# Patient Record
Sex: FEMALE | Race: WHITE | Employment: UNEMPLOYED | URBAN - NONMETROPOLITAN AREA
[De-identification: names, ages, dates, MRNs, and addresses within clinical notes are randomized per-mention and may not be internally consistent; named-entity substitution may affect disease eponyms.]

---

## 2017-07-21 ENCOUNTER — OFFICE VISIT (OUTPATIENT)
Dept: ORTHOPEDIC SURGERY | Age: 57
End: 2017-07-21

## 2017-07-21 DIAGNOSIS — M17.11 PRIMARY OSTEOARTHRITIS OF RIGHT KNEE: ICD-10-CM

## 2017-07-21 DIAGNOSIS — M23.300 DEGENERATIVE TEAR OF LATERAL MENISCUS OF RIGHT KNEE: ICD-10-CM

## 2017-07-21 DIAGNOSIS — M25.561 LATERAL KNEE PAIN, RIGHT: Primary | ICD-10-CM

## 2017-07-21 PROBLEM — M25.569 LATERAL KNEE PAIN: Status: ACTIVE | Noted: 2017-07-21

## 2017-07-21 PROCEDURE — 99213 OFFICE O/P EST LOW 20 MIN: CPT | Performed by: ORTHOPAEDIC SURGERY

## 2017-07-21 RX ORDER — NITROFURANTOIN MACROCRYSTALS 50 MG/1
50 CAPSULE ORAL 4 TIMES DAILY
Status: ON HOLD | COMMUNITY
End: 2020-11-09 | Stop reason: HOSPADM

## 2017-07-21 RX ORDER — METOPROLOL SUCCINATE 25 MG/1
25 TABLET, EXTENDED RELEASE ORAL DAILY
Status: ON HOLD | COMMUNITY
End: 2021-03-12 | Stop reason: HOSPADM

## 2017-09-15 ENCOUNTER — OFFICE VISIT (OUTPATIENT)
Dept: ORTHOPEDIC SURGERY | Age: 57
End: 2017-09-15

## 2017-09-15 DIAGNOSIS — M23.300 DEGENERATIVE TEAR OF LATERAL MENISCUS OF RIGHT KNEE: Primary | ICD-10-CM

## 2017-09-15 DIAGNOSIS — M25.561 LATERAL KNEE PAIN, RIGHT: ICD-10-CM

## 2017-09-15 DIAGNOSIS — M17.11 PRIMARY OSTEOARTHRITIS OF RIGHT KNEE: ICD-10-CM

## 2017-09-15 PROCEDURE — 99213 OFFICE O/P EST LOW 20 MIN: CPT | Performed by: ORTHOPAEDIC SURGERY

## 2017-09-15 RX ORDER — MELOXICAM 15 MG/1
15 TABLET ORAL DAILY
Status: ON HOLD | COMMUNITY
End: 2020-12-29 | Stop reason: HOSPADM

## 2017-09-15 RX ORDER — LATANOPROST 50 UG/ML
1 SOLUTION/ DROPS OPHTHALMIC NIGHTLY
COMMUNITY

## 2017-12-01 ENCOUNTER — OFFICE VISIT (OUTPATIENT)
Dept: ORTHOPEDIC SURGERY | Age: 57
End: 2017-12-01

## 2017-12-01 VITALS
SYSTOLIC BLOOD PRESSURE: 191 MMHG | WEIGHT: 230 LBS | HEART RATE: 69 BPM | DIASTOLIC BLOOD PRESSURE: 100 MMHG | BODY MASS INDEX: 34.86 KG/M2 | HEIGHT: 68 IN

## 2017-12-01 DIAGNOSIS — M23.300 DEGENERATIVE TEAR OF LATERAL MENISCUS OF RIGHT KNEE: Primary | ICD-10-CM

## 2017-12-01 DIAGNOSIS — M17.11 PRIMARY OSTEOARTHRITIS OF RIGHT KNEE: ICD-10-CM

## 2017-12-01 PROCEDURE — 99213 OFFICE O/P EST LOW 20 MIN: CPT | Performed by: ORTHOPAEDIC SURGERY

## 2017-12-01 PROCEDURE — 3014F SCREEN MAMMO DOC REV: CPT | Performed by: ORTHOPAEDIC SURGERY

## 2017-12-01 PROCEDURE — 1036F TOBACCO NON-USER: CPT | Performed by: ORTHOPAEDIC SURGERY

## 2017-12-01 PROCEDURE — G8484 FLU IMMUNIZE NO ADMIN: HCPCS | Performed by: ORTHOPAEDIC SURGERY

## 2017-12-01 PROCEDURE — G8427 DOCREV CUR MEDS BY ELIG CLIN: HCPCS | Performed by: ORTHOPAEDIC SURGERY

## 2017-12-01 PROCEDURE — 3017F COLORECTAL CA SCREEN DOC REV: CPT | Performed by: ORTHOPAEDIC SURGERY

## 2017-12-01 PROCEDURE — G8417 CALC BMI ABV UP PARAM F/U: HCPCS | Performed by: ORTHOPAEDIC SURGERY

## 2017-12-01 RX ORDER — ACETAMINOPHEN 500 MG
500 TABLET ORAL EVERY 6 HOURS PRN
Status: ON HOLD | COMMUNITY
End: 2020-11-09 | Stop reason: HOSPADM

## 2017-12-01 RX ORDER — OMEGA-3S/DHA/EPA/FISH OIL/D3 300MG-1000
400 CAPSULE ORAL DAILY
COMMUNITY

## 2017-12-01 RX ORDER — PHENOL 1.4 %
1 AEROSOL, SPRAY (ML) MUCOUS MEMBRANE DAILY
COMMUNITY

## 2017-12-01 NOTE — LETTER
 meloxicam (MOBIC) 15 MG tablet Take 15 mg by mouth daily      ciprofloxacin (CIPRO) 500 MG tablet Take 500 mg by mouth 2 times daily      oxyCODONE-acetaminophen (PERCOCET) 5-325 MG per tablet Take 1 tablet by mouth every 4 hours as needed for Pain       No current facility-administered medications for this visit. Social    Social History     Social History    Marital status:      Spouse name: N/A    Number of children: N/A    Years of education: N/A     Occupational History    Not on file. Social History Main Topics    Smoking status: Never Smoker    Smokeless tobacco: Never Used    Alcohol use Not on file    Drug use: Unknown    Sexual activity: Not on file     Other Topics Concern    Not on file     Social History Narrative    No narrative on file       Family HISTORY    History reviewed. No pertinent family history. PHYSICAL EXAM    Vital Signs:  BP (!) 191/100   Pulse 69   Ht 5' 8\" (1.727 m)   Wt 230 lb (104.3 kg)   BMI 34.97 kg/m²    General Appearance: obese body habitus with a BMI of 35. Alert and oriented to person, place, and time. Affect:  Normal.   Gait:  Antalgic, using a cane with some genu valgum, which is mild to moderate on the right versus the left.l. Good balance and coordination. Skin:  Intact. Sensation:  Intact. Strength:  Intact. Reflexes:  Intact. Pulses:  Intact. Knee Exam:    Effusion:  negative    Range of Motion Right Left   Extension 0 0   Flexion 110 110     Provocative Test Right Left    Positive Negative Positive Negative   Anterior drawer       Lachman       Posterior drawer       Varus testing       Valgus testing       Joint line tenderness         Additional Exam Comments:  Her neurocirculatory and lymphatic exam is normal symmetric both lower extremities. She has lateral compartment pain on the right knee with direct palpation and Gladis's maneuver.       IMAGING STUDIES MRI of her right knee reveals osteoarthritis with lateral meniscus tear    IMPRESSION    Right knee pain secondary to degenerative lateral meniscus tear and osteoarthritis    PLAN      1. Conservative care options including physical therapy, NSAIDs, bracing, and activity modification were discussed. 2.  The indications for therapeutic injections were discussed. 3.  The indications for additional imaging studies were discussed. 4.  After considering the various options discussed, the patient elected to pursue a course that includes proceeding with arthroscopic intervention for partial lateral meniscectomy, right knee      INFORMED CONSENT NOTE        We discussed the risks, benefits, and alternatives to the proposed procedure, as well as the necessity of other members of the healthcare team participating in the procedure. All questions were answered and the patient elected to proceed with the proposed procedure and signed the informed consent form. Laquita Dakin.  Stanford Boros, MD Adella Runner, MD

## 2017-12-01 NOTE — PROGRESS NOTES
KNEE VISIT      HISTORY OF PRESENT ILLNESS    Paola Oppenheim is a 61 y.o. female who presents for Reevaluation of her right knee. She's here for review of her MRI. Still complains of knee pain. Most and some lateral compartment. ROS    Well documented in her patient history form dated 17  All other ROS negative except for above.     Past Surgical history    Past Surgical History:   Procedure Laterality Date    BLADDER REPAIR       SECTION      CHOLECYSTECTOMY      CYSTOSCOPY         PAST MEDICAL    Past Medical History:   Diagnosis Date    CHF [facility]congestive heart failure) ([facility] [address]     HTN (hypertension)     UTI (urinary tract infection)        Allergies    No Known Allergies    Meds    Current Outpatient Prescriptions   Medication Sig Dispense Refill    vitamin D3 (CHOLECALCIFEROL) 400 units TABS tablet Take 400 Units by mouth daily      Flaxseed Oil OIL 1,400 mg by Does not apply route daily      calcium carbonate 600 MG TABS tablet Take 1 tablet by mouth daily      acetaminophen (TYLENOL) 500 MG tablet Take 500 mg by mouth every 6 hours as needed for Pain      cimetidine (TAGAMET HB) 200 MG tablet Take 200 mg by mouth as needed      metoprolol succinate (TOPROL XL) 25 MG extended release tablet Take 25 mg by mouth daily      nitrofurantoin (MACRODANTIN) 50 MG capsule Take 50 mg by mouth 4 times daily      rivaroxaban (XARELTO) 20 MG TABS tablet Take 20 mg by mouth      lisinopril (PRINIVIL;ZESTRIL) 20 MG tablet Take 40 mg by mouth daily       potassium chloride (KLOR-CON) 20 MEQ packet Take 20 mEq by mouth 2 times daily      latanoprost (XALATAN) 0.005 % ophthalmic solution 1 drop nightly      meloxicam (MOBIC) 15 MG tablet Take 15 mg by mouth daily      ciprofloxacin (CIPRO) 500 MG tablet Take 500 mg by mouth 2 times daily      oxyCODONE-acetaminophen (PERCOCET) 5-325 MG per tablet Take 1 tablet by mouth every 4 hours as needed for Pain       No current facility-administered medications for this visit. Social    Social History     Social History    Marital status:      Spouse name: N/A    Number of children: N/A    Years of education: N/A     Occupational History    Not on file. Social History Main Topics    Smoking status: Never Smoker    Smokeless tobacco: Never Used    Alcohol use Not on file    Drug use: Unknown    Sexual activity: Not on file     Other Topics Concern    Not on file     Social History Narrative    No narrative on file       Family HISTORY    History reviewed. No pertinent family history. PHYSICAL EXAM    Vital Signs:  BP (!) 191/100   Pulse 69   Ht 5' 8\" (1.727 m)   Wt 230 lb (104.3 kg)   BMI 34.97 kg/m²   General Appearance: obese body habitus with a BMI of 35. Alert and oriented to person, place, and time. Affect:  Normal.   Gait:  Antalgic, using a cane with some genu valgum, which is mild to moderate on the right versus the left.l. Good balance and coordination. Skin:  Intact. Sensation:  Intact. Strength:  Intact. Reflexes:  Intact. Pulses:  Intact. Knee Exam:    Effusion:  negative    Range of Motion Right Left   Extension 0 0   Flexion 110 110     Provocative Test Right Left    Positive Negative Positive Negative   Anterior drawer [] [x] [] [x]   Lachman [] [x] [] [x]   Posterior drawer [] [x] [] [x]   Varus testing [] [x] [] [x]   Valgus testing [x] [] [] [x]   Joint line tenderness [x] [] [] [x]     Additional Exam Comments:  Her neurocirculatory and lymphatic exam is normal symmetric both lower extremities. She has lateral compartment pain on the right knee with direct palpation and Gladis's maneuver. IMAGING STUDIES    MRI of her right knee reveals osteoarthritis with lateral meniscus tear    IMPRESSION    Right knee pain secondary to degenerative lateral meniscus tear and osteoarthritis    PLAN      1.   Conservative care options including physical therapy, NSAIDs, bracing, and activity modification were discussed. 2.  The indications for therapeutic injections were discussed. 3.  The indications for additional imaging studies were discussed. 4.  After considering the various options discussed, the patient elected to pursue a course that includes proceeding with arthroscopic intervention for partial lateral meniscectomy, right knee      INFORMED CONSENT NOTE        We discussed the risks, benefits, and alternatives to the proposed procedure, as well as the necessity of other members of the healthcare team participating in the procedure. All questions were answered and the patient elected to proceed with the proposed procedure and signed the informed consent form.

## 2017-12-14 ENCOUNTER — TELEPHONE (OUTPATIENT)
Dept: ORTHOPEDIC SURGERY | Age: 57
End: 2017-12-14

## 2017-12-14 NOTE — TELEPHONE ENCOUNTER
Pending (no info in sys regarding Surgery)   Marylin ZAIDI (R Knee)  12/20/17 @ 3:12 pm, still nothing in sys regarding sx  12/27/17 @ 10:48a.m. Nothing in sys regarding sx  12/28/17 sent Juanito Ran a telephone msg.

## 2018-01-03 ENCOUNTER — OFFICE VISIT (OUTPATIENT)
Dept: ORTHOPEDIC SURGERY | Age: 58
End: 2018-01-03

## 2018-01-03 DIAGNOSIS — M17.11 PRIMARY OSTEOARTHRITIS OF RIGHT KNEE: ICD-10-CM

## 2018-01-03 DIAGNOSIS — M23.300 DEGENERATIVE TEAR OF LATERAL MENISCUS OF RIGHT KNEE: Primary | ICD-10-CM

## 2018-01-03 PROCEDURE — 99024 POSTOP FOLLOW-UP VISIT: CPT | Performed by: ORTHOPAEDIC SURGERY

## 2018-01-03 NOTE — PROGRESS NOTES
FOLLOW-UP VISIT      The patient returns for follow-up s/p right knee video arthroscopy, partial lateral meniscectomy, chondroplasty    Date of Surgery    Flow 20/9/17    Wound Status    Sutures Removed, Incisions are healing well with no surrounding erythema, and minimal ecchymosis.      Exam    She has no signs of infection DVT but moderate pain and still using a walker    Plan    Formal physical therapy    Follow-up Appointment    4 weeks

## 2018-01-03 NOTE — PATIENT INSTRUCTIONS
floor. Hold for about 6 seconds, then slowly lower your leg. 5. Do 8 to 12 repetitions. Straight-leg raises to the back    1. Lie on your stomach, and lift your leg straight up behind you (toward the ceiling). 2. Lift your toes about 6 inches off the floor, hold for about 6 seconds, then lower slowly. 3. Do 8 to 12 repetitions. Straight-leg raises to the inside    1. Lie on the side of your body with the injured leg. 2. You can either prop your other (good) leg up on a chair, or you can bend your good knee and put that foot in front of your injured knee. Do not drop your hip back. 3. Tighten the muscles on the front of your thigh to straighten your injured knee. 4. Keep your kneecap pointing forward, and lift your whole leg up toward the ceiling about 6 inches. Hold for about 6 seconds, then lower slowly. 5. Do 8 to 12 repetitions. Heel dig bridging    1. Lie on your back with both knees bent and your ankles bent so that only your heels are digging into the floor. Your knees should be bent about 90 degrees. 2. Then push your heels into the floor, squeeze your buttocks, and lift your hips off the floor until your shoulders, hips, and knees are all in a straight line. 3. Hold for about 6 seconds as you continue to breathe normally, and then slowly lower your hips back down to the floor and rest for up to 10 seconds. 4. Do 8 to 12 repetitions. Hamstring curls    1. Lie on your stomach with your knees straight. If your kneecap is uncomfortable, roll up a washcloth and put it under your leg just above your kneecap. 2. Lift the foot of your injured leg by bending the knee so that you bring the foot up toward your buttock. If this motion hurts, try it without bending your knee quite as far. This may help you avoid any painful motion. 3. Slowly lower your leg back to the floor. 4. Do 8 to 12 repetitions.   5. With permission from your doctor or physical therapist, you may also want to add a cuff weight to your ankle (not more than 5 pounds). With weight, you do not have to lift your leg more than 12 inches to get a hamstring workout. Shallow standing knee bends    Do this exercise only if you have very little pain; if you have no clicking, locking, or giving way if you have an injured knee; and if it does not hurt while you are doing 8 to 12 repetitions. 1. Stand with your hands lightly resting on a counter or chair in front of you. Put your feet shoulder-width apart. 2. Slowly bend your knees so that you squat down like you are going to sit in a chair. Make sure your knees do not go in front of your toes. 3. Lower yourself about 6 inches. Your heels should remain on the floor at all times. 4. Rise slowly to a standing position. Heel raises    1. Stand with your feet a few inches apart, with your hands lightly resting on a counter or chair in front of you. 2. Slowly raise your heels off the floor while keeping your knees straight. 3. Hold for about 6 seconds, then slowly lower your heels to the floor. 4. Do 8 to 12 repetitions several times during the day. Follow-up care is a key part of your treatment and safety. Be sure to make and go to all appointments, and call your doctor if you are having problems. It's also a good idea to know your test results and keep a list of the medicines you take. Where can you learn more? Go to https://[employer]. org and sign in to your [employer] account. Enter V833 in the [facility] box to learn more about \"Knee: Exercises. \"     If you do not have an account, please click on the \"Sign Up Now\" link. Current as of: March 21, 2017  Content Version: 11.4  © 5163-2187 [employer], [employer]. Care instructions adapted under license by [facility] ([facility]).  If you have questions about a medical condition or this instruction, always ask your healthcare professional. [place] [address] any warranty or liability for your use of this

## 2018-01-31 ENCOUNTER — OFFICE VISIT (OUTPATIENT)
Dept: ORTHOPEDIC SURGERY | Age: 58
End: 2018-01-31

## 2018-01-31 DIAGNOSIS — M17.11 PRIMARY OSTEOARTHRITIS OF RIGHT KNEE: Primary | ICD-10-CM

## 2018-01-31 PROCEDURE — 99024 POSTOP FOLLOW-UP VISIT: CPT | Performed by: ORTHOPAEDIC SURGERY

## 2018-02-01 ENCOUNTER — TELEPHONE (OUTPATIENT)
Dept: ORTHOPEDIC SURGERY | Age: 58
End: 2018-02-01

## 2018-02-01 NOTE — TELEPHONE ENCOUNTER
Faxed completed RTW Certification and Revised FMLA (R Knee) to [facility] @ [phone].

## 2020-10-30 ENCOUNTER — OFFICE VISIT (OUTPATIENT)
Dept: ORTHOPEDIC SURGERY | Age: 60
End: 2020-10-30
Payer: MEDICAID

## 2020-10-30 VITALS — WEIGHT: 290 LBS | HEIGHT: 68 IN | BODY MASS INDEX: 43.95 KG/M2

## 2020-10-30 PROBLEM — M17.12 PRIMARY OSTEOARTHRITIS OF LEFT KNEE: Status: ACTIVE | Noted: 2020-10-30

## 2020-10-30 PROCEDURE — G8427 DOCREV CUR MEDS BY ELIG CLIN: HCPCS | Performed by: ORTHOPAEDIC SURGERY

## 2020-10-30 PROCEDURE — 4004F PT TOBACCO SCREEN RCVD TLK: CPT | Performed by: ORTHOPAEDIC SURGERY

## 2020-10-30 PROCEDURE — G8484 FLU IMMUNIZE NO ADMIN: HCPCS | Performed by: ORTHOPAEDIC SURGERY

## 2020-10-30 PROCEDURE — G8417 CALC BMI ABV UP PARAM F/U: HCPCS | Performed by: ORTHOPAEDIC SURGERY

## 2020-10-30 PROCEDURE — 99203 OFFICE O/P NEW LOW 30 MIN: CPT | Performed by: ORTHOPAEDIC SURGERY

## 2020-10-30 PROCEDURE — 3017F COLORECTAL CA SCREEN DOC REV: CPT | Performed by: ORTHOPAEDIC SURGERY

## 2020-10-30 RX ORDER — AMLODIPINE BESYLATE 10 MG/1
TABLET ORAL
Status: ON HOLD | COMMUNITY
Start: 2020-10-08 | End: 2021-03-12 | Stop reason: HOSPADM

## 2020-10-30 RX ORDER — TRAMADOL HYDROCHLORIDE 50 MG/1
TABLET ORAL
Status: ON HOLD | COMMUNITY
Start: 2020-10-08 | End: 2020-11-09 | Stop reason: HOSPADM

## 2020-10-30 RX ORDER — ATORVASTATIN CALCIUM 40 MG/1
TABLET, FILM COATED ORAL
COMMUNITY
Start: 2020-10-29

## 2020-10-30 RX ORDER — OXYBUTYNIN CHLORIDE 5 MG/1
TABLET ORAL
COMMUNITY
Start: 2020-07-31

## 2020-10-30 NOTE — LETTER
[id number] [address]  Phone: [phone]  Fax: [phone]    Ke Mosley MD        2020    Alysia Tillman  [facility]. [address] [facility]   1960  DOS 10/30/2020      KNEE VISIT      HISTORY OF PRESENT ILLNESS    Alysia Tillman is a 60 y.o. female who presents for evaluation of bilateral knee pain. Right knee hurts worse than left and she was told by another practitioner that although she had grade 10/10 pain that she should eat 1 meal a day. Although she says she weighs 290 pounds, my guess is that she weighs closer to 350 or more pounds. She has difficult time walking and just wants some relief and recommendations because she was told to eating 1 meal a day is probably not the best source    ROS    Patient history form dated 10/30/2020  All other ROS negative except for above.     Past Surgical history    Past Surgical History:   Procedure Laterality Date    BLADDER REPAIR       SECTION      CHOLECYSTECTOMY      CYSTOSCOPY         PAST MEDICAL    Past Medical History:   Diagnosis Date    [facility]) ([facility] [address]     HTN (hypertension)     UTI (urinary tract infection)        Allergies    No Known Allergies    Meds    Current Outpatient Medications   Medication Sig Dispense Refill    amLODIPine (NORVASC) 10 MG tablet TAKE 1 TABLET BY MOUTH ONCE DAILY FOR 90 DAYS      atorvastatin (LIPITOR) 40 MG tablet       oxybutynin (DITROPAN) 5 MG tablet TAKE 1 TABLET BY MOUTH EVERY 8 HOURS      traMADol (ULTRAM) 50 MG tablet TAKE 1 TABLET BY MOUTH THREE TIMES DAILY AS NEEDED FOR PAIN      acetaminophen (TYLENOL) 500 MG tablet Take 500 mg by mouth every 6 hours as needed for Pain      cimetidine (TAGAMET HB) 200 MG tablet Take 200 mg by mouth as needed      metoprolol succinate (TOPROL XL) 25 MG extended release tablet Take 25 mg by mouth daily  nitrofurantoin (MACRODANTIN) 50 MG capsule Take 50 mg by mouth 4 times daily      rivaroxaban (XARELTO) 20 MG TABS tablet Take 20 mg by mouth      lisinopril (PRINIVIL;ZESTRIL) 20 MG tablet Take 40 mg by mouth daily       vitamin D3 (CHOLECALCIFEROL) 400 units TABS tablet Take 400 Units by mouth daily      Flaxseed Oil OIL 1,400 mg by Does not apply route daily      calcium carbonate 600 MG TABS tablet Take 1 tablet by mouth daily      latanoprost (XALATAN) 0.005 % ophthalmic solution 1 drop nightly      meloxicam (MOBIC) 15 MG tablet Take 15 mg by mouth daily      ciprofloxacin (CIPRO) 500 MG tablet Take 500 mg by mouth 2 times daily      oxyCODONE-acetaminophen (PERCOCET) 5-325 MG per tablet Take 1 tablet by mouth every 4 hours as needed for Pain      potassium chloride (KLOR-CON) 20 MEQ packet Take 20 mEq by mouth 2 times daily       No current facility-administered medications for this visit. Social    Social History     Socioeconomic History    Marital status:       Spouse name: Not on file    Number of children: Not on file    Years of education: Not on file    Highest education level: Not on file   Occupational History    Not on file   Social Needs    Financial resource strain: Not on file    Food insecurity     Worry: Not on file     Inability: Not on file    Transportation needs     Medical: Not on file     Non-medical: Not on file   Tobacco Use    Smoking status: Never Smoker    Smokeless tobacco: Never Used   Substance and Sexual Activity    Alcohol use: Not on file    Drug use: Not on file    Sexual activity: Not on file   Lifestyle    Physical activity     Days per week: Not on file     Minutes per session: Not on file    Stress: Not on file   Relationships    Social connections     Talks on phone: Not on file     Gets together: Not on file     Attends Adventism service: Not on file     Active member of club or organization: Not on file 2.  The indications for therapeutic injections were discussed. 3.  The indications for additional imaging studies were discussed. 4.  After considering the various options discussed, the patient elected to pursue a course that includes requesting viscosupplementation, a consultation with dietary and seek care with [facility] weight management or weight reduction department        MD Jose Eduardo Reddy MD

## 2020-10-30 NOTE — PROGRESS NOTES
KNEE VISIT      HISTORY OF PRESENT ILLNESS    Mary Grace Cordova is a 60 y.o. female who presents for evaluation of bilateral knee pain. Right knee hurts worse than left and she was told by another practitioner that although she had grade 10/10 pain that she should eat 1 meal a day. Although she says she weighs 290 pounds, my guess is that she weighs closer to 350 or more pounds. She has difficult time walking and just wants some relief and recommendations because she was told to eating 1 meal a day is probably not the best source    ROS    Patient history form dated 10/30/2020  All other ROS negative except for above.     Past Surgical history    Past Surgical History:   Procedure Laterality Date    BLADDER REPAIR       SECTION      CHOLECYSTECTOMY      CYSTOSCOPY         PAST MEDICAL    Past Medical History:   Diagnosis Date    [facility]est[facility]) ([facility] [address]     HTN (hypertension)     UTI (urinary tract infection)        Allergies    No Known Allergies    Meds    Current Outpatient Medications   Medication Sig Dispense Refill    amLODIPine (NORVASC) 10 MG tablet TAKE 1 TABLET BY MOUTH ONCE DAILY FOR 90 DAYS      atorvastatin (LIPITOR) 40 MG tablet       oxybutynin (DITROPAN) 5 MG tablet TAKE 1 TABLET BY MOUTH EVERY 8 HOURS      traMADol (ULTRAM) 50 MG tablet TAKE 1 TABLET BY MOUTH THREE TIMES DAILY AS NEEDED FOR PAIN      acetaminophen (TYLENOL) 500 MG tablet Take 500 mg by mouth every 6 hours as needed for Pain      cimetidine (TAGAMET HB) 200 MG tablet Take 200 mg by mouth as needed      metoprolol succinate (TOPROL XL) 25 MG extended release tablet Take 25 mg by mouth daily      nitrofurantoin (MACRODANTIN) 50 MG capsule Take 50 mg by mouth 4 times daily      rivaroxaban (XARELTO) 20 MG TABS tablet Take 20 mg by mouth      lisinopril (PRINIVIL;ZESTRIL) 20 MG tablet Take 40 mg by mouth daily       vitamin D3 (CHOLECALCIFEROL) 400 units TABS tablet Take 400 Units by mouth daily  Flaxseed Oil OIL 1,400 mg by Does not apply route daily      calcium carbonate 600 MG TABS tablet Take 1 tablet by mouth daily      latanoprost (XALATAN) 0.005 % ophthalmic solution 1 drop nightly      meloxicam (MOBIC) 15 MG tablet Take 15 mg by mouth daily      ciprofloxacin (CIPRO) 500 MG tablet Take 500 mg by mouth 2 times daily      oxyCODONE-acetaminophen (PERCOCET) 5-325 MG per tablet Take 1 tablet by mouth every 4 hours as needed for Pain      potassium chloride (KLOR-CON) 20 MEQ packet Take 20 mEq by mouth 2 times daily       No current facility-administered medications for this visit. Social    Social History     Socioeconomic History    Marital status:       Spouse name: Not on file    Number of children: Not on file    Years of education: Not on file    Highest education level: Not on file   Occupational History    Not on file   Social Needs    Financial resource strain: Not on file    Food insecurity     Worry: Not on file     Inability: Not on file    Transportation needs     Medical: Not on file     Non-medical: Not on file   Tobacco Use    Smoking status: Never Smoker    Smokeless tobacco: Never Used   Substance and Sexual Activity    Alcohol use: Not on file    Drug use: Not on file    Sexual activity: Not on file   Lifestyle    Physical activity     Days per week: Not on file     Minutes per session: Not on file    Stress: Not on file   Relationships    Social connections     Talks on phone: Not on file     Gets together: Not on file     Attends Latter-day service: Not on file     Active member of club or organization: Not on file     Attends meetings of clubs or organizations: Not on file     Relationship status: Not on file    Intimate partner violence     Fear of current or ex partner: Not on file     Emotionally abused: Not on file     Physically abused: Not on file     Forced sexual activity: Not on file   Other Topics Concern    Not on file   Social History Narrative    Not on file       Family HISTORY    No family history on file. PHYSICAL EXAM    Vital Signs:  Ht 5' 8\" (1.727 m)   Wt 290 lb (131.5 kg)   BMI 44.09 kg/m²   General Appearance: Obese body habitus with a BMI this problem much greater than 44. Alert and oriented to person, place, and time. Affect:  Normal.   Gait: Very unsteady with severe genu valgum right knee good balance and coordination. Skin:  Intact. Sensation:  Intact. Strength:  Intact. Reflexes:  Intact. Pulses:  Intact. Knee Exam:    Effusion: Difficult to obtain or determine because of the size of her legs    Range of Motion Right Left   Extension -10 -10   Flexion 85 110     Provocative Test Right Left    Positive Negative Positive Negative   Anterior drawer [] [x] [] [x]   Lachman [] [x] [] [x]   Posterior drawer [] [x] [] [x]   Varus testing [x] [] [x] []   Valgus testing [x] [] [x] []   Joint line tenderness [x] [] [x] []     Additional Exam Comments: She has cellulitis in both legs from chronic pedal edema and venous stasis. I told her she needs to get her legs up higher as opposed to trying to get them down to do exercise which she cannot do anyway. She is not a good surgical candidate for variety reasons including skin condition weight and activity. IMAGING STUDIES    X-rays 2 views of both knees reveal osteoarthritis of both knees    IMPRESSION    Severe arthritis both knees right worse than left    PLAN      1. Conservative care options including physical therapy, NSAIDs, bracing, and activity modification were discussed. 2.  The indications for therapeutic injections were discussed. 3.  The indications for additional imaging studies were discussed.    4.  After considering the various options discussed, the patient elected to pursue a course that includes requesting viscosupplementation, a consultation with dietary and seek care with [facility] weight management or weight reduction department

## 2020-11-05 ENCOUNTER — HOSPITAL ENCOUNTER (INPATIENT)
Age: 60
LOS: 7 days | Discharge: NURSING, REHAB OR LONG-TERM CARE | DRG: 313 | End: 2020-11-12
Attending: INTERNAL MEDICINE | Admitting: INTERNAL MEDICINE
Payer: MEDICAID

## 2020-11-05 PROBLEM — S82.131A RIGHT MEDIAL TIBIAL PLATEAU FRACTURE, CLOSED, INITIAL ENCOUNTER: Status: ACTIVE | Noted: 2020-11-05

## 2020-11-05 PROCEDURE — 6360000002 HC RX W HCPCS: Performed by: NURSE PRACTITIONER

## 2020-11-05 PROCEDURE — 1200000000 HC SEMI PRIVATE

## 2020-11-05 RX ORDER — ONDANSETRON 2 MG/ML
4 INJECTION INTRAMUSCULAR; INTRAVENOUS EVERY 6 HOURS PRN
Status: DISCONTINUED | OUTPATIENT
Start: 2020-11-05 | End: 2020-11-08

## 2020-11-05 RX ORDER — PROMETHAZINE HYDROCHLORIDE 25 MG/1
12.5 TABLET ORAL EVERY 6 HOURS PRN
Status: DISCONTINUED | OUTPATIENT
Start: 2020-11-05 | End: 2020-11-12 | Stop reason: HOSPADM

## 2020-11-05 RX ORDER — OXYCODONE HYDROCHLORIDE AND ACETAMINOPHEN 5; 325 MG/1; MG/1
2 TABLET ORAL EVERY 4 HOURS PRN
Status: DISCONTINUED | OUTPATIENT
Start: 2020-11-05 | End: 2020-11-12 | Stop reason: HOSPADM

## 2020-11-05 RX ORDER — MORPHINE SULFATE 2 MG/ML
2 INJECTION, SOLUTION INTRAMUSCULAR; INTRAVENOUS
Status: DISCONTINUED | OUTPATIENT
Start: 2020-11-05 | End: 2020-11-12 | Stop reason: HOSPADM

## 2020-11-05 RX ORDER — ACETAMINOPHEN 325 MG/1
650 TABLET ORAL EVERY 6 HOURS PRN
Status: DISCONTINUED | OUTPATIENT
Start: 2020-11-05 | End: 2020-11-12 | Stop reason: HOSPADM

## 2020-11-05 RX ORDER — SODIUM CHLORIDE 9 MG/ML
INJECTION, SOLUTION INTRAVENOUS CONTINUOUS
Status: DISCONTINUED | OUTPATIENT
Start: 2020-11-06 | End: 2020-11-07

## 2020-11-05 RX ORDER — ACETAMINOPHEN 650 MG/1
650 SUPPOSITORY RECTAL EVERY 6 HOURS PRN
Status: DISCONTINUED | OUTPATIENT
Start: 2020-11-05 | End: 2020-11-12 | Stop reason: HOSPADM

## 2020-11-05 RX ORDER — CYCLOBENZAPRINE HCL 10 MG
10 TABLET ORAL 3 TIMES DAILY PRN
Status: DISCONTINUED | OUTPATIENT
Start: 2020-11-05 | End: 2020-11-12 | Stop reason: HOSPADM

## 2020-11-05 RX ORDER — POLYETHYLENE GLYCOL 3350 17 G/17G
17 POWDER, FOR SOLUTION ORAL DAILY PRN
Status: DISCONTINUED | OUTPATIENT
Start: 2020-11-05 | End: 2020-11-12 | Stop reason: HOSPADM

## 2020-11-05 RX ORDER — SODIUM CHLORIDE 0.9 % (FLUSH) 0.9 %
10 SYRINGE (ML) INJECTION EVERY 12 HOURS SCHEDULED
Status: DISCONTINUED | OUTPATIENT
Start: 2020-11-06 | End: 2020-11-12 | Stop reason: HOSPADM

## 2020-11-05 RX ORDER — SODIUM CHLORIDE 0.9 % (FLUSH) 0.9 %
10 SYRINGE (ML) INJECTION PRN
Status: DISCONTINUED | OUTPATIENT
Start: 2020-11-05 | End: 2020-11-12 | Stop reason: HOSPADM

## 2020-11-05 RX ORDER — OXYCODONE HYDROCHLORIDE AND ACETAMINOPHEN 5; 325 MG/1; MG/1
1 TABLET ORAL EVERY 4 HOURS PRN
Status: DISCONTINUED | OUTPATIENT
Start: 2020-11-05 | End: 2020-11-12 | Stop reason: HOSPADM

## 2020-11-05 RX ORDER — MORPHINE SULFATE 4 MG/ML
4 INJECTION, SOLUTION INTRAMUSCULAR; INTRAVENOUS
Status: DISCONTINUED | OUTPATIENT
Start: 2020-11-05 | End: 2020-11-12 | Stop reason: HOSPADM

## 2020-11-05 ASSESSMENT — PAIN DESCRIPTION - LOCATION: LOCATION: LEG

## 2020-11-05 ASSESSMENT — PAIN DESCRIPTION - ORIENTATION: ORIENTATION: RIGHT

## 2020-11-05 ASSESSMENT — PAIN SCALES - GENERAL: PAINLEVEL_OUTOF10: 5

## 2020-11-05 ASSESSMENT — PAIN DESCRIPTION - PROGRESSION: CLINICAL_PROGRESSION: NOT CHANGED

## 2020-11-05 ASSESSMENT — PAIN DESCRIPTION - FREQUENCY: FREQUENCY: CONTINUOUS

## 2020-11-05 ASSESSMENT — PAIN DESCRIPTION - DESCRIPTORS: DESCRIPTORS: SHOOTING;SHARP;RADIATING

## 2020-11-05 ASSESSMENT — PAIN DESCRIPTION - ONSET: ONSET: SUDDEN

## 2020-11-05 ASSESSMENT — PAIN DESCRIPTION - PAIN TYPE: TYPE: ACUTE PAIN

## 2020-11-06 ENCOUNTER — APPOINTMENT (OUTPATIENT)
Dept: GENERAL RADIOLOGY | Age: 60
DRG: 313 | End: 2020-11-06
Attending: INTERNAL MEDICINE
Payer: MEDICAID

## 2020-11-06 ENCOUNTER — ANESTHESIA (OUTPATIENT)
Dept: OPERATING ROOM | Age: 60
DRG: 313 | End: 2020-11-06
Payer: MEDICAID

## 2020-11-06 ENCOUNTER — ANESTHESIA EVENT (OUTPATIENT)
Dept: OPERATING ROOM | Age: 60
DRG: 313 | End: 2020-11-06
Payer: MEDICAID

## 2020-11-06 VITALS
RESPIRATION RATE: 13 BRPM | SYSTOLIC BLOOD PRESSURE: 153 MMHG | OXYGEN SATURATION: 100 % | DIASTOLIC BLOOD PRESSURE: 79 MMHG

## 2020-11-06 LAB
ANION GAP SERPL CALCULATED.3IONS-SCNC: 12 MMOL/L (ref 3–16)
BASOPHILS ABSOLUTE: 0.1 K/UL (ref 0–0.2)
BASOPHILS RELATIVE PERCENT: 0.7 %
BUN BLDV-MCNC: 12 MG/DL (ref 7–20)
CALCIUM SERPL-MCNC: 9.3 MG/DL (ref 8.3–10.6)
CHLORIDE BLD-SCNC: 108 MMOL/L (ref 99–110)
CO2: 22 MMOL/L (ref 21–32)
CREAT SERPL-MCNC: <0.5 MG/DL (ref 0.6–1.2)
EKG ATRIAL RATE: 98 BPM
EKG DIAGNOSIS: NORMAL
EKG P AXIS: 38 DEGREES
EKG P-R INTERVAL: 114 MS
EKG Q-T INTERVAL: 348 MS
EKG QRS DURATION: 76 MS
EKG QTC CALCULATION (BAZETT): 444 MS
EKG R AXIS: -9 DEGREES
EKG T AXIS: 31 DEGREES
EKG VENTRICULAR RATE: 98 BPM
EOSINOPHILS ABSOLUTE: 0.1 K/UL (ref 0–0.6)
EOSINOPHILS RELATIVE PERCENT: 1.3 %
GFR AFRICAN AMERICAN: >60
GFR NON-AFRICAN AMERICAN: >60
GLUCOSE BLD-MCNC: 90 MG/DL (ref 70–99)
HCT VFR BLD CALC: 39.9 % (ref 36–48)
HEMOGLOBIN: 12.5 G/DL (ref 12–16)
LYMPHOCYTES ABSOLUTE: 0.8 K/UL (ref 1–5.1)
LYMPHOCYTES RELATIVE PERCENT: 8.2 %
MCH RBC QN AUTO: 32.6 PG (ref 26–34)
MCHC RBC AUTO-ENTMCNC: 31.3 G/DL (ref 31–36)
MCV RBC AUTO: 104.1 FL (ref 80–100)
MONOCYTES ABSOLUTE: 0.8 K/UL (ref 0–1.3)
MONOCYTES RELATIVE PERCENT: 7.8 %
NEUTROPHILS ABSOLUTE: 8.3 K/UL (ref 1.7–7.7)
NEUTROPHILS RELATIVE PERCENT: 82 %
PDW BLD-RTO: 17.1 % (ref 12.4–15.4)
PLATELET # BLD: 156 K/UL (ref 135–450)
PLATELET SLIDE REVIEW: ADEQUATE
PMV BLD AUTO: 11.1 FL (ref 5–10.5)
POTASSIUM REFLEX MAGNESIUM: 3.8 MMOL/L (ref 3.5–5.1)
RBC # BLD: 3.84 M/UL (ref 4–5.2)
SLIDE REVIEW: ABNORMAL
SODIUM BLD-SCNC: 142 MMOL/L (ref 136–145)
WBC # BLD: 10.1 K/UL (ref 4–11)

## 2020-11-06 PROCEDURE — 3600000015 HC SURGERY LEVEL 5 ADDTL 15MIN: Performed by: ORTHOPAEDIC SURGERY

## 2020-11-06 PROCEDURE — 6360000002 HC RX W HCPCS: Performed by: ANESTHESIOLOGY

## 2020-11-06 PROCEDURE — 3600000005 HC SURGERY LEVEL 5 BASE: Performed by: ORTHOPAEDIC SURGERY

## 2020-11-06 PROCEDURE — 36415 COLL VENOUS BLD VENIPUNCTURE: CPT

## 2020-11-06 PROCEDURE — 7100000000 HC PACU RECOVERY - FIRST 15 MIN: Performed by: ORTHOPAEDIC SURGERY

## 2020-11-06 PROCEDURE — 93010 ELECTROCARDIOGRAM REPORT: CPT | Performed by: INTERNAL MEDICINE

## 2020-11-06 PROCEDURE — 2500000003 HC RX 250 WO HCPCS: Performed by: ANESTHESIOLOGY

## 2020-11-06 PROCEDURE — 3700000000 HC ANESTHESIA ATTENDED CARE: Performed by: ORTHOPAEDIC SURGERY

## 2020-11-06 PROCEDURE — 6360000002 HC RX W HCPCS: Performed by: ORTHOPAEDIC SURGERY

## 2020-11-06 PROCEDURE — 2580000003 HC RX 258: Performed by: ORTHOPAEDIC SURGERY

## 2020-11-06 PROCEDURE — 2500000003 HC RX 250 WO HCPCS: Performed by: NURSE ANESTHETIST, CERTIFIED REGISTERED

## 2020-11-06 PROCEDURE — 6360000002 HC RX W HCPCS: Performed by: NURSE PRACTITIONER

## 2020-11-06 PROCEDURE — 3E0T3BZ INTRODUCTION OF ANESTHETIC AGENT INTO PERIPHERAL NERVES AND PLEXI, PERCUTANEOUS APPROACH: ICD-10-PCS | Performed by: ANESTHESIOLOGY

## 2020-11-06 PROCEDURE — 2580000003 HC RX 258: Performed by: NURSE PRACTITIONER

## 2020-11-06 PROCEDURE — 3700000001 HC ADD 15 MINUTES (ANESTHESIA): Performed by: ORTHOPAEDIC SURGERY

## 2020-11-06 PROCEDURE — 7100000001 HC PACU RECOVERY - ADDTL 15 MIN: Performed by: ORTHOPAEDIC SURGERY

## 2020-11-06 PROCEDURE — 0QSG36Z REPOSITION RIGHT TIBIA WITH INTRAMEDULLARY INTERNAL FIXATION DEVICE, PERCUTANEOUS APPROACH: ICD-10-PCS | Performed by: ORTHOPAEDIC SURGERY

## 2020-11-06 PROCEDURE — 2580000003 HC RX 258: Performed by: PHYSICIAN ASSISTANT

## 2020-11-06 PROCEDURE — 94761 N-INVAS EAR/PLS OXIMETRY MLT: CPT

## 2020-11-06 PROCEDURE — 64445 NJX AA&/STRD SCIATIC NRV IMG: CPT | Performed by: ANESTHESIOLOGY

## 2020-11-06 PROCEDURE — 3209999900 FLUORO FOR SURGICAL PROCEDURES

## 2020-11-06 PROCEDURE — 73590 X-RAY EXAM OF LOWER LEG: CPT

## 2020-11-06 PROCEDURE — 2709999900 HC NON-CHARGEABLE SUPPLY: Performed by: ORTHOPAEDIC SURGERY

## 2020-11-06 PROCEDURE — 2720000010 HC SURG SUPPLY STERILE: Performed by: ORTHOPAEDIC SURGERY

## 2020-11-06 PROCEDURE — C1713 ANCHOR/SCREW BN/BN,TIS/BN: HCPCS | Performed by: ORTHOPAEDIC SURGERY

## 2020-11-06 PROCEDURE — 6370000000 HC RX 637 (ALT 250 FOR IP): Performed by: NURSE PRACTITIONER

## 2020-11-06 PROCEDURE — 93005 ELECTROCARDIOGRAM TRACING: CPT | Performed by: ANESTHESIOLOGY

## 2020-11-06 PROCEDURE — 85025 COMPLETE CBC W/AUTO DIFF WBC: CPT

## 2020-11-06 PROCEDURE — 1200000000 HC SEMI PRIVATE

## 2020-11-06 PROCEDURE — 80048 BASIC METABOLIC PNL TOTAL CA: CPT

## 2020-11-06 PROCEDURE — 2700000000 HC OXYGEN THERAPY PER DAY

## 2020-11-06 PROCEDURE — 6360000002 HC RX W HCPCS: Performed by: NURSE ANESTHETIST, CERTIFIED REGISTERED

## 2020-11-06 PROCEDURE — 2580000003 HC RX 258: Performed by: NURSE ANESTHETIST, CERTIFIED REGISTERED

## 2020-11-06 PROCEDURE — 64447 NJX AA&/STRD FEMORAL NRV IMG: CPT | Performed by: ANESTHESIOLOGY

## 2020-11-06 PROCEDURE — 6360000002 HC RX W HCPCS: Performed by: PHYSICIAN ASSISTANT

## 2020-11-06 DEVICE — TRIGEN META TIBIAL NAIL 10MM X 34CM
Type: IMPLANTABLE DEVICE | Site: TIBIA | Status: FUNCTIONAL
Brand: TRIGEN

## 2020-11-06 DEVICE — TRIGEN LOW PROFILE SCREW 5.0MM X 50MM
Type: IMPLANTABLE DEVICE | Site: TIBIA | Status: FUNCTIONAL
Brand: TRIGEN

## 2020-11-06 DEVICE — TRIGEN LOW PROFILE SCREW 5.0MM X 72.5MM
Type: IMPLANTABLE DEVICE | Site: TIBIA | Status: FUNCTIONAL
Brand: TRIGEN

## 2020-11-06 DEVICE — TRIGEN LOW PROFILE SCREW 5.0MM X 30MM
Type: IMPLANTABLE DEVICE | Site: TIBIA | Status: FUNCTIONAL
Brand: TRIGEN

## 2020-11-06 DEVICE — TRIGEN LOW PROFILE SCREW 5.0MM X 60MM
Type: IMPLANTABLE DEVICE | Site: TIBIA | Status: FUNCTIONAL
Brand: TRIGEN

## 2020-11-06 DEVICE — TRIGEN LOW PROFILE SCREW 5.0MM X 25MM
Type: IMPLANTABLE DEVICE | Site: TIBIA | Status: FUNCTIONAL
Brand: TRIGEN

## 2020-11-06 RX ORDER — SODIUM CHLORIDE, SODIUM LACTATE, POTASSIUM CHLORIDE, CALCIUM CHLORIDE 600; 310; 30; 20 MG/100ML; MG/100ML; MG/100ML; MG/100ML
INJECTION, SOLUTION INTRAVENOUS CONTINUOUS PRN
Status: DISCONTINUED | OUTPATIENT
Start: 2020-11-06 | End: 2020-11-06 | Stop reason: SDUPTHER

## 2020-11-06 RX ORDER — DEXAMETHASONE SODIUM PHOSPHATE 4 MG/ML
INJECTION, SOLUTION INTRA-ARTICULAR; INTRALESIONAL; INTRAMUSCULAR; INTRAVENOUS; SOFT TISSUE PRN
Status: DISCONTINUED | OUTPATIENT
Start: 2020-11-06 | End: 2020-11-06 | Stop reason: SDUPTHER

## 2020-11-06 RX ORDER — PROPOFOL 10 MG/ML
INJECTION, EMULSION INTRAVENOUS PRN
Status: DISCONTINUED | OUTPATIENT
Start: 2020-11-06 | End: 2020-11-06 | Stop reason: SDUPTHER

## 2020-11-06 RX ORDER — OXYCODONE HYDROCHLORIDE AND ACETAMINOPHEN 5; 325 MG/1; MG/1
1 TABLET ORAL PRN
Status: DISCONTINUED | OUTPATIENT
Start: 2020-11-06 | End: 2020-11-06 | Stop reason: HOSPADM

## 2020-11-06 RX ORDER — SUCCINYLCHOLINE CHLORIDE 20 MG/ML
INJECTION INTRAMUSCULAR; INTRAVENOUS PRN
Status: DISCONTINUED | OUTPATIENT
Start: 2020-11-06 | End: 2020-11-06 | Stop reason: SDUPTHER

## 2020-11-06 RX ORDER — LIDOCAINE HYDROCHLORIDE 20 MG/ML
INJECTION, SOLUTION EPIDURAL; INFILTRATION; INTRACAUDAL; PERINEURAL PRN
Status: DISCONTINUED | OUTPATIENT
Start: 2020-11-06 | End: 2020-11-06 | Stop reason: SDUPTHER

## 2020-11-06 RX ORDER — PROMETHAZINE HYDROCHLORIDE 25 MG/ML
6.25 INJECTION, SOLUTION INTRAMUSCULAR; INTRAVENOUS
Status: DISCONTINUED | OUTPATIENT
Start: 2020-11-06 | End: 2020-11-06 | Stop reason: HOSPADM

## 2020-11-06 RX ORDER — LABETALOL HYDROCHLORIDE 5 MG/ML
INJECTION, SOLUTION INTRAVENOUS PRN
Status: DISCONTINUED | OUTPATIENT
Start: 2020-11-06 | End: 2020-11-06 | Stop reason: SDUPTHER

## 2020-11-06 RX ORDER — ONDANSETRON 2 MG/ML
INJECTION INTRAMUSCULAR; INTRAVENOUS PRN
Status: DISCONTINUED | OUTPATIENT
Start: 2020-11-06 | End: 2020-11-06 | Stop reason: SDUPTHER

## 2020-11-06 RX ORDER — ROCURONIUM BROMIDE 10 MG/ML
INJECTION, SOLUTION INTRAVENOUS PRN
Status: DISCONTINUED | OUTPATIENT
Start: 2020-11-06 | End: 2020-11-06 | Stop reason: SDUPTHER

## 2020-11-06 RX ORDER — FENTANYL CITRATE 50 UG/ML
25 INJECTION, SOLUTION INTRAMUSCULAR; INTRAVENOUS EVERY 5 MIN PRN
Status: DISCONTINUED | OUTPATIENT
Start: 2020-11-06 | End: 2020-11-06 | Stop reason: HOSPADM

## 2020-11-06 RX ORDER — ONDANSETRON 2 MG/ML
4 INJECTION INTRAMUSCULAR; INTRAVENOUS
Status: DISCONTINUED | OUTPATIENT
Start: 2020-11-06 | End: 2020-11-06 | Stop reason: HOSPADM

## 2020-11-06 RX ORDER — MEPERIDINE HYDROCHLORIDE 50 MG/ML
12.5 INJECTION INTRAMUSCULAR; INTRAVENOUS; SUBCUTANEOUS EVERY 5 MIN PRN
Status: DISCONTINUED | OUTPATIENT
Start: 2020-11-06 | End: 2020-11-06 | Stop reason: HOSPADM

## 2020-11-06 RX ORDER — FENTANYL CITRATE 50 UG/ML
INJECTION, SOLUTION INTRAMUSCULAR; INTRAVENOUS PRN
Status: DISCONTINUED | OUTPATIENT
Start: 2020-11-06 | End: 2020-11-06 | Stop reason: SDUPTHER

## 2020-11-06 RX ORDER — OXYCODONE HYDROCHLORIDE AND ACETAMINOPHEN 5; 325 MG/1; MG/1
2 TABLET ORAL PRN
Status: DISCONTINUED | OUTPATIENT
Start: 2020-11-06 | End: 2020-11-06 | Stop reason: HOSPADM

## 2020-11-06 RX ORDER — BUPIVACAINE HYDROCHLORIDE AND EPINEPHRINE 5; 5 MG/ML; UG/ML
INJECTION, SOLUTION EPIDURAL; INTRACAUDAL; PERINEURAL PRN
Status: DISCONTINUED | OUTPATIENT
Start: 2020-11-06 | End: 2020-11-06 | Stop reason: SDUPTHER

## 2020-11-06 RX ORDER — MIDAZOLAM HYDROCHLORIDE 1 MG/ML
INJECTION INTRAMUSCULAR; INTRAVENOUS PRN
Status: DISCONTINUED | OUTPATIENT
Start: 2020-11-06 | End: 2020-11-06 | Stop reason: SDUPTHER

## 2020-11-06 RX ORDER — HYDRALAZINE HYDROCHLORIDE 20 MG/ML
5 INJECTION INTRAMUSCULAR; INTRAVENOUS EVERY 10 MIN PRN
Status: DISCONTINUED | OUTPATIENT
Start: 2020-11-06 | End: 2020-11-06 | Stop reason: HOSPADM

## 2020-11-06 RX ADMIN — PROMETHAZINE HYDROCHLORIDE 12.5 MG: 25 TABLET ORAL at 00:53

## 2020-11-06 RX ADMIN — HYDROMORPHONE HYDROCHLORIDE 0.5 MG: 1 INJECTION, SOLUTION INTRAMUSCULAR; INTRAVENOUS; SUBCUTANEOUS at 17:34

## 2020-11-06 RX ADMIN — MORPHINE SULFATE 4 MG: 4 INJECTION, SOLUTION INTRAMUSCULAR; INTRAVENOUS at 10:11

## 2020-11-06 RX ADMIN — SUCCINYLCHOLINE CHLORIDE 180 MG: 20 INJECTION, SOLUTION INTRAMUSCULAR; INTRAVENOUS at 15:07

## 2020-11-06 RX ADMIN — BUPIVACAINE HYDROCHLORIDE AND EPINEPHRINE 10 ML: 5; 5 INJECTION, SOLUTION EPIDURAL; INTRACAUDAL; PERINEURAL at 14:20

## 2020-11-06 RX ADMIN — SODIUM CHLORIDE, PRESERVATIVE FREE 10 ML: 5 INJECTION INTRAVENOUS at 08:53

## 2020-11-06 RX ADMIN — ROCURONIUM BROMIDE 20 MG: 10 SOLUTION INTRAVENOUS at 15:33

## 2020-11-06 RX ADMIN — SODIUM CHLORIDE: 9 INJECTION, SOLUTION INTRAVENOUS at 00:29

## 2020-11-06 RX ADMIN — PROPOFOL 25 MG: 10 INJECTION, EMULSION INTRAVENOUS at 16:56

## 2020-11-06 RX ADMIN — HYDROMORPHONE HYDROCHLORIDE 0.5 MG: 1 INJECTION, SOLUTION INTRAMUSCULAR; INTRAVENOUS; SUBCUTANEOUS at 17:20

## 2020-11-06 RX ADMIN — ROCURONIUM BROMIDE 10 MG: 10 SOLUTION INTRAVENOUS at 16:31

## 2020-11-06 RX ADMIN — SUGAMMADEX 200 MG: 100 INJECTION, SOLUTION INTRAVENOUS at 17:00

## 2020-11-06 RX ADMIN — FENTANYL CITRATE 25 MCG: 50 INJECTION INTRAMUSCULAR; INTRAVENOUS at 16:55

## 2020-11-06 RX ADMIN — BUPIVACAINE HYDROCHLORIDE AND EPINEPHRINE 10 ML: 5; 5 INJECTION, SOLUTION EPIDURAL; INTRACAUDAL; PERINEURAL at 14:19

## 2020-11-06 RX ADMIN — FENTANYL CITRATE 50 MCG: 50 INJECTION INTRAMUSCULAR; INTRAVENOUS at 15:47

## 2020-11-06 RX ADMIN — ONDANSETRON 4 MG: 2 INJECTION INTRAMUSCULAR; INTRAVENOUS at 06:40

## 2020-11-06 RX ADMIN — BUPIVACAINE HYDROCHLORIDE AND EPINEPHRINE 10 ML: 5; 5 INJECTION, SOLUTION EPIDURAL; INTRACAUDAL; PERINEURAL at 14:15

## 2020-11-06 RX ADMIN — FENTANYL CITRATE 50 MCG: 50 INJECTION INTRAMUSCULAR; INTRAVENOUS at 15:07

## 2020-11-06 RX ADMIN — DEXAMETHASONE SODIUM PHOSPHATE 8 MG: 4 INJECTION, SOLUTION INTRAMUSCULAR; INTRAVENOUS at 15:07

## 2020-11-06 RX ADMIN — FENTANYL CITRATE 25 MCG: 50 INJECTION INTRAMUSCULAR; INTRAVENOUS at 16:53

## 2020-11-06 RX ADMIN — ROCURONIUM BROMIDE 50 MG: 10 SOLUTION INTRAVENOUS at 15:20

## 2020-11-06 RX ADMIN — PROPOFOL 100 MG: 10 INJECTION, EMULSION INTRAVENOUS at 15:45

## 2020-11-06 RX ADMIN — MIDAZOLAM HYDROCHLORIDE 2 MG: 2 INJECTION, SOLUTION INTRAMUSCULAR; INTRAVENOUS at 14:13

## 2020-11-06 RX ADMIN — FENTANYL CITRATE 50 MCG: 50 INJECTION INTRAMUSCULAR; INTRAVENOUS at 16:30

## 2020-11-06 RX ADMIN — LABETALOL HYDROCHLORIDE 5 MG: 5 INJECTION, SOLUTION INTRAVENOUS at 16:32

## 2020-11-06 RX ADMIN — ONDANSETRON 4 MG: 2 INJECTION INTRAMUSCULAR; INTRAVENOUS at 15:07

## 2020-11-06 RX ADMIN — ONDANSETRON 4 MG: 2 INJECTION INTRAMUSCULAR; INTRAVENOUS at 00:22

## 2020-11-06 RX ADMIN — SODIUM CHLORIDE, PRESERVATIVE FREE 10 ML: 5 INJECTION INTRAVENOUS at 22:25

## 2020-11-06 RX ADMIN — BUPIVACAINE HYDROCHLORIDE AND EPINEPHRINE 10 ML: 5; 5 INJECTION, SOLUTION EPIDURAL; INTRACAUDAL; PERINEURAL at 14:18

## 2020-11-06 RX ADMIN — LIDOCAINE HYDROCHLORIDE 60 MG: 20 INJECTION, SOLUTION EPIDURAL; INFILTRATION; INTRACAUDAL; PERINEURAL at 15:07

## 2020-11-06 RX ADMIN — MEPERIDINE HYDROCHLORIDE 12.5 MG: 50 INJECTION, SOLUTION INTRAMUSCULAR; INTRAVENOUS; SUBCUTANEOUS at 17:12

## 2020-11-06 RX ADMIN — HYDROMORPHONE HYDROCHLORIDE 0.5 MG: 1 INJECTION, SOLUTION INTRAMUSCULAR; INTRAVENOUS; SUBCUTANEOUS at 17:29

## 2020-11-06 RX ADMIN — BUPIVACAINE HYDROCHLORIDE AND EPINEPHRINE 10 ML: 5; 5 INJECTION, SOLUTION EPIDURAL; INTRACAUDAL; PERINEURAL at 14:16

## 2020-11-06 RX ADMIN — PROPOFOL 25 MG: 10 INJECTION, EMULSION INTRAVENOUS at 16:55

## 2020-11-06 RX ADMIN — CEFAZOLIN SODIUM 2 G: 10 INJECTION, POWDER, FOR SOLUTION INTRAVENOUS at 15:02

## 2020-11-06 RX ADMIN — SODIUM CHLORIDE, SODIUM LACTATE, POTASSIUM CHLORIDE, AND CALCIUM CHLORIDE: .6; .31; .03; .02 INJECTION, SOLUTION INTRAVENOUS at 15:02

## 2020-11-06 RX ADMIN — MORPHINE SULFATE 4 MG: 4 INJECTION, SOLUTION INTRAMUSCULAR; INTRAVENOUS at 06:40

## 2020-11-06 RX ADMIN — FENTANYL CITRATE 50 MCG: 50 INJECTION INTRAMUSCULAR; INTRAVENOUS at 15:42

## 2020-11-06 RX ADMIN — LABETALOL HYDROCHLORIDE 5 MG: 5 INJECTION, SOLUTION INTRAVENOUS at 16:46

## 2020-11-06 RX ADMIN — PROPOFOL 25 MG: 10 INJECTION, EMULSION INTRAVENOUS at 16:54

## 2020-11-06 RX ADMIN — FENTANYL CITRATE 100 MCG: 50 INJECTION INTRAMUSCULAR; INTRAVENOUS at 14:13

## 2020-11-06 RX ADMIN — HYDROMORPHONE HYDROCHLORIDE 0.5 MG: 1 INJECTION, SOLUTION INTRAMUSCULAR; INTRAVENOUS; SUBCUTANEOUS at 17:15

## 2020-11-06 RX ADMIN — SUGAMMADEX 200 MG: 100 INJECTION, SOLUTION INTRAVENOUS at 16:58

## 2020-11-06 RX ADMIN — PROPOFOL 200 MG: 10 INJECTION, EMULSION INTRAVENOUS at 15:07

## 2020-11-06 ASSESSMENT — PULMONARY FUNCTION TESTS
PIF_VALUE: 19
PIF_VALUE: 20
PIF_VALUE: 17
PIF_VALUE: 23
PIF_VALUE: 16
PIF_VALUE: 20
PIF_VALUE: 19
PIF_VALUE: 2
PIF_VALUE: 19
PIF_VALUE: 20
PIF_VALUE: 19
PIF_VALUE: 17
PIF_VALUE: 19
PIF_VALUE: 0
PIF_VALUE: 19
PIF_VALUE: 19
PIF_VALUE: 20
PIF_VALUE: 22
PIF_VALUE: 17
PIF_VALUE: 0
PIF_VALUE: 20
PIF_VALUE: 19
PIF_VALUE: 18
PIF_VALUE: 19
PIF_VALUE: 19
PIF_VALUE: 16
PIF_VALUE: 21
PIF_VALUE: 0
PIF_VALUE: 20
PIF_VALUE: 17
PIF_VALUE: 23
PIF_VALUE: 22
PIF_VALUE: 17
PIF_VALUE: 20
PIF_VALUE: 19
PIF_VALUE: 23
PIF_VALUE: 22
PIF_VALUE: 19
PIF_VALUE: 19
PIF_VALUE: 23
PIF_VALUE: 15
PIF_VALUE: 19
PIF_VALUE: 20
PIF_VALUE: 17
PIF_VALUE: 19
PIF_VALUE: 15
PIF_VALUE: 19
PIF_VALUE: 21
PIF_VALUE: 17
PIF_VALUE: 23
PIF_VALUE: 19
PIF_VALUE: 19
PIF_VALUE: 22
PIF_VALUE: 22
PIF_VALUE: 0
PIF_VALUE: 21
PIF_VALUE: 19
PIF_VALUE: 22
PIF_VALUE: 20
PIF_VALUE: 20
PIF_VALUE: 21
PIF_VALUE: 19
PIF_VALUE: 23
PIF_VALUE: 22
PIF_VALUE: 19
PIF_VALUE: 17
PIF_VALUE: 19
PIF_VALUE: 1
PIF_VALUE: 18
PIF_VALUE: 17
PIF_VALUE: 19
PIF_VALUE: 0
PIF_VALUE: 19
PIF_VALUE: 20
PIF_VALUE: 21
PIF_VALUE: 19
PIF_VALUE: 19
PIF_VALUE: 23
PIF_VALUE: 15
PIF_VALUE: 14
PIF_VALUE: 22
PIF_VALUE: 5
PIF_VALUE: 23
PIF_VALUE: 19
PIF_VALUE: 19
PIF_VALUE: 14
PIF_VALUE: 19
PIF_VALUE: 20
PIF_VALUE: 19
PIF_VALUE: 20
PIF_VALUE: 21
PIF_VALUE: 19
PIF_VALUE: 20
PIF_VALUE: 19
PIF_VALUE: 5
PIF_VALUE: 19
PIF_VALUE: 26
PIF_VALUE: 1
PIF_VALUE: 19
PIF_VALUE: 19
PIF_VALUE: 20
PIF_VALUE: 20
PIF_VALUE: 19

## 2020-11-06 ASSESSMENT — PAIN DESCRIPTION - FREQUENCY
FREQUENCY: CONTINUOUS

## 2020-11-06 ASSESSMENT — PAIN DESCRIPTION - DESCRIPTORS
DESCRIPTORS: ACHING
DESCRIPTORS: ACHING
DESCRIPTORS: SHOOTING;SHARP
DESCRIPTORS: ACHING

## 2020-11-06 ASSESSMENT — PAIN DESCRIPTION - PAIN TYPE
TYPE: SURGICAL PAIN
TYPE: ACUTE PAIN
TYPE: SURGICAL PAIN

## 2020-11-06 ASSESSMENT — PAIN DESCRIPTION - ONSET
ONSET: ON-GOING

## 2020-11-06 ASSESSMENT — PAIN DESCRIPTION - PROGRESSION: CLINICAL_PROGRESSION: NOT CHANGED

## 2020-11-06 ASSESSMENT — PAIN DESCRIPTION - ORIENTATION
ORIENTATION: RIGHT

## 2020-11-06 ASSESSMENT — PAIN SCALES - GENERAL
PAINLEVEL_OUTOF10: 7
PAINLEVEL_OUTOF10: 4
PAINLEVEL_OUTOF10: 7
PAINLEVEL_OUTOF10: 4
PAINLEVEL_OUTOF10: 5
PAINLEVEL_OUTOF10: 4
PAINLEVEL_OUTOF10: 10
PAINLEVEL_OUTOF10: 9
PAINLEVEL_OUTOF10: 7
PAINLEVEL_OUTOF10: 10
PAINLEVEL_OUTOF10: 8

## 2020-11-06 ASSESSMENT — LIFESTYLE VARIABLES: SMOKING_STATUS: 0

## 2020-11-06 ASSESSMENT — PAIN DESCRIPTION - LOCATION
LOCATION: LEG

## 2020-11-06 NOTE — CONSULTS
Department of Orthopedic Surgery  Physician Assistant   Consult Note        Reason for Consult:  Right leg pain  Requesting Physician: Caro Mcmillan MD  Date of Service: 2020 10:56 AM    CHIEF COMPLAINT:  As Above    History Obtained From:  patient    HISTORY OF PRESENT ILLNESS:                The patient is a 63 y.o. female who presents with above chief complaint. Pt states that yesterday she tripped on a step and fell. Her right leg bent under her and she felt it pop. Was unable to get up or walk after. No prior lower leg injuries. No pain in the knee or the hip. No n/t in the leg. 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 Last dose yesterday. Past Medical History:        Diagnosis Date    CHF (congestive heart failure) (HCC)     HTN (hypertension)     UTI (urinary tract infection)      Past Surgical History:        Procedure Laterality Date    BLADDER REPAIR       SECTION      CHOLECYSTECTOMY      CYSTOSCOPY           Medications Prior to Admission:   Prior to Admission medications    Medication Sig Start Date End Date Taking?  Authorizing Provider   atorvastatin (LIPITOR) 40 MG tablet  10/29/20  Yes Historical Provider, MD   oxybutynin (DITROPAN) 5 MG tablet TAKE 1 TABLET BY MOUTH EVERY 8 HOURS 20  Yes Historical Provider, MD   traMADol (ULTRAM) 50 MG tablet TAKE 1 TABLET BY MOUTH THREE TIMES DAILY AS NEEDED FOR PAIN 10/8/20  Yes Historical Provider, MD   acetaminophen (TYLENOL) 500 MG tablet Take 500 mg by mouth every 6 hours as needed for Pain   Yes Historical Provider, MD   meloxicam (MOBIC) 15 MG tablet Take 15 mg by mouth daily   Yes Historical Provider, MD   metoprolol succinate (TOPROL XL) 25 MG extended release tablet Take 25 mg by mouth daily   Yes Historical Provider, MD   nitrofurantoin (MACRODANTIN) 50 MG capsule Take 50 mg by mouth 4 times daily   Yes Historical Provider, MD   rivaroxaban (XARELTO) 20 MG TABS tablet Take 20 mg by mouth   Yes Historical Recent Labs     11/06/20  0644   WBC 10.1   HGB 12.5        BMP:    Recent Labs     11/06/20  0644      K 3.8      CO2 22   BUN 12   CREATININE <0.5*   GLUCOSE 90     INR: No results for input(s): INR in the last 72 hours. Radiology:   XR TIBIA FIBULA RIGHT (2 VIEWS)    (Results Pending)          IMPRESSION/RECOMMENDATIONS:    Assessment: Right tibia fracture    Plan:  1) Patient will get repeat films today to verify fracture and displacement. Will keep her NPO today as we may plan to take her to surgery later this afternoon depending on amount of displacement and fracture pattern. Holding Xarelto currently and may need to delay surgery a day due to her last dose being yesterday. Consent obtained. Continue pain control. Thank you for the opportunity to consult on this patient.     David Mauricio

## 2020-11-06 NOTE — BRIEF OP NOTE
Brief Postoperative Note      Patient: Balbir Rubio  YOB: 1960  MRN: [id number]    Date of Procedure: 11/6/2020    Pre-Op Diagnosis: RIGHT PROXIMAL TIBIA FRACTURE    Post-Op Diagnosis: Same       Procedure(s):  RIGHT TIBIA IM NAIL INSERTION    Surgeon(s):  Bonnie Owens MD    Assistant:  Surgical Assistant: Karis Monsivais; Berenice Mckoy; Luis Jasso  Physician Assistant: RAMÍREZ Eugene    Anesthesia: General    Estimated Blood Loss (mL): 281     Complications: None    Specimens:   * No specimens in log *    Implants:  Implant Name Type Inv.  Item Serial No.  Lot No. LRB No. Used Action   NAIL IM [id number] [id number] TIB KNEE GLD TI ROSANA LCK RG BENT  NAIL IM L340MM [id number] TIB KNEE GLD TI ROSANA LCK RG BENT  [employer]- [id number] Right 1 Implanted   SCREW BNE L25MM DIA5MM JORGE TIB G TI ST SELF DRL  SCREW BNE L25MM DIA5MM JORGE TIB G TI ST SELF DRL  [employer] [id number] Right 1 Implanted   SCREW BNE L30MM DIA5MM JORGE TI INT CAPT HEX LO PROF FOR IM  SCREW BNE L30MM DIA5MM JORGE TI INT CAPT HEX LO PROF FOR IM  [employer] [id number] Right 1 Implanted   SCREW BNE L72.5MM DIA5MM JORGE TIB TI ST SELF DRL HEX DRV  SCREW BNE L72.5MM DIA5MM JORGE TIB TI ST SELF DRL HEX DRV  [employer] [id number] Right 1 Implanted   SCREW BNE L60MM DIA5MM JORGE TIB TI INT HEX LO PROF FOR IM  SCREW BNE L60MM DIA5MM JORGE TIB TI INT HEX LO PROF FOR IM  [employer] [id number] Right 1 Implanted   SCREW BNE L50MM DIA5MM JORGE TIB TI INT HEX LO PROF FOR IM  SCREW BNE L50MM DIA5MM JORGE TIB TI INT HEX LO PROF FOR IM  [employer] [id number] Right 1 Implanted         Drains:   Urethral Catheter Non-latex 16 fr (Active)   Catheter Indications Perioperative use in selected surgeries including but not limited to urologic, pelvic or need for intraoperative monitoring of urinary output due to prolonged surgery,

## 2020-11-06 NOTE — ANESTHESIA PROCEDURE NOTES
Peripheral Block    Patient location during procedure: pre-op  Staffing  Anesthesiologist: Damián Howard MD  Performed: anesthesiologist   Preanesthetic Checklist  Completed: patient identified, site marked, surgical consent, pre-op evaluation, timeout performed, IV checked, risks and benefits discussed, monitors and equipment checked, anesthesia consent given, oxygen available and patient being monitored  Peripheral Block  Patient position: supine  Patient monitoring: continuous pulse ox and IV access  Block type: Femoral  Laterality: right  Injection technique: single-shot  Procedures: ultrasound guided  Adductor canal  Provider prep: sterile gloves and mask  Needle  Needle gauge: 21 G  Needle length: 10 cm  Needle localization: ultrasound guidance  Test dose: negative  Assessment  Injection assessment: negative aspiration for heme, no paresthesia on injection and local visualized surrounding nerve on ultrasound  Paresthesia pain: none  Slow fractionated injection: yes  Hemodynamics: stable  Additional Notes  Pt. agrees to risks, benefits and alternatives to block  Block performed at the request of the surgeon for post-operative pain management   Immediately prior to procedure a \"time out\" was called to verify the correct patient, procedure, equipment, support staff. Site/side marked as required  Side: right  Site/Approach: anteromedial thigh approximately 10-15 cm from the inguinal crease. Position: supine  Sedation: Midazolam 2 mg  +  Fentanyl  100  mcg  IV  Local Anesthetic Dose:  2% Lido  3 ml  Aseptic technique: prepped with chlorhexidine  Ultrasound:   yes, see attached image  Iliopsoas Muscle and Fascia Iliaca, Femoral artery (Deep artery to the thigh take off), Femoral Vein and Femoral Nerve are identified; the tip of the need and the spread of the local anesthetic around the Femoral nerve are visualized.  The Femoral nerve appeared to be anatomically normal and there were no abnormal pathologically findings seen. Local Anesthetic: 0.5% Bupivacaine with Epi 1:200K  Amount: 20 ml  in 5 ml increments after negative aspiration each time. Easy injection w/o resistance and w/o pain/paresthesias. Pt tolerated procedure well. No complications.   Reason for block: post-op pain management and at surgeon's request

## 2020-11-06 NOTE — ANESTHESIA PROCEDURE NOTES
Peripheral Block    Patient location during procedure: pre-op  Start time: 11/6/2020 2:13 PM  End time: 11/6/2020 2:20 PM  Staffing  Anesthesiologist: Tima Clarke MD  Performed: anesthesiologist   Peripheral Block  Patient position: supine  Prep: ChloraPrep  Patient monitoring: continuous pulse ox and IV access  Block type: Sciatic  Laterality: right  Injection technique: single-shot  Procedures: ultrasound guided  Popliteal  Provider prep: mask and sterile gloves  Needle  Needle gauge: 20 G  Needle length: 15 cm  Needle localization: ultrasound guidance and nerve stimulator  Test dose: negative  Assessment  Injection assessment: negative aspiration for heme, no paresthesia on injection and local visualized surrounding nerve on ultrasound  Paresthesia pain: none  Slow fractionated injection: yes  Hemodynamics: stable  Additional Notes  Pt. agrees to risks, benefits and alternatives to block  Block performed at the request of the surgeon for post-operative pain management   Immediately prior to procedure a \"time out\" was called to verify the correct patient, procedure, equipment, support staff. Site/side marked as required  Side: right  Site/Approach: lateral thigh 5-10 cm superior to the PF crease  Position: supine with leg elevated on blankets  Sedation: Midazolam 2 mg  +  Fentanyl  100  mcg  IV  Local Anesthetic Dose:  Lido 2%    3 ml sc prior to each block  Aseptic technique: prepped with chlorhexidine  Ultrasound:   yes- see attached image; Verified with nerve stimulator- + toe twitch  Local Anesthetic: 0.5% Bupivacaine with Epi 1:200K Amount:  30 ml in 5 ml increments after negative aspiration each time. Easy injection w/o resistance and w/o pain/paresthesias. Pt tolerated procedure well. No complications.   Reason for block: post-op pain management and at surgeon's request

## 2020-11-06 NOTE — PROGRESS NOTES
D: R leg pain is at a tolerable level, denies nausea, resting in bed with eyes closed. A: floor RN called and requested to come down to bedside for handoff report.

## 2020-11-06 NOTE — H&P
Hospital Medicine History & Physical      PCP: Magdalena Ladd MD    Date of Admission: 11/5/2020    Date of Service: Pt seen/examined on 11/5/2020 and Admitted to Inpatient with expected LOS greater than two midnights due to medical therapy. Chief Complaint:  Right leg pain    History Of Present Illness:      63 y.o. female, with PMH of morbid obesity, HTN and HLD, who was a direct admit from [facility] to [facility] with right leg pain. History obtained from the patient and review of EMR. The patient stated she had an appointment with her urologist today so she called the transportation van. She stated she told them she needed the large van, but the small [employer] [facility] showed up. Patient stated the  tried to get her into the small [employer], but was unsuccessful so the patient went back to her home. She stated when she stepped up onto the step to go into her house her right leg gave out from underneath her and she fell to the ground and her right leg went underneath of her left leg. She stated she did try to stand up, but was unable to. [facility] to help the patient get up, but was unable to. Therefore, EMS was called. In the emergency room at [facility] an x-ray of the tibia fibula was obtained that revealed acute proximal tibial and fibular fractures. Was given multiple doses of Dilaudid in the emergency room. Orthopedic surgery was consulted. The patient was ultimately transferred to [facility] for further evaluation. She denied any other associated symptoms as well as any aggravating and/or alleviating factors. At the time of this assessment, the patient was resting comfortably in bed. She currently denies any chest pain, back pain, abdominal pain, shortness of breath, numbness, tingling, N/V/C/D, fever and/or chills.      Past Medical History:          Diagnosis Date    CHF (congestive heart failure) (HCC)     HTN (hypertension)     UTI (urinary tract infection)      Past Surgical History:          Procedure Laterality Date    BLADDER REPAIR       SECTION      CHOLECYSTECTOMY      CYSTOSCOPY       Medications Prior to Admission:      Prior to Admission medications    Medication Sig Start Date End Date Taking?  Authorizing Provider   amLODIPine (NORVASC) 10 MG tablet TAKE 1 TABLET BY MOUTH ONCE DAILY FOR 90 DAYS 10/8/20   Historical Provider, MD   atorvastatin (LIPITOR) 40 MG tablet  10/29/20   Historical Provider, MD   oxybutynin (DITROPAN) 5 MG tablet TAKE 1 TABLET BY MOUTH EVERY 8 HOURS 20   Historical Provider, MD   traMADol (ULTRAM) 50 MG tablet TAKE 1 TABLET BY MOUTH THREE TIMES DAILY AS NEEDED FOR PAIN 10/8/20   Historical Provider, MD   vitamin D3 (CHOLECALCIFEROL) 400 units TABS tablet Take 400 Units by mouth daily    Historical Provider, MD   Flaxseed Oil OIL 1,400 mg by Does not apply route daily    Historical Provider, MD   calcium carbonate 600 MG TABS tablet Take 1 tablet by mouth daily    Historical Provider, MD   acetaminophen (TYLENOL) 500 MG tablet Take 500 mg by mouth every 6 hours as needed for Pain    Historical Provider, MD   cimetidine (TAGAMET HB) 200 MG tablet Take 200 mg by mouth as needed    Historical Provider, MD   latanoprost (XALATAN) 0.005 % ophthalmic solution 1 drop nightly    Historical Provider, MD   meloxicam (MOBIC) 15 MG tablet Take 15 mg by mouth daily    Historical Provider, MD   metoprolol succinate (TOPROL XL) 25 MG extended release tablet Take 25 mg by mouth daily    Historical Provider, MD   nitrofurantoin (MACRODANTIN) 50 MG capsule Take 50 mg by mouth 4 times daily    Historical Provider, MD   rivaroxaban (XARELTO) 20 MG TABS tablet Take 20 mg by mouth    Historical Provider, MD   ciprofloxacin (CIPRO) 500 MG tablet Take 500 mg by mouth 2 times daily    Historical Provider, MD   lisinopril (PRINIVIL;ZESTRIL) 20 MG tablet Take 40 mg by mouth daily     Historical Provider, MD   oxyCODONE-acetaminophen found for: [facility] [address] [facility] [address], [address] [facility] [address]    Radiology:     CXR: I have reviewed the CXR with the following interpretation: n/a    EKG:  I have reviewed the EKG with the following interpretation: N/a    No orders to display     ASSESSMENT:    Active Hospital Problems    Diagnosis Date Noted    Right medial tibial plateau fracture, closed, initial encounter [S82.131A] 11/05/2020    HTN (hypertension) [I10]     Morbid obesity ([facility] [address] [E66.01]     HLD (hyperlipidemia) [E78.5]      PLAN:    Right knee and shin pain In setting of right tib fib fracture d/t mechanical fall  -XR right tibia fibula revealed: acute proximal tibial and fibular fractures  -up with assistance  -orthopedic surgery consulted in ED at [facility] - appreciate recommendations in advance  -PRN pain medication   -prn muscle relaxer  -NPO after MN    Morbid obesity  With Body mass index is 44.0 kg/m². Complicating assessment and treatment. Placing patient at risk for multiple co-morbidities as well as early death and contributing to the patient's presentation. Counseled on weight loss    Essential HTN  -lisinopril, amlodipine and metoprolol    HLD  -atorvastatin    H/o atrial fibrillation/DVT and PE  -anticoagulated on Xarelto, hold until further notice    DVT Prophylaxis: pt takes xarelto    Diet: No diet orders on file     Code Status: No Order    PT/OT Eval Status: no indication for need at this time    Dispo - 2-3 days pending clinical improvement     Candance Silvius, LEIGHA - CNP    Thank you Anabella Osullivan MD for the opportunity to be involved in this patient's care.  If you have any questions or concerns please feel free to contact me at [phone].  -------------------------------Anticipated Dr. Angelina chandler---------------------------------------

## 2020-11-06 NOTE — PROGRESS NOTES
D: Pt brought to PACU. Report obtained from [address] and CRNA. Pt placed on monitor, VSS, c/o right leg pain, see MAR for details.

## 2020-11-06 NOTE — PROGRESS NOTES
Pt arrived to [facility]. Rings, watch, earrings removed and placed into a bag with patient label. EKG completed.

## 2020-11-06 NOTE — ANESTHESIA PRE PROCEDURE
Department of Anesthesiology  Preprocedure Note       Name:  Jazzmine Hernandes   Age:  63 y.o.  :  1960                                          MRN:  [id number]         Date:  2020      Surgeon:  Shiloh Jolley MD    Procedure:  RIGHT TIBIA IM NAIL INSERTION    HPI:  63 y.o. female who states that yesterday she tripped on a step and fell. Her right leg bent under her and she felt it pop. Was unable to get up or walk after. No prior lower leg injuries. No pain in the knee or the hip. No n/t in the leg. 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 Last dose yesterday. EK2020 Normal sinus rhythm 114; Nonspecific ST and T wave abnormality; No acute ischemic changes; No previous ECGs available    ECHO:    Left ventricle: Wall thickness was markedly increased. Systolic function was normal. The calculated ejection fraction was in the range of 50% to 55%. Ventricular septum: The contour showed diastolic flattening and systolic flattening. Right ventricle: The cavity size was dilated. Systolic function was reduced. Systolic pressure was increased. Right atrium: The atrium was dilated. Tricuspid valve: There was moderate regurgitation. Pulmonary arteries: PA peak pressure: 56mm Hg (S).     Medications prior to admission:    atorvastatin (LIPITOR) 40 MG tablet    oxybutynin (DITROPAN) 5 MG tablet TAKE 1 TABLET BY MOUTH EVERY 8 HOURS   traMADol (ULTRAM) 50 MG tablet TAKE 1 TABLET BY MOUTH THREE TIMES DAILY AS NEEDED FOR PAIN   acetaminophen (TYLENOL) 500 MG   Take 500 mg by mouth every 6 hours as needed for Pain   meloxicam (MOBIC) 15 MG tablet Take 15 mg by mouth daily   metoprolol succinate (TOPROL XL) 25 MG  Take 25 mg by mouth daily   nitrofurantoin (MACRODANTIN) 50 MG   Take 50 mg by mouth 4 times daily   rivaroxaban (XARELTO) 20 MG   Take 20 mg by mouth   ciprofloxacin (CIPRO) 500 MG tablet Take 500 mg by mouth 2 times daily   amLODIPine (NORVASC) 10 MG tablet TAKE 1 TABLET BY MOUTH ONCE DAILY FOR 90 DAYS   vitamin D3 (CHOLECALCIFEROL) 400 units   Take 400 Units by mouth daily   Flaxseed Oil OIL 1,400 mg by Does not apply route daily   calcium carbonate 600 MG TABS tablet Take 1 tablet by mouth daily   cimetidine (TAGAMET HB) 200 MG tablet Take 200 mg by mouth as needed   latanoprost (XALATAN) 0.005 % ophthalmic soln 1 drop nightly   lisinopril (PRINIVIL;ZESTRIL) 20 MG tablet Take 40 mg by mouth daily    oxyCODONE-acetaminophen (PERCOCET) 5-325 MG  Take 1 tablet by mouth every 4 hours as needed for Pain   potassium chloride (KLOR-CON) 20 MEQ packet Take 20 mEq by mouth 2 times daily     Current medications:     ceFAZolin (ANCEF) 2 g in dextrose 5 %   2 g Intravenous On Call to OR    acetaminophen (TYLENOL) tablet 650 mg  650 mg Oral Q6H PRN       acetaminophen (TYLENOL) suppository 650 mg  650 mg Rectal Q6H PRN    polyethylene glycol (GLYCOLAX) packet 17 g  17 g Oral Daily PRN    promethazine (PHENERGAN) tablet 12.5 mg  12.5 mg Oral Q6H PRN       ondansetron (ZOFRAN) injection 4 mg  4 mg Intravenous Q6H PRN    oxyCODONE-acetaminophen (PERCOCET) 5-325 MG   1 tablet Oral Q4H PRN       oxyCODONE-acetaminophen (PERCOCET) 5-325 MG   2 tablet Oral Q4H PRN    morphine (PF) injection 2 mg  2 mg Intravenous Q2H PRN       morphine (PF) injection 4 mg  4 mg Intravenous Q2H PRN    cyclobenzaprine (FLEXERIL) tablet 10 mg  10 mg Oral TID PRN     Allergies:  No Known Allergies    Problem List:     Pubic ramus fracture (HCC)    Right sided sciatica    Degenerative tear of lateral meniscus of right knee    Primary osteoarthritis of right knee    Lateral knee pain    Primary osteoarthritis of left knee    Right medial tibial plateau fracture, closed, initial encounter    HTN (hypertension)    Morbid obesity ([address]    HLD (hyperlipidemia)     Past Medical History:     CHF (congestive heart failure) (HCC)     HTN (hypertension)     UTI (urinary tract infection)      Past Surgical History:     BLADDER REPAIR       SECTION      CHOLECYSTECTOMY      CYSTOSCOPY       Social History:    Tobacco Use    Smoking status: Never Smoker    Smokeless tobacco: Never Used   Substance Use Topics    Alcohol use: Not on file     Vital Signs (Current):    20 2300 20 0744   BP: (!) 170/79 (!) 160/77   Pulse: 88 85   Resp: 18 18   Temp: 97.6 °F (36.4 °C) 99.3 °F (37.4 °C)   TempSrc: Axillary Oral   SpO2: 100% 90%                   BP Readings from Last 3 Encounters:   20 (!) 160/77   17 (!) 191/100     NPO Status: >8 hrs                         BMI:   Wt Readings from Last 3 Encounters:   10/30/20 290 lb (131.5 kg)   17 230 lb (104.3 kg)   16 230 lb (104.3 kg)       CBC:    WBC 10.1 2020    HGB 12.5 2020    HCT 39.9 2020    .1 2020     2020     CMP:     2020    K 3.8 2020     2020    CO2 22 2020    BUN 12 2020    CREATININE <0.5 2020    GLUCOSE 90 2020    CALCIUM 9.3 2020     COVID-19 Screening (If Applicable): No results found for: COVID19    Anesthesia Evaluation  Patient summary reviewed and Nursing notes reviewed no history of anesthetic complications:   Airway: Mallampati: II  TM distance: >3 FB   Neck ROM: full  Comment:  Thick neck girth  Mouth opening: < 3 FB Dental:          Pulmonary: breath sounds clear to auscultation      (-) COPD, asthma, recent URI, sleep apnea, wheezes and not a current smoker                           Cardiovascular:  Exercise tolerance: good (>4 METS),   (+) hypertension:,     (-) past MI,  angina,  DANIEL and murmur    ECG reviewed  Rhythm: regular  Rate: normal  Echocardiogram reviewed                  Neuro/Psych:   (+) neuromuscular disease:,    (-) seizures, TIA and CVA            ROS comment: Right side sciatica GI/Hepatic/Renal:   (+) morbid obesity     (-) GERD, hepatitis, liver disease and no renal disease       Endo/Other:    (+) : arthritis: OA., .    (-) diabetes mellitus, hypothyroidism               Abdominal:   (+) obese,         Vascular:   + DVT, PE.        ROS comment: H/O PE and DVT. Anesthesia Plan      general     ASA 2     (PF and AC block for post-op pain management per surgeon request. )  Induction: intravenous. MIPS: Prophylactic antiemetics administered. Anesthetic plan and risks discussed with patient. Plan discussed with CRNA.             Carolina Ghotra MD

## 2020-11-06 NOTE — PLAN OF CARE
Problem: Pain:  Goal: Pain level will decrease  Description: Pain level will decrease  Outcome: Ongoing     Problem: Falls - Risk of:  Goal: Will remain free from falls  Description: Will remain free from falls  Outcome: Ongoing     Problem: Skin Integrity:  Goal: Will show no infection signs and symptoms  Description: Will show no infection signs and symptoms  Outcome: Ongoing

## 2020-11-07 PROCEDURE — 1200000000 HC SEMI PRIVATE

## 2020-11-07 PROCEDURE — 97166 OT EVAL MOD COMPLEX 45 MIN: CPT

## 2020-11-07 PROCEDURE — 6360000002 HC RX W HCPCS: Performed by: PHYSICIAN ASSISTANT

## 2020-11-07 PROCEDURE — 6370000000 HC RX 637 (ALT 250 FOR IP): Performed by: HOSPITALIST

## 2020-11-07 PROCEDURE — 97162 PT EVAL MOD COMPLEX 30 MIN: CPT

## 2020-11-07 PROCEDURE — 94761 N-INVAS EAR/PLS OXIMETRY MLT: CPT

## 2020-11-07 PROCEDURE — 97530 THERAPEUTIC ACTIVITIES: CPT

## 2020-11-07 PROCEDURE — 2580000003 HC RX 258: Performed by: PHYSICIAN ASSISTANT

## 2020-11-07 PROCEDURE — 97535 SELF CARE MNGMENT TRAINING: CPT

## 2020-11-07 PROCEDURE — 2700000000 HC OXYGEN THERAPY PER DAY

## 2020-11-07 PROCEDURE — 97110 THERAPEUTIC EXERCISES: CPT

## 2020-11-07 PROCEDURE — 6370000000 HC RX 637 (ALT 250 FOR IP): Performed by: PHYSICIAN ASSISTANT

## 2020-11-07 RX ORDER — METOPROLOL SUCCINATE 25 MG/1
25 TABLET, EXTENDED RELEASE ORAL DAILY
Status: DISCONTINUED | OUTPATIENT
Start: 2020-11-08 | End: 2020-11-12 | Stop reason: HOSPADM

## 2020-11-07 RX ORDER — ATORVASTATIN CALCIUM 40 MG/1
40 TABLET, FILM COATED ORAL NIGHTLY
Status: DISCONTINUED | OUTPATIENT
Start: 2020-11-07 | End: 2020-11-12 | Stop reason: HOSPADM

## 2020-11-07 RX ORDER — AMLODIPINE BESYLATE 5 MG/1
10 TABLET ORAL DAILY
Status: DISCONTINUED | OUTPATIENT
Start: 2020-11-07 | End: 2020-11-12 | Stop reason: HOSPADM

## 2020-11-07 RX ORDER — CALCIUM CARBONATE 200(500)MG
500 TABLET,CHEWABLE ORAL 3 TIMES DAILY PRN
Status: DISCONTINUED | OUTPATIENT
Start: 2020-11-07 | End: 2020-11-12 | Stop reason: HOSPADM

## 2020-11-07 RX ORDER — LISINOPRIL 20 MG/1
40 TABLET ORAL DAILY
Status: DISCONTINUED | OUTPATIENT
Start: 2020-11-07 | End: 2020-11-12 | Stop reason: HOSPADM

## 2020-11-07 RX ORDER — LATANOPROST 50 UG/ML
1 SOLUTION/ DROPS OPHTHALMIC NIGHTLY
Status: DISCONTINUED | OUTPATIENT
Start: 2020-11-07 | End: 2020-11-12 | Stop reason: HOSPADM

## 2020-11-07 RX ADMIN — OXYCODONE AND ACETAMINOPHEN 2 TABLET: 5; 325 TABLET ORAL at 23:32

## 2020-11-07 RX ADMIN — ANTACID TABLETS 500 MG: 500 TABLET, CHEWABLE ORAL at 12:59

## 2020-11-07 RX ADMIN — CEFAZOLIN SODIUM 2 G: 10 INJECTION, POWDER, FOR SOLUTION INTRAVENOUS at 01:25

## 2020-11-07 RX ADMIN — CEFAZOLIN SODIUM 2 G: 10 INJECTION, POWDER, FOR SOLUTION INTRAVENOUS at 09:12

## 2020-11-07 RX ADMIN — LISINOPRIL 40 MG: 20 TABLET ORAL at 11:38

## 2020-11-07 RX ADMIN — RIVAROXABAN 20 MG: 20 TABLET, FILM COATED ORAL at 09:12

## 2020-11-07 RX ADMIN — OXYCODONE AND ACETAMINOPHEN 2 TABLET: 5; 325 TABLET ORAL at 17:33

## 2020-11-07 RX ADMIN — SODIUM CHLORIDE: 9 INJECTION, SOLUTION INTRAVENOUS at 11:38

## 2020-11-07 RX ADMIN — ANTACID TABLETS 500 MG: 500 TABLET, CHEWABLE ORAL at 18:40

## 2020-11-07 RX ADMIN — MORPHINE SULFATE 4 MG: 4 INJECTION, SOLUTION INTRAMUSCULAR; INTRAVENOUS at 03:11

## 2020-11-07 RX ADMIN — OXYCODONE AND ACETAMINOPHEN 2 TABLET: 5; 325 TABLET ORAL at 09:16

## 2020-11-07 RX ADMIN — LATANOPROST 1 DROP: 50 SOLUTION OPHTHALMIC at 20:08

## 2020-11-07 RX ADMIN — ATORVASTATIN CALCIUM 40 MG: 40 TABLET, FILM COATED ORAL at 20:10

## 2020-11-07 RX ADMIN — AMLODIPINE BESYLATE 10 MG: 5 TABLET ORAL at 11:38

## 2020-11-07 ASSESSMENT — PAIN DESCRIPTION - LOCATION
LOCATION: LEG

## 2020-11-07 ASSESSMENT — PAIN DESCRIPTION - ORIENTATION
ORIENTATION: RIGHT

## 2020-11-07 ASSESSMENT — PAIN SCALES - GENERAL
PAINLEVEL_OUTOF10: 9
PAINLEVEL_OUTOF10: 4
PAINLEVEL_OUTOF10: 9
PAINLEVEL_OUTOF10: 7
PAINLEVEL_OUTOF10: 8
PAINLEVEL_OUTOF10: 4
PAINLEVEL_OUTOF10: 8
PAINLEVEL_OUTOF10: 5
PAINLEVEL_OUTOF10: 7
PAINLEVEL_OUTOF10: 8

## 2020-11-07 ASSESSMENT — PAIN DESCRIPTION - FREQUENCY: FREQUENCY: CONTINUOUS

## 2020-11-07 ASSESSMENT — PAIN DESCRIPTION - PAIN TYPE
TYPE: SURGICAL PAIN

## 2020-11-07 ASSESSMENT — PAIN - FUNCTIONAL ASSESSMENT
PAIN_FUNCTIONAL_ASSESSMENT: PREVENTS OR INTERFERES SOME ACTIVE ACTIVITIES AND ADLS

## 2020-11-07 ASSESSMENT — PAIN DESCRIPTION - PROGRESSION
CLINICAL_PROGRESSION: NOT CHANGED
CLINICAL_PROGRESSION: NOT CHANGED

## 2020-11-07 ASSESSMENT — PAIN DESCRIPTION - ONSET: ONSET: ON-GOING

## 2020-11-07 ASSESSMENT — PAIN DESCRIPTION - DESCRIPTORS: DESCRIPTORS: ACHING

## 2020-11-07 NOTE — PROGRESS NOTES
Physical Therapy    Facility/Department: [place] [address] - MED SURG/ORTHO  Initial Assessment and Treatment    NAME: Danis Cash  : 1960  MRN: [id number]    Date of Service: 2020    Discharge Recommendations:  Subacute/Skilled Nursing Facility   PT Equipment Recommendations  Equipment Needed: No  Other: defer to patient's facility. If pt is unable to be seen after this session, please let this note serve as discharge summary. Please see case management note for discharge disposition. Thank you. Assessment   Body structures, Functions, Activity limitations: Decreased functional mobility ; Increased pain;Decreased ADL status; Decreased balance;Decreased posture;Decreased ROM; Decreased strength;Decreased high-level IADLs;Decreased safe awareness;Decreased cognition;Decreased endurance;Decreased coordination  Assessment: Patient is a 63year old female who was admitted to [facility] on 20 with right medial tibial plateau fracture. Patient received right tibia IM nail on 20 and per orthopedics she is TTWB RLE. Patient is significantly below her prior level of function. Today she needed maximum assistance x 2 for bed mobility and she was unable to stand with maximum assistance x 2. Patient presented with the therapy deficits listed above. PT recommends that this patient receive skilled PT in the SNF setting, when medically stable, in order to address her therapy defiicts and help her maximize her safety and independence with all functional mobility. PT to continue to follow. Treatment Diagnosis: decreased independence with functional mobility. Specific instructions for Next Treatment: progress mobility as tolerated  Prognosis: Fair  Decision Making: Medium Complexity  PT Education: Goals; General Safety;Gait Training;PT Role;Orientation;Disease Specific Education;Plan of Care;Home Exercise Program;Functional Mobility Training;Precautions;Transfer Training;Weight-bearing Education; Injury Prevention  Patient Education: Patient educated on her weight bearing precautions and on safe SW placement/usage. Patient will need additional education reinforcement. Barriers to Learning: impaired cognition  REQUIRES PT FOLLOW UP: Yes  Activity Tolerance  Activity Tolerance: Patient limited by fatigue;Patient limited by pain; Patient limited by endurance  Activity Tolerance: Vitals: 133/77 88 BPM       Patient Diagnosis(es): There were no encounter diagnoses. has a past medical history of CHF (congestive heart failure) ([address] HTN (hypertension), and UTI (urinary tract infection). has a past surgical history that includes  section; bladder repair; Cholecystectomy; and Cystoscopy. Restrictions  Restrictions/Precautions  Restrictions/Precautions: Weight Bearing, General Precautions, Fall Risk  Lower Extremity Weight Bearing Restrictions  Right Lower Extremity Weight Bearing: Partial Weight Bearing  Partial Weight Bearing Percentage Or Pounds: Partial Weight Bearing of TTWB to Right leg  Position Activity Restriction  Other position/activity restrictions: up with assistance  Vision/Hearing  Vision: Impaired  Vision Exceptions: Wears glasses at all times  Hearing: Within functional limits     Subjective  General  Chart Reviewed: Yes  Patient assessed for rehabilitation services?: Yes  Additional Pertinent Hx:  IM nail right tibia. Response To Previous Treatment: Not applicable  Referring Practitioner: RAMÍREZ Eugene  Referral Date : 20  General Comment  Comments: Patient in supine position in the bed upon entry of therapy staff. Subjective  Subjective: Patient agreed to participate.   Pain Screening  Patient Currently in Pain: Yes  Pain Assessment  Pain Assessment: 0-10  Pain Level: 8  Pain Type: Surgical pain  Pain Location: Leg  Pain Orientation: Right  Functional Pain Assessment: Prevents or interferes some active activities and ADLs  Response to Pain Intervention: Patient Satisfied  Vital Signs  Patient Currently in Pain: Yes  Pre Treatment Pain Screening  Intervention List: Patient able to continue with treatment    Orientation  Orientation  Overall Orientation Status: Within Functional Limits  Social/Functional History  Social/Functional History  Lives With: Alone  Type of Home: House  Home Layout: One level  Home Access: Stairs to enter without rails  Entrance Stairs - Number of Steps: 1  Bathroom Shower/Tub: Tub/Shower unit  Bathroom Toilet: Handicap height  Bathroom Accessibility: Not accessible  Home Equipment: [employer], Standard walker, Cane, Lift chair(patient sleeps in a recliner at home)  Receives Help From: Home health(aides come to house M-F for 1-2 hours a day)  ADL Assistance: Needs assistance  Bath: Moderate assistance  Dressing: Moderate assistance  Grooming: Independent  Feeding: Independent  Toileting: Independent  Homemaking Assistance: Needs assistance  Meal Prep: Maximal  Laundry: Maximal  Cleaning: Maximal  Ambulation Assistance: Independent(short household distances with her walker)  Transfer Assistance: Independent  Active : No  Patient's  Info: senior [employer]  Occupation: Retired  Type of occupation: [employer]  Additional Comments: Last fall prior to this most recent fall was about one year ago. Cognition   Cognition  Overall Cognitive Status: Exceptions  Following Commands: Follows multistep commands with repitition; Follows multistep commands with increased time  Safety Judgement: Decreased awareness of need for assistance  Problem Solving: Assistance required to correct errors made;Assistance required to generate solutions;Assistance required to identify errors made;Assistance required to implement solutions;Decreased awareness of errors  Insights: Decreased awareness of deficits  Initiation: Requires cues for some  Sequencing: Requires cues for some  Cognition Comment: frequent cueing for safety.     Objective     Observation/Palpation  Posture: Poor  Observation: significant forward flexed posture. AROM RLE (degrees)  RLE General AROM: severely limited right hip ROM due to pain/weakness. right knee ROM not tested due to knee in knee immobilizer. ankle ROM WFL  PROM LLE (degrees)  LLE PROM: WFL  AROM LLE (degrees)  LLE AROM : WFL  Strength RLE  Comment: 3-/5 right hip strength. right knee not tested due to immobilizer. right ankle grossly 3+/5 strength. Strength LLE  Comment: grossly 3+/5 strength  Motor Control  Gross Motor?: Exceptions  Comments: 3-/5 right hip strength. right knee not tested due to immobilizer. right ankle grossly 3+/5 strength. Sensation  Overall Sensation Status: Impaired(numbness in RLE)  Bed mobility  Rolling to Left: Maximum assistance  Rolling to Right: Maximum assistance  Supine to Sit: Maximum assistance;2 Person assistance(head of bed elevated)  Sit to Supine: Maximum assistance;2 Person assistance  Scooting: Dependent/Total(to scoot up in the bed)  Transfers  Sit to Stand: Unable to assess  Stand to sit: Unable to assess  Bed to Chair: Unable to assess  Comment: Standing attempts: 1st attempt: stand from edge of bed with standard walker and max assist x 2. This resulted in patient unable to clear buttocks from bed. 2nd and 3rd attempts the edge of the bed was raised. max assist x 2 was applied and patient was barely able to clear her buttocks from the bed. cueing provided for patient to utilize momentum and shift her weight more anteriorally but patient still unable to stand due to weight bearing status, weakness, pain, fatigue.   Ambulation  Ambulation?: No(unable to safely complete today due to weight bearing status, weakness, pain, fatigue.)  WB Status: TTWB RLE     Balance  Posture: Poor  Sitting - Static: Poor  Sitting - Dynamic: Poor  Exercises  Straight Leg Raise: 1 x 10 LLE 1 x 5 AAROM RLE  Quad Sets: 1 x 10  Heelslides: 1 x 10 LLE  Hip Abduction: 1 x 10 LLE, 1 x 10 RLE AAROM  Knee Long Arc Quad: 1 x 10 LLE AROM  Ankle Pumps: 2 x 10  Comments: cueing for sequencing and technique. frequent rest breaks due to pain/fatigue. Other exercises  Other exercises?: No     Plan   Plan  Times per week: 7/week  Plan weeks: 1 week 11/14/20  Specific instructions for Next Treatment: progress mobility as tolerated  Current Treatment Recommendations: Strengthening, Home Exercise Program, ROM, Safety Education & Training, Balance Training, Endurance Training, Patient/Caregiver Education & Training, Functional Mobility Training, Equipment Evaluation, Education, & procurement, Transfer Training, Gait Training, ADL/Self-care Training, Positioning, Stair training  Safety Devices  Type of devices: All fall risk precautions in place, Bed alarm in place, Nurse notified, Call light within reach, Gait belt, Patient at risk for falls, Left in bed    AM-PAC Score     AM-PAC Inpatient Mobility without Stair Climbing Raw Score : 7 (11/07/20 1009)  AM-PAC Inpatient without Stair Climbing T-Scale Score : 28.66 (11/07/20 1009)  Mobility Inpatient CMS 0-100% Score: 86.29 (11/07/20 1009)  Mobility Inpatient without Stair CMS G-Code Modifier : CM (11/07/20 1009)       Goals  Short term goals  Time Frame for Short term goals: 1 week 11/14/20  Short term goal 1: Rolling left/right with mod assist.  Short term goal 2: Supine <> sit with mod assist.  Short term goal 3: Sit <> stand with maximum assistance. Short term goal 4: Bed <> chair with maximum assistance and appropriate lifting equipment if needed. Short term goal 5: Assess ambulation when safe to attempt. Patient Goals   Patient goals : To decrease her pain and eventually start to stand/walk.        Therapy Time   Individual Concurrent Group Co-treatment   Time In 6359         Time Out 0848         Minutes 37         Timed Code Treatment Minutes: 27 Minutes(10 minute evaluation)       Louisa Gregory PT

## 2020-11-07 NOTE — PROGRESS NOTES
Department of Orthopedic Surgery  Physician Assistant Progress Note      SUBJECTIVE  POD 1 right tibial nail by Dr. Macho Araujo  She is resting in bed at my visit. States pain in manageable. Physical    VITALS:  /68   Pulse 75   Temp 99.1 °F (37.3 °C) (Oral)   Resp 16   SpO2 95%   CONSTITUTIONAL:  awake, alert, cooperative, no apparent distress, and appears stated age  MUSCULOSKELETAL:  with exception of  RIGHT LE:  Dressings in place. Calf soft and nontender.  Pulses equal    Data    CBC with Differential:    Lab Results   Component Value Date    WBC 10.1 11/06/2020    RBC 3.84 11/06/2020    HGB 12.5 11/06/2020    HCT 39.9 11/06/2020     11/06/2020    .1 11/06/2020    MCH 32.6 11/06/2020    MCHC 31.3 11/06/2020    RDW 17.1 11/06/2020    LYMPHOPCT 8.2 11/06/2020    MONOPCT 7.8 11/06/2020    BASOPCT 0.7 11/06/2020    MONOSABS 0.8 11/06/2020    LYMPHSABS 0.8 11/06/2020    EOSABS 0.1 11/06/2020    BASOSABS 0.1 11/06/2020     Hemoglobin/Hematocrit:    Lab Results   Component Value Date    HGB 12.5 11/06/2020    HCT 39.9 11/06/2020     CMP:    Lab Results   Component Value Date     11/06/2020    K 3.8 11/06/2020     11/06/2020    CO2 22 11/06/2020    BUN 12 11/06/2020    CREATININE <0.5 11/06/2020    GFRAA >60 11/06/2020    LABGLOM >60 11/06/2020    GLUCOSE 90 11/06/2020    CALCIUM 9.3 11/06/2020     BMP:    Lab Results   Component Value Date     11/06/2020    K 3.8 11/06/2020     11/06/2020    CO2 22 11/06/2020    BUN 12 11/06/2020    CREATININE <0.5 11/06/2020    CALCIUM 9.3 11/06/2020    GFRAA >60 11/06/2020    LABGLOM >60 11/06/2020    GLUCOSE 90 11/06/2020       Current Inpatient Medications    Current Facility-Administered Medications: rivaroxaban (XARELTO) tablet 20 mg, 20 mg, Oral, Daily  ceFAZolin (ANCEF) 2 g in dextrose 5 % 100 mL IVPB, 2 g, Intravenous, Q8H  sodium chloride flush 0.9 % injection 10 mL, 10 mL, Intravenous, 2 times per day  sodium chloride flush 0.9 % injection 10 mL, 10 mL, Intravenous, PRN  acetaminophen (TYLENOL) tablet 650 mg, 650 mg, Oral, Q6H PRN **OR** acetaminophen (TYLENOL) suppository 650 mg, 650 mg, Rectal, Q6H PRN  polyethylene glycol (GLYCOLAX) packet 17 g, 17 g, Oral, Daily PRN  promethazine (PHENERGAN) tablet 12.5 mg, 12.5 mg, Oral, Q6H PRN **OR** ondansetron (ZOFRAN) injection 4 mg, 4 mg, Intravenous, Q6H PRN  0.9 % sodium chloride infusion, , Intravenous, Continuous  oxyCODONE-acetaminophen (PERCOCET) 5-325 MG per tablet 1 tablet, 1 tablet, Oral, Q4H PRN **OR** oxyCODONE-acetaminophen (PERCOCET) 5-325 MG per tablet 2 tablet, 2 tablet, Oral, Q4H PRN  morphine (PF) injection 2 mg, 2 mg, Intravenous, Q2H PRN **OR** morphine (PF) injection 4 mg, 4 mg, Intravenous, Q2H PRN  cyclobenzaprine (FLEXERIL) tablet 10 mg, 10 mg, Oral, TID PRN    ASSESSMENT AND PLAN      Continue PT with TTWB RLE  Continue pain control  She lives alone at home and will need SNF placement.  It will be a challenge to ambulate at home with weightbearing restrictions given her size       Barb Bender PA-C

## 2020-11-07 NOTE — OP NOTE
SCIP protocol. INDICATIONS FOR SURGERY:  The patient is a 48-year-old morbidly obese  woman with a BMI of over 44. She claims she is 290 pounds. Clinically,  she appeared to be at least 350 to 400 pounds. She had unfortunately  sustained a complex fracture of the segmental fibular fracture and a  short oblique proximal fibular fracture of the metaphyseal/diaphyseal  junction 6.8 cm below the joint space. I recommended surgical  intervention for this woman, I explained her the complexity of this  operation as well as lengthy rehabilitation. She voiced understanding  to this with specific concerns regarding infection, deep vein  thrombosis, pulmonary embolism, arthrofibrosis, delayed rehabilitation,  anesthetic complications including death, cardiopulmonary issues,  hardware failure, need for revision surgery etc.  All questions were  answered. DETAILS OF SURGERY:  The patient was taken to the operating room and  placed on general anesthesia. The patient's massive obesity made  everything in this operation extremely difficult. She was ultimately  positioned adequately for the surgery. The leg was then prepped and  draped. The pneumatic tourniquet was placed high in the right thigh. After the patient was positioned and checked with x-ray, the leg was  prepped and draped. A longitudinal incision was made on the lateral  parapatellar region due to the nature of the fracture. This was carried  down through the skin and soft tissues to expose the bone. An awl was  placed and the guidewire was placed. Then, with the fracture reduction  maneuver performed by the assistant, the guidewire was placed down into  the tibial plafond region and checked. It was measured to _____. At this point, I was able to palpate the fracture and I was able to put  a Ver[place]rugge clamp on it. This allowing the fracture quite nicely.   The  Verbrugge clamp was left on really through the entirety of the case.    _____ reamed

## 2020-11-07 NOTE — PROGRESS NOTES
Hospitalist Progress Note      PCP: Zaid Coronado MD    Date of Admission: 11/5/2020        Subjective:     Complains of pain at the surgical site. No nausea vomiting. No fevers or chills. Medications:  Reviewed    Infusion Medications    sodium chloride 75 mL/hr at 11/07/20 1138     Scheduled Medications    latanoprost  1 drop Both Eyes Nightly    lisinopril  40 mg Oral Daily    atorvastatin  40 mg Oral Nightly    amLODIPine  10 mg Oral Daily    rivaroxaban  20 mg Oral Daily    sodium chloride flush  10 mL Intravenous 2 times per day     PRN Meds: sodium chloride flush, acetaminophen **OR** acetaminophen, polyethylene glycol, promethazine **OR** ondansetron, oxyCODONE-acetaminophen **OR** oxyCODONE-acetaminophen, morphine **OR** morphine, cyclobenzaprine      Intake/Output Summary (Last 24 hours) at 11/7/2020 1216  Last data filed at 11/7/2020 1138  Gross per 24 hour   Intake 1040 ml   Output 1350 ml   Net -310 ml       Exam:    /72   Pulse 86   Temp 98.7 °F (37.1 °C) (Oral)   Resp 16   SpO2 92%     General appearance: No apparent distress, appears stated age and cooperative. HEENT: Pupils equal, round, and reactive to light. Conjunctivae/corneas clear. Neck: Supple, with full range of motion. No jugular venous distention. Trachea midline. Respiratory:  Normal respiratory effort. Clear to auscultation, bilaterally without Rales/Wheezes/Rhonchi. Cardiovascular: Regular rate and rhythm with normal S1/S2 without murmurs, rubs or gallops. Abdomen: Soft, non-tender, non-distended with normal bowel sounds. Musculoskeletal: No clubbing, cyanosis or edema bilaterally. Full range of motion without deformity. Skin: Skin color, texture, turgor normal.  No rashes or lesions. Neurologic:  Neurovascularly intact without any focal sensory/motor deficits.  Cranial nerves: II-XII intact, grossly non-focal.  Psychiatric: Alert and oriented, thought content appropriate, normal

## 2020-11-07 NOTE — PROGRESS NOTES
Restrictions  Restrictions/Precautions  Restrictions/Precautions: Weight Bearing, General Precautions, Fall Risk  Lower Extremity Weight Bearing Restrictions  Right Lower Extremity Weight Bearing: Partial Weight Bearing  Partial Weight Bearing Percentage Or Pounds: Partial Weight Bearing of TTWB to Right leg  Position Activity Restriction  Other position/activity restrictions: up with assistance    Subjective   General  Chart Reviewed: Yes  Patient assessed for rehabilitation services?: Yes  Family / Caregiver Present: No  Referring Practitioner: RAMÍREZ Aguirre  Diagnosis: RIGHT TIBIA IM NAIL INSERTION  Subjective  Subjective: Pt agreeable to therapy  General Comment  Comments: RN approved therapy  Pain Assessment  Pain Level: 8  Vital Signs  Temp: 98.7 °F (37.1 °C)  Temp Source: Oral  Pulse: 105  Heart Rate Source: Monitor  Resp: 15  BP: (!) 146/83  BP Location: Left upper arm  BP Upper/Lower: Upper  MAP (mmHg): 104  Patient Position: High fowlers  Level of Consciousness: Alert  MEWS Score: 2  Oxygen Therapy  SpO2: 96 %  O2 Device: Nasal cannula  O2 Flow Rate (L/min): 1.5 L/min     Social/Functional History  Social/Functional History  Lives With: Alone  Type of Home: House  Home Layout: One level  Home Access: Stairs to enter without rails  Entrance Stairs - Number of Steps: 1  Bathroom Shower/Tub: Tub/Shower unit  Bathroom Toilet: Handicap height  Bathroom Accessibility: Not accessible  Home Equipment: [address], Standard walker, Cane, Lift chair(patient sleeps in a recliner at home)  Receives Help From: Home health(aides come to house M-F for 1-2 hours a day)  ADL Assistance: Needs assistance  Bath: Moderate assistance  Dressing:  Moderate assistance  Grooming: Independent  Feeding: Independent  Toileting: Independent  Homemaking Assistance: Needs assistance  Meal Prep: Maximal  Laundry: Maximal  Cleaning: Maximal  Ambulation Assistance: Independent(short household distances with her walker)  Transfer Assistance: Independent  Active : No  Patient's  Info: [employer]  Occupation: Retired  Type of occupation: [employer]  Additional Comments: Last fall prior to this most recent fall was about one year ago. Objective        Orientation  Overall Orientation Status: Within Functional Limits  Observation/Palpation  Posture: Poor  Observation: significant forward flexed posture. Balance  Sitting Balance: Stand by assistance(seated EOB)  Standing Balance: Dependent/Total(max A x2 for attempt sit<>stand)  Standing Balance  Time: ~10 minutes  Activity: seated EOB  ADL  Grooming: Setup(in semifowlers in bed)  LE Dressing: Dependent/Total(socks)  Toileting: Dependent/Total(duran)  Tone RUE  RUE Tone: Normotonic  Tone LUE  LUE Tone: Normotonic  Coordination  Movements Are Fluid And Coordinated: Yes     Bed mobility  Rolling to Left: Maximum assistance  Rolling to Right: Maximum assistance  Supine to Sit: Maximum assistance;2 Person assistance  Sit to Supine: Maximum assistance;2 Person assistance  Scooting: Dependent/Total(to scoot to [facility])  Transfers  Sit to stand: 2 Person assistance;Maximum assistance  Stand to sit: Maximum assistance;2 Person assistance  Transfer Comments: for attempts at sit<>stand at EOB with SW, pt unable to get to full standing position with weigth bearing status     Cognition  Overall Cognitive Status: Exceptions  Following Commands: Follows multistep commands with repitition; Follows multistep commands with increased time  Memory: Decreased short term memory  Safety Judgement: Decreased awareness of need for assistance  Problem Solving: Assistance required to correct errors made;Assistance required to generate solutions;Assistance required to identify errors made;Assistance required to implement solutions;Decreased awareness of errors  Insights: Decreased awareness of deficits  Initiation: Requires cues for some  Sequencing: Requires cues for some  Cognition Comment: frequent cueing for safety. Sensation  Overall Sensation Status: Impaired(numbness in RLE)  Type of ROM/Therapeutic Exercise  Type of ROM/Therapeutic Exercise: AROM  Exercises  Elbow Flexion: x10  Elbow Extension: x10  Grasp/Release: x10     LUE AROM (degrees)  LUE AROM : WFL  RUE AROM (degrees)  RUE AROM : WFL  LUE Strength  Gross LUE Strength: WFL  RUE Strength  Gross RUE Strength: WFL                   Plan   Plan  Times per week: 4-6x/wk  Current Treatment Recommendations: Balance Training, Safety Education & Training, Self-Care / ADL      AM-PAC Score        AM-PAC Inpatient Daily Activity Raw Score: 11 (11/07/20 1037)  AM-PAC Inpatient ADL T-Scale Score : 29.04 (11/07/20 1037)  ADL Inpatient CMS 0-100% Score: 70.42 (11/07/20 1037)  ADL Inpatient CMS G-Code Modifier : CL (11/07/20 1037)    Goals  Short term goals  Time Frame for Short term goals: 1 week (11/14/20)  Short term goal 1: Pt will complete functional transfer with min A x2. Short term goal 2: Pt will complete 5 minutes of dynamic sitting EOB for adls with S.  Short term goal 3: Pt will complete 15 reps of BUE exercises for increased endurance. (11/11/20)  Short term goal 4: Pt will complete LB dressing with AE prn and set up. Patient Goals   Patient goals : \"to get better\"       Therapy Time   Individual Concurrent Group Co-treatment   Time In 0810         Time Out 0850         Minutes 40         Timed Code Treatment Minutes: 30 Minutes(10 minutes for evaluation)       Coreen Casillas OTR/L    If pt is unable to be seen after this session, please let this note serve as discharge summary. Please see case management note for discharge disposition. Thank you.

## 2020-11-07 NOTE — PROGRESS NOTES
Pt assessment completed and charted. VSS. Pt a/o. PRN medication administered per MAR. Mitesh patent. Bed in lowest position and wheels locked. Call light within reach. Bedside table within reach. Non-skid footwear in place. Pt denies any other needs at this time. Pt calls out appropriately. Will continue to monitor.

## 2020-11-08 ENCOUNTER — APPOINTMENT (OUTPATIENT)
Dept: GENERAL RADIOLOGY | Age: 60
DRG: 313 | End: 2020-11-08
Attending: INTERNAL MEDICINE
Payer: MEDICAID

## 2020-11-08 PROCEDURE — 1200000000 HC SEMI PRIVATE

## 2020-11-08 PROCEDURE — 2580000003 HC RX 258: Performed by: PHYSICIAN ASSISTANT

## 2020-11-08 PROCEDURE — 97110 THERAPEUTIC EXERCISES: CPT

## 2020-11-08 PROCEDURE — 97530 THERAPEUTIC ACTIVITIES: CPT

## 2020-11-08 PROCEDURE — 6370000000 HC RX 637 (ALT 250 FOR IP): Performed by: HOSPITALIST

## 2020-11-08 PROCEDURE — 73590 X-RAY EXAM OF LOWER LEG: CPT

## 2020-11-08 PROCEDURE — 6370000000 HC RX 637 (ALT 250 FOR IP): Performed by: PHYSICIAN ASSISTANT

## 2020-11-08 RX ORDER — ONDANSETRON 4 MG/1
4 TABLET, ORALLY DISINTEGRATING ORAL EVERY 6 HOURS PRN
Status: DISCONTINUED | OUTPATIENT
Start: 2020-11-08 | End: 2020-11-12 | Stop reason: HOSPADM

## 2020-11-08 RX ADMIN — OXYCODONE AND ACETAMINOPHEN 2 TABLET: 5; 325 TABLET ORAL at 04:20

## 2020-11-08 RX ADMIN — AMLODIPINE BESYLATE 10 MG: 5 TABLET ORAL at 09:16

## 2020-11-08 RX ADMIN — ONDANSETRON 4 MG: 4 TABLET, ORALLY DISINTEGRATING ORAL at 12:06

## 2020-11-08 RX ADMIN — ANTACID TABLETS 500 MG: 500 TABLET, CHEWABLE ORAL at 11:32

## 2020-11-08 RX ADMIN — ATORVASTATIN CALCIUM 40 MG: 40 TABLET, FILM COATED ORAL at 19:43

## 2020-11-08 RX ADMIN — SODIUM CHLORIDE, PRESERVATIVE FREE 10 ML: 5 INJECTION INTRAVENOUS at 10:00

## 2020-11-08 RX ADMIN — METOPROLOL SUCCINATE 25 MG: 25 TABLET, EXTENDED RELEASE ORAL at 09:17

## 2020-11-08 RX ADMIN — RIVAROXABAN 20 MG: 20 TABLET, FILM COATED ORAL at 09:17

## 2020-11-08 RX ADMIN — LISINOPRIL 40 MG: 20 TABLET ORAL at 09:17

## 2020-11-08 RX ADMIN — LATANOPROST 1 DROP: 50 SOLUTION OPHTHALMIC at 19:43

## 2020-11-08 RX ADMIN — OXYCODONE AND ACETAMINOPHEN 2 TABLET: 5; 325 TABLET ORAL at 19:43

## 2020-11-08 RX ADMIN — OXYCODONE AND ACETAMINOPHEN 2 TABLET: 5; 325 TABLET ORAL at 09:16

## 2020-11-08 RX ADMIN — OXYCODONE AND ACETAMINOPHEN 2 TABLET: 5; 325 TABLET ORAL at 15:00

## 2020-11-08 ASSESSMENT — PAIN DESCRIPTION - PAIN TYPE
TYPE: SURGICAL PAIN

## 2020-11-08 ASSESSMENT — PAIN DESCRIPTION - ORIENTATION
ORIENTATION: RIGHT

## 2020-11-08 ASSESSMENT — PAIN SCALES - GENERAL
PAINLEVEL_OUTOF10: 0
PAINLEVEL_OUTOF10: 8
PAINLEVEL_OUTOF10: 7
PAINLEVEL_OUTOF10: 0
PAINLEVEL_OUTOF10: 9
PAINLEVEL_OUTOF10: 0
PAINLEVEL_OUTOF10: 8
PAINLEVEL_OUTOF10: 8

## 2020-11-08 ASSESSMENT — PAIN DESCRIPTION - LOCATION
LOCATION: LEG

## 2020-11-08 NOTE — PROGRESS NOTES
Department of Orthopedic Surgery  Physician Assistant Progress Note      SUBJECTIVE  POD 1 right tibial nail by Dr. Deny Hernandez  Resting comfortably in bed at my visit. Pain is controlled. Physical    VITALS:  /67   Pulse 90   Temp 98.3 °F (36.8 °C) (Oral)   Resp 16   SpO2 94%   CONSTITUTIONAL:  awake, alert, cooperative, no apparent distress, and appears stated age  Right leg dressings intact. Clean and dry. Knee immobilizer in place. Calf is soft.  Pulses equal.     Data    CBC with Differential:    Lab Results   Component Value Date    WBC 10.1 11/06/2020    RBC 3.84 11/06/2020    HGB 12.5 11/06/2020    HCT 39.9 11/06/2020     11/06/2020    .1 11/06/2020    MCH 32.6 11/06/2020    MCHC 31.3 11/06/2020    RDW 17.1 11/06/2020    LYMPHOPCT 8.2 11/06/2020    MONOPCT 7.8 11/06/2020    BASOPCT 0.7 11/06/2020    MONOSABS 0.8 11/06/2020    LYMPHSABS 0.8 11/06/2020    EOSABS 0.1 11/06/2020    BASOSABS 0.1 11/06/2020     Hemoglobin/Hematocrit:    Lab Results   Component Value Date    HGB 12.5 11/06/2020    HCT 39.9 11/06/2020     CMP:    Lab Results   Component Value Date     11/06/2020    K 3.8 11/06/2020     11/06/2020    CO2 22 11/06/2020    BUN 12 11/06/2020    CREATININE <0.5 11/06/2020    GFRAA >60 11/06/2020    LABGLOM >60 11/06/2020    GLUCOSE 90 11/06/2020    CALCIUM 9.3 11/06/2020     BMP:    Lab Results   Component Value Date     11/06/2020    K 3.8 11/06/2020     11/06/2020    CO2 22 11/06/2020    BUN 12 11/06/2020    CREATININE <0.5 11/06/2020    CALCIUM 9.3 11/06/2020    GFRAA >60 11/06/2020    LABGLOM >60 11/06/2020    GLUCOSE 90 11/06/2020     Current Inpatient Medications    Current Facility-Administered Medications: latanoprost (XALATAN) 0.005 % ophthalmic solution 1 drop, 1 drop, Both Eyes, Nightly  lisinopril (PRINIVIL;ZESTRIL) tablet 40 mg, 40 mg, Oral, Daily  atorvastatin (LIPITOR) tablet 40 mg, 40 mg, Oral, Nightly  amLODIPine (NORVASC) tablet 10 mg, 10 mg, Oral, Daily  metoprolol succinate (TOPROL XL) extended release tablet 25 mg, 25 mg, Oral, Daily  calcium carbonate (TUMS) chewable tablet 500 mg, 500 mg, Oral, TID PRN  rivaroxaban (XARELTO) tablet 20 mg, 20 mg, Oral, Daily  sodium chloride flush 0.9 % injection 10 mL, 10 mL, Intravenous, 2 times per day  sodium chloride flush 0.9 % injection 10 mL, 10 mL, Intravenous, PRN  acetaminophen (TYLENOL) tablet 650 mg, 650 mg, Oral, Q6H PRN **OR** acetaminophen (TYLENOL) suppository 650 mg, 650 mg, Rectal, Q6H PRN  polyethylene glycol (GLYCOLAX) packet 17 g, 17 g, Oral, Daily PRN  promethazine (PHENERGAN) tablet 12.5 mg, 12.5 mg, Oral, Q6H PRN **OR** ondansetron (ZOFRAN) injection 4 mg, 4 mg, Intravenous, Q6H PRN  oxyCODONE-acetaminophen (PERCOCET) 5-325 MG per tablet 1 tablet, 1 tablet, Oral, Q4H PRN **OR** oxyCODONE-acetaminophen (PERCOCET) 5-325 MG per tablet 2 tablet, 2 tablet, Oral, Q4H PRN  morphine (PF) injection 2 mg, 2 mg, Intravenous, Q2H PRN **OR** morphine (PF) injection 4 mg, 4 mg, Intravenous, Q2H PRN  cyclobenzaprine (FLEXERIL) tablet 10 mg, 10 mg, Oral, TID PRN    ASSESSMENT AND PLAN      She is doing well. Will need SNF placement  Continue PT with TTWB and brace. Patient on Xarelto with history of prior DVTs. Continue pain management.      Lee Martinez PA-C

## 2020-11-08 NOTE — PROGRESS NOTES
Physical Therapy  Facility/Department: [place] [address] - MED SURG/ORTHO  Daily Treatment Note  NAME: Yevgeniy Chicas  : 1960  MRN: [id number]    Date of Service: 2020    Discharge Recommendations:  Subacute/Skilled Nursing Facility   PT Equipment Recommendations  Equipment Needed: No  Other: defer to patient's facility. If pt is unable to be seen after this session, please let this note serve as discharge summary. Please see case management note for discharge disposition. Thank you. Assessment   Body structures, Functions, Activity limitations: Decreased functional mobility ; Increased pain;Decreased ADL status; Decreased balance;Decreased posture;Decreased ROM; Decreased strength;Decreased high-level IADLs;Decreased safe awareness;Decreased cognition;Decreased endurance;Decreased coordination  Assessment: Patient is a 63year old female who was admitted to [facility] on 20 with right medial tibial plateau fracture. Patient received right tibia IM nail on 20 and per orthopedics she is TTWB RLE. Patient continues to be significantly below her prior level of function. Today she again needed maximum assistance for bed mobility and she was unable to stand. Patient presented with the therapy deficits listed above. PT recommends that this patient receive skilled PT in the SNF setting, when medically stable, in order to address her therapy defiicts and help her maximize her safety and independence with all functional mobility. PT to continue to follow. Treatment Diagnosis: decreased independence with functional mobility. Specific instructions for Next Treatment: progress mobility as tolerated  Prognosis: Fair  Decision Making: Medium Complexity  PT Education: Goals; General Safety;Gait Training;PT Role;Orientation;Disease Specific Education;Plan of Care;Home Exercise Program;Functional Mobility Training;Precautions;Transfer Training;Weight-bearing Education; Injury Prevention  Patient Education: Patient educated on her weight bearing precautions and on safe SW placement/usage. Patient will need additional education reinforcement. Barriers to Learning: impaired cognition  REQUIRES PT FOLLOW UP: Yes  Activity Tolerance  Activity Tolerance: Patient limited by fatigue;Patient limited by pain; Patient limited by endurance  Activity Tolerance: Vitals: 138/77 89 BPM     Patient Diagnosis(es): There were no encounter diagnoses. has a past medical history of CHF (congestive heart failure) ([address] HTN (hypertension), and UTI (urinary tract infection). has a past surgical history that includes  section; bladder repair; Cholecystectomy; and Cystoscopy. Restrictions  Restrictions/Precautions  Restrictions/Precautions: Weight Bearing, General Precautions, Fall Risk  Lower Extremity Weight Bearing Restrictions  Right Lower Extremity Weight Bearing: Partial Weight Bearing  Partial Weight Bearing Percentage Or Pounds: Partial Weight Bearing of TTWB to Right leg  Position Activity Restriction  Other position/activity restrictions: up with assistance  Subjective   General  Chart Reviewed: Yes  Additional Pertinent Hx:  IM nail right tibia. Response To Previous Treatment: Not applicable  Subjective  Subjective: Patient agreed to participate. General Comment  Comments: Patient in supine position in the bed upon entry of therapy staff. Pain Screening  Patient Currently in Pain: Yes  Pain Assessment  Pain Assessment: 0-10  Pain Level: 8(when moving.)  Pain Type: Surgical pain  Pain Location: Leg  Pain Orientation: Right  Non-Pharmaceutical Pain Intervention(s): Ambulation/Increased Activity  Response to Pain Intervention: Patient Satisfied  Vital Signs  Patient Currently in Pain: Yes       Orientation  Orientation  Overall Orientation Status: Within Functional Limits  Cognition   Cognition  Overall Cognitive Status: Exceptions  Following Commands: Follows multistep commands with repitition; Follows multistep commands with increased time  Memory: Decreased short term memory  Safety Judgement: Decreased awareness of need for assistance  Problem Solving: Assistance required to correct errors made;Assistance required to generate solutions;Assistance required to identify errors made;Assistance required to implement solutions;Decreased awareness of errors  Insights: Decreased awareness of deficits  Initiation: Requires cues for some  Sequencing: Requires cues for some  Cognition Comment: frequent cueing for safety. Objective   Bed mobility  Rolling to Left: Moderate assistance  Rolling to Right: Moderate assistance  Supine to Sit: Maximum assistance  Sit to Supine: Maximum assistance;2 Person assistance  Scooting: Maximal assistance;2 Person assistance(to scoot up in bed)  Comment: head of bed elevated  Transfers  Sit to Stand: Unable to assess  Stand to sit: Unable to assess  Bed to Chair: Unable to assess  Comment: unable to attempt due to patient's weakness/pain/poor balance and only having one therapist present. Ambulation  Ambulation?: No(unable to safely complete today due to weight bearing status, weakness, pain, fatigue and only one therapist present during treatment session)  WB Status: TTWB RLE        Exercises  Straight Leg Raise: 1 x 10 AROM LLE. AAROM RLE  Quad Sets: 1 x 10  Heelslides: 1 x 10 LLE  Gluteal Sets: 1 x 10  Hip Flexion: 1 x 10 LLE at edge of bed AAROM  Hip Abduction: 1 x 10 LLE, 1 x 10 RLE AAROM  Knee Long Arc Quad: 2 x 10 LLE AROM at edge of bed  Knee Short Arc Quad: 2 x 10 LLE AROM with pillow under left knee  Ankle Pumps: 2 x 10  Core Strengthening: supine <> long sit with minimum assistance. repeated 1 x 5. Comments: cueing for sequencing and technique. frequent rest breaks due to pain/fatigue.   Other exercises  Other exercises?: No                       AM-PAC Score     AM-PAC Inpatient Mobility without Stair Climbing Raw Score : 7 (11/08/20 1024)  AM-PAC Inpatient without Stair Climbing T-Scale Score : 28.66 (11/08/20 1024)  Mobility Inpatient CMS 0-100% Score: 86.29 (11/08/20 1024)  Mobility Inpatient without Stair CMS G-Code Modifier : CM (11/08/20 1024)       Goals  Short term goals  Time Frame for Short term goals: 1 week 11/14/20  Short term goal 1: Rolling left/right with mod assist. Goal met 11/8  Short term goal 2: Supine <> sit with mod assist. 11/8 max assist for supine to sit and max assist x 2 for sit to supine  Short term goal 3: Sit <> stand with maximum assistance. 11/8 unable to attempt  Short term goal 4: Bed <> chair with maximum assistance and appropriate lifting equipment if needed. 11/8 unable to attempt  Short term goal 5: Assess ambulation when safe to attempt. 11/8 unable to attempt  Patient Goals   Patient goals : To decrease her pain and eventually start to stand/walk. Plan    Plan  Times per week: 7/week  Plan weeks: 1 week 11/14/20  Specific instructions for Next Treatment: progress mobility as tolerated  Current Treatment Recommendations: Strengthening, Home Exercise Program, ROM, Safety Education & Training, Balance Training, Endurance Training, Patient/Caregiver Education & Training, Functional Mobility Training, Equipment Evaluation, Education, & procurement, Transfer Training, Gait Training, ADL/Self-care Training, Positioning, Stair training  Safety Devices  Type of devices:  All fall risk precautions in place, Bed alarm in place, Nurse notified, Call light within reach, Gait belt, Patient at risk for falls, Left in bed     Therapy Time   Individual Concurrent Group Co-treatment   Time In 0948         Time Out 1018         Minutes 30         Timed Code Treatment Minutes: 1200 [facility], PT

## 2020-11-08 NOTE — PROGRESS NOTES
Hospitalist Progress Note      PCP: KEITH MARQUEZ [facility], MD    Date of Admission: 11/5/2020        Subjective:     Still complains of right leg pain, limited ambulation. No fevers      Medications:  Reviewed    Infusion Medications     Scheduled Medications    latanoprost  1 drop Both Eyes Nightly    lisinopril  40 mg Oral Daily    atorvastatin  40 mg Oral Nightly    amLODIPine  10 mg Oral Daily    metoprolol succinate  25 mg Oral Daily    rivaroxaban  20 mg Oral Daily    sodium chloride flush  10 mL Intravenous 2 times per day     PRN Meds: calcium carbonate, sodium chloride flush, acetaminophen **OR** acetaminophen, polyethylene glycol, promethazine **OR** ondansetron, oxyCODONE-acetaminophen **OR** oxyCODONE-acetaminophen, morphine **OR** morphine, cyclobenzaprine      Intake/Output Summary (Last 24 hours) at 11/8/2020 0902  Last data filed at 11/8/2020 6993  Gross per 24 hour   Intake 600 ml   Output 1200 ml   Net -600 ml       Exam:    /78   Pulse 78   Temp 98.3 °F (36.8 °C) (Oral)   Resp 16   SpO2 95%     General appearance: No apparent distress, appears stated age and cooperative. HEENT: Pupils equal, round, and reactive to light. Conjunctivae/corneas clear. Neck: Supple, with full range of motion. No jugular venous distention. Trachea midline. Respiratory:  Normal respiratory effort. Clear to auscultation, bilaterally without Rales/Wheezes/Rhonchi. Cardiovascular: Regular rate and rhythm with normal S1/S2 without murmurs, rubs or gallops. Abdomen: Soft, non-tender, non-distended with normal bowel sounds. Musculoskeletal: No clubbing, cyanosis or edema bilaterally. Full range of motion without deformity. Skin: Skin color, texture, turgor normal.  No rashes or lesions. Neurologic:  Neurovascularly intact without any focal sensory/motor deficits.  Cranial nerves: II-XII intact, grossly non-focal.  Psychiatric: Alert and oriented, thought content appropriate, normal insight  Capillary Refill: Brisk,< 3 seconds   Peripheral Pulses: +2 palpable, equal bilaterally       Labs:   Recent Labs     11/06/20  0644   WBC 10.1   HGB 12.5   HCT 39.9        Recent Labs     11/06/20  0644      K 3.8      CO2 22   BUN 12   CREATININE <0.5*   CALCIUM 9.3     No results for input(s): AST, ALT, BILIDIR, BILITOT, ALKPHOS in the last 72 hours. No results for input(s): INR in the last 72 hours. No results for input(s): Eliezer Troy in the last 72 hours. Assessment/Plan:    Right knee and shin pain In setting of right tib fib fracture d/t mechanical fall status post ORIF 11/6/20-continue PT and OT, pain control, DVT prophylaxis--- discharge planning to SNF     Morbid obesity--BMI 44--continue modified diet /weight loss management    Essential HTN-control-continue lisinopril, amlodipine and metoprolol     HLD-continue atorvastatin     Paroxysmal atrial fibrillation-in NSR-continue Xarelto and metoprolol    Hx DVT and PE-continue Xarelto    Disposition. ..  Discharge to skilled nursing facility once approved      DVT Prophylaxis: Xarelto  Diet: DIET LOW FAT;  Code Status: Full Code        Rocael Baugh MD

## 2020-11-09 LAB
GLUCOSE BLD-MCNC: 88 MG/DL (ref 70–99)
HCT VFR BLD CALC: 31.5 % (ref 36–48)
HEMOGLOBIN: 10.3 G/DL (ref 12–16)
MCH RBC QN AUTO: 32.6 PG (ref 26–34)
MCHC RBC AUTO-ENTMCNC: 32.6 G/DL (ref 31–36)
MCV RBC AUTO: 99.8 FL (ref 80–100)
PDW BLD-RTO: 16.5 % (ref 12.4–15.4)
PERFORMED ON: NORMAL
PLATELET # BLD: 204 K/UL (ref 135–450)
PMV BLD AUTO: 10.4 FL (ref 5–10.5)
RBC # BLD: 3.15 M/UL (ref 4–5.2)
SARS-COV-2, NAAT: NOT DETECTED
WBC # BLD: 8.6 K/UL (ref 4–11)

## 2020-11-09 PROCEDURE — 2580000003 HC RX 258: Performed by: PHYSICIAN ASSISTANT

## 2020-11-09 PROCEDURE — 97110 THERAPEUTIC EXERCISES: CPT

## 2020-11-09 PROCEDURE — U0002 COVID-19 LAB TEST NON-CDC: HCPCS

## 2020-11-09 PROCEDURE — 36415 COLL VENOUS BLD VENIPUNCTURE: CPT

## 2020-11-09 PROCEDURE — 85027 COMPLETE CBC AUTOMATED: CPT

## 2020-11-09 PROCEDURE — 1200000000 HC SEMI PRIVATE

## 2020-11-09 PROCEDURE — 6370000000 HC RX 637 (ALT 250 FOR IP): Performed by: PHYSICIAN ASSISTANT

## 2020-11-09 PROCEDURE — 6370000000 HC RX 637 (ALT 250 FOR IP): Performed by: HOSPITALIST

## 2020-11-09 PROCEDURE — 97530 THERAPEUTIC ACTIVITIES: CPT

## 2020-11-09 RX ORDER — OXYCODONE HYDROCHLORIDE AND ACETAMINOPHEN 5; 325 MG/1; MG/1
1-2 TABLET ORAL EVERY 4 HOURS PRN
Qty: 40 TABLET | Refills: 0 | Status: SHIPPED | OUTPATIENT
Start: 2020-11-09 | End: 2020-11-12

## 2020-11-09 RX ADMIN — LATANOPROST 1 DROP: 50 SOLUTION OPHTHALMIC at 21:42

## 2020-11-09 RX ADMIN — OXYCODONE AND ACETAMINOPHEN 2 TABLET: 5; 325 TABLET ORAL at 14:02

## 2020-11-09 RX ADMIN — RIVAROXABAN 20 MG: 20 TABLET, FILM COATED ORAL at 08:49

## 2020-11-09 RX ADMIN — ATORVASTATIN CALCIUM 40 MG: 40 TABLET, FILM COATED ORAL at 21:42

## 2020-11-09 RX ADMIN — SODIUM CHLORIDE, PRESERVATIVE FREE 10 ML: 5 INJECTION INTRAVENOUS at 08:55

## 2020-11-09 RX ADMIN — METOPROLOL SUCCINATE 25 MG: 25 TABLET, EXTENDED RELEASE ORAL at 08:49

## 2020-11-09 RX ADMIN — OXYCODONE AND ACETAMINOPHEN 2 TABLET: 5; 325 TABLET ORAL at 08:48

## 2020-11-09 RX ADMIN — ANTACID TABLETS 500 MG: 500 TABLET, CHEWABLE ORAL at 18:59

## 2020-11-09 RX ADMIN — LISINOPRIL 40 MG: 20 TABLET ORAL at 08:49

## 2020-11-09 RX ADMIN — OXYCODONE AND ACETAMINOPHEN 2 TABLET: 5; 325 TABLET ORAL at 21:42

## 2020-11-09 RX ADMIN — AMLODIPINE BESYLATE 10 MG: 5 TABLET ORAL at 08:49

## 2020-11-09 RX ADMIN — OXYCODONE AND ACETAMINOPHEN 2 TABLET: 5; 325 TABLET ORAL at 04:14

## 2020-11-09 ASSESSMENT — PAIN SCALES - GENERAL
PAINLEVEL_OUTOF10: 8
PAINLEVEL_OUTOF10: 0
PAINLEVEL_OUTOF10: 8

## 2020-11-09 ASSESSMENT — PAIN DESCRIPTION - ORIENTATION
ORIENTATION: RIGHT

## 2020-11-09 ASSESSMENT — PAIN DESCRIPTION - PAIN TYPE
TYPE: SURGICAL PAIN

## 2020-11-09 ASSESSMENT — PAIN DESCRIPTION - LOCATION
LOCATION: LEG

## 2020-11-09 NOTE — PROGRESS NOTES
Occupational Therapy  Facility/Department: [facility] - MED SURG/ORTHO  Daily Treatment Note  NAME: Elisa Damon  : 1960  MRN: [id number]    Date of Service: 2020    Discharge Recommendations:  Subacute/Skilled Nursing Facility     Assessment   Performance deficits / Impairments: Decreased functional mobility ; Decreased strength;Decreased endurance;Decreased ADL status; Decreased safe awareness;Decreased balance;Decreased posture;Decreased high-level IADLs  Assessment: Pt demos need for max A of 2 to complete functional transfers, unable to achieve full standing position at this time, unable to maintain TTWB. Pt total A for LE dressing. Pt functioning below her baseline with the above noted occupational performance deficits and would benefit from continued skilled OT in SNF setting at d/c. Prognosis: Good  OT Education: OT Role;Plan of Care;Transfer Training;Precautions; Home Exercise Program  Patient Education: disease specific: weight bearing status, safety  REQUIRES OT FOLLOW UP: Yes  Activity Tolerance  Activity Tolerance: Patient Tolerated treatment well  Safety Devices  Safety Devices in place: Yes  Type of devices: Left in chair;Chair alarm in place;Call light within reach;Nurse notified;Gait belt         Patient Diagnosis(es): The encounter diagnosis was Right medial tibial plateau fracture, closed, initial encounter. has a past medical history of CHF (congestive heart failure) ([facility] [address] HTN (hypertension), and UTI (urinary tract infection). has a past surgical history that includes  section; bladder repair; Cholecystectomy; Cystoscopy; and Tibia fracture surgery (Right, 2020).     Restrictions  Restrictions/Precautions  Restrictions/Precautions: Weight Bearing, General Precautions, Fall Risk  Required Braces or Orthoses?: Yes  Lower Extremity Weight Bearing Restrictions  Right Lower Extremity Weight Bearing: Partial Weight Bearing  Partial Weight Bearing Percentage Or Pounds: Partial Weight Bearing of TTWB to Right leg  Required Braces or Orthoses  Right Lower Extremity Brace: Knee Immobilizer  Position Activity Restriction  Other position/activity restrictions: up with assistance     Subjective   General  Chart Reviewed: Yes  Patient assessed for rehabilitation services?: Yes  Response to previous treatment: Patient with no complaints from previous session  Family / Caregiver Present: No  Referring Practitioner: RAMÍREZ Pandya  Diagnosis: RIGHT TIBIA IM NAIL INSERTION    Subjective  Subjective: Pt resting in bed, agreeable to OT treatment. Pain Assessment  Pain Assessment: 0-10  Pain Level: 8  Pain Location: Leg  Pain Orientation: Right  Non-Pharmaceutical Pain Intervention(s): Repositioned  Pre Treatment Pain Screening  Intervention List: Patient able to continue with treatment  Vital Signs  Patient Currently in Pain: Yes     Orientation  Orientation  Overall Orientation Status: Within Functional Limits     Objective    ADL  LE Dressing: Dependent/Total(socks)  Additional Comments: Total A to manage KI     Balance  Sitting Balance: Stand by assistance  Standing Balance: Dependent/Total(max A of 2 with alicia RW, pt forward flexed with posterior lean)  Standing Balance  Activity: 2x stand with RW, pt fatigues quickly and heavy max A of 2 for partial stands    Bed mobility  Rolling to Left: Maximum assistance  Supine to Sit: Dependent/Total;2 Person assistance(mod A of 2 to pt L)     Transfers  Sit to stand: Dependent/Total;2 Person assistance(max A of 2)  Stand to sit: Dependent/Total;2 Person assistance(mod-max A of 2)  Transfer Comments: Pt with difficulty standing fully erect, forward flexed with posterior lean, unable to maintain TTWB.  Used Maxi Move lift to transfer pt from EOB to bedside chair     Cognition  Overall Cognitive Status: WFL     Type of ROM/Therapeutic Exercise  Type of ROM/Therapeutic Exercise: AROM  Comment: seated EOB  Exercises  Shoulder Flexion: 10x  Horizontal ABduction: 10x  Horizontal ADduction: 10x  Elbow Flexion: 15x  Elbow Extension: 15x  Supination: 15x  Pronation: 15x  Wrist Flexion: 15x  Wrist Extension: 15x  Finger Flexion: 15x  Finger Extension: 15x     Plan   Plan  Times per week: 4-6x/wk  Current Treatment Recommendations: Balance Training, Safety Education & Training, Self-Care / ADL    AM-PAC Score  AM-PAC Inpatient Daily Activity Raw Score: 11 (11/09/20 1315)  AM-PAC Inpatient ADL T-Scale Score : 29.04 (11/09/20 1315)  ADL Inpatient CMS 0-100% Score: 70.42 (11/09/20 1315)  ADL Inpatient CMS G-Code Modifier : CL (11/09/20 1315)    Goals  Short term goals  Time Frame for Short term goals: 1 week (11/14/20)  Short term goal 1: Pt will complete functional transfer with min A x2. -- ongoing 11/9/20  Short term goal 2: Pt will complete 5 minutes of dynamic sitting EOB for adls with S.-- ongoing 11/9/20  Short term goal 3: Pt will complete 15 reps of BUE exercises for increased endurance.  (11/11/20)-- ongoing 11/9/20  Short term goal 4: Pt will complete LB dressing with AE prn and set up.-- ongoing 11/9/20  Patient Goals   Patient goals : \"to get better\"       Therapy Time   Individual Concurrent Group Co-treatment   Time In 1130         Time Out 1210         Minutes [address], BLANCO/L

## 2020-11-09 NOTE — PROGRESS NOTES
Physician Progress Note      Bay Sams  [facility] #:                  [id number]  :                       1960  ADMIT DATE:       2020 11:03 PM  DISCH DATE:  RESPONDING  PROVIDER #:        Aubrey Aaron MD          QUERY TEXT:    Pt admitted with Tib/Fib fracture and repair and has CHF documented. If   possible, please document in progress notes and discharge summary further   specificity regarding the type and acuity of CHF:    The medical record reflects the following:  Risk Factors: Morbid obesity, HTN, AF  Clinical Indicators: per anesthesia report of 2016 echo: \"ECHO:  2016  Left   ventricle: Wall thickness was markedly increased. Systolic?function was   normal. The calculated ejection fraction was in the?range of 50% to 55%. Ventricular septum: The contour showed diastolic flattening and?systolic   flattening. Right ventricle: The cavity size was dilated. Systolic   function?was reduced. Systolic pressure was increased. Right atrium: The   atrium was dilated. Tricuspid valve: There was moderate regurgitation. Pulmonary arteries: PA peak pressure: 56mm Hg (S). \"  Listed PMH of CHF per ortho note without specificity. ? Treatment: home meds of hydralazine, lisinopril, Toprol XL continued, labs   and monitoring, supportive care    Thank Lei Wolf RN, CDS  [phone]  Options provided:  -- Chronic Diastolic CHF/HFpEF  -- Chronic Systolic and Diastolic CHF  -- Cardiomyopathy without CHF  -- Other - I will add my own diagnosis  -- Disagree - Not applicable / Not valid  -- Disagree - Clinically unable to determine / Unknown  -- Refer to Clinical Documentation Reviewer    PROVIDER RESPONSE TEXT:    This patient has Cardiomyopathy without CHF.     Query created by: Varun Roblero on 2020 10:34 AM      Electronically signed by:  Aubrey Aaron MD 2020 3:53 PM

## 2020-11-09 NOTE — DISCHARGE INSTR - COC
Continuity of Care Form    Patient Name: Margarita Mendez   :  1960  MRN:  [id number]    Admit date:  2020  Discharge date:      Code Status Order: Full Code   Advance Directives:   [facility] Documentation       Date/Time Healthcare Directive Type of Healthcare Directive Copy in [address] Agent's Name Healthcare Agent's Phone Number    20 1423  No, patient does not have an advance directive for healthcare treatment -- -- -- -- --    20 0515  No, patient does not have an advance directive for healthcare treatment -- -- -- -- --            Admitting Physician:  Jerone Cooks, MD  PCP: Vijay Yoon MD    Discharging Nurse: [facility] Unit/Room#: 9111/3356-77  Discharging Unit Phone Number: ***    Emergency Contact:   Extended Emergency Contact Information  Primary Emergency Contact: Grover Faria   [address] Phone: [phone]  Relation: Parent    Past Surgical History:  Past Surgical History:   Procedure Laterality Date    BLADDER REPAIR       SECTION      [facility] Right 2020    RIGHT TIBIA IM NAIL INSERTION performed by Suyapa Sarkar MD at [facility]       Immunization History: There is no immunization history on file for this patient.     Active Problems:  Patient Active Problem List   Diagnosis Code    Pubic ramus fracture ([facility] [address] S32.599A    Right sided sciatica M54.31    Degenerative tear of lateral meniscus of right knee M23.300    Primary osteoarthritis of right knee M17.11    Lateral knee pain M25.569    Primary osteoarthritis of left knee M17.12    Right medial tibial plateau fracture, closed, initial encounter S82.131A    HTN (hypertension) I10    Morbid obesity ([facility] [address] E66.01    HLD (hyperlipidemia) E78.5       Isolation/Infection:   Isolation            No Isolation          Patient Infection Status

## 2020-11-09 NOTE — CARE COORDINATION
CM spoke to [facility] who stated she would look at pt this a.m.  CM refaxed face sheet.   Radha Becerril RN

## 2020-11-09 NOTE — PROGRESS NOTES
Physical Therapy  Facility/Department: [facility] C5 - MED SURG/ORTHO  Daily Treatment Note  NAME: Tasha Cornejo  : 1960  MRN: [id number]    Date of Service: 2020    Discharge Recommendations:  Subacute/Skilled Nursing Facility   PT Equipment Recommendations  Equipment Needed: No  Other: defer to patient's facility. Assessment   Body structures, Functions, Activity limitations: Decreased functional mobility ; Increased pain;Decreased ADL status; Decreased balance;Decreased posture;Decreased ROM; Decreased strength;Decreased high-level IADLs;Decreased safe awareness;Decreased cognition;Decreased endurance;Decreased coordination  Assessment: Pt able to rtolerate 1 stand at EOB with walker today but could not take any steps. Utilized maxi move to transfer pt bed > chair. Pt participated in [employer] and seems well motivated. Recommend pt for SNF at D/C. Treatment Diagnosis: decreased independence with functional mobility. Specific instructions for Next Treatment: progress mobility as tolerated  Prognosis: Fair  Decision Making: Medium Complexity  PT Education: Goals; General Safety;Gait Training;PT Role;Orientation;Disease Specific Education;Plan of Care;Home Exercise Program;Functional Mobility Training;Precautions;Transfer Training;Weight-bearing Education; Injury Prevention  Patient Education: Patient educated on her weight bearing precautions and on safe SW placement/usage. Patient will need additional education reinforcement. Barriers to Learning: impaired cognition  REQUIRES PT FOLLOW UP: Yes  Activity Tolerance  Activity Tolerance: Patient limited by fatigue;Patient limited by pain; Patient limited by endurance     Patient Diagnosis(es): The encounter diagnosis was Right medial tibial plateau fracture, closed, initial encounter. has a past medical history of CHF (congestive heart failure) ([facility] [address] HTN (hypertension), and UTI (urinary tract infection).    has a past surgical history that includes  section; bladder repair; Cholecystectomy; Cystoscopy; and Tibia fracture surgery (Right, 11/6/2020). Restrictions  Restrictions/Precautions  Restrictions/Precautions: Weight Bearing, General Precautions, Fall Risk  Required Braces or Orthoses?: Yes  Lower Extremity Weight Bearing Restrictions  Right Lower Extremity Weight Bearing: Partial Weight Bearing  Partial Weight Bearing Percentage Or Pounds: Partial Weight Bearing of TTWB to Right leg  Required Braces or Orthoses  Right Lower Extremity Brace: Knee Immobilizer  Position Activity Restriction  Other position/activity restrictions: up with assistance     Subjective   General  Chart Reviewed: Yes  Additional Pertinent Hx: 11/06 IM nail right tibia. Response To Previous Treatment: Patient with no complaints from previous session. Subjective  Subjective: Patient agreed to participate. General Comment  Comments: Patient in supine position in the bed upon entry of therapy staff.   Pain Screening  Patient Currently in Pain: Yes  Pain Assessment  Pain Assessment: 0-10  Pain Level: 8  Pain Type: Surgical pain  Pain Location: Leg  Pain Orientation: Right  Non-Pharmaceutical Pain Intervention(s): Emotional support;Repositioned;Elevation  Vital Signs  Patient Currently in Pain: Yes       Orientation  Orientation  Overall Orientation Status: Within Functional Limits     Objective   Bed mobility  Rolling to Left: Maximum assistance  Supine to Sit: Dependent/Total(mod A of 2 to pts L with HOB elevated and use of rails.)  Transfers  Sit to Stand: Dependent/Total(mod/ max A of 2 for 1 stand at EOB with walker, consecutive stands were unsucessful due to fatigue/ endurance)  Stand to sit: Dependent/Total(min of 2)  Bed to Chair: Dependent/Total(unable to take steps with walker, used maxi move after failed attempt with walker)  Ambulation  Ambulation?: No  WB Status: TTWB RLE        Exercises  Quad Sets: 1 x 10  Gluteal Sets: 1 x 10  Ankle Pumps: X 15 B        AM-PAC Score AM-PAC Inpatient Mobility without Stair Climbing Raw Score : 8 (11/09/20 1449)  AM-PAC Inpatient without Stair Climbing T-Scale Score : 30.65 (11/09/20 1449)  Mobility Inpatient CMS 0-100% Score: 80.91 (11/09/20 1449)  Mobility Inpatient without Stair CMS G-Code Modifier : CM (11/09/20 1449)       Goals  Short term goals  Time Frame for Short term goals: 1 week 11/14/20  Short term goal 1: Rolling left/right with mod assist. Goal met 11/8  Short term goal 2: Supine <> sit with mod assist.; 11/9 mod A of 2  Short term goal 3: Sit <> stand with maximum assistance. 11/9 max/ mod A of 2 for 1 stand at EOB with walker  Short term goal 4: Bed <> chair with maximum assistance and appropriate lifting equipment if needed. 11/9 maxi move afterfailed attempt with walker  Short term goal 5: Assess ambulation when safe to attempt. 11/9 unable to take steps  Patient Goals   Patient goals : To decrease her pain and eventually start to stand/walk. Plan    Plan  Times per week: 7/week  Plan weeks: 1 week 11/14/20  Specific instructions for Next Treatment: progress mobility as tolerated  Current Treatment Recommendations: Strengthening, Home Exercise Program, ROM, Safety Education & Training, Balance Training, Endurance Training, Patient/Caregiver Education & Training, Functional Mobility Training, Equipment Evaluation, Education, & procurement, Transfer Training, Gait Training, ADL/Self-care Training, Positioning, Stair training  Safety Devices  Type of devices:  All fall risk precautions in place, Call light within reach, Chair alarm in place, Gait belt, Left in chair, Nurse notified     Therapy Time   Individual Concurrent Group Co-treatment   Time In 1130         Time Out 1210         Minutes [address] [facility], [address] [place] [address]

## 2020-11-09 NOTE — PROGRESS NOTES
Department of Orthopedic Surgery  Physician Assistant Progress Note      SUBJECTIVE  POD 2 right tibial nail by Dr. Henri Castelan  Resting comfortably in bed at my visit. Pain is controlled. Physical    VITALS:  /75   Pulse 82   Temp 98.8 °F (37.1 °C) (Oral)   Resp 16   Wt (!) 308 lb 13.8 oz (140.1 kg)   SpO2 90%   BMI 46.96 kg/m²   CONSTITUTIONAL:  awake, alert, cooperative, no apparent distress, and appears stated age  Right leg dressings intact. Clean and dry. Knee immobilizer in place. Calf is soft. Pulses equal. Able to DF and PF without problems.      Data    CBC with Differential:    Lab Results   Component Value Date    WBC 8.6 11/09/2020    RBC 3.15 11/09/2020    HGB 10.3 11/09/2020    HCT 31.5 11/09/2020     11/09/2020    MCV 99.8 11/09/2020    MCH 32.6 11/09/2020    MCHC 32.6 11/09/2020    RDW 16.5 11/09/2020    LYMPHOPCT 8.2 11/06/2020    MONOPCT 7.8 11/06/2020    BASOPCT 0.7 11/06/2020    MONOSABS 0.8 11/06/2020    LYMPHSABS 0.8 11/06/2020    EOSABS 0.1 11/06/2020    BASOSABS 0.1 11/06/2020     Hemoglobin/Hematocrit:    Lab Results   Component Value Date    HGB 10.3 11/09/2020    HCT 31.5 11/09/2020     CMP:    Lab Results   Component Value Date     11/06/2020    K 3.8 11/06/2020     11/06/2020    CO2 22 11/06/2020    BUN 12 11/06/2020    CREATININE <0.5 11/06/2020    GFRAA >60 11/06/2020    LABGLOM >60 11/06/2020    GLUCOSE 90 11/06/2020    CALCIUM 9.3 11/06/2020     BMP:    Lab Results   Component Value Date     11/06/2020    K 3.8 11/06/2020     11/06/2020    CO2 22 11/06/2020    BUN 12 11/06/2020    CREATININE <0.5 11/06/2020    CALCIUM 9.3 11/06/2020    GFRAA >60 11/06/2020    LABGLOM >60 11/06/2020    GLUCOSE 90 11/06/2020     Current Inpatient Medications    Current Facility-Administered Medications: ondansetron (ZOFRAN-ODT) disintegrating tablet 4 mg, 4 mg, Oral, Q6H PRN  latanoprost (XALATAN) 0.005 % ophthalmic solution 1 drop, 1 drop, Both Eyes, Nightly  lisinopril (PRINIVIL;ZESTRIL) tablet 40 mg, 40 mg, Oral, Daily  atorvastatin (LIPITOR) tablet 40 mg, 40 mg, Oral, Nightly  amLODIPine (NORVASC) tablet 10 mg, 10 mg, Oral, Daily  metoprolol succinate (TOPROL XL) extended release tablet 25 mg, 25 mg, Oral, Daily  calcium carbonate (TUMS) chewable tablet 500 mg, 500 mg, Oral, TID PRN  rivaroxaban (XARELTO) tablet 20 mg, 20 mg, Oral, Daily  sodium chloride flush 0.9 % injection 10 mL, 10 mL, Intravenous, 2 times per day  sodium chloride flush 0.9 % injection 10 mL, 10 mL, Intravenous, PRN  acetaminophen (TYLENOL) tablet 650 mg, 650 mg, Oral, Q6H PRN **OR** acetaminophen (TYLENOL) suppository 650 mg, 650 mg, Rectal, Q6H PRN  polyethylene glycol (GLYCOLAX) packet 17 g, 17 g, Oral, Daily PRN  promethazine (PHENERGAN) tablet 12.5 mg, 12.5 mg, Oral, Q6H PRN **OR** [DISCONTINUED] ondansetron (ZOFRAN) injection 4 mg, 4 mg, Intravenous, Q6H PRN  oxyCODONE-acetaminophen (PERCOCET) 5-325 MG per tablet 1 tablet, 1 tablet, Oral, Q4H PRN **OR** oxyCODONE-acetaminophen (PERCOCET) 5-325 MG per tablet 2 tablet, 2 tablet, Oral, Q4H PRN  morphine (PF) injection 2 mg, 2 mg, Intravenous, Q2H PRN **OR** morphine (PF) injection 4 mg, 4 mg, Intravenous, Q2H PRN  cyclobenzaprine (FLEXERIL) tablet 10 mg, 10 mg, Oral, TID PRN    ASSESSMENT AND PLAN      She is doing well. Will need SNF placement - [facility]. She is surgical ready for d/c  Continue PT with TTWB and brace. Patient on Xarelto with history of prior DVTs. Continue pain management.  Script in chart    Rufus Rae PA-C

## 2020-11-09 NOTE — DISCHARGE SUMMARY
Hospital Medicine PROGRESS NOTE  Awaiting precert. Patient ID: Edy Pena      Patient's PCP: Hafsa Siddiqi MD    Admit Date: 11/5/2020     Discharge Date:   11/09/20     Admitting Physician: Robinson Ortiz MD     Discharge Physician: Gavin Messer MD     Discharge Diagnoses: Active Hospital Problems    Diagnosis    Right medial tibial plateau fracture, closed, initial encounter [S82.131A]    HTN (hypertension) [I10]    Morbid obesity ([address] [E66.01]    HLD (hyperlipidemia) [E78.5]       The patient was seen and examined on day of discharge and this discharge summary is in conjunction with any daily progress note from day of discharge. Hospital Course:  \"55 y.o. female, with PMH of morbid obesity, HTN and HLD, who was a direct admit from [facility] with right leg pain. The patient stated she had an appointment with her urologist today so she called the transportation van. She stated she told them she needed the large van, but the small [employer] showed up. Patient stated the  tried to get her into the small [employer], but was unsuccessful so the patient went back to her home. She stated when she stepped up onto the step to go into her house her right leg gave out from underneath her and she fell to the ground and her right leg went underneath of her left leg. \"      R tibia and fibula fractures  - ORIF 11/6. SNF. TTWB RLE.     Morbid obesity--BMI 44--continue modified diet /weight loss management     Essential HTN-control-continue lisinopril, amlodipine and metoprolol     HLD-continue atorvastatin     Paroxysmal atrial fibrillation-in NSR-continue Xarelto and metoprolol     Hx DVT and PE-continue Xarelto        Physical Exam Performed:     /75   Pulse 82   Temp 98.8 °F (37.1 °C) (Oral)   Resp 16   Wt (!) 308 lb 13.8 oz (140.1 kg)   SpO2 90%   BMI 46.96 kg/m²       General appearance:  No apparent distress, appears stated age and cooperative. HEENT:  Normal cephalic, atraumatic without obvious deformity. Pupils equal, round, and reactive to light. Extra ocular muscles intact. Conjunctivae/corneas clear. Neck: Supple, with full range of motion. No jugular venous distention. Trachea midline. Respiratory:  Normal respiratory effort. Clear to auscultation, bilaterally without Rales/Wheezes/Rhonchi. Cardiovascular:  Regular rate and rhythm with normal S1/S2 without murmurs, rubs or gallops. Abdomen: Soft, non-tender, non-distended with normal bowel sounds. Musculoskeletal:  No clubbing, cyanosis. RLE nonpitting edema. Full range of motion without deformity. Skin: Skin color, texture, turgor normal.  No rashes or lesions. Neurologic:  Neurovascularly intact without any focal sensory/motor deficits. Cranial nerves: II-XII intact, grossly non-focal.  Psychiatric:  Alert and oriented, thought content appropriate, normal insight  Capillary Refill: Brisk,< 3 seconds   Peripheral Pulses: +2 palpable, equal bilaterally       Labs: For convenience and continuity at follow-up the following most recent labs are provided:      CBC:    Lab Results   Component Value Date    WBC 8.6 11/09/2020    HGB 10.3 11/09/2020    HCT 31.5 11/09/2020     11/09/2020       Renal:    Lab Results   Component Value Date     11/06/2020    K 3.8 11/06/2020     11/06/2020    CO2 22 11/06/2020    BUN 12 11/06/2020    CREATININE <0.5 11/06/2020    CALCIUM 9.3 11/06/2020         Significant Diagnostic Studies    Radiology:   XR TIBIA FIBULA RIGHT (2 VIEWS)   Final Result   Postoperative change of the right tibia as above. Proximal right fibular fracture now appears displaced. A remote appearing   callused fracture is again seen at the mid to distal fibula. XR TIBIA FIBULA RIGHT (2 VIEWS)   Final Result   Successful reduction of tibial fracture with hardware. Please refer to   procedure notes for additional details.          XR TIBIA FIBULA review of medications and discharge plan. Signed:    Carie Steward MD   11/9/2020      Thank you Jacquie Duckworth MD for the opportunity to be involved in this patient's care. If you have any questions or concerns please feel free to contact me at [phone].

## 2020-11-09 NOTE — CARE COORDINATION
CM spoke to [facility];  Precert initiated;  ELAINE ordered rapid Covid;   Mariaa Montenegro RN

## 2020-11-10 PROCEDURE — 1200000000 HC SEMI PRIVATE

## 2020-11-10 PROCEDURE — 97110 THERAPEUTIC EXERCISES: CPT

## 2020-11-10 PROCEDURE — 97530 THERAPEUTIC ACTIVITIES: CPT

## 2020-11-10 PROCEDURE — 97535 SELF CARE MNGMENT TRAINING: CPT

## 2020-11-10 PROCEDURE — 6370000000 HC RX 637 (ALT 250 FOR IP): Performed by: PHYSICIAN ASSISTANT

## 2020-11-10 PROCEDURE — 6370000000 HC RX 637 (ALT 250 FOR IP): Performed by: HOSPITALIST

## 2020-11-10 RX ADMIN — RIVAROXABAN 20 MG: 20 TABLET, FILM COATED ORAL at 08:56

## 2020-11-10 RX ADMIN — ANTACID TABLETS 500 MG: 500 TABLET, CHEWABLE ORAL at 15:56

## 2020-11-10 RX ADMIN — OXYCODONE AND ACETAMINOPHEN 2 TABLET: 5; 325 TABLET ORAL at 15:58

## 2020-11-10 RX ADMIN — ANTACID TABLETS 500 MG: 500 TABLET, CHEWABLE ORAL at 08:58

## 2020-11-10 RX ADMIN — ATORVASTATIN CALCIUM 40 MG: 40 TABLET, FILM COATED ORAL at 19:43

## 2020-11-10 RX ADMIN — METOPROLOL SUCCINATE 25 MG: 25 TABLET, EXTENDED RELEASE ORAL at 08:56

## 2020-11-10 RX ADMIN — LISINOPRIL 40 MG: 20 TABLET ORAL at 08:56

## 2020-11-10 RX ADMIN — AMLODIPINE BESYLATE 10 MG: 5 TABLET ORAL at 08:56

## 2020-11-10 RX ADMIN — LATANOPROST 1 DROP: 50 SOLUTION OPHTHALMIC at 19:43

## 2020-11-10 RX ADMIN — OXYCODONE AND ACETAMINOPHEN 2 TABLET: 5; 325 TABLET ORAL at 05:38

## 2020-11-10 ASSESSMENT — PAIN DESCRIPTION - ORIENTATION
ORIENTATION: RIGHT
ORIENTATION: RIGHT

## 2020-11-10 ASSESSMENT — PAIN SCALES - GENERAL
PAINLEVEL_OUTOF10: 8
PAINLEVEL_OUTOF10: 0

## 2020-11-10 ASSESSMENT — PAIN DESCRIPTION - PAIN TYPE
TYPE: ACUTE PAIN;SURGICAL PAIN
TYPE: SURGICAL PAIN

## 2020-11-10 ASSESSMENT — PAIN DESCRIPTION - LOCATION
LOCATION: LEG
LOCATION: LEG

## 2020-11-10 NOTE — PROGRESS NOTES
Occupational Therapy  Facility/Department: [facility] C5 - MED SURG/ORTHO  Daily Treatment Note  NAME: Anabel Woods  : 1960  MRN: [id number]    Date of Service: 11/10/2020    Discharge Recommendations:  Subacute/Skilled Nursing Facility       Assessment   Performance deficits / Impairments: Decreased functional mobility ; Decreased strength;Decreased endurance;Decreased ADL status; Decreased safe awareness;Decreased balance;Decreased posture;Decreased high-level IADLs  Assessment: Pt demos need for max A of 2 to complete functional transfers, unable to achieve full standing position at this time requiring use of maxi move lift. Pt total A for LE dressing, and setup/Supervision for EOB grooming tasks. Pt functioning below her baseline with the above noted occupational performance deficits and would benefit from continued skilled OT in SNF setting at d/c. Prognosis: Good  OT Education: OT Role;Plan of Care;Transfer Training;Precautions; Home Exercise Program  Patient Education: disease specific: weight bearing status, safety  REQUIRES OT FOLLOW UP: Yes  Activity Tolerance  Activity Tolerance: Patient Tolerated treatment well  Safety Devices  Safety Devices in place: Yes  Type of devices: Left in chair;Chair alarm in place;Call light within reach;Nurse notified;Gait belt         Patient Diagnosis(es): The encounter diagnosis was Right medial tibial plateau fracture, closed, initial encounter. has a past medical history of CHF (congestive heart failure) ([facility] [address] HTN (hypertension), and UTI (urinary tract infection). has a past surgical history that includes  section; bladder repair; Cholecystectomy; Cystoscopy; and Tibia fracture surgery (Right, 2020).     Restrictions  Restrictions/Precautions  Restrictions/Precautions: Weight Bearing, General Precautions, Fall Risk  Required Braces or Orthoses?: Yes  Lower Extremity Weight Bearing Restrictions  Right Lower Extremity Weight Bearing: Partial Weight Bearing  Partial Weight Bearing Percentage Or Pounds: Partial Weight Bearing of TTWB to Right leg  Required Braces or Orthoses  Right Lower Extremity Brace: Knee Immobilizer  Position Activity Restriction  Other position/activity restrictions: up with assistance     Subjective   General  Chart Reviewed: Yes  Patient assessed for rehabilitation services?: Yes  Response to previous treatment: Patient with no complaints from previous session  Family / Caregiver Present: No  Referring Practitioner: RAMÍREZ Eugene  Diagnosis: RIGHT TIBIA IM NAIL INSERTION    Subjective  Subjective: Pt resting in bed, agreeable to OT treatment and getting up to chair.     Pain Assessment  Pain Assessment: 0-10  Pain Level: 8  Pain Type: Surgical pain  Pain Location: Leg  Pain Orientation: Right  Non-Pharmaceutical Pain Intervention(s): Repositioned;Elevation  Pre Treatment Pain Screening  Intervention List: Patient able to continue with treatment  Vital Signs  Patient Currently in Pain: Yes     Orientation  Orientation  Overall Orientation Status: Within Functional Limits     Objective    ADL  Grooming: Setup(EOB to brush teeth and comb hair)  LE Dressing: Dependent/Total(socks)  Toileting: Dependent/Total(purewick initially, removed for OOB)     Balance  Sitting Balance: Supervision  Standing Balance: Dependent/Total(max A of 2 for partial stand, pt unable to stand fully upright with alicia RW)  Standing Balance  Activity: 2 attempts to achieve standing from EOB up to alicia RW, pt barely clears hips from mattress, unable to achieve full stand    Bed mobility  Supine to Sit: Dependent/Total;2 Person assistance(mod A of 1, min A of 2nd person to pt L with HOB elevated and use of bedrails)     Transfers  Sit to stand: Dependent/Total;2 Person assistance(max A of 2)  Stand to sit: Dependent/Total;2 Person assistance(max A of 2)  Transfer Comments: Max A of 2 for partial stand, required use of Maxi move lift to transfer from EOB to bedside

## 2020-11-10 NOTE — DISCHARGE SUMMARY
Hospital Medicine PROGRESS NOTE  Awaiting precert    Patient ID: Nikki Velazquez      Patient's PCP: Lucretia Oliveros MD    Admit Date: 11/5/2020     Discharge Date:   11/10/20     Admitting Physician: Joelle Ellis MD     Discharge Physician: Mario Ortiz MD     Discharge Diagnoses: Active Hospital Problems    Diagnosis    Right medial tibial plateau fracture, closed, initial encounter [S82.131A]    HTN (hypertension) [I10]    Morbid obesity ([address] [E66.01]    HLD (hyperlipidemia) [E78.5]       The patient was seen and examined on day of discharge and this discharge summary is in conjunction with any daily progress note from day of discharge. Hospital Course:  \"60 y.o. female, with PMH of morbid obesity, HTN and HLD, who was a direct admit from [address] [place] [address] leg [facility]. The patient stated she had an appointment with her urologist today so she called the transportation Leida Maucynthia stated she told them she needed the large van, but the small [employer] showed up. Ching Castrejon stated the  tried to get her into the small [employer], but was unsuccessful so the patient went back to her home. Justo Flores stated when she stepped up onto the step to go into her house her right leg gave out from underneath her and she fell to the ground and her right leg went underneath of her left leg. \"        R tibia and fibula fractures  - ORIF 11/6. SNF.   TTWB RLE.     Morbid obesity--BMI 44--continue modified diet /weight loss management     Essential HTN-control-continue lisinopril, amlodipine and metoprolol     HLD-continue atorvastatin     Paroxysmal atrial fibrillation-in NSR-continue Xarelto and metoprolol     Hx DVT and PE-continue Xarelto          Physical Exam Performed:     BP (!) 143/80   Pulse 94   Temp 98.4 °F (36.9 °C) (Oral)   Resp 16   Wt (!) 308 lb 13.8 oz (140.1 kg)   SpO2 91%   BMI 46.96 kg/m²       General appearance:  No apparent distress, appears stated age and RIGHT (2 VIEWS)   Final Result   Tibial and fibular fractures as above. FLUORO FOR SURGICAL PROCEDURES    (Results Pending)          Consults:     None    Disposition:  SNF     Condition at Discharge: Stable    Discharge Instructions/Follow-up:  Follow up with PCP within 1-2 weeks. Follow up with orthopedics per their instructions, typically within about two weeks. Toe touch weight bearing RLE. Code Status:  Full Code     Activity: activity as tolerated    Diet: cardiac diet      Discharge Medications:     Current Discharge Medication List           Details   oxyCODONE-acetaminophen (PERCOCET) 5-325 MG per tablet Take 1-2 tablets by mouth every 4 hours as needed for Pain (every 4-6 hours as needed) for up to 3 days.   Qty: 40 tablet, Refills: 0    Comments: Reduce doses taken as pain becomes manageable  Associated Diagnoses: Right medial tibial plateau fracture, closed, initial encounter              Details   atorvastatin (LIPITOR) 40 MG tablet       oxybutynin (DITROPAN) 5 MG tablet TAKE 1 TABLET BY MOUTH EVERY 8 HOURS      meloxicam (MOBIC) 15 MG tablet Take 15 mg by mouth daily      metoprolol succinate (TOPROL XL) 25 MG extended release tablet Take 25 mg by mouth daily      rivaroxaban (XARELTO) 20 MG TABS tablet Take 20 mg by mouth      amLODIPine (NORVASC) 10 MG tablet TAKE 1 TABLET BY MOUTH ONCE DAILY FOR 90 DAYS      vitamin D3 (CHOLECALCIFEROL) 400 units TABS tablet Take 400 Units by mouth daily      Flaxseed Oil OIL 1,400 mg by Does not apply route daily      calcium carbonate 600 MG TABS tablet Take 1 tablet by mouth daily      cimetidine (TAGAMET HB) 200 MG tablet Take 200 mg by mouth as needed      latanoprost (XALATAN) 0.005 % ophthalmic solution 1 drop nightly      lisinopril (PRINIVIL;ZESTRIL) 20 MG tablet Take 40 mg by mouth daily       potassium chloride (KLOR-CON) 20 MEQ packet Take 20 mEq by mouth 2 times daily               Time Spent on discharge is more than 30 minutes in the examination, evaluation, counseling and review of medications and discharge plan. Signed:    eMet Barillas MD   11/10/2020      Thank you Jordon Samaniego MD for the opportunity to be involved in this patient's care. If you have any questions or concerns please feel free to contact me at [phone].

## 2020-11-10 NOTE — CARE COORDINATION
CASE MANAGEMENT DISCHARGE SUMMARY      Discharge to: [facility] completed: yes  Hospital Exemption Notification (HENS) completed: yes        Transportation: Ambulance     Agency used:  [address] up time: 4:30 PM   Ambulance form completed: Yes    Confirmed discharge plan with:     Patient: yes     Family, name and contact number: Pt a/o, able to contact family   Facility/Agency, name:  [facility] faxed   Phone number for report to facility: [phone]     RN, name:   Marda Cowden, RN    Note: Discharging nurse to complete ERNA, reconcile [facility], and place final copy with patient's discharge packet. RN to ensure that written prescriptions for  Level II medications are sent with patient to the facility as per protocol.       Jennifer Watts RN

## 2020-11-10 NOTE — PROGRESS NOTES
Physical Therapy  Facility/Department: [place] [address] - MED SURG/ORTHO  Daily Treatment Note  NAME: Yevgeniy Chicas  : 1960  MRN: [id number]    Date of Service: 11/10/2020    Discharge Recommendations:  Subacute/Skilled Nursing Facility   PT Equipment Recommendations  Equipment Needed: No  Other: defer to patient's facility. Assessment   Body structures, Functions, Activity limitations: Decreased functional mobility ; Increased pain;Decreased ADL status; Decreased balance;Decreased posture;Decreased ROM; Decreased strength;Decreased high-level IADLs;Decreased safe awareness;Decreased cognition;Decreased endurance;Decreased coordination  Assessment: Pt cont to struggle with standing at walker. She required max A of 2 and was able to clear hips slightly from bed on 2 attemtps before using maxi move to transfer to chair from EOB. Pt continues to seem well motivated. Pt is recommended for SNf at D/C. Treatment Diagnosis: decreased independence with functional mobility. Specific instructions for Next Treatment: progress mobility as tolerated  Prognosis: Fair  Decision Making: Medium Complexity  PT Education: Goals; General Safety;Gait Training;PT Role;Orientation;Disease Specific Education;Plan of Care;Home Exercise Program;Functional Mobility Training;Precautions;Transfer Training;Weight-bearing Education; Injury Prevention  Patient Education: Patient educated on her weight bearing precautions and on safe SW placement/usage. Patient will need additional education reinforcement. Barriers to Learning: impaired cognition  REQUIRES PT FOLLOW UP: Yes     Patient Diagnosis(es): The encounter diagnosis was Right medial tibial plateau fracture, closed, initial encounter. has a past medical history of CHF (congestive heart failure) ([address] HTN (hypertension), and UTI (urinary tract infection). has a past surgical history that includes  section; bladder repair; Cholecystectomy;  Cystoscopy; and Tibia fracture surgery (Right, 11/6/2020). Restrictions  Restrictions/Precautions  Restrictions/Precautions: Weight Bearing, General Precautions, Fall Risk  Required Braces or Orthoses?: Yes  Lower Extremity Weight Bearing Restrictions  Right Lower Extremity Weight Bearing: Partial Weight Bearing  Partial Weight Bearing Percentage Or Pounds: Partial Weight Bearing of TTWB to Right leg  Required Braces or Orthoses  Right Lower Extremity Brace: Knee Immobilizer  Position Activity Restriction  Other position/activity restrictions: up with assistance     Subjective   General  Chart Reviewed: Yes  Additional Pertinent Hx: 11/06 IM nail right tibia. Response To Previous Treatment: Patient with no complaints from previous session. Family / Caregiver Present: No  Referring Practitioner: RAMÍREZ Rodriguez  Subjective  Subjective: Patient agreed to participate. General Comment  Comments: Patient in supine position in the bed upon entry of therapy staff. Pain Screening  Patient Currently in Pain: Yes  Pain Assessment  Pain Assessment: 0-10  Patient's Stated Pain Goal: 8  Pain Type: Acute pain;Surgical pain  Pain Location: Leg  Pain Orientation: Right  Non-Pharmaceutical Pain Intervention(s): Emotional support;Repositioned;Elevation  Vital Signs  Patient Currently in Pain: Yes       Orientation  Orientation  Overall Orientation Status: Within Functional Limits     Objective   Bed mobility  Rolling to Left: Moderate assistance;Maximum assistance  Supine to Sit: Dependent/Total;2 Person assistance(mod/max of 2)  Sit to Supine: Unable to assess  Transfers  Sit to Stand: Dependent/Total(unable to obtain fully erect posture at walker with max A of 1. Pt attemtped twice lifting buttocks slightly from bed but not obtaining as much clearance as yesterday.   She did seem to have some difficulty executing our directions  to push from the bed.)  Bed to Chair: Dependent/Total(maxi move from EOB after failed attempts to stand at walker)  Ambulation  Ambulation?: No  WB Status: TTWB RLE        Exercises  Quad Sets: X 10 B  Heelslides: X 20 LLE AA> AROM  Gluteal Sets: X 10 B  Ankle Pumps: X 15 B        AM-PAC Score     AM-PAC Inpatient Mobility without Stair Climbing Raw Score : 7 (11/10/20 0858)  AM-PAC Inpatient without Stair Climbing T-Scale Score : 28.66 (11/10/20 0858)  Mobility Inpatient CMS 0-100% Score: 86.29 (11/10/20 1633)  Mobility Inpatient without Stair CMS G-Code Modifier : CM (11/10/20 5929)       Goals  Short term goals  Time Frame for Short term goals: 1 week 11/14/20  Short term goal 1: Rolling left/right with mod assist. Goal met 11/8  Short term goal 2: Supine <> sit with mod assist.; 11/10 mod A of 2  Short term goal 3: Sit <> stand with maximum assistance. 11/10 max X 2 for partial stand at EOB in walker  Short term goal 4: Bed <> chair with maximum assistance and appropriate lifting equipment if needed. 11/10 maxi move after failed attempt with walker  Short term goal 5: Assess ambulation when safe to attempt. 11/10 unable to take steps  Patient Goals   Patient goals : To decrease her pain and eventually start to stand/walk. Plan    Plan  Times per week: 7/week  Plan weeks: 1 week 11/14/20  Specific instructions for Next Treatment: progress mobility as tolerated  Current Treatment Recommendations: Strengthening, Home Exercise Program, ROM, Safety Education & Training, Balance Training, Endurance Training, Patient/Caregiver Education & Training, Functional Mobility Training, Equipment Evaluation, Education, & procurement, Transfer Training, Gait Training, ADL/Self-care Training, Positioning, Stair training  Safety Devices  Type of devices:  All fall risk precautions in place, Call light within reach, Chair alarm in place, Gait belt, Left in chair, Nurse notified     Therapy Time   Individual Concurrent Group Co-treatment   Time In 0735         Time Out 0813         Minutes [address], [place] [address]

## 2020-11-11 PROCEDURE — 97535 SELF CARE MNGMENT TRAINING: CPT

## 2020-11-11 PROCEDURE — 97110 THERAPEUTIC EXERCISES: CPT

## 2020-11-11 PROCEDURE — 97530 THERAPEUTIC ACTIVITIES: CPT

## 2020-11-11 PROCEDURE — 1200000000 HC SEMI PRIVATE

## 2020-11-11 PROCEDURE — 6370000000 HC RX 637 (ALT 250 FOR IP): Performed by: HOSPITALIST

## 2020-11-11 PROCEDURE — 2580000003 HC RX 258: Performed by: PHYSICIAN ASSISTANT

## 2020-11-11 PROCEDURE — 6370000000 HC RX 637 (ALT 250 FOR IP): Performed by: PHYSICIAN ASSISTANT

## 2020-11-11 RX ADMIN — AMLODIPINE BESYLATE 10 MG: 5 TABLET ORAL at 09:16

## 2020-11-11 RX ADMIN — LATANOPROST 1 DROP: 50 SOLUTION OPHTHALMIC at 20:05

## 2020-11-11 RX ADMIN — OXYCODONE AND ACETAMINOPHEN 2 TABLET: 5; 325 TABLET ORAL at 18:02

## 2020-11-11 RX ADMIN — LISINOPRIL 40 MG: 20 TABLET ORAL at 09:17

## 2020-11-11 RX ADMIN — RIVAROXABAN 20 MG: 20 TABLET, FILM COATED ORAL at 07:52

## 2020-11-11 RX ADMIN — ATORVASTATIN CALCIUM 40 MG: 40 TABLET, FILM COATED ORAL at 20:05

## 2020-11-11 RX ADMIN — OXYCODONE AND ACETAMINOPHEN 2 TABLET: 5; 325 TABLET ORAL at 06:50

## 2020-11-11 RX ADMIN — OXYCODONE AND ACETAMINOPHEN 2 TABLET: 5; 325 TABLET ORAL at 00:55

## 2020-11-11 RX ADMIN — METOPROLOL SUCCINATE 25 MG: 25 TABLET, EXTENDED RELEASE ORAL at 09:17

## 2020-11-11 ASSESSMENT — PAIN DESCRIPTION - LOCATION
LOCATION: LEG
LOCATION: LEG

## 2020-11-11 ASSESSMENT — PAIN SCALES - GENERAL
PAINLEVEL_OUTOF10: 0
PAINLEVEL_OUTOF10: 0
PAINLEVEL_OUTOF10: 8
PAINLEVEL_OUTOF10: 9
PAINLEVEL_OUTOF10: 8

## 2020-11-11 ASSESSMENT — PAIN DESCRIPTION - ORIENTATION
ORIENTATION: RIGHT
ORIENTATION: RIGHT

## 2020-11-11 ASSESSMENT — PAIN DESCRIPTION - PAIN TYPE
TYPE: ACUTE PAIN;SURGICAL PAIN
TYPE: SURGICAL PAIN;ACUTE PAIN

## 2020-11-11 NOTE — CARE COORDINATION
CM spoke to [facility] who stated that she has called [facility] several times in regard to delay.   DCP will continue to work w/facility in order get approval. Abisai Baez RN

## 2020-11-11 NOTE — PROGRESS NOTES
Physical Therapy  Facility/Department: [facility] C5 - MED SURG/ORTHO  Daily Treatment Note  NAME: Elisa Damon  : 1960  MRN: [id number]    Date of Service: 2020    Discharge Recommendations:  Subacute/Skilled Nursing Facility   PT Equipment Recommendations  Equipment Needed: No  Other: defer to patient's facility. Assessment   Body structures, Functions, Activity limitations: Decreased functional mobility ; Increased pain;Decreased ADL status; Decreased balance;Decreased posture;Decreased ROM; Decreased strength;Decreased high-level IADLs;Decreased safe awareness;Decreased cognition;Decreased endurance;Decreased coordination  Assessment: Pt deonstarated ability to stand at walker while maintaining her WB fairly well with A  of 2 dfor ~ 1min15 seconds today. Pt was unable to take steps with walker so maxi move was untilized for bed <> chair transfer. Pt seems well motivated and participates well, she is recommeneded for D/C to SNF to con't with skilled PT/OT. Treatment Diagnosis: decreased independence with functional mobility. Specific instructions for Next Treatment: progress mobility as tolerated  Prognosis: Fair  Decision Making: Medium Complexity  Patient Education: Patient educated on her weight bearing precautions and on safe SW placement/usage. Patient will need additional education reinforcement. Barriers to Learning: impaired cognition  REQUIRES PT FOLLOW UP: Yes  Activity Tolerance  Activity Tolerance: Patient limited by fatigue;Patient limited by pain; Patient limited by endurance     Patient Diagnosis(es): The encounter diagnosis was Right medial tibial plateau fracture, closed, initial encounter. has a past medical history of CHF (congestive heart failure) ([facility] [address] HTN (hypertension), and UTI (urinary tract infection). has a past surgical history that includes  section; bladder repair; Cholecystectomy;  Cystoscopy; and Tibia fracture surgery (Right, 11/6/2020). Restrictions  Restrictions/Precautions  Restrictions/Precautions: Weight Bearing, General Precautions, Fall Risk  Required Braces or Orthoses?: Yes  Lower Extremity Weight Bearing Restrictions  Right Lower Extremity Weight Bearing: Partial Weight Bearing  Partial Weight Bearing Percentage Or Pounds: Partial Weight Bearing of TTWB to Right leg  Required Braces or Orthoses  Right Lower Extremity Brace: Knee Immobilizer  Position Activity Restriction  Other position/activity restrictions: up with assistance     Subjective   General  Chart Reviewed: Yes  Additional Pertinent Hx: 11/06 IM nail right tibia. Response To Previous Treatment: Patient with no complaints from previous session. Family / Caregiver Present: No  Referring Practitioner: RAMÍREZ Stewart  Subjective  Subjective: Patient agreed to participate. General Comment  Comments: Patient in supine position in the bed upon entry of therapy staff.   Pain Screening  Patient Currently in Pain: Yes  Pain Assessment  Pain Assessment: 0-10  Pain Level: 8  Pain Type: Acute pain;Surgical pain  Pain Location: Leg  Pain Orientation: Right  Non-Pharmaceutical Pain Intervention(s): Emotional support  Vital Signs  Patient Currently in Pain: Yes       Orientation  Orientation  Overall Orientation Status: Within Functional Limits     Objective   Bed mobility  Rolling to Left: Maximum assistance(for hygiene, pt wet on arrival including brace)  Rolling to Right: Maximum assistance  Supine to Sit: Dependent/Total;2 Person assistance(min A of 2)  Sit to Supine: Unable to assess  Transfers  Sit to Stand: Dependent/Total;2 Person Assistance(mod/ max A of 2)  Stand to sit: Dependent/Total;2 Person Assistance(Evelyn of 2)  Bed to Chair: Dependent/Total(maxi move after pt was unable to take step swith walker)  Comment: Pt stood at walker with A of 2 X 1 min 15 seconds while maintaining WB fairly well but was uanble to take any steps due to poor overall strength Exercises  Quad Sets: X 10 B  Gluteal Sets: X 10 B  Hip Abduction: 1 x 10 LLE, 1 x 10 RLE AAROM  Knee Long Arc Quad: X 10 LLE at EOB  Ankle Pumps: X 15 B        AM-PAC Score     AM-PAC Inpatient Mobility without Stair Climbing Raw Score : 7 (11/11/20 1206)  AM-PAC Inpatient without Stair Climbing T-Scale Score : 28.66 (11/11/20 1206)  Mobility Inpatient CMS 0-100% Score: 86.29 (11/11/20 1206)  Mobility Inpatient without Stair CMS G-Code Modifier : CM (11/11/20 1206)       Goals  Short term goals  Time Frame for Short term goals: 1 week 11/14/20  Short term goal 1: Rolling left/right with mod assist. Goal met 11/11 max A  Short term goal 2: Supine <> sit with mod assist.; 11/11 Evelyn of 2  Short term goal 3: Sit <> stand with maximum assistance. 11/11 max X 2  Short term goal 4: Bed <> chair with maximum assistance and appropriate lifting equipment if needed. 11/11maxi move after failed attempt with walker  Short term goal 5: Assess ambulation when safe to attempt. 11/11 unable to take steps  Patient Goals   Patient goals : To decrease her pain and eventually start to stand/walk. Plan    Plan  Times per week: 7/week  Plan weeks: 1 week 11/14/20  Specific instructions for Next Treatment: progress mobility as tolerated  Current Treatment Recommendations: Strengthening, Home Exercise Program, ROM, Safety Education & Training, Balance Training, Endurance Training, Patient/Caregiver Education & Training, Functional Mobility Training, Equipment Evaluation, Education, & procurement, Transfer Training, Gait Training, ADL/Self-care Training, Positioning, Stair training  Safety Devices  Type of devices:  All fall risk precautions in place, Call light within reach, Chair alarm in place, Gait belt, Left in chair, Nurse notified     Therapy Time   Individual Concurrent Group Co-treatment   Time In 1057         Time Out 1137         Minutes [address] [place] [address]

## 2020-11-11 NOTE — CARE COORDINATION
[facility]ert approval for [facility] approved;  CM attempted to get transport set up, but unable to get  time;  [facility], [facility], [address], [address] [facility], [employer], [place] and [address] with no success; Pt scheduled to go to [facility] on 11/12 @ 1200.   Hayley Hernández RN

## 2020-11-11 NOTE — PROGRESS NOTES
Occupational Therapy  Facility/Department: [facility] C5 - MED SURG/ORTHO  Daily Treatment Note  NAME: Elsa Jordan  : 1960  MRN: [id number]    Date of Service: 2020    Discharge Recommendations:  Subacute/Skilled Nursing Facility     Assessment   Performance deficits / Impairments: Decreased functional mobility ; Decreased strength;Decreased endurance;Decreased ADL status; Decreased safe awareness;Decreased balance;Decreased posture;Decreased high-level IADLs  Assessment: Pt demos progress with ability to complete transfer to full standing position this date with max A of 2 and alicia RW. Pt continues to require Maxi Move lift for transfer to chair, fatigues quickly with BUE ther ex. Pt would benefit from continued skilled OT in SNF setting. Prognosis: Good  OT Education: OT Role;Plan of Care;Transfer Training;Precautions; Home Exercise Program  Patient Education: disease specific: weight bearing status, safety  REQUIRES OT FOLLOW UP: Yes  Activity Tolerance  Activity Tolerance: Patient Tolerated treatment well  Safety Devices  Safety Devices in place: Yes  Type of devices: Left in chair;Chair alarm in place;Call light within reach;Nurse notified;Gait belt         Patient Diagnosis(es): The encounter diagnosis was Right medial tibial plateau fracture, closed, initial encounter. has a past medical history of CHF (congestive heart failure) ([facility] [address] HTN (hypertension), and UTI (urinary tract infection). has a past surgical history that includes  section; bladder repair; Cholecystectomy; Cystoscopy; and Tibia fracture surgery (Right, 2020).     Restrictions  Restrictions/Precautions  Restrictions/Precautions: Weight Bearing, General Precautions, Fall Risk  Required Braces or Orthoses?: Yes  Lower Extremity Weight Bearing Restrictions  Right Lower Extremity Weight Bearing: Partial Weight Bearing  Partial Weight Bearing Percentage Or Pounds: Partial Weight Bearing of TTWB to Right leg  Required Braces or Orthoses  Right Lower Extremity Brace: Knee Immobilizer  Position Activity Restriction  Other position/activity restrictions: up with assistance     Subjective   General  Chart Reviewed: Yes  Patient assessed for rehabilitation services?: Yes  Response to previous treatment: Patient with no complaints from previous session  Family / Caregiver Present: No  Referring Practitioner: RAMÍREZ Valderrama  Diagnosis: RIGHT TIBIA IM NAIL INSERTION    Subjective  Subjective: Pt resting in bed, agreeable to OT treatment. Pt repors chair is more comfortable than the bed. Pain Assessment  Pain Assessment: 0-10  Pain Level: 8  Pain Type: Surgical pain;Acute pain  Pain Location: Leg  Pain Orientation: Right  Non-Pharmaceutical Pain Intervention(s): Repositioned  Pre Treatment Pain Screening  Intervention List: Patient able to continue with treatment  Vital Signs  Patient Currently in Pain: Yes     Orientation  Orientation  Overall Orientation Status: Within Functional Limits     Objective    ADL  LE Dressing: Dependent/Total(socks)  Toileting: Dependent/Total(purewick was full, briefs saturated and also bedpan used, total A for hygiene)  Additional Comments:  Total A to manage KI     Balance  Sitting Balance: Supervision  Standing Balance: Dependent/Total(mod-max A of 2 with alicia CHRISTIN)  Standing Balance  Activity: 1x static stand ~ 1 min  Comment: pt demos ability to maintain TTWB, however inconsistent d/t weakness    Bed mobility  Supine to Sit: Dependent/Total;2 Person assistance(min A of 2 to pt L)     Transfers  Sit to stand: Dependent/Total;2 Person assistance(max A of 2)  Stand to sit: Dependent/Total;2 Person assistance(mod A of 2)  Transfer Comments: pt able to tolerate full stand this date, unable to take steps or pivot for transfer, Maxi Move lift used for transfer from EOB to chair     Type of ROM/Therapeutic Exercise  Type of ROM/Therapeutic Exercise: AROM  Comment: seated in recliner  Exercises  Shoulder Flexion: 15x  Shoulder ABduction: 10x  Shoulder ADduction: 10x  Elbow Flexion: 15x  Elbow Extension: 15x      Plan   Plan  Times per week: 4-6x/wk  Current Treatment Recommendations: Balance Training, Safety Education & Training, Self-Care / ADL    AM-PAC Score  AM-PAC Inpatient Daily Activity Raw Score: 12 (11/11/20 1331)  AM-PAC Inpatient ADL T-Scale Score : 30.6 (11/11/20 1331)  ADL Inpatient CMS 0-100% Score: 66.57 (11/11/20 1331)  ADL Inpatient CMS G-Code Modifier : CL (11/11/20 1331)    Goals  Short term goals  Time Frame for Short term goals: 1 week (11/14/20)  Short term goal 1: Pt will complete functional transfer with min A x2. -- ongoing 11/11/20  Short term goal 2: Pt will complete 5 minutes of dynamic sitting EOB for adls with S.-- GOAL MET, pt demos ~10 mins EOB S for grooming tasks 11/10/20  Short term goal 3: Pt will complete 15 reps of BUE exercises for increased endurance.  (11/11/20)-- GOAL NOT MET, pt fatigues with 10 reps of some ther ex, particularly shoulder, tolerating 10-15 reps at this time 11/11/20  Short term goal 4: Pt will complete LB dressing with AE prn and set up.-- ongoing 11/11/20  Patient Goals   Patient goals : \"to get better\"       Therapy Time   Individual Concurrent Group Co-treatment   Time In 1057         Time Out 1138         Minutes [address], [place]

## 2020-11-11 NOTE — PLAN OF CARE
Problem: Pain:  Goal: Pain level will decrease  Description: Pain level will decrease  Outcome: Ongoing  Note: Pt scoring pain on 0-10 scale. Pain medications given per MAR. Pt instructed to call out when pain level increasing. Call light within reach. Nurse will continue to reassess and monitor. Goal: Control of acute pain  Description: Control of acute pain  Outcome: Ongoing     Problem: Falls - Risk of:  Goal: Will remain free from falls  Description: Will remain free from falls  Outcome: Ongoing  Note: Bed in lowest position, wheels locked, 2/4 side rails up, nonskid footwear on. Bed/ chair check alarm in place, call light within reach. Pt instructed to call out when needing assistance. Pt stated understanding. Nurse will continue to monitor.    Goal: Absence of physical injury  Description: Absence of physical injury  Outcome: Ongoing     Problem: Skin Integrity:  Goal: Absence of new skin breakdown  Description: Absence of new skin breakdown  Outcome: Ongoing

## 2020-11-11 NOTE — DISCHARGE SUMMARY
Hospital Medicine PROGRESS NOTE  Awaiting precert. Patient ID: Elisa Harris      Patient's PCP: Jacquie Duckworth MD    Admit Date: 11/5/2020     Discharge Date:   11/11/20     Admitting Physician: Hever Vicente MD     Discharge Physician: Carie Steward MD     Discharge Diagnoses: Active Hospital Problems    Diagnosis    Right medial tibial plateau fracture, closed, initial encounter [S82.131A]    HTN (hypertension) [I10]    Morbid obesity ([address] [E66.01]    HLD (hyperlipidemia) [E78.5]       The patient was seen and examined on day of discharge and this discharge summary is in conjunction with any daily progress note from day of discharge. Hospital Course:  \"60 y.o. female, with PMH of morbid obesity, HTN and HLD, who was a direct admit from [address] [place] [address] leg [facility]. The patient stated she had an appointment with her urologist today so she called the transportation Susan Osman stated she told them she needed the large van, but the small [employer] showed up. Aram Goldstein stated the  tried to get her into the small [employer], but was unsuccessful so the patient went back to her home. Zhang Edwards stated when she stepped up onto the step to go into her house her right leg gave out from underneath her and she fell to the ground and her right leg went underneath of her left leg. \"        R tibia and fibula fractures  - ORIF 11/6. SNF.   TTWB RLE.     Morbid obesity--BMI 44--continue modified diet /weight loss management     Essential HTN-control-continue lisinopril, amlodipine and metoprolol     HLD-continue atorvastatin     Paroxysmal atrial fibrillation-in NSR-continue Xarelto and metoprolol     Hx DVT and PE-continue Xarelto          Physical Exam Performed:     /80   Pulse 90   Temp 98.3 °F (36.8 °C) (Oral)   Resp 16   Wt (!) 308 lb 13.8 oz (140.1 kg)   SpO2 94%   BMI 46.96 kg/m²       General appearance:  No apparent distress, appears stated age and cooperative. HEENT:  Normal cephalic, atraumatic without obvious deformity. Pupils equal, round, and reactive to light. Extra ocular muscles intact. Conjunctivae/corneas clear. Neck: Supple, with full range of motion. No jugular venous distention. Trachea midline. Respiratory:  Normal respiratory effort. Clear to auscultation, bilaterally without Rales/Wheezes/Rhonchi. Cardiovascular:  Regular rate and rhythm with normal S1/S2 without murmurs, rubs or gallops. Abdomen: Soft, non-tender, non-distended with normal bowel sounds. Musculoskeletal:  No clubbing, cyanosis. RLE nonpitting edema. Full range of motion without deformity. Skin: Skin color, texture, turgor normal.  No rashes or lesions. Neurologic:  Neurovascularly intact without any focal sensory/motor deficits. Cranial nerves: II-XII intact, grossly non-focal.  Psychiatric:  Alert and oriented, thought content appropriate, normal insight  Capillary Refill: Brisk,< 3 seconds   Peripheral Pulses: +2 palpable, equal bilaterally       Labs: For convenience and continuity at follow-up the following most recent labs are provided:      CBC:    Lab Results   Component Value Date    WBC 8.6 11/09/2020    HGB 10.3 11/09/2020    HCT 31.5 11/09/2020     11/09/2020       Renal:    Lab Results   Component Value Date     11/06/2020    K 3.8 11/06/2020     11/06/2020    CO2 22 11/06/2020    BUN 12 11/06/2020    CREATININE <0.5 11/06/2020    CALCIUM 9.3 11/06/2020         Significant Diagnostic Studies    Radiology:   XR TIBIA FIBULA RIGHT (2 VIEWS)   Final Result   Postoperative change of the right tibia as above. Proximal right fibular fracture now appears displaced. A remote appearing   callused fracture is again seen at the mid to distal fibula. XR TIBIA FIBULA RIGHT (2 VIEWS)   Final Result   Successful reduction of tibial fracture with hardware. Please refer to   procedure notes for additional details.          XR TIBIA FIBULA RIGHT (2 VIEWS)   Final Result   Tibial and fibular fractures as above. FLUORO FOR SURGICAL PROCEDURES    (Results Pending)          Consults:     None    Disposition:  SNF     Condition at Discharge: Stable    Discharge Instructions/Follow-up:  Follow up with PCP within 1-2 weeks. Follow up with orthopedics per their instructions, typically within about two weeks. Toe touch weight bearing RLE. Code Status:  Full Code     Activity: activity as tolerated    Diet: cardiac diet      Discharge Medications:     Current Discharge Medication List           Details   oxyCODONE-acetaminophen (PERCOCET) 5-325 MG per tablet Take 1-2 tablets by mouth every 4 hours as needed for Pain (every 4-6 hours as needed) for up to 3 days.   Qty: 40 tablet, Refills: 0    Comments: Reduce doses taken as pain becomes manageable  Associated Diagnoses: Right medial tibial plateau fracture, closed, initial encounter              Details   atorvastatin (LIPITOR) 40 MG tablet       oxybutynin (DITROPAN) 5 MG tablet TAKE 1 TABLET BY MOUTH EVERY 8 HOURS      meloxicam (MOBIC) 15 MG tablet Take 15 mg by mouth daily      metoprolol succinate (TOPROL XL) 25 MG extended release tablet Take 25 mg by mouth daily      rivaroxaban (XARELTO) 20 MG TABS tablet Take 20 mg by mouth      amLODIPine (NORVASC) 10 MG tablet TAKE 1 TABLET BY MOUTH ONCE DAILY FOR 90 DAYS      vitamin D3 (CHOLECALCIFEROL) 400 units TABS tablet Take 400 Units by mouth daily      Flaxseed Oil OIL 1,400 mg by Does not apply route daily      calcium carbonate 600 MG TABS tablet Take 1 tablet by mouth daily      cimetidine (TAGAMET HB) 200 MG tablet Take 200 mg by mouth as needed      latanoprost (XALATAN) 0.005 % ophthalmic solution 1 drop nightly      lisinopril (PRINIVIL;ZESTRIL) 20 MG tablet Take 40 mg by mouth daily       potassium chloride (KLOR-CON) 20 MEQ packet Take 20 mEq by mouth 2 times daily               Time Spent on discharge is more than 30 minutes in the examination, evaluation, counseling and review of medications and discharge plan. Signed:    Steven Alfred MD   11/11/2020      Thank you Diego Talley MD for the opportunity to be involved in this patient's care. If you have any questions or concerns please feel free to contact me at [phone].

## 2020-11-12 VITALS
WEIGHT: 293 LBS | SYSTOLIC BLOOD PRESSURE: 142 MMHG | DIASTOLIC BLOOD PRESSURE: 83 MMHG | BODY MASS INDEX: 46.96 KG/M2 | OXYGEN SATURATION: 95 % | HEART RATE: 91 BPM | TEMPERATURE: 98.5 F | RESPIRATION RATE: 16 BRPM

## 2020-11-12 PROCEDURE — 97110 THERAPEUTIC EXERCISES: CPT

## 2020-11-12 PROCEDURE — 97116 GAIT TRAINING THERAPY: CPT

## 2020-11-12 PROCEDURE — 97530 THERAPEUTIC ACTIVITIES: CPT

## 2020-11-12 PROCEDURE — 6370000000 HC RX 637 (ALT 250 FOR IP): Performed by: PHYSICIAN ASSISTANT

## 2020-11-12 PROCEDURE — 6370000000 HC RX 637 (ALT 250 FOR IP): Performed by: HOSPITALIST

## 2020-11-12 RX ADMIN — AMLODIPINE BESYLATE 10 MG: 5 TABLET ORAL at 10:15

## 2020-11-12 RX ADMIN — OXYCODONE AND ACETAMINOPHEN 2 TABLET: 5; 325 TABLET ORAL at 04:30

## 2020-11-12 RX ADMIN — METOPROLOL SUCCINATE 25 MG: 25 TABLET, EXTENDED RELEASE ORAL at 10:15

## 2020-11-12 RX ADMIN — RIVAROXABAN 20 MG: 20 TABLET, FILM COATED ORAL at 10:15

## 2020-11-12 RX ADMIN — OXYCODONE AND ACETAMINOPHEN 2 TABLET: 5; 325 TABLET ORAL at 10:15

## 2020-11-12 RX ADMIN — LISINOPRIL 40 MG: 20 TABLET ORAL at 10:14

## 2020-11-12 ASSESSMENT — PAIN SCALES - GENERAL
PAINLEVEL_OUTOF10: 8
PAINLEVEL_OUTOF10: 8
PAINLEVEL_OUTOF10: 0
PAINLEVEL_OUTOF10: 8
PAINLEVEL_OUTOF10: 7
PAINLEVEL_OUTOF10: 0
PAINLEVEL_OUTOF10: 8
PAINLEVEL_OUTOF10: 8
PAINLEVEL_OUTOF10: 0

## 2020-11-12 ASSESSMENT — PAIN DESCRIPTION - ORIENTATION
ORIENTATION: RIGHT
ORIENTATION: RIGHT

## 2020-11-12 ASSESSMENT — PAIN DESCRIPTION - LOCATION
LOCATION: LEG;KNEE
LOCATION: LEG

## 2020-11-12 ASSESSMENT — PAIN DESCRIPTION - PAIN TYPE: TYPE: SURGICAL PAIN

## 2020-11-12 NOTE — PROGRESS NOTES
Occupational Therapy  Facility/Department: [facility] C5 - MED SURG/ORTHO  Daily Treatment Note  NAME: Srinivas Mail  : 1960  MRN: [id number]    Date of Service: 2020    Discharge Recommendations:  Subacute/Skilled Nursing Facility     Assessment   Performance deficits / Impairments: Decreased functional mobility ; Decreased strength;Decreased endurance;Decreased ADL status; Decreased safe awareness;Decreased balance;Decreased posture;Decreased high-level IADLs  Assessment: Pt demos progress with ability to complete transfer to full standing position this date with max A of 2 and alicia RW. Pt continues to require Maxi Move lift for transfer to chair, fatigues quickly with BUE ther ex. Pt would benefit from continued skilled OT in SNF setting. Prognosis: Good  OT Education: OT Role;Plan of Care;Transfer Training;Precautions; Home Exercise Program  Patient Education: disease specific: weight bearing status, safety  REQUIRES OT FOLLOW UP: Yes  Activity Tolerance  Activity Tolerance: Patient Tolerated treatment well  Safety Devices  Safety Devices in place: Yes  Type of devices: Left in chair;Chair alarm in place;Call light within reach;Nurse notified;Gait belt         Patient Diagnosis(es): The encounter diagnosis was Right medial tibial plateau fracture, closed, initial encounter. has a past medical history of CHF (congestive heart failure) ([facility] [address] HTN (hypertension), and UTI (urinary tract infection). has a past surgical history that includes  section; bladder repair; Cholecystectomy; Cystoscopy; and Tibia fracture surgery (Right, 2020).     Restrictions  Restrictions/Precautions  Restrictions/Precautions: Weight Bearing, General Precautions, Fall Risk  Required Braces or Orthoses?: Yes  Lower Extremity Weight Bearing Restrictions  Right Lower Extremity Weight Bearing: Partial Weight Bearing  Partial Weight Bearing Percentage Or Pounds: Partial Weight Bearing of TTWB to Right leg  Required Braces or Orthoses  Right Lower Extremity Brace: Knee Immobilizer  Position Activity Restriction  Other position/activity restrictions: up with assistance     Subjective   General  Chart Reviewed: Yes  Patient assessed for rehabilitation services?: Yes  Response to previous treatment: Patient with no complaints from previous session  Family / Caregiver Present: No  Referring Practitioner: RAMÍREZ Conner  Diagnosis: RIGHT TIBIA IM NAIL INSERTION    Subjective  Subjective: Pt resting in bed, agreeable to OT treatment.     Pain Assessment  Pain Assessment: 0-10  Pain Level: 8  Pain Type: Surgical pain  Pain Location: Leg  Pain Orientation: Right  Non-Pharmaceutical Pain Intervention(s): Ambulation/Increased Activity;Repositioned  Pre Treatment Pain Screening  Intervention List: Patient able to continue with treatment  Vital Signs  Patient Currently in Pain: Yes     Orientation  Orientation  Overall Orientation Status: Within Functional Limits     Objective    ADL  LE Dressing: Dependent/Total(socks and KI)  Toileting: Dependent/Total     Balance  Sitting Balance: Supervision  Standing Balance: Dependent/Total(mod-max A of 2 with alicia RW)  Standing Balance  Activity: 1st trial static stand pt unable to achieve full stand; 2nd trial static stand ~ 1 minute  Comment: pt demos ability to maintain TTWB, however inconsistent    Bed mobility  Supine to Sit: Minimal assistance(to L with HOB elevated and use of bedrail)     Transfers  Sit to stand: Dependent/Total;2 Person assistance(max A of 2)  Stand to sit: Dependent/Total;2 Person assistance(max A of 2)  Transfer Comments: Maxi Move lift used from EOB to bedside chair     Type of ROM/Therapeutic Exercise  Type of ROM/Therapeutic Exercise: AROM  Comment: seated in recliner  Exercises  Shoulder Flexion: 15x  Horizontal ABduction: 15x  Horizontal ADduction: 15x  Elbow Flexion: 20x  Elbow Extension: 20x  Supination: 20x  Pronation: 20x  Wrist Flexion: 20x  Wrist Extension: 20x  Grasp/Release: 20x     Plan   Plan  Times per week: 4-6x/wk  Current Treatment Recommendations: Balance Training, Safety Education & Training, Self-Care / ADL    AM-PAC Score  AM-PAC Inpatient Daily Activity Raw Score: 12 (11/12/20 0926)  AM-PAC Inpatient ADL T-Scale Score : 30.6 (11/12/20 0926)  ADL Inpatient CMS 0-100% Score: 66.57 (11/12/20 0926)  ADL Inpatient CMS G-Code Modifier : CL (11/12/20 9971)    Goals  Short term goals  Time Frame for Short term goals: 1 week (11/14/20)  Short term goal 1: Pt will complete functional transfer with min A x2. -- ongoing 11/12/20  Short term goal 2: Pt will complete 5 minutes of dynamic sitting EOB for adls with S.-- GOAL MET, pt demos ~10 mins EOB S for grooming tasks 11/10/20  Short term goal 3: Pt will complete 15 reps of BUE exercises for increased endurance.  (11/11/20)-- GOAL NOT MET, pt fatigues with 10 reps of some ther ex, particularly shoulder, tolerating 10-15 reps at this time 11/11/20  Short term goal 4: Pt will complete LB dressing with AE prn and set up.-- ongoing 11/12/20  Patient Goals   Patient goals : \"to get better\"       Therapy Time   Individual Concurrent Group Co-treatment   Time In 0800         Time Out 0838         Minutes [address] [facility], BLANCO/L

## 2020-11-12 NOTE — CARE COORDINATION
CASE MANAGEMENT DISCHARGE SUMMARY        Discharge to: Joanie Kussmaul     Precertification completed: yes  Hospital Exemption Notification (HENS) completed: yes           Transportation: Ambulance                Agency used:  [address] up time: 1200 PM              Ambulance form completed: Yes     Confirmed discharge plan with:                 Patient: yes                 Family, name and contact number: Pt a/o, able to contact family              Facility/Agency, name:  [facility] faxed              Phone number for report to facility: [phone]                 RN, name:   Geeta Mccain RN     Note: Discharging nurse to complete ERNA, reconcile [facility], and place final copy with patient's discharge packet.  RN to ensure that written prescriptions for  Level II medications are sent with patient to the facility as per protocol.        Rita Lane RN

## 2020-11-12 NOTE — PROGRESS NOTES
Physical Therapy  Facility/Department: [place] [address] - MED SURG/ORTHO  Daily Treatment Note  NAME: David Ledezma  : 1960  MRN: [id number]    Date of Service: 2020    Discharge Recommendations:  Subacute/Skilled Nursing Facility   PT Equipment Recommendations  Equipment Needed: No  Other: defer to patient's facility. If pt is unable to be seen after this session, please let this note serve as discharge summary. Please see case management note for discharge disposition. Thank you. Assessment   Body structures, Functions, Activity limitations: Decreased functional mobility ; Increased pain;Decreased ADL status; Decreased balance;Decreased posture;Decreased ROM; Decreased strength;Decreased high-level IADLs;Decreased safe awareness;Decreased cognition;Decreased endurance;Decreased coordination  Assessment: Pt demonstrated ability to stand at walker while maintaining her WB fairly well with A  of 2 for 2 mins today. Pt was unable to take steps with walker so maxi move was untilized for bed <> chair transfer. Pt seems well motivated and participates well, she is recommeneded for D/C to SNF to con't with skilled PT/OT. Treatment Diagnosis: decreased independence with functional mobility. Prognosis: Fair  Decision Making: Medium Complexity  Patient Education: Patient educated on her weight bearing precautions and on safe SW placement/usage. Patient verbalized understanding  Barriers to Learning: impaired cognition  REQUIRES PT FOLLOW UP: Yes  Activity Tolerance  Activity Tolerance: Patient Tolerated treatment well;Patient limited by endurance     Patient Diagnosis(es): The encounter diagnosis was Right medial tibial plateau fracture, closed, initial encounter. has a past medical history of CHF (congestive heart failure) ([address] HTN (hypertension), and UTI (urinary tract infection). has a past surgical history that includes  section; bladder repair; Cholecystectomy;  Cystoscopy; and Tibia fracture surgery (Right, 11/6/2020). Restrictions  Restrictions/Precautions  Restrictions/Precautions: Weight Bearing, General Precautions, Fall Risk  Required Braces or Orthoses?: Yes  Lower Extremity Weight Bearing Restrictions  Right Lower Extremity Weight Bearing: Partial Weight Bearing  Partial Weight Bearing Percentage Or Pounds: Partial Weight Bearing of TTWB to Right leg  Required Braces or Orthoses  Right Lower Extremity Brace: Knee Immobilizer  Position Activity Restriction  Other position/activity restrictions: up with assistance  Subjective   General  Chart Reviewed: Yes  Additional Pertinent Hx: 11/06 IM nail right tibia. Response To Previous Treatment: Patient with no complaints from previous session. Family / Caregiver Present: No  Referring Practitioner: RAMÍREZ Lira  General Comment  Comments: Patient in supine position in the bed upon entry of therapy staff. Pain Screening  Patient Currently in Pain: Yes  Pain Assessment  Pain Assessment: 0-10  Pain Level: 8  Non-Pharmaceutical Pain Intervention(s): Ambulation/Increased Activity;Repositioned  Vital Signs  Patient Currently in Pain: Yes       Orientation  Orientation  Overall Orientation Status: Within Functional Limits  Cognition      Objective   Bed mobility  Supine to Sit: Minimal assistance(HOB elev)  Transfers  Sit to Stand: 2 Person Assistance;Dependent/Total;Maximum Assistance  Stand to sit: 2 Person Assistance;Dependent/Total;Maximum Assistance  Bed to Chair: Dependent/Total(maxi move)  Ambulation  Ambulation?: No  WB Status: TTWB RLE     Balance  Comments: Sitting Balance: Supervision  Standing Balance: Dependent/Total(mod-max A of 2 with alicia RW)  Exercises  Straight Leg Raise: 1 x 10 AROM LLE.  AAROM RLE  Quad Sets: X 10 B  Heelslides: X 20 LLE AA> AROM  Gluteal Sets: X 10 B  Hip Abduction: 1 x 10 LLE, 1 x 10 RLE AAROM  Knee Long Arc Quad: X 10 LLE at EOB  Ankle Pumps: X 15 B         Comment: 1st trial static stand pt unable to achieve full stand; 2nd trial static stand ~ 1 minute. Pt demos ability to maintain TTWB, however inconsistent            AM-PAC Score     AM-PAC Inpatient Mobility without Stair Climbing Raw Score : 6 (11/12/20 0950)  AM-PAC Inpatient without Stair Climbing T-Scale Score : 26.48 (11/12/20 0950)  Mobility Inpatient CMS 0-100% Score: 92.18 (11/12/20 0950)  Mobility Inpatient without Stair CMS G-Code Modifier : CM (11/12/20 0950)       Goals  Short term goals  Time Frame for Short term goals: 1 week 11/14/20  Short term goal 1: Rolling left/right with mod assist. Goal met 11/11 max A  10/12 N/T  Short term goal 2: Supine <> sit with mod assist.; 11/12 Evelyn of 2  Short term goal 3: Sit <> stand with maximum assistance. 11/12 max X 2  Short term goal 4: Bed <> chair with maximum assistance and appropriate lifting equipment if needed. 11/12 maxi move  Short term goal 5: Assess ambulation when safe to attempt. 11/12 unable to take steps  Patient Goals   Patient goals : To decrease her pain and eventually start to stand/walk. 10/12 Not met    Plan    Plan  Times per week: 7/week  Plan weeks: 1 week 11/14/20  Specific instructions for Next Treatment: progress mobility as tolerated  Current Treatment Recommendations: Strengthening, Home Exercise Program, ROM, Safety Education & Training, Balance Training, Endurance Training, Patient/Caregiver Education & Training, Functional Mobility Training, Equipment Evaluation, Education, & procurement, Transfer Training, Gait Training, ADL/Self-care Training, Positioning, Stair training  Safety Devices  Type of devices:  All fall risk precautions in place, Call light within reach, Chair alarm in place, Gait belt, Left in chair, Nurse notified     Therapy Time   Individual Concurrent Group Co-treatment   Time In 0800         Time Out 0838         Minutes 38         Timed Code Treatment Minutes: [address]

## 2020-11-12 NOTE — DISCHARGE SUMMARY
cooperative. HEENT:  Normal cephalic, atraumatic without obvious deformity. Pupils equal, round, and reactive to light. Extra ocular muscles intact. Conjunctivae/corneas clear. Neck: Supple, with full range of motion. No jugular venous distention. Trachea midline. Respiratory:  Normal respiratory effort. Clear to auscultation, bilaterally without Rales/Wheezes/Rhonchi. Cardiovascular:  Regular rate and rhythm with normal S1/S2 without murmurs, rubs or gallops. Abdomen: Soft, non-tender, non-distended with normal bowel sounds. Musculoskeletal:  No clubbing, cyanosis. RLE nonpitting edema. Full range of motion without deformity. Skin: Skin color, texture, turgor normal.  No rashes or lesions. Neurologic:  Neurovascularly intact without any focal sensory/motor deficits. Cranial nerves: II-XII intact, grossly non-focal.  Psychiatric:  Alert and oriented, thought content appropriate, normal insight  Capillary Refill: Brisk,< 3 seconds   Peripheral Pulses: +2 palpable, equal bilaterally       Labs: For convenience and continuity at follow-up the following most recent labs are provided:      CBC:    Lab Results   Component Value Date    WBC 8.6 11/09/2020    HGB 10.3 11/09/2020    HCT 31.5 11/09/2020     11/09/2020       Renal:    Lab Results   Component Value Date     11/06/2020    K 3.8 11/06/2020     11/06/2020    CO2 22 11/06/2020    BUN 12 11/06/2020    CREATININE <0.5 11/06/2020    CALCIUM 9.3 11/06/2020         Significant Diagnostic Studies    Radiology:   XR TIBIA FIBULA RIGHT (2 VIEWS)   Final Result   Postoperative change of the right tibia as above. Proximal right fibular fracture now appears displaced. A remote appearing   callused fracture is again seen at the mid to distal fibula. XR TIBIA FIBULA RIGHT (2 VIEWS)   Final Result   Successful reduction of tibial fracture with hardware. Please refer to   procedure notes for additional details.          XR TIBIA FIBULA RIGHT (2 VIEWS)   Final Result   Tibial and fibular fractures as above. FLUORO FOR SURGICAL PROCEDURES    (Results Pending)          Consults:     None    Disposition:  SNF     Condition at Discharge: Stable    Discharge Instructions/Follow-up:  Follow up with PCP within 1-2 weeks. Follow up with orthopedics per their instructions, typically within about two weeks. Toe touch weight bearing RLE. Code Status:  Full Code     Activity: activity as tolerated    Diet: cardiac diet      Discharge Medications:     Current Discharge Medication List           Details   oxyCODONE-acetaminophen (PERCOCET) 5-325 MG per tablet Take 1-2 tablets by mouth every 4 hours as needed for Pain (every 4-6 hours as needed) for up to 3 days.   Qty: 40 tablet, Refills: 0    Comments: Reduce doses taken as pain becomes manageable  Associated Diagnoses: Right medial tibial plateau fracture, closed, initial encounter              Details   atorvastatin (LIPITOR) 40 MG tablet       oxybutynin (DITROPAN) 5 MG tablet TAKE 1 TABLET BY MOUTH EVERY 8 HOURS      meloxicam (MOBIC) 15 MG tablet Take 15 mg by mouth daily      metoprolol succinate (TOPROL XL) 25 MG extended release tablet Take 25 mg by mouth daily      rivaroxaban (XARELTO) 20 MG TABS tablet Take 20 mg by mouth      amLODIPine (NORVASC) 10 MG tablet TAKE 1 TABLET BY MOUTH ONCE DAILY FOR 90 DAYS      vitamin D3 (CHOLECALCIFEROL) 400 units TABS tablet Take 400 Units by mouth daily      Flaxseed Oil OIL 1,400 mg by Does not apply route daily      calcium carbonate 600 MG TABS tablet Take 1 tablet by mouth daily      cimetidine (TAGAMET HB) 200 MG tablet Take 200 mg by mouth as needed      latanoprost (XALATAN) 0.005 % ophthalmic solution 1 drop nightly      lisinopril (PRINIVIL;ZESTRIL) 20 MG tablet Take 40 mg by mouth daily       potassium chloride (KLOR-CON) 20 MEQ packet Take 20 mEq by mouth 2 times daily               Time Spent on discharge is more than 30 minutes in the examination, evaluation, counseling and review of medications and discharge plan. Signed:    Nael Roblero MD   11/12/2020      Thank you Nataly Linares MD for the opportunity to be involved in this patient's care. If you have any questions or concerns please feel free to contact me at [phone].

## 2020-11-20 ENCOUNTER — OFFICE VISIT (OUTPATIENT)
Dept: ORTHOPEDIC SURGERY | Age: 60
End: 2020-11-20

## 2020-11-20 VITALS — BODY MASS INDEX: 44.41 KG/M2 | HEIGHT: 68 IN | WEIGHT: 293 LBS

## 2020-11-20 PROCEDURE — 99024 POSTOP FOLLOW-UP VISIT: CPT | Performed by: ORTHOPAEDIC SURGERY

## 2020-11-20 NOTE — PROGRESS NOTES
CHIEF COMPLAINT:    Chief Complaint   Patient presents with    Post-Op Check     PO RT HIP TIBIAL NAIL SX:2020       HISTORY OF PRESENT ILLNESS:    Status post her right proximal tibial shaft nailing of 2020. She has been in a knee immobilizer and essentially nonweightbearing since the surgery. Currently residing in a nursing facility in [facility]. The patient is a 63 y.o. female   Past Medical History:   Diagnosis Date    CHF (congestive heart failure) ([facility] [address]     HTN (hypertension)     UTI (urinary tract infection)         Work Status:      The pain assessment was noted & is as follows:  Pain Assessment  Location of Pain: Leg  Location Modifiers: Right  Severity of Pain: 4  Quality of Pain: Dull, Aching  Duration of Pain: A few hours  Frequency of Pain: Several times daily  Aggravating Factors: Stretching, Bending  Limiting Behavior: Some  Relieving Factors: Rest]      Work Status/Functionality:     Past Medical History: Medical history form was reviewed today & can be found in the media tab  Past Medical History:   Diagnosis Date    CHF (congestive heart failure) (HCC)     HTN (hypertension)     UTI (urinary tract infection)       Past Surgical History:     Past Surgical History:   Procedure Laterality Date    BLADDER REPAIR       SECTION      CHOLECYSTECTOMY      CYSTOSCOPY      TIBIA FRACTURE SURGERY Right 2020    RIGHT TIBIA IM NAIL INSERTION performed by Lizeth Astorga MD at [facility]     Current Medications:     Current Outpatient Medications:     amLODIPine (NORVASC) 10 MG tablet, TAKE 1 TABLET BY MOUTH ONCE DAILY FOR 90 DAYS, Disp: , Rfl:     atorvastatin (LIPITOR) 40 MG tablet, , Disp: , Rfl:     oxybutynin (DITROPAN) 5 MG tablet, TAKE 1 TABLET BY MOUTH EVERY 8 HOURS, Disp: , Rfl:     vitamin D3 (CHOLECALCIFEROL) 400 units TABS tablet, Take 400 Units by mouth daily, Disp: , Rfl:     Flaxseed Oil OIL, 1,400 mg by Does not apply route daily, Disp: , Calf is soft and nontender. Capillary refill is normal.          · Skin:  There are no rashes, ulcerations or lesions. · There are no distal dysvascular changes     Gait & station: Stretcher      Additional Examinations:              Diagnostic Testing: The following x rays were read and interpreted by myself      1.  2 x-ray views of the right tibia and fibula demonstrates a nailing with proximal distal locking of the proximal tibial shaft fracture. Overall the position is acceptable with no signs of hardware failure or shifting of the fracture. Orders     Orders Placed This Encounter   Procedures    XR TIBIA FIBULA RIGHT (2 VIEWS)     Standing Status:   Future     Number of Occurrences:   1     Standing Expiration Date:   11/20/2021         Assessment / Treatment Plan:     1. That is post IM nailing of proximal tibial shaft fracture. Overall everything looks good. Working letter work on some gentle range of motion of her knee at this point. Follow-up in 2 weeks for repeat evaluation and x-ray. 2.  Explained to the patient the complex nature of this fracture and the proximity to the knee joint as well as the inherent instability of this fracture. Weightbearing is going to be a challenge for her particular considering her morbid obesity. Fortunately, there are no signs of infection or DVT. I explained to the patient that overall things really could not be better than they are now. She may start to work on range of motion of the knee as tolerated in physical therapy. She may also work on straight leg raising and flexion extension of the knee as tolerated. Her staples and sutures were removed. Steri-Strips applied for the next 2 days. After 2 days she may bathe directly over these areas. Patient does not need to wear her knee immobilizer except when she is doing transfers .     I do not believe she will be able to ambulate at this point in time until she is seen again in 3 weeks for repeat exam.    Also encouraged ankle range of motion and hip range of motion. 3. I have personally performed and/or participated in the history, exam and medical decision making and agree with all pertinent clinical information. I have also reviewed and agree with the past medical, family and social history unless otherwise noted. This dictation was performed with a verbal recognition program (DRAGON) and it was checked for errors. It is possible that there are still dictated errors within this office note. If so, please bring any errors to my attention for an addendum. All efforts were made to ensure that this office note is accurate.           Suresh Foster MD

## 2020-12-04 LAB
AMORPHOUS: NEGATIVE
BACTERIA: NORMAL
BILIRUBIN URINE: NEGATIVE
CASTS: NEGATIVE
CLARITY: NORMAL
COLOR: YELLOW
CRYSTALS, UA: NEGATIVE
EPITHELIAL CELLS: NORMAL
ERYTHROCYTES URINE: NORMAL
ERYTHROCYTES URINE: NORMAL
GLUCOSE URINE: NEGATIVE
KETONES, URINE: NORMAL
LEUKOCYTE ESTERASE, URINE: NORMAL
LEUKOCYTES, UA: NORMAL
MUCUS, URINE: NEGATIVE
NITRITE SER/PLAS SCNC PT QN: POSITIVE
PH UA: 5.5
PROTEIN FLUID: 100 MG/DL
SPECIFIC GRAVITY UA: >=1.03 (ref 1–1.03)
TRICHOMONAS: NEGATIVE
UROBILINOGEN, URINE: 0.2 MG/DL (ref 0.2–1)
YEAST, URINE: NEGATIVE

## 2020-12-14 ENCOUNTER — APPOINTMENT (OUTPATIENT)
Dept: CT IMAGING | Age: 60
DRG: 137 | End: 2020-12-14
Payer: MEDICAID

## 2020-12-14 ENCOUNTER — HOSPITAL ENCOUNTER (INPATIENT)
Age: 60
LOS: 1 days | Discharge: NURSING, REHAB OR LONG-TERM CARE | DRG: 137 | End: 2020-12-16
Attending: EMERGENCY MEDICINE | Admitting: HOSPITALIST
Payer: MEDICAID

## 2020-12-14 ENCOUNTER — APPOINTMENT (OUTPATIENT)
Dept: GENERAL RADIOLOGY | Age: 60
DRG: 137 | End: 2020-12-14
Payer: MEDICAID

## 2020-12-14 LAB
A/G RATIO: 1.1 (ref 1.1–2.2)
ALBUMIN SERPL-MCNC: 3 G/DL (ref 3.4–5)
ALP BLD-CCNC: 101 U/L (ref 40–129)
ALT SERPL-CCNC: 14 U/L (ref 10–40)
ANION GAP SERPL CALCULATED.3IONS-SCNC: 14 MMOL/L (ref 3–16)
AST SERPL-CCNC: 17 U/L (ref 15–37)
BACTERIA: ABNORMAL /HPF
BASOPHILS ABSOLUTE: 0.1 K/UL (ref 0–0.2)
BASOPHILS RELATIVE PERCENT: 1.2 %
BILIRUB SERPL-MCNC: 0.5 MG/DL (ref 0–1)
BILIRUBIN URINE: ABNORMAL
BLOOD, URINE: ABNORMAL
BUN BLDV-MCNC: 14 MG/DL (ref 7–20)
CALCIUM SERPL-MCNC: 9.3 MG/DL (ref 8.3–10.6)
CHLORIDE BLD-SCNC: 102 MMOL/L (ref 99–110)
CLARITY: ABNORMAL
CO2: 20 MMOL/L (ref 21–32)
COLOR: YELLOW
CREAT SERPL-MCNC: 0.9 MG/DL (ref 0.6–1.2)
EOSINOPHILS ABSOLUTE: 0.1 K/UL (ref 0–0.6)
EOSINOPHILS RELATIVE PERCENT: 2.2 %
EPITHELIAL CELLS, UA: ABNORMAL /HPF (ref 0–5)
GFR AFRICAN AMERICAN: >60
GFR NON-AFRICAN AMERICAN: >60
GLOBULIN: 2.7 G/DL
GLUCOSE BLD-MCNC: 61 MG/DL (ref 70–99)
GLUCOSE URINE: NEGATIVE MG/DL
HCT VFR BLD CALC: 37.3 % (ref 36–48)
HEMOGLOBIN: 12.2 G/DL (ref 12–16)
KETONES, URINE: 40 MG/DL
LACTIC ACID: 1 MMOL/L (ref 0.4–2)
LEUKOCYTE ESTERASE, URINE: ABNORMAL
LIPASE: 40 U/L (ref 13–60)
LYMPHOCYTES ABSOLUTE: 1.1 K/UL (ref 1–5.1)
LYMPHOCYTES RELATIVE PERCENT: 16.9 %
MCH RBC QN AUTO: 32 PG (ref 26–34)
MCHC RBC AUTO-ENTMCNC: 32.8 G/DL (ref 31–36)
MCV RBC AUTO: 97.3 FL (ref 80–100)
MICROSCOPIC EXAMINATION: YES
MONOCYTES ABSOLUTE: 0.6 K/UL (ref 0–1.3)
MONOCYTES RELATIVE PERCENT: 8.6 %
NEUTROPHILS ABSOLUTE: 4.6 K/UL (ref 1.7–7.7)
NEUTROPHILS RELATIVE PERCENT: 71.1 %
NITRITE, URINE: NEGATIVE
PDW BLD-RTO: 15.7 % (ref 12.4–15.4)
PH UA: 6 (ref 5–8)
PLATELET # BLD: 271 K/UL (ref 135–450)
PMV BLD AUTO: 10.3 FL (ref 5–10.5)
POTASSIUM SERPL-SCNC: 4.3 MMOL/L (ref 3.5–5.1)
PROTEIN UA: NEGATIVE MG/DL
RBC # BLD: 3.83 M/UL (ref 4–5.2)
RBC UA: ABNORMAL /HPF (ref 0–4)
SODIUM BLD-SCNC: 136 MMOL/L (ref 136–145)
SPECIFIC GRAVITY UA: 1.02 (ref 1–1.03)
TOTAL PROTEIN: 5.7 G/DL (ref 6.4–8.2)
TROPONIN: <0.01 NG/ML
URINE REFLEX TO CULTURE: YES
URINE TYPE: ABNORMAL
UROBILINOGEN, URINE: 1 E.U./DL
WBC # BLD: 6.4 K/UL (ref 4–11)
WBC UA: >100 /HPF (ref 0–5)

## 2020-12-14 PROCEDURE — 6360000002 HC RX W HCPCS: Performed by: PHYSICIAN ASSISTANT

## 2020-12-14 PROCEDURE — 87086 URINE CULTURE/COLONY COUNT: CPT

## 2020-12-14 PROCEDURE — 81001 URINALYSIS AUTO W/SCOPE: CPT

## 2020-12-14 PROCEDURE — 2580000003 HC RX 258: Performed by: PHYSICIAN ASSISTANT

## 2020-12-14 PROCEDURE — 85025 COMPLETE CBC W/AUTO DIFF WBC: CPT

## 2020-12-14 PROCEDURE — 93005 ELECTROCARDIOGRAM TRACING: CPT | Performed by: PHYSICIAN ASSISTANT

## 2020-12-14 PROCEDURE — 96365 THER/PROPH/DIAG IV INF INIT: CPT

## 2020-12-14 PROCEDURE — 6360000004 HC RX CONTRAST MEDICATION: Performed by: PHYSICIAN ASSISTANT

## 2020-12-14 PROCEDURE — 71045 X-RAY EXAM CHEST 1 VIEW: CPT

## 2020-12-14 PROCEDURE — 84484 ASSAY OF TROPONIN QUANT: CPT

## 2020-12-14 PROCEDURE — 87040 BLOOD CULTURE FOR BACTERIA: CPT

## 2020-12-14 PROCEDURE — 99285 EMERGENCY DEPT VISIT HI MDM: CPT

## 2020-12-14 PROCEDURE — 74177 CT ABD & PELVIS W/CONTRAST: CPT

## 2020-12-14 PROCEDURE — 96375 TX/PRO/DX INJ NEW DRUG ADDON: CPT

## 2020-12-14 PROCEDURE — 83690 ASSAY OF LIPASE: CPT

## 2020-12-14 PROCEDURE — 80053 COMPREHEN METABOLIC PANEL: CPT

## 2020-12-14 PROCEDURE — 83605 ASSAY OF LACTIC ACID: CPT

## 2020-12-14 RX ORDER — 0.9 % SODIUM CHLORIDE 0.9 %
500 INTRAVENOUS SOLUTION INTRAVENOUS ONCE
Status: COMPLETED | OUTPATIENT
Start: 2020-12-14 | End: 2020-12-14

## 2020-12-14 RX ORDER — 0.9 % SODIUM CHLORIDE 0.9 %
1000 INTRAVENOUS SOLUTION INTRAVENOUS ONCE
Status: DISCONTINUED | OUTPATIENT
Start: 2020-12-14 | End: 2020-12-14

## 2020-12-14 RX ORDER — ONDANSETRON 2 MG/ML
4 INJECTION INTRAMUSCULAR; INTRAVENOUS ONCE
Status: COMPLETED | OUTPATIENT
Start: 2020-12-14 | End: 2020-12-14

## 2020-12-14 RX ORDER — PROMETHAZINE HYDROCHLORIDE 25 MG/ML
6.25 INJECTION, SOLUTION INTRAMUSCULAR; INTRAVENOUS ONCE
Status: COMPLETED | OUTPATIENT
Start: 2020-12-14 | End: 2020-12-14

## 2020-12-14 RX ADMIN — SODIUM CHLORIDE 500 ML: 9 INJECTION, SOLUTION INTRAVENOUS at 19:00

## 2020-12-14 RX ADMIN — CEFTRIAXONE SODIUM 1 G: 1 INJECTION, POWDER, FOR SOLUTION INTRAMUSCULAR; INTRAVENOUS at 23:48

## 2020-12-14 RX ADMIN — PROMETHAZINE HYDROCHLORIDE 6.25 MG: 25 INJECTION INTRAMUSCULAR; INTRAVENOUS at 19:18

## 2020-12-14 RX ADMIN — IOPAMIDOL 75 ML: 755 INJECTION, SOLUTION INTRAVENOUS at 20:20

## 2020-12-14 RX ADMIN — ONDANSETRON HYDROCHLORIDE 4 MG: 2 INJECTION, SOLUTION INTRAMUSCULAR; INTRAVENOUS at 18:39

## 2020-12-14 NOTE — ED PROVIDER NOTES
[address]  EMERGENCY DEPARTMENT ENCOUNTER        Pt Name: Matt Hartley  MRN: [id number]  [place] 1960  Date of evaluation: 12/14/2020  Provider: Traci Wheatley PA-C  PCP: Kelly Nolasco MD    Shared Visit or Autonomous Visit:  I have seen and evaluated this patient with my supervising physician Erinn Cabrera MD.    [facility]       Chief Complaint   Patient presents with    Emesis     Pt from [facility] s/p n/v x 2 days per report. upon speaking to pt, pt reports taht she has been having difficulty with PO x approx 1 month but just worsening over last few days. pt tested positive 2 weeks ago. Pt reports LBM maybe 1 week ago.  Positive For Covid-19     x1 week; denies CP. HISTORY OF PRESENT ILLNESS   (Location/Symptom, Timing/Onset, Context/Setting, Quality, Duration, Modifying Factors, Severity)  Note limiting factors. Matt Hartley is a 63 y.o. 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 Presents to ER with complaint of nausea and intractable vomiting for the past 1 month. States cannot keep anything down. Last bowel movement was about 1 week ago has been taking stool softeners and has used 3 suppositories without relief. Denies abdominal pain. Denies chest pain or shortness of breath. No coughing. States 1 week ago they came into her room and started backing up all of her things and moved here in a different part of the rehab center told her she tested positive for COVID-19. Currently on bactrim for UTI. The history is provided by the patient.    Nausea & Vomiting  Duration:  1 month  Timing:  Constant  Progression:  Worsening  Associated symptoms: no abdominal pain, no cough, no diarrhea and no fever        Nursing Notes were reviewed    REVIEW OF SYSTEMS    (2-9 systems for level 4, 10 or more for level 5)     Review of Systems Constitutional: Negative for fever. Respiratory: Negative for cough and shortness of breath. Cardiovascular: Negative for chest pain. Gastrointestinal: Positive for nausea and vomiting. Negative for abdominal pain and diarrhea. Genitourinary: Negative for difficulty urinating. All other systems reviewed and are negative. Positives and Pertinent negatives as per HPI. PAST MEDICAL HISTORY     Past Medical History:   Diagnosis Date    CHF (congestive heart failure) ([facility] [address]     HTN (hypertension)     UTI (urinary tract infection)          SURGICAL HISTORY     Past Surgical History:   Procedure Laterality Date    BLADDER REPAIR       SECTION      CHOLECYSTECTOMY      CYSTOSCOPY      TIBIA FRACTURE SURGERY Right 2020    RIGHT TIBIA IM NAIL INSERTION performed by Chasity Najera MD at [facility]       Previous Medications    AMLODIPINE (NORVASC) 10 MG TABLET    TAKE 1 TABLET BY MOUTH ONCE DAILY FOR 90 DAYS    ATORVASTATIN (LIPITOR) 40 MG TABLET        CALCIUM CARBONATE 600 MG TABS TABLET    Take 1 tablet by mouth daily    CIMETIDINE (TAGAMET HB) 200 MG TABLET    Take 200 mg by mouth as needed    FLAXSEED OIL OIL    1,400 mg by Does not apply route daily    LATANOPROST (XALATAN) 0.005 % OPHTHALMIC SOLUTION    1 drop nightly    LISINOPRIL (PRINIVIL;ZESTRIL) 20 MG TABLET    Take 40 mg by mouth daily     MELOXICAM (MOBIC) 15 MG TABLET    Take 15 mg by mouth daily    METOPROLOL SUCCINATE (TOPROL XL) 25 MG EXTENDED RELEASE TABLET    Take 25 mg by mouth daily    OXYBUTYNIN (DITROPAN) 5 MG TABLET    TAKE 1 TABLET BY MOUTH EVERY 8 HOURS    POTASSIUM CHLORIDE (KLOR-CON) 20 MEQ PACKET    Take 20 mEq by mouth 2 times daily    RIVAROXABAN (XARELTO) 20 MG TABS TABLET    Take 20 mg by mouth    VITAMIN D3 (CHOLECALCIFEROL) 400 UNITS TABS TABLET    Take 400 Units by mouth daily         ALLERGIES     Patient has no known allergies.     FAMILYHISTORY     No family history on atraumatic. Mouth/Throat:      Mouth: Mucous membranes are dry. Pharynx: Oropharynx is clear. No oropharyngeal exudate or posterior oropharyngeal erythema. Eyes:      Conjunctiva/sclera: Conjunctivae normal.      Pupils: Pupils are equal, round, and reactive to light. Neck:      Musculoskeletal: Normal range of motion and neck supple. Vascular: No JVD. Cardiovascular:      Rate and Rhythm: Normal rate and regular rhythm. Pulses: Normal pulses. Heart sounds: Normal heart sounds. Pulmonary:      Effort: Pulmonary effort is normal. No respiratory distress. Breath sounds: Normal breath sounds. No stridor. No wheezing, rhonchi or rales. Abdominal:      General: Bowel sounds are normal. There is no distension. Palpations: Abdomen is soft. Abdomen is not rigid. There is no mass. Tenderness: There is no abdominal tenderness. There is no guarding or rebound. Musculoskeletal: Normal range of motion. Skin:     General: Skin is warm and dry. Findings: No rash. Neurological:      Mental Status: She is alert and oriented to person, place, and time. GCS: GCS eye subscore is 4. GCS verbal subscore is 5. GCS motor subscore is 6. Cranial Nerves: No cranial nerve deficit. Sensory: No sensory deficit. Motor: No abnormal muscle tone.       Coordination: Coordination normal.   Psychiatric:         Behavior: Behavior normal.         DIAGNOSTIC RESULTS   LABS:    Labs Reviewed   CBC WITH AUTO DIFFERENTIAL - Abnormal; Notable for the following components:       Result Value    RBC 3.83 (*)     RDW 15.7 (*)     All other components within normal limits    Narrative:     Performed at:  [place] [address],  [facility]   Phone [phone]   COMPREHENSIVE METABOLIC PANEL - Abnormal; Notable for the following components:    CO2 20 (*)     Glucose 61 (*)     Total Protein 5.7 (*)     Alb 3.0 (*)     All other components within normal limits    Narrative:     Performed at:  [facility],  [place], [facility]   Phone [phone]   URINE RT REFLEX TO CULTURE - Abnormal; Notable for the following components:    Clarity, UA SL CLOUDY (*)     Bilirubin Urine MODERATE (*)     Ketones, Urine 40 (*)     Blood, Urine SMALL (*)     Leukocyte Esterase, Urine LARGE (*)     All other components within normal limits    Narrative:     Performed at:  [facility],  [employer], [facility]   Phone [phone]   MICROSCOPIC URINALYSIS - Abnormal; Notable for the following components:    WBC, UA >100 (*)     RBC, UA 5-10 (*)     Epithelial Cells, UA 21-50 (*)     Bacteria, UA 2+ (*)     All other components within normal limits    Narrative:     Performed at:  [facility],  [employer], [facility]   Phone [phone]   CULTURE, BLOOD 2   CULTURE, BLOOD 1   CULTURE, URINE   LIPASE    Narrative:     Performed at:  [facility],  [employer], [facility]   Phone [phone]   LACTIC ACID, PLASMA    Narrative:     Performed at:  [facility],  [email or web address], [facility]   Phone [phone]   TROPONIN    Narrative:     Performed at:  [facility],  [employer]   Phone [phone]     Results for orders placed or performed during the hospital encounter of 12/14/20   CBC Auto Differential   Result Value Ref Range    WBC 6.4 4.0 - 11.0 K/uL    RBC 3.83 (L) 4.00 - 5.20 M/uL    Hemoglobin 12.2 12.0 - 16.0 g/dL    Hematocrit 37.3 36.0 - 48.0 %    MCV 97.3 80.0 - 100.0 fL    MCH 32.0 26.0 - 34.0 pg    MCHC 32.8 31.0 - 36.0 g/dL    RDW 15.7 (H) 12.4 - 15.4 %    Platelets 704 671 - 518 K/uL    MPV 10.3 5.0 - 10.5 fL    Neutrophils % 71.1 % Lymphocytes % 16.9 %    Monocytes % 8.6 %    Eosinophils % 2.2 %    Basophils % 1.2 %    Neutrophils Absolute 4.6 1.7 - 7.7 K/uL    Lymphocytes Absolute 1.1 1.0 - 5.1 K/uL    Monocytes Absolute 0.6 0.0 - 1.3 K/uL    Eosinophils Absolute 0.1 0.0 - 0.6 K/uL    Basophils Absolute 0.1 0.0 - 0.2 K/uL   Comprehensive Metabolic Panel   Result Value Ref Range    Sodium 136 136 - 145 mmol/L    Potassium 4.3 3.5 - 5.1 mmol/L    Chloride 102 99 - 110 mmol/L    CO2 20 (L) 21 - 32 mmol/L    Anion Gap 14 3 - 16    Glucose 61 (L) 70 - 99 mg/dL    BUN 14 7 - 20 mg/dL    CREATININE 0.9 0.6 - 1.2 mg/dL    GFR Non-African American >60 >60    GFR African American >60 >60    Calcium 9.3 8.3 - 10.6 mg/dL    Total Protein 5.7 (L) 6.4 - 8.2 g/dL    Alb 3.0 (L) 3.4 - 5.0 g/dL    Albumin/Globulin Ratio 1.1 1.1 - 2.2    Total Bilirubin 0.5 0.0 - 1.0 mg/dL    Alkaline Phosphatase 101 40 - 129 U/L    ALT 14 10 - 40 U/L    AST 17 15 - 37 U/L    Globulin 2.7 g/dL   Lipase   Result Value Ref Range    Lipase 40.0 13.0 - 60.0 U/L   Lactic Acid, Plasma   Result Value Ref Range    Lactic Acid 1.0 0.4 - 2.0 mmol/L   Urinalysis Reflex to Culture    Specimen: Urine, clean catch   Result Value Ref Range    Color, UA Yellow Straw/Yellow    Clarity, UA SL CLOUDY (A) Clear    Glucose, Ur Negative Negative mg/dL    Bilirubin Urine MODERATE (A) Negative    Ketones, Urine 40 (A) Negative mg/dL    Specific Gravity, UA 1.025 1.005 - 1.030    Blood, Urine SMALL (A) Negative    pH, UA 6.0 5.0 - 8.0    Protein, UA Negative Negative mg/dL    Urobilinogen, Urine 1.0 <2.0 E.U./dL    Nitrite, Urine Negative Negative    Leukocyte Esterase, Urine LARGE (A) Negative    Microscopic Examination YES     Urine Type NotGiven     Urine Reflex to Culture Yes    Troponin   Result Value Ref Range    Troponin <0.01 <0.01 ng/mL   Microscopic Urinalysis   Result Value Ref Range    WBC, UA >100 (A) 0 - 5 /HPF    RBC, UA 5-10 (A) 0 - 4 /HPF    Epithelial Cells, UA 21-50 (A) 0 - 5 /HPF Bacteria, UA 2+ (A) None Seen /HPF   EKG 12 Lead   Result Value Ref Range    Ventricular Rate 66 BPM    Atrial Rate 66 BPM    P-R Interval 122 ms    QRS Duration 72 ms    Q-T Interval 394 ms    QTc Calculation (Bazett) 413 ms    P Axis 17 degrees    R Axis -14 degrees    T Axis 92 degrees    Diagnosis       Normal sinus rhythmMinimal voltage criteria for LVH, may be normal variantST & T wave abnormality, consider anterolateral ischemiaAbnormal ECGNo previous ECGs available         All other labs were within normal range or not returned as of this dictation. EKG: All EKG's are interpreted by the Emergency Department Physician in the absence of a cardiologist.  Please see their note for interpretation of EKG. RADIOLOGY:   Non-plain film images such as CT, Ultrasound and MRI are read by the radiologist. Plain radiographic images are visualized andpreliminarily interpreted by the  ED Provider with the below findings:        Interpretation perthe Radiologist below, if available at the time of this note:    CT ABDOMEN PELVIS W IV CONTRAST Additional Contrast? None   Final Result   There is mild wall thickening of the midportion of the sigmoid. There is   subtle injection of the surrounding fat. This could be due to the partially   contracted state of the colon, or an early finding of colitis. Recommend   correlation with any recent colonoscopy. Bilateral inguinal hernias. No resultant bowel obstruction. Remote   appearing pelvic fractures      Indeterminate left adrenal nodule. This was described on outside chest CT   report 09/09/2016, measuring 2.5 x 2.2 cm in size. Stability in size favors   benign etiology      Tiny nonobstructing right renal stone         XR CHEST PORTABLE   Final Result   Small amount of atelectasis versus pneumonia in the right lower lobe.       Outward protrusion of the right lobe mediastinal shadow is most commonly due   to a pericardial fat pad or pericardial cyst.  A mass can not be excluded and   a follow-up full inspiration PA and lateral view chest x-ray or chest CT   should be considered           Ct Abdomen Pelvis W Iv Contrast Additional Contrast? None    Result Date: 12/14/2020  EXAMINATION: CT OF THE ABDOMEN AND PELVIS WITH CONTRAST 12/14/2020 7:54 pm TECHNIQUE: CT of the abdomen and pelvis was performed with the administration of intravenous contrast. Multiplanar reformatted images are provided for review. Dose modulation, iterative reconstruction, and/or weight based adjustment of the mA/kV was utilized to reduce the radiation dose to as low as reasonably achievable. COMPARISON: Outside chest CT report 09/09/2016 HISTORY: ORDERING SYSTEM PROVIDED HISTORY: intractable nausea and vomiting TECHNOLOGIST PROVIDED HISTORY: Reason for exam:->intractable nausea and vomiting Additional Contrast?->None Reason for Exam: intractable nausea and vomiting Acuity: Acute Type of Exam: Initial FINDINGS: Lower Chest: De pendant opacity is seen at the lung bases, and bandlike opacity seen in the right lower lobe. , likely atelectasis. Small partially calcified nodule is seen in the right pericardial phrenic region measuring 2.0 cm, incidentally noted. Organs: Moderate size hiatal hernia seen. There is nonspecific thickening at the GE junction. No splenomegaly Right adrenal gland is normal Focal nodule seen in the left adrenal gland measuring 2.2 cm, incidentally noted Scarring of the right kidney is seen. A few subcentimeter hypodense nodules are seen in the right kidney, likely cyst..  No hydronephrosis on the right. . 1 mm calcification is seen in the right kidney. Hypodense nodules are seen in the left kidney measuring 3.6 cm size or less, likely cyst.  There is scarring of the left kidney. No peripancreatic inflammatory change There are clips from prior cholecystectomy. No peripancreatic inflammatory change GI/Bowel: No significant small bowel distention noted.   Moderate stool seen in the colon. No bowel obstruction. There is mild wall thickening of the midportion of the sigmoid. There is subtle injection of the surrounding fat. Pelvis: Moderate sized inguinal hernias are seen bilaterally, the right hernia containing fat and the left hernia containing fat omentum and bowel. No resultant bowel obstruction. Peritoneum/Retroperitoneum: Atherosclerotic change seen in aorta. No aneurysm. .  No retroperitoneal adenopathy Bones/Soft Tissues: Remote right inferior pubic and left inferior pubic ramus fracture is seen. Degenerative changes are seen in the hips. Degenerative changes are seen in the spine     There is mild wall thickening of the midportion of the sigmoid. There is subtle injection of the surrounding fat. This could be due to the partially contracted state of the colon, or an early finding of colitis. Recommend correlation with any recent colonoscopy. Bilateral inguinal hernias. No resultant bowel obstruction. Remote appearing pelvic fractures Indeterminate left adrenal nodule. This was described on outside chest CT report 09/09/2016, measuring 2.5 x 2.2 cm in size. Stability in size favors benign etiology Tiny nonobstructing right renal stone     Xr Chest Portable    Result Date: 12/14/2020  EXAMINATION: ONE XRAY VIEW OF THE CHEST 12/14/2020 6:11 pm COMPARISON: None. HISTORY: ORDERING SYSTEM PROVIDED HISTORY: covid+ r/o pneumonia TECHNOLOGIST PROVIDED HISTORY: Reason for exam:->covid+ r/o pneumonia Reason for Exam: cough Acuity: Acute Type of Exam: Initial FINDINGS: There is a small amount of opacity in the medial right lung base inferior to the hilum. No evidence pneumonia elsewhere. No evidence of pleural effusion or pneumothorax. Outward protrusion of the right lower mediastinal shadow. Small amount of atelectasis versus pneumonia in the right lower lobe.  Outward protrusion of the right lobe mediastinal shadow is most commonly due to a pericardial fat pad or pericardial cyst.  A mass can not be excluded and a follow-up full inspiration PA and lateral view chest x-ray or chest CT should be considered         PROCEDURES   Unless otherwise noted below, none     Procedures    CRITICAL CARE TIME   N/A    CONSULTS:  IP CONSULT TO HOSPITALIST      EMERGENCY DEPARTMENT COURSE and DIFFERENTIAL DIAGNOSIS/MDM:   Vitals:    Vitals:    12/14/20 2306 12/14/20 2348 12/15/20 0111 12/15/20 0115   BP: 115/75 115/75 (!) 150/94 (!) 150/94   Pulse: 74 68 78 67   Resp: 19 18 23 17   Temp:       TempSrc:       SpO2: 95% 95% 96% 95%   Weight:       Height:           Patient was given thefollowing medications:  Medications   cefTRIAXone (ROCEPHIN) 1 g IVPB in 50 mL D5W minibag (has no administration in time range)   amLODIPine (NORVASC) tablet 10 mg (has no administration in time range)   atorvastatin (LIPITOR) tablet 40 mg (has no administration in time range)   calcium elemental (OSCAL) tablet 500 mg (has no administration in time range)   famotidine (PEPCID) tablet 20 mg (has no administration in time range)   latanoprost (XALATAN) 0.005 % ophthalmic solution 1 drop (has no administration in time range)   metoprolol succinate (TOPROL XL) extended release tablet 25 mg (has no administration in time range)   oxybutynin (DITROPAN) tablet 5 mg (has no administration in time range)   rivaroxaban (XARELTO) tablet 20 mg (has no administration in time range)   vitamin D3 (CHOLECALCIFEROL) tablet 400 Units (has no administration in time range)   sodium chloride flush 0.9 % injection 10 mL (has no administration in time range)   sodium chloride flush 0.9 % injection 10 mL (has no administration in time range)   promethazine (PHENERGAN) tablet 12.5 mg (has no administration in time range)     Or   ondansetron (ZOFRAN) injection 4 mg (has no administration in time range)   polyethylene glycol (GLYCOLAX) packet 17 g (has no administration in time range)   acetaminophen (TYLENOL) tablet 650 mg (has no administration in time range)     Or   acetaminophen (TYLENOL) suppository 650 mg (has no administration in time range)   0.9 % sodium chloride infusion (has no administration in time range)   atorvastatin (LIPITOR) tablet 40 mg (has no administration in time range)   latanoprost (XALATAN) 0.005 % ophthalmic solution 1 drop (has no administration in time range)   ondansetron (ZOFRAN) injection 4 mg (4 mg Intravenous Given 12/14/20 1839)   0.9 % sodium chloride bolus (0 mLs Intravenous Stopped 12/14/20 2041)   iopamidol (ISOVUE-370) 76 % injection 75 mL (75 mLs Intravenous Given 12/14/20 2020)   promethazine (PHENERGAN) injection 6.25 mg (6.25 mg Intravenous Given 12/14/20 1918)   cefTRIAXone (ROCEPHIN) 1 g IVPB in 50 mL D5W minibag (0 g Intravenous Stopped 12/15/20 0029)       Urinalysis greater than 100 WBC given Rocephin. CT abdomen pelvis no obstructing stones. No signs of bowel obstruction. Mild wall thickening sigmoid possible early colitis. She has had nausea and vomiting but denies any diarrhea. Abdomen is soft and nontender on exam.  Normal bowel sounds. She was given Zofran and then Phenergan for her nausea vomiting. She did get some improvement with the Phenergan but stating still nauseous and not able to eat or drink. Plan for admission. Spoke with Dr. Paco Mcleod and discussed symptoms, exam, objective data and clinical course. Disposition and plan agreed upon. He will admit the patient and give/enter admitting orders. Patient is stable. FINAL IMPRESSION      1. Intractable nausea and vomiting    2. Bacterial urinary tract infection    3. COVID-19 virus infection          DISPOSITION/PLAN   DISPOSITION Admitted    PATIENT REFERREDTO:  No follow-up provider specified.     DISCHARGE MEDICATIONS:  New Prescriptions    No medications on file       DISCONTINUED MEDICATIONS:  Discontinued Medications    No medications on file              (Please note that portions ofthis note were completed with a voice recognition program.  Efforts were made to edit the dictations but occasionally words are mis-transcribed.)    Justen Hammer PA-C (electronically signed)            Huey Hill PA-C  12/15/20 7809

## 2020-12-15 PROBLEM — N39.0 UTI (URINARY TRACT INFECTION): Status: ACTIVE | Noted: 2020-12-15

## 2020-12-15 PROBLEM — A49.9 BACTERIAL URINARY TRACT INFECTION: Status: ACTIVE | Noted: 2020-12-15

## 2020-12-15 LAB
EKG ATRIAL RATE: 66 BPM
EKG DIAGNOSIS: NORMAL
EKG P AXIS: 17 DEGREES
EKG P-R INTERVAL: 122 MS
EKG Q-T INTERVAL: 394 MS
EKG QRS DURATION: 72 MS
EKG QTC CALCULATION (BAZETT): 413 MS
EKG R AXIS: -14 DEGREES
EKG T AXIS: 92 DEGREES
EKG VENTRICULAR RATE: 66 BPM
SARS-COV-2, NAAT: DETECTED
URINE CULTURE, ROUTINE: NORMAL

## 2020-12-15 PROCEDURE — 2580000003 HC RX 258: Performed by: HOSPITALIST

## 2020-12-15 PROCEDURE — 36415 COLL VENOUS BLD VENIPUNCTURE: CPT

## 2020-12-15 PROCEDURE — 87040 BLOOD CULTURE FOR BACTERIA: CPT

## 2020-12-15 PROCEDURE — 6360000002 HC RX W HCPCS: Performed by: HOSPITALIST

## 2020-12-15 PROCEDURE — 93010 ELECTROCARDIOGRAM REPORT: CPT | Performed by: INTERNAL MEDICINE

## 2020-12-15 PROCEDURE — 97161 PT EVAL LOW COMPLEX 20 MIN: CPT

## 2020-12-15 PROCEDURE — U0002 COVID-19 LAB TEST NON-CDC: HCPCS

## 2020-12-15 PROCEDURE — 97165 OT EVAL LOW COMPLEX 30 MIN: CPT

## 2020-12-15 PROCEDURE — 99223 1ST HOSP IP/OBS HIGH 75: CPT | Performed by: INTERNAL MEDICINE

## 2020-12-15 PROCEDURE — 6370000000 HC RX 637 (ALT 250 FOR IP): Performed by: HOSPITALIST

## 2020-12-15 PROCEDURE — 97110 THERAPEUTIC EXERCISES: CPT

## 2020-12-15 PROCEDURE — 1200000000 HC SEMI PRIVATE

## 2020-12-15 PROCEDURE — 97530 THERAPEUTIC ACTIVITIES: CPT

## 2020-12-15 PROCEDURE — 6370000000 HC RX 637 (ALT 250 FOR IP): Performed by: INTERNAL MEDICINE

## 2020-12-15 RX ORDER — ATORVASTATIN CALCIUM 40 MG/1
40 TABLET, FILM COATED ORAL ONCE
Status: COMPLETED | OUTPATIENT
Start: 2020-12-15 | End: 2020-12-15

## 2020-12-15 RX ORDER — SODIUM CHLORIDE 0.9 % (FLUSH) 0.9 %
10 SYRINGE (ML) INJECTION EVERY 12 HOURS SCHEDULED
Status: DISCONTINUED | OUTPATIENT
Start: 2020-12-15 | End: 2020-12-16 | Stop reason: HOSPADM

## 2020-12-15 RX ORDER — ACETAMINOPHEN 650 MG/1
650 SUPPOSITORY RECTAL EVERY 6 HOURS PRN
Status: DISCONTINUED | OUTPATIENT
Start: 2020-12-15 | End: 2020-12-16 | Stop reason: HOSPADM

## 2020-12-15 RX ORDER — LATANOPROST 50 UG/ML
1 SOLUTION/ DROPS OPHTHALMIC NIGHTLY
Status: DISCONTINUED | OUTPATIENT
Start: 2020-12-15 | End: 2020-12-16 | Stop reason: HOSPADM

## 2020-12-15 RX ORDER — ATORVASTATIN CALCIUM 40 MG/1
40 TABLET, FILM COATED ORAL NIGHTLY
Status: DISCONTINUED | OUTPATIENT
Start: 2020-12-15 | End: 2020-12-16 | Stop reason: HOSPADM

## 2020-12-15 RX ORDER — DEXAMETHASONE SODIUM PHOSPHATE 10 MG/ML
6 INJECTION, SOLUTION INTRAMUSCULAR; INTRAVENOUS EVERY 24 HOURS
Status: DISCONTINUED | OUTPATIENT
Start: 2020-12-15 | End: 2020-12-15

## 2020-12-15 RX ORDER — CALCIUM CARBONATE 500(1250)
500 TABLET ORAL DAILY
Status: DISCONTINUED | OUTPATIENT
Start: 2020-12-15 | End: 2020-12-16 | Stop reason: HOSPADM

## 2020-12-15 RX ORDER — OXYBUTYNIN CHLORIDE 5 MG/1
5 TABLET ORAL 3 TIMES DAILY
Status: DISCONTINUED | OUTPATIENT
Start: 2020-12-15 | End: 2020-12-16 | Stop reason: HOSPADM

## 2020-12-15 RX ORDER — LACTULOSE 10 G/15ML
20 SOLUTION ORAL ONCE
Status: COMPLETED | OUTPATIENT
Start: 2020-12-15 | End: 2020-12-15

## 2020-12-15 RX ORDER — POLYETHYLENE GLYCOL 3350 17 G/17G
17 POWDER, FOR SOLUTION ORAL DAILY PRN
Status: DISCONTINUED | OUTPATIENT
Start: 2020-12-15 | End: 2020-12-16 | Stop reason: HOSPADM

## 2020-12-15 RX ORDER — ACETAMINOPHEN 325 MG/1
650 TABLET ORAL EVERY 6 HOURS PRN
Status: DISCONTINUED | OUTPATIENT
Start: 2020-12-15 | End: 2020-12-16 | Stop reason: HOSPADM

## 2020-12-15 RX ORDER — METOPROLOL SUCCINATE 25 MG/1
25 TABLET, EXTENDED RELEASE ORAL DAILY
Status: DISCONTINUED | OUTPATIENT
Start: 2020-12-15 | End: 2020-12-16 | Stop reason: HOSPADM

## 2020-12-15 RX ORDER — AMLODIPINE BESYLATE 5 MG/1
10 TABLET ORAL DAILY
Status: DISCONTINUED | OUTPATIENT
Start: 2020-12-15 | End: 2020-12-15

## 2020-12-15 RX ORDER — OMEGA-3S/DHA/EPA/FISH OIL/D3 300MG-1000
400 CAPSULE ORAL DAILY
Status: DISCONTINUED | OUTPATIENT
Start: 2020-12-15 | End: 2020-12-16 | Stop reason: HOSPADM

## 2020-12-15 RX ORDER — SODIUM CHLORIDE 9 MG/ML
INJECTION, SOLUTION INTRAVENOUS CONTINUOUS
Status: DISCONTINUED | OUTPATIENT
Start: 2020-12-15 | End: 2020-12-16 | Stop reason: HOSPADM

## 2020-12-15 RX ORDER — FAMOTIDINE 20 MG/1
20 TABLET, FILM COATED ORAL NIGHTLY
Status: DISCONTINUED | OUTPATIENT
Start: 2020-12-15 | End: 2020-12-16 | Stop reason: HOSPADM

## 2020-12-15 RX ORDER — ONDANSETRON 2 MG/ML
4 INJECTION INTRAMUSCULAR; INTRAVENOUS EVERY 6 HOURS PRN
Status: DISCONTINUED | OUTPATIENT
Start: 2020-12-15 | End: 2020-12-16 | Stop reason: HOSPADM

## 2020-12-15 RX ORDER — BISACODYL 10 MG
10 SUPPOSITORY, RECTAL RECTAL DAILY PRN
Status: DISCONTINUED | OUTPATIENT
Start: 2020-12-15 | End: 2020-12-16 | Stop reason: HOSPADM

## 2020-12-15 RX ORDER — LATANOPROST 50 UG/ML
1 SOLUTION/ DROPS OPHTHALMIC ONCE
Status: COMPLETED | OUTPATIENT
Start: 2020-12-15 | End: 2020-12-15

## 2020-12-15 RX ORDER — PROMETHAZINE HYDROCHLORIDE 25 MG/1
12.5 TABLET ORAL EVERY 6 HOURS PRN
Status: DISCONTINUED | OUTPATIENT
Start: 2020-12-15 | End: 2020-12-16 | Stop reason: HOSPADM

## 2020-12-15 RX ORDER — AMLODIPINE BESYLATE 5 MG/1
5 TABLET ORAL DAILY
Status: DISCONTINUED | OUTPATIENT
Start: 2020-12-16 | End: 2020-12-16 | Stop reason: HOSPADM

## 2020-12-15 RX ORDER — SODIUM CHLORIDE 0.9 % (FLUSH) 0.9 %
10 SYRINGE (ML) INJECTION PRN
Status: DISCONTINUED | OUTPATIENT
Start: 2020-12-15 | End: 2020-12-16 | Stop reason: HOSPADM

## 2020-12-15 RX ADMIN — SODIUM CHLORIDE 1000 ML: 9 INJECTION, SOLUTION INTRAVENOUS at 23:32

## 2020-12-15 RX ADMIN — CHOLECALCIFEROL TAB 10 MCG (400 UNIT) 400 UNITS: 10 TAB at 08:41

## 2020-12-15 RX ADMIN — OXYBUTYNIN CHLORIDE 5 MG: 5 TABLET ORAL at 13:27

## 2020-12-15 RX ADMIN — LATANOPROST 1 DROP: 50 SOLUTION OPHTHALMIC at 22:58

## 2020-12-15 RX ADMIN — BISACODYL 10 MG: 10 SUPPOSITORY RECTAL at 13:27

## 2020-12-15 RX ADMIN — ONDANSETRON HYDROCHLORIDE 4 MG: 2 INJECTION, SOLUTION INTRAMUSCULAR; INTRAVENOUS at 23:06

## 2020-12-15 RX ADMIN — OXYBUTYNIN CHLORIDE 5 MG: 5 TABLET ORAL at 08:39

## 2020-12-15 RX ADMIN — SODIUM CHLORIDE: 9 INJECTION, SOLUTION INTRAVENOUS at 03:54

## 2020-12-15 RX ADMIN — LATANOPROST 1 DROP: 50 SOLUTION OPHTHALMIC at 03:55

## 2020-12-15 RX ADMIN — RIVAROXABAN 20 MG: 20 TABLET, FILM COATED ORAL at 03:56

## 2020-12-15 RX ADMIN — CEFTRIAXONE SODIUM 1 G: 1 INJECTION, POWDER, FOR SOLUTION INTRAMUSCULAR; INTRAVENOUS at 22:54

## 2020-12-15 RX ADMIN — ONDANSETRON HYDROCHLORIDE 4 MG: 2 INJECTION, SOLUTION INTRAMUSCULAR; INTRAVENOUS at 15:09

## 2020-12-15 RX ADMIN — AMLODIPINE BESYLATE 10 MG: 5 TABLET ORAL at 08:38

## 2020-12-15 RX ADMIN — RIVAROXABAN 20 MG: 20 TABLET, FILM COATED ORAL at 18:36

## 2020-12-15 RX ADMIN — FAMOTIDINE 20 MG: 20 TABLET ORAL at 22:54

## 2020-12-15 RX ADMIN — OXYBUTYNIN CHLORIDE 5 MG: 5 TABLET ORAL at 03:55

## 2020-12-15 RX ADMIN — LACTULOSE 20 G: 10 SOLUTION ORAL at 18:36

## 2020-12-15 RX ADMIN — Medication 500 MG: at 08:39

## 2020-12-15 RX ADMIN — ONDANSETRON HYDROCHLORIDE 4 MG: 2 INJECTION, SOLUTION INTRAMUSCULAR; INTRAVENOUS at 08:38

## 2020-12-15 RX ADMIN — OXYBUTYNIN CHLORIDE 5 MG: 5 TABLET ORAL at 22:54

## 2020-12-15 RX ADMIN — METOPROLOL SUCCINATE 25 MG: 25 TABLET, EXTENDED RELEASE ORAL at 08:39

## 2020-12-15 RX ADMIN — POLYETHYLENE GLYCOL (3350) 17 G: 17 POWDER, FOR SOLUTION ORAL at 15:09

## 2020-12-15 RX ADMIN — ATORVASTATIN CALCIUM 40 MG: 40 TABLET, FILM COATED ORAL at 03:55

## 2020-12-15 RX ADMIN — ATORVASTATIN CALCIUM 40 MG: 40 TABLET, FILM COATED ORAL at 22:54

## 2020-12-15 ASSESSMENT — ENCOUNTER SYMPTOMS
SHORTNESS OF BREATH: 0
DIARRHEA: 0
COUGH: 0
NAUSEA: 1
VOMITING: 1
ABDOMINAL PAIN: 0

## 2020-12-15 ASSESSMENT — PAIN SCALES - GENERAL
PAINLEVEL_OUTOF10: 0
PAINLEVEL_OUTOF10: 0

## 2020-12-15 NOTE — PROGRESS NOTES
Received from ED via [facility]. Assisted to bed. Oriented to room and general plan of care, including covid isolation. Pt verbalized understanding. Call light within reach.

## 2020-12-15 NOTE — PROGRESS NOTES
Inpatient Occupational Therapy  Evaluation and Treatment    Unit: [facility]  Date:  12/15/2020  Patient Name:    Franky Mccoy  Admitting diagnosis:  UTI (urinary tract infection) [N39.0]  Admit Date:  12/14/2020  Precautions/Restrictions/WB Status/ Lines/ Wounds/ Oxygen: fall risk, IV, bed/chair alarm and WB restrictions (R LE TTWB)     Treatment Time:  13:40-14:10  Treatment Number: 1     Billable Treatment Time: 20 minutes   Total Treatment Time:  30   minutes    Patient Goals for Therapy:  \" to go back to rehab \"      Discharge Recommendations: SNF  DME needs for discharge: defer to facility       Therapy recommendations for staff:   Assist of 2 with use of MAXI-Move for all transfers to/from BSC/chair. Assist of 1 for bed mobility. History of Present Illness: 63 y.o. female with CHF, HTN and recently dx with UTI and COVID-19 who presented to [facility] from her SNFwith complaint of N/V. S/p right tib fx and surgery dr Amalia Hernández 1 month ago currently residing in [facility] for rehab. Home Health S4 Level Recommendation:  NA  AM-PAC Score: AM-PAC Inpatient Daily Activity Raw Score: 14    Preadmission Environment  Patient admitted to SNF ~1 month ago after R tib fx and surgery. Patient admitted to [facility] from SNF. Pt. Lives Alone  Home environment:  one story home  Steps to enter first floor:   1 steps to enter and no handrail    Bathroom:  Tub/Shower unit and Raised toilet seat w/o arms  Equipment owned:  [address], Standard Walker (SW), manual W/C and lift chair, Home health (aides come to house M-F for 1-2 hours a day)    Preadmission Status / PLOF:  History of falls   Yes  Pt. Able to drive   No  Pt Fully independent with ADL's  Yes  Pt. Required assistance from family for:  Cleaning, Cooking and Dressing    Pt.  Fully independent for transfers and gait and walked with: Walker    Pain  No  Rating:NA  Location:  Pain Medicine Status: Denies need      Cognition    A&O x4   Able to follow 2 step commands    Subjective  Patient lying supine in bed with no family present - no visitors present due to COVID-19 restrictions  Pt agreeable to this OT eval & tx. Upper Extremity ROM:    WFL,  pt able to perform all bed mobility, transfers, and gait without ROM limitation. Upper Extremity Strength:    BUE strength impaired but not formally assessed w/ MMT    Upper Extremity Sensation    WFL    Upper Extremity Proprioception:  NT    Coordination and Tone  Diminished    Balance  Functional Sitting Balance:  NT  Functional Standing Balance:NT    Bed mobility:    Supine to sit:   Not Tested (declined)   Supine to long sitting:  SBA and verbal cues  Sit to supine:   Not Tested  Rolling:    Min A  Scooting in sitting:  Not Tested  Scooting to head of bed:   Min A    Bridging:   Not Tested    Transfers:  (declined all mobility)   Sit to stand:  Not Tested  Stand to sit:  Not Tested  Bed to chair:   Not Tested  Standard toilet: Not Tested  Bed to [facility]:  Not Tested    Dressing:      UE:   Not Tested  LE:    Not Tested    Bathing:    UE:  Not Tested  LE:  Not Tested    Eating:   Not Tested    Toileting:  Not Tested    Activity Tolerance   Pt completed therapy session with Nausea noted with activity and at rest.     Positioning Needs: In bed, call light and needs in reach. Alarm Set    Exercise / Activities Initiated:   N/A    Patient/Family Education:   Role of OT  Recommendations for DC   Importance of mobility and out of bed activity. Assessment of Deficits: Pt seen for Occupational therapy evaluation in acute care setting. Pt demonstrated decreased Activity tolerance, ADLs, IADLs, Balance , Bathing, Bed mobility, Dressing, ROM, Safety Awareness, Strength, Transfers and Coping Skills. Pt functioning below baseline and will likely benefit from skilled occupational therapy services to maximize safety and independence. Goal(s) : To be met in 3 Visits:  1).  Bed to toilet/BSC: Total A via Stedy    To be met in 5 Visits:  1). Supine to/from Sit:  Min A  2). Upper Body Bathing:   SBA  3). Lower Body Bathing:   Max A  4). Upper Body Dressing:  SBA  5). Lower Body Dressing:  Max A  6). Pt to demonstrate UE exs x 15 reps with minimal cues    Rehabilitation Potential:  Fair for goals listed above. Strengths for achieving goals include: PLOF  Barriers to achieving goals include:  Complexity of condition     Plan: To be seen 3-5 x/wk while in acute care setting for therapeutic exercises, bed mobility, transfers, dressing, bathing, family/patient education, ADL/IADL retraining, energy conservation training.        Yadira Sharma, OTR/L #[id number]        If patient discharges from this facility prior to next visit, this note will serve as the Discharge Summary

## 2020-12-15 NOTE — ED PROVIDER NOTES
I independently performed a history and physical on [employer]. All diagnostic, treatment, and disposition decisions were made by myself in conjunction with the advanced practice provider. For further details of Enrique Salazar's emergency department encounter, please see the advance practice provider's documentation. In brief, this is a 63y.o. year old female, with   Chief Complaint   Patient presents with    Emesis     Pt from [facility] s/p n/v x 2 days per report. upon speaking to pt, pt reports taht she has been having difficulty with PO x approx 1 month but just worsening over last few days. pt tested positive 2 weeks ago. Pt reports LBM maybe 1 week ago.  Positive For Covid-19     x1 week; denies CP. Patient comes from her nursing facility secondary to nausea with vomiting and unable to keep anything down for the last couple of weeks. Patient tested positive for coronavirus about a week ago. On examination I find an elderly female patient who is obese. Clear to auscultation bilaterally and regular rate rhythm. Abdomen is soft and nontender. No rebound guarding or rigidity. Patient's work-up at this time shows probable contaminated urine. She has what appears to be some colitis. At this point given her intractable nausea with vomiting she was admitted by the hospitalist service.     EKG Interpretation    Interpreted by emergency department physician    Rhythm: normal sinus   Rate: normal  Axis: normal  Ectopy: none  Conduction: normal  ST Segments: nonspecific changes  T Waves: non specific changes  Q Waves: none    Clinical Impression: Normal sinus rhythm, nonspecific ST and T wave changes    Nathanael Galaviz MD  12/15/20 0000

## 2020-12-15 NOTE — ED NOTES
Pt calm and resting quietly with equal rise and fall of chest. Pt in NAD, RR even and unlabored. Side rails up, bed locked and in lowest position. Pt updated on plan of care. No needs at this time. Pt instructed on use of call light if needed.       Tamara Chapman RN  12/15/20 9611

## 2020-12-15 NOTE — PROGRESS NOTES
Pt still with c/o felling miserable with no BM post suppository given. Order for lactulose obtained and given.

## 2020-12-15 NOTE — H&P
Hospital Medicine History & Physical      PCP: Valerie Javier MD    Date of Admission: 2020    Date of Service: Pt seen/examined on 12/15/2020    Chief Complaint:    Chief Complaint   Patient presents with    Emesis     Pt from [facility] s/p n/v x 2 days per report. upon speaking to pt, pt reports taht she has been having difficulty with PO x approx 1 month but just worsening over last few days. pt tested positive 2 weeks ago. Pt reports LBM maybe 1 week ago.  Positive For Covid-19     x1 week; denies CP. History Of Present Illness: The patient is a 63 y.o. female with CHF, HTN and recently dx with UTI and COVID-19 who presented to [facility] from her SNFwith complaint of N/V. Was tested positve for covid 2 weeks ago and since then her symptoms are getting worse   No fevers, chills. No sob or chest pain or abd pain . Workup in ER showed UTI and was admitted       Past Medical History:        Diagnosis Date    CHF (congestive heart failure) ([address]     COVID-19 12/15/2020    HTN (hypertension)     UTI (urinary tract infection)        Past Surgical History:        Procedure Laterality Date    BLADDER REPAIR       SECTION      CHOLECYSTECTOMY      CYSTOSCOPY      TIBIA FRACTURE SURGERY Right 2020    RIGHT TIBIA IM NAIL INSERTION performed by Mike Beard MD at [facility]       Medications Prior to Admission:    Prior to Admission medications    Medication Sig Start Date End Date Taking?  Authorizing Provider   amLODIPine (NORVASC) 10 MG tablet TAKE 1 TABLET BY MOUTH ONCE DAILY FOR 90 DAYS 10/8/20   Historical Provider, MD   atorvastatin (LIPITOR) 40 MG tablet  10/29/20   Historical Provider, MD   oxybutynin (DITROPAN) 5 MG tablet TAKE 1 TABLET BY MOUTH EVERY 8 HOURS 20   Historical Provider, MD   vitamin D3 (CHOLECALCIFEROL) 400 units TABS tablet Take 400 Units by mouth daily    Historical Provider, MD   Flaxseed Oil OIL 1,400 mg by Does not apply route daily Historical Provider, MD   calcium carbonate 600 MG TABS tablet Take 1 tablet by mouth daily    Historical Provider, MD   cimetidine (TAGAMET HB) 200 MG tablet Take 200 mg by mouth as needed    Historical Provider, MD   latanoprost (XALATAN) 0.005 % ophthalmic solution 1 drop nightly    Historical Provider, MD   meloxicam (MOBIC) 15 MG tablet Take 15 mg by mouth daily    Historical Provider, MD   metoprolol succinate (TOPROL XL) 25 MG extended release tablet Take 25 mg by mouth daily    Historical Provider, MD   rivaroxaban (XARELTO) 20 MG TABS tablet Take 20 mg by mouth    Historical Provider, MD   lisinopril (PRINIVIL;ZESTRIL) 20 MG tablet Take 40 mg by mouth daily     Historical Provider, MD   potassium chloride (KLOR-CON) 20 MEQ packet Take 20 mEq by mouth 2 times daily    Historical Provider, MD       Allergies:  Patient has no known allergies. Social History:  The patient currently lives in [facility]    TOBACCO:   reports that she has never smoked. She has never used smokeless tobacco.  ETOH:   reports previous alcohol use. Family History:   Positive as follows:    No family history on file. REVIEW OF SYSTEMS:       Constitutional: Negative for fever   HENT: Negative for sore throat   Eyes: Negative for redness   Respiratory: Negative  for dyspnea, cough   Cardiovascular: Negative for chest pain   Gastrointestinal: +ve  for nausea /vomiting, diarrhea   Genitourinary: Negative for hematuria   Musculoskeletal: Negative for arthralgias   Skin: Negative for rash   Neurological: Negative for syncope   Hematological: Negative for adenopathy   Psychiatric/Behavorial: Negative for anxiety    PHYSICAL EXAM:    /68   Pulse 76   Temp 98.4 °F (36.9 °C) (Oral)   Resp 18   Ht 5' 8\" (1.727 m)   Wt 237 lb (107.5 kg)   SpO2 96%   BMI 36.04 kg/m²     Gen: middle aged female, No distress. Alert. Eyes: PERRL. No sclera icterus. No conjunctival injection. ENT: No discharge. Pharynx clear. Neck: No JVD.   No Carotid Bruit. Trachea midline. Resp: No accessory muscle use. No crackles. No wheezes. No rhonchi. CV: Regular rate. Regular rhythm. No murmur. No rub. No edema. Capillary Refill: Brisk,< 3 seconds   Peripheral Pulses: +2 palpable, equal bilaterally   GI: Non-tender. Non-distended. No masses. No organomegaly. Normal bowel sounds. No hernia. Skin: Warm and dry. No nodule on exposed extremities. No rash on exposed extremities. Surgical scars on right tibia and leg healed well  Neuro: Awake. Grossly nonfocal    Psych: Oriented x 3. No anxiety or agitation. CBC:   Recent Labs     12/14/20  1742   WBC 6.4   HGB 12.2   HCT 37.3   MCV 97.3        BMP:   Recent Labs     12/14/20  1742      K 4.3      CO2 20*   BUN 14   CREATININE 0.9     LIVER PROFILE:   Recent Labs     12/14/20  1742   AST 17   ALT 14   LIPASE 40.0   BILITOT 0.5   ALKPHOS 101     UA:  Recent Labs     12/14/20  2101   COLORU Yellow   PHUR 6.0   WBCUA >100*   RBCUA 5-10*   BACTERIA 2+*   CLARITYU SL CLOUDY*   SPECGRAV 1.025   LEUKOCYTESUR LARGE*   UROBILINOGEN 1.0   BILIRUBINUR MODERATE*   BLOODU SMALL*   GLUCOSEU Negative     CARDIAC ENZYMES  Recent Labs     12/14/20  1742   TROPONINI <0.01     CULTURES  SARS-CoV-2, NAAT DETECTEDPanic    Urine Cx: pending   Blood Cx: pending     EKG:  I have reviewed the EKG with the following interpretation:   Normal sinus rhythm  Minimal voltage criteria for LVH, may be normal variant  Q waves in inferior leads  T wave inversion in anterolateral leads    T wave inversions new compared to prior EKG    RADIOLOGY  CT ABDOMEN PELVIS W IV CONTRAST Additional Contrast? None   Final Result   There is mild wall thickening of the midportion of the sigmoid. There is   subtle injection of the surrounding fat. This could be due to the partially   contracted state of the colon, or an early finding of colitis. Recommend   correlation with any recent colonoscopy. Bilateral inguinal hernias.   No resultant bowel obstruction. Remote   appearing pelvic fractures      Indeterminate left adrenal nodule. This was described on outside chest CT   report 09/09/2016, measuring 2.5 x 2.2 cm in size. Stability in size favors   benign etiology      Tiny nonobstructing right renal stone         XR CHEST PORTABLE   Final Result   Small amount of atelectasis versus pneumonia in the right lower lobe. Outward protrusion of the right lobe mediastinal shadow is most commonly due   to a pericardial fat pad or pericardial cyst.  A mass can not be excluded and   a follow-up full inspiration PA and lateral view chest x-ray or chest CT   should be considered           Pertinent previous results reviewed   None     ASSESSMENT/PLAN:    COVID-19 Infection   - with nausea and vomiting , poor oral intake  - DROPLET + PRECAUTIONS   - afebrile, no hypoxia   - No need for decadron, remdesivir or CCP at this time   - No pulm consult needed     Hypoglycemia- 60 on arrival   - No hx of DM   - likely 2/2 poor PO intake   - General diet   - monitor closely      UTI  - Was being treated with bactrim per prior notes  - UA with + LE and WBC >100   - Urine Cx pending   - Continue Rocephin     Early colitis?  - CT shows thickening with fat stranding around sigmoid colon   - pt with no abd pain .  Hold off abx     Pericardial fat pad/cyst vs mass  - Noted on CXR  - Recommended to have Chest CT to rule out mass     Abnormal EKG  - No CP   - NO hx of cardiac disease   - EKG with new T wave inversions in anterolateral leads compared to EKG in Nov 2020   - Troponin negative   - Continue tele monitor     Adrenal Nodule   - Noted on prior imaging as well, read as stable in size favoring a benign lesion   - OP follow up     HTN  - BP is stable   - Continue Norvasc, BB     PAF  - EKG in NSR   - Continue Xarelto and BB   - Rates controlled     Hx of DVT/PE  - Continue Xarelto     S/p Recent Tib/Fib Fx   - s/p ORIF on 11/6   - Has been in SNF since   - PT/OT     Obesity  - Body mass index is 36.04 kg/m². - Complicating assessment and treatment. Placing patient at risk for multiple co-morbidities as well as early death and contributing to the patient's presentation.   - Counseled on weight loss.     DVT Prophylaxis: Tilmon Moritz   Diet: DIET GENERAL;   Code Status: Full Code      Rochelle Ramirez MD 12/15/2020 8:24 PM No

## 2020-12-15 NOTE — ED NOTES
Pt placed on bed pan for urine sample. Tolerated well. Pt calm and resting quietly with equal rise and fall of chest. Pt in NAD, RR even and unlabored. Side rails up, bed locked and in lowest position. Pt updated on plan of care. No needs at this time. Pt instructed on use of call light if needed.           Danette Zamora RN  12/14/20 2118

## 2020-12-15 NOTE — PROGRESS NOTES
Inpatient Physical Therapy Evaluation and Treatment    Unit: 2 [place]  Date:  12/15/2020  Patient Name:    Aj Wilcox  Admitting diagnosis:  UTI (urinary tract infection) [N39.0]  Admit Date:  12/14/2020  Precautions/Restrictions/WB Status/ Lines/ Wounds/ Oxygen: Fall risk, Bed/chair alarm, Telemetry, Continuous pulse oximetry and Isolation Precautions: Droplet Plus - COVID    Treatment Time: 1340 - 1408  Treatment Number:  1   Timed Code Treatment Minutes: 18 minutes  Total Treatment Minutes:  28  minutes    Patient Goals for Therapy: \" To get better \"          Discharge Recommendations: SNF  DME needs for discharge: defer to facility       Therapy recommendation for EMS Transport: requires transport by cot due to need of lift equipment with functional transfers    Therapy recommendations for staff:   Assist of 1 bed mobility      History of Present Illness: Aj Wilcox is a 63 y.o. female with PMH CHF,HTN,UTI, AFIB,PE ON Georcris Garzaton, s/p right tib fx and surgery dr Brad Jalloh 1 month ago currently residing in [facility] for rehab. Presents to ER with complaint of nausea and intractable vomiting for the past 1 month. States cannot keep anything down. Last bowel movement was about 1 week ago has been taking stool softeners and has used 3 suppositories without relief. Denies abdominal pain. Denies chest pain or shortness of breath. No coughing. States 1 week ago they came into her room and started backing up all of her things and moved here in a different part of the rehab center told her she tested positive for COVID-19. Currently on bactrim for UTI. The history is provided by the patient. Home Health S4 Level Recommendation:  NA  AM-PAC Mobility Score    AM-PAC Inpatient Mobility Raw Score : 9     Preadmission Environment  Patient admitted to SNF ~1 month ago after R tib fx and surgery. Patient admitted to [facility] from SNF.    Pt. Lives Alone  Home environment:    one story home  Steps to enter first floor:   1 alignment and pressure relief provided. Call light provided and all needs within reach    Exercises Initiated  all completed bilaterally unless indicated  Heel slides x 10 reps  Ankle pumps for 10 reps    Other  None. Patient/Family Education   Pt educated on role of inpatient PT, POC, importance of continued activity, DC recommendations, safety awareness, transfer techniques, pursed lip breathing, energy conservation, pacing activity and calling for assist with mobility. Assessment  Pt seen for Physical Therapy evaluation in acute care setting. Patient needed consistent motivation to participate in PT evaluation today. Patient was feeling nauseous with heart burn hence refused to sit by the side of the bed. Patient was able to scoot with minimal assistance and sowed improved rehab potential. Patient also will benefit from Ortho consult for upgraded weight bearing precautions on the RLE. Pt demonstrated decreased Activity tolerance, Balance, Safety and Strength as well as decreased independence with Ambulation, Bed Mobility  and Transfers. Recommending SNF upon discharge as patient functioning well below baseline, demonstrates good rehab potential and unable to return home due to limited or no family support, inability to negotiate stairs to enter home/bedroom/bathroom, burden of care beyond caregiver ability, home environment not conducive to patient recovery and limited safety awareness. Goals : To be met in 3 visits:  1). Independent with LE Ex x 10 reps    To be met in 6 visits:  1). Supine to/from sit: Max A   2). Sit to/from stand: Max A   3). Bed to chair: Max A   4). Gait: Ambulate 10 ft.   with  Max A  and use of LRAD (least restrictive assistive device)  5). Tolerate B LE exercises 3 sets of 10-15 reps    Rehabilitation Potential: Good  Strengths for achieving goals include:   PLOF   Barriers to achieving goals include:    less participative in the time out of the bed.     Plan    To

## 2020-12-15 NOTE — CARE COORDINATION
Chart review completed. Pt is in isolation precautions for Covid-19 and is a readmit. She recently discharged from [facility] to [facility] SNF on 11/12/2020. Attempted calling pt's bedside phone and cell phone but there was no answer. Pt has no emergency contacts listed on the facesheet or epic. CM will attempt again when able for the assessment. PT/OT will be needed for pt to return to SNF if this is what she wants to do.     Addendum at 1:34: Attempted calling pt's bedside phone again but there was no answer

## 2020-12-15 NOTE — CARE COORDINATION
Case Management Assessment  Initial Evaluation      Patient Name: Emmy Latham  YOB: 1960  Diagnosis: UTI (urinary tract infection) [N39.0]  Date / Time: 12/14/2020  5:05 PM    Admission status/Date:12/15/2020 inpt  Chart Reviewed: Yes      Patient Interviewed: Yes   Family Interviewed:  No      Hospitalization in the last 30 days:  Yes      Health Care Decision Maker :     (CM - must 1st enter selection under Navigator - emergency contact- Keny Relationship and pick relationship)   Who do you trust or have selected to make healthcare decisions for you      Met with: pt  Interview conducted  (bedside/phone):telephone    [address] required for SNF : Y            ADLS  Support Systems/Care Needs:    Transportation: EMS transportation    Meal Preparation: facility    Housing  Living Arrangements: STR @   Steps:   Intent for return to present living arrangements: Yes  Identified Issues:     [address] with [address] : No Agency:(Services)     Passport/Waiver : No  :                      Phone Number:    Passport/Waiver Services:           [facility] Provider: facility  Equipment: facility  Walker___Cane___RTS___ BSC___Shower Chair___Hospital Bed___W/C____Other________  02 at ____Liter(s)---wears(frequency)_______ [facility] ___ CPAP___ BiPap___   N/A____      Home O2 Use :  No    If No for home O2---if presently on O2 during hospitalization:  No  if yes CM to follow for potential DC O2 need    Community Service Affiliation  Dialysis:  No    · Agency:  · Location:  · Dialysis Schedule:  · Phone:   · Fax: Other Community Services: (ex:PT/OT,Mental Health,Wound Clinic, Cardio/Pul [address])    DISCHARGE PLAN: Explained Case Management role/services. Chart reviewed. Spoke with pt who states she is from [facility] and she plans to return.  TC to [facility] and she states pt can return and will need a precert to return. COVID+ on 12/15. Per Shira Angry pt will need COVID test within 48 hours of discharge. Spoke with pt regarding no emergency contact listed and pt states she does not have her son or daughters phone numbers and they live out of town. Pt's father Zoe Glover ([phone]) is not of sound mind and lives in a nursing home.

## 2020-12-15 NOTE — PLAN OF CARE
Problem: Falls - Risk of:  Goal: Will remain free from falls  Description: Will remain free from falls  Outcome: Ongoing     Problem: Airway Clearance - Ineffective  Goal: Achieve or maintain patent airway  Outcome: Ongoing     Problem: Gas Exchange - Impaired  Goal: Absence of hypoxia  Outcome: Ongoing

## 2020-12-15 NOTE — ED NOTES
Pt calm and resting quietly with equal rise and fall of chest. Pt in NAD, RR even and unlabored. Side rails up, bed locked and in lowest position. Pt updated on plan of care. No needs at this time. Pt instructed on use of call light if needed.       Oscar Rg RN  12/15/20 [id number]

## 2020-12-16 ENCOUNTER — APPOINTMENT (OUTPATIENT)
Dept: GENERAL RADIOLOGY | Age: 60
DRG: 137 | End: 2020-12-16
Payer: MEDICAID

## 2020-12-16 VITALS
HEART RATE: 85 BPM | TEMPERATURE: 98 F | SYSTOLIC BLOOD PRESSURE: 118 MMHG | HEIGHT: 68 IN | OXYGEN SATURATION: 94 % | DIASTOLIC BLOOD PRESSURE: 87 MMHG | RESPIRATION RATE: 18 BRPM | WEIGHT: 237 LBS | BODY MASS INDEX: 35.92 KG/M2

## 2020-12-16 LAB
ALBUMIN SERPL-MCNC: 3.1 G/DL (ref 3.4–5)
ALP BLD-CCNC: 109 U/L (ref 40–129)
ALT SERPL-CCNC: 13 U/L (ref 10–40)
ANION GAP SERPL CALCULATED.3IONS-SCNC: 12 MMOL/L (ref 3–16)
AST SERPL-CCNC: 17 U/L (ref 15–37)
BASOPHILS ABSOLUTE: 0.1 K/UL (ref 0–0.2)
BASOPHILS RELATIVE PERCENT: 0.9 %
BILIRUB SERPL-MCNC: 0.6 MG/DL (ref 0–1)
BILIRUBIN DIRECT: <0.2 MG/DL (ref 0–0.3)
BILIRUBIN, INDIRECT: ABNORMAL MG/DL (ref 0–1)
BUN BLDV-MCNC: 10 MG/DL (ref 7–20)
CALCIUM SERPL-MCNC: 9.2 MG/DL (ref 8.3–10.6)
CHLORIDE BLD-SCNC: 108 MMOL/L (ref 99–110)
CO2: 20 MMOL/L (ref 21–32)
CREAT SERPL-MCNC: 0.7 MG/DL (ref 0.6–1.2)
EOSINOPHILS ABSOLUTE: 0.3 K/UL (ref 0–0.6)
EOSINOPHILS RELATIVE PERCENT: 2.7 %
GFR AFRICAN AMERICAN: >60
GFR NON-AFRICAN AMERICAN: >60
GLUCOSE BLD-MCNC: 77 MG/DL (ref 70–99)
HCT VFR BLD CALC: 41 % (ref 36–48)
HEMOGLOBIN: 12.8 G/DL (ref 12–16)
LYMPHOCYTES ABSOLUTE: 1 K/UL (ref 1–5.1)
LYMPHOCYTES RELATIVE PERCENT: 10 %
MCH RBC QN AUTO: 31.8 PG (ref 26–34)
MCHC RBC AUTO-ENTMCNC: 31.2 G/DL (ref 31–36)
MCV RBC AUTO: 101.9 FL (ref 80–100)
MONOCYTES ABSOLUTE: 0.8 K/UL (ref 0–1.3)
MONOCYTES RELATIVE PERCENT: 8.1 %
NEUTROPHILS ABSOLUTE: 8.1 K/UL (ref 1.7–7.7)
NEUTROPHILS RELATIVE PERCENT: 78.3 %
PDW BLD-RTO: 16.9 % (ref 12.4–15.4)
PLATELET # BLD: 285 K/UL (ref 135–450)
PMV BLD AUTO: 10.3 FL (ref 5–10.5)
POTASSIUM REFLEX MAGNESIUM: 4.2 MMOL/L (ref 3.5–5.1)
RBC # BLD: 4.03 M/UL (ref 4–5.2)
SODIUM BLD-SCNC: 140 MMOL/L (ref 136–145)
TOTAL PROTEIN: 5.9 G/DL (ref 6.4–8.2)
WBC # BLD: 10.4 K/UL (ref 4–11)

## 2020-12-16 PROCEDURE — 99238 HOSP IP/OBS DSCHRG MGMT 30/<: CPT | Performed by: INTERNAL MEDICINE

## 2020-12-16 PROCEDURE — 74018 RADEX ABDOMEN 1 VIEW: CPT

## 2020-12-16 PROCEDURE — 2580000003 HC RX 258: Performed by: HOSPITALIST

## 2020-12-16 PROCEDURE — 36415 COLL VENOUS BLD VENIPUNCTURE: CPT

## 2020-12-16 PROCEDURE — 80076 HEPATIC FUNCTION PANEL: CPT

## 2020-12-16 PROCEDURE — 85025 COMPLETE CBC W/AUTO DIFF WBC: CPT

## 2020-12-16 PROCEDURE — 80048 BASIC METABOLIC PNL TOTAL CA: CPT

## 2020-12-16 PROCEDURE — 6360000002 HC RX W HCPCS: Performed by: HOSPITALIST

## 2020-12-16 PROCEDURE — 6370000000 HC RX 637 (ALT 250 FOR IP): Performed by: INTERNAL MEDICINE

## 2020-12-16 RX ORDER — PROMETHAZINE HYDROCHLORIDE 12.5 MG/1
12.5 TABLET ORAL 4 TIMES DAILY PRN
Qty: 20 TABLET | Refills: 0
Start: 2020-12-16 | End: 2020-12-23

## 2020-12-16 RX ORDER — DOCUSATE SODIUM 100 MG/1
100 CAPSULE, LIQUID FILLED ORAL 2 TIMES DAILY
Qty: 60 CAPSULE | Refills: 0
Start: 2020-12-16 | End: 2021-01-15

## 2020-12-16 RX ORDER — LACTULOSE 10 G/15ML
20 SOLUTION ORAL ONCE
Status: DISCONTINUED | OUTPATIENT
Start: 2020-12-16 | End: 2020-12-16 | Stop reason: HOSPADM

## 2020-12-16 RX ADMIN — ONDANSETRON HYDROCHLORIDE 4 MG: 2 INJECTION, SOLUTION INTRAMUSCULAR; INTRAVENOUS at 07:59

## 2020-12-16 RX ADMIN — BISACODYL 10 MG: 10 SUPPOSITORY RECTAL at 07:51

## 2020-12-16 RX ADMIN — SODIUM CHLORIDE: 9 INJECTION, SOLUTION INTRAVENOUS at 13:24

## 2020-12-16 ASSESSMENT — PAIN SCALES - GENERAL
PAINLEVEL_OUTOF10: 0
PAINLEVEL_OUTOF10: 0

## 2020-12-16 NOTE — PROGRESS NOTES
Patient with continuing c/o nausea and constipation; reports no results from all PRNs given on day shift. BS hypoactive and faint, belly soft & nontender, and patient does state she's passing some flatus. Dr. Courtney Celestin notified, and new order received for KUB in AM to rule out ileus/obstruction.

## 2020-12-16 NOTE — PROGRESS NOTES
chloride flush, promethazine **OR** ondansetron, polyethylene glycol, acetaminophen **OR** acetaminophen, bisacodyl    Lab Data:  Recent Labs     12/14/20  1742 12/16/20  0642   WBC 6.4 10.4   HGB 12.2 12.8   HCT 37.3 41.0   MCV 97.3 101.9*    285     Recent Labs     12/14/20  1742      K 4.3      CO2 20*   BUN 14   CREATININE 0.9     Recent Labs     12/14/20  1742   TROPONINI <0.01       Coagulation: No results found for: INR, APTT  Cardiac markers:   Lab Results   Component Value Date    TROPONINI <0.01 12/14/2020         Lab Results   Component Value Date    ALT 14 12/14/2020    AST 17 12/14/2020    ALKPHOS 101 12/14/2020    BILITOT 0.5 12/14/2020           CULTURES  SARS-CoV-2, NAAT DETECTEDPanic    Urine Cx: pending   Blood Cx: pending      EKG:  I have reviewed the EKG with the following interpretation:   Normal sinus rhythm  Minimal voltage criteria for LVH, may be normal variant  Q waves in inferior leads  T wave inversion in anterolateral leads     T wave inversions new compared to prior EKG     RADIOLOGY  CT ABDOMEN PELVIS W IV CONTRAST Additional Contrast? None   Final Result   There is mild wall thickening of the midportion of the sigmoid. There is   subtle injection of the surrounding fat. This could be due to the partially   contracted state of the colon, or an early finding of colitis. Recommend   correlation with any recent colonoscopy.       Bilateral inguinal hernias. No resultant bowel obstruction. Remote   appearing pelvic fractures       Indeterminate left adrenal nodule. This was described on outside chest CT   report 09/09/2016, measuring 2.5 x 2.2 cm in size.   Stability in size favors   benign etiology       Tiny nonobstructing right renal stone           XR CHEST PORTABLE   Final Result   Small amount of atelectasis versus pneumonia in the right lower lobe.       Outward protrusion of the right lobe mediastinal shadow is most commonly due   to a pericardial fat pad or pericardial cyst.  A mass can not be excluded and   a follow-up full inspiration PA and lateral view chest x-ray or chest CT   should be considered              Pertinent previous results reviewed   None      ASSESSMENT/PLAN:     COVID-19 Infection   - with nausea and vomiting , poor oral intake  - DROPLET + PRECAUTIONS   - afebrile, no hypoxia   - No need for decadron, remdesivir or CCP at this time   - No pulm consult needed      Hypoglycemia- 61 on arrival   - No hx of DM   - likely 2/2 poor PO intake   - General diet initiated and no more hypoglycemia   - monitor closely       UTI ruled out , stop abx   cx neg      Early colitis?  - CT shows thickening with fat stranding around sigmoid colon   - pt with no abd pain . Hold off abx     Severe constipation - could be causing nausea as well  Ct abd with no obstruction   Added lactulose, suppository and eventually given enema with good  Results  Start on bowel regimen     Pericardial fat pad/cyst vs mass  - Noted on CXR  - Recommended to have Chest CT to rule out mass      Abnormal EKG  - No CP   - NO hx of cardiac disease   - EKG with new T wave inversions in anterolateral leads compared to EKG in Nov 2020   - Troponin negative   - TELE with no arrythmia     Adrenal Nodule   - Noted on prior imaging as well, read as stable in size favoring a benign lesion   - OP follow up      HTN  - BP is stable   - Continue Norvasc, BB      PAF  - EKG in NSR   - Continue Xarelto and BB   - Rates controlled      Hx of DVT/PE  - Continue Xarelto      S/p Recent Tib/Fib Fx   - s/p ORIF on 11/6   - Has been in SNF since   - PT/OT      Obesity  - Body mass index is 36.04 kg/m². - Complicating assessment and treatment.  Placing patient at risk for multiple co-morbidities as well as early death and contributing to the patient's presentation.   - Counseled on weight loss.     DVT Prophylaxis: Rafa Bragg   Diet: DIET GENERAL;   Code Status: Full Code     Dc planning after enema        Uri Berumen Traci Linda MD 12/16/2020 7:08 AM

## 2020-12-16 NOTE — DISCHARGE INSTR - COC
Test Resulted    COVID-19 Rule Out 11/09/20 11/09/20 11/09/20 COVID-19 (Ordered)   11/09/20 Rule-Out Test Resulted            Nurse Assessment:  Last Vital Signs: /87   Pulse 85   Temp 98 °F (36.7 °C)   Resp 18   Ht 5' 8\" (1.727 m)   Wt 237 lb (107.5 kg)   SpO2 94%   BMI 36.04 kg/m²     Last documented pain score (0-10 scale): Pain Level: 0  Last Weight:   Wt Readings from Last 1 Encounters:   12/14/20 237 lb (107.5 kg)     Mental Status:  alert    IV Access:  { ERNA IV ACCESS:[id number]:::0}    Nursing Mobility/ADLs:  Walking   Dependent  Transfer  Dependent  Bathing  Dependent  Dressing  Dependent  Toileting  Dependent  Feeding  Assisted  Med Admin  {[facility] DME ADLs:[id number]:::0}  Med Delivery   [facility] ERNA MED Delivery:[id number]:::0}    Wound Care Documentation and Therapy:        Elimination:  Continence:   · Bowel: {YES / MM:91661}  · Bladder: {YES / MV:96567}  Urinary Catheter: None   Colostomy/Ileostomy/Ileal Conduit: No       Date of Last BM: 12/16    Intake/Output Summary (Last 24 hours) at 12/16/2020 0932  Last data filed at 12/15/2020 1039  Gross per 24 hour   Intake 240 ml   Output --   Net 240 ml     I/O last 3 completed shifts: In: 240 [P.O.:240]  Out: -     Safety Concerns:     None    Impairments/Disabilities:      overall weakness    Nutrition Therapy:  Current Nutrition Therapy:   - Oral Diet:  General    Routes of Feeding: Oral  Liquids: Thin Liquids  Daily Fluid Restriction: no  Last Modified Barium Swallow with Video (Video Swallowing Test): not done    Treatments at the Time of Hospital Discharge:   Respiratory Treatments: ***  Oxygen Therapy:  is not on home oxygen therapy.   Ventilator:    - No ventilator support    Rehab Therapies: Physical Therapy and Occupational Therapy  Weight Bearing Status/Restrictions: No weight bearing restirctions  Other Medical Equipment (for information only, NOT a DME order):  {EQUIPMENT:[id number]}  Other Treatments: ***    Patient's personal belongings (please select all that are sent with patient):  {[facility] DME Belongings:[id number]:::0}    RN SIGNATURE:  {Esignature:[id number]:::0}    CASE MANAGEMENT/SOCIAL WORK SECTION    Inpatient Status Date:  12/15/20    Readmission Risk Assessment Score:  Readmission Risk              Risk of Unplanned Readmission:        8           Discharging to Facility/ Agency   · Name:               [facility]  · Address:           [address] [place] [address], [facility] [address]  · Phone:             [phone]  · Fax:                  [phone]      / signature: Electronically signed by David Shaffer RN on 12/16/20 at 2:11 PM EST    PHYSICIAN SECTION    Prognosis: Good    Condition at Discharge: Stable    Rehab Potential (if transferring to Rehab): Good    Recommended Labs or Other Treatments After Discharge:  stool softners daily   Continue PT      Ct chest to rule out pericardial fat pad  - orders given      Physician Certification: I certify the above information and transfer of Elsa Jordan  is necessary for the continuing treatment of the diagnosis listed and that she requires [place] for less 30 days.      Update Admission H&P: No change in H&P    PHYSICIAN SIGNATURE:  Electronically signed by Joseluis Cardenas MD on 12/16/20 at 9:32 AM EST

## 2020-12-16 NOTE — PROGRESS NOTES
Pt refusing morning meds due to nausea. zofran was given. Refused lactulose. Dulcolax supp. Given earlier, now giving SSE. Had large BM post SSE given.

## 2020-12-16 NOTE — CARE COORDINATION
DISCHARGE ORDER  Date/Time 2020 3:00 PM  Completed by: José Benitez, Case Management    Patient Name: Aj Wilcox    : 1960      Admit order Date and Status:  Inpatient 12/15/20    Confirmed discharge plan with:              Patient:  Yes              When pt confirms DC plan does any support person need to be contacted by CM? No                     Discharge to Facility:  [address] [place] [address] phone number for staff giving report:  [phone]   Pre-certification completed: [facility] Notification ([facility]) completed: n/a   Discharge orders and Continuity of Care faxed to facility:   yes      Transportation:               Medical Transport explained with choice list offered to pt/family. Choice:   No  Agency used: [facility]   time:   17:00  Pt/family/Nursing/Facility aware of  time:   Yes Names: [address], [place]  Ambulance form completed:  yes:      Comments:  Spoke with Adelaide Villanueva at [facility] and they are ready to accept patient back to their facility. Faxed AVS/ERNA and covid test results. Pt is being d/c'd to NH today. Pt's O2 sats are 94% on RA. Discharge timeout done with Thuy. All discharge needs and concerns addressed. Discharging nurse to complete ERNA, reconcile AVS, and place final copy with patient's discharge packet. Discharging RN to ensure that written prescriptions for  Level II medications are sent with patient to the facility as per protocol.

## 2020-12-16 NOTE — DISCHARGE SUMMARY
[facility]  [facility]  [facility]7/0207-02  MRN:    [id number]    Discharge Summary      This discharge summary is in conjunction with a complete physical exam done on the day of discharge. Discharging Physician: Dr. Janette Ac: 12/14/2020  Discharge:  12/16/2020    HPI taken from admission H&P:      The patient is a 63 y.o. female with CHF, HTN and recently dx with UTI and COVID-19 who [facility] SNF[facility] of N/V. Was tested positve for covid 2 weeks ago and since then her symptoms are getting worse   No fevers, chills. No sob or chest pain or abd pain . Workup in ER showed UTI and was admitted     Diagnoses this Admission and Hospital Course     COVID-19 Infection   - with nausea and vomiting , poor oral intake  - DROPLET + PRECAUTIONS   - afebrile, no hypoxia   - No need for decadron, remdesivir or CCP at this time   - No pulm consult needed      Hypoglycemia- 61 on arrival - improved  - No hx of DM   - likely 2/2 poor PO intake   - General diet   - monitored closely       UTI - Ruled Out  - Was being treated with bactrim per prior notes  - UA with + LE and WBC >100   - Urine Cx - NO GROWTH  - received Rocephin, no abx at d/c     Early colitis?  - CT showed thickening with fat stranding around sigmoid colon   - pt with no abd pain .  Held off on abx      Pericardial fat pad/cyst vs mass  - Noted on CXR  - Recommended to have Chest CT to rule out mass      Abnormal EKG  - No CP   - NO hx of cardiac disease   - EKG with new T wave inversions in anterolateral leads compared to EKG in Nov 2020   - Troponin negative   - Continued tele monitor      Adrenal Nodule   - Noted on prior imaging as well, read as stable in size favoring a benign lesion   - OP follow up      HTN  - BP stable   - Continued Norvasc, BB      PAF  - EKG in NSR   - Continued Xarelto and BB   - Rates controlled      Hx of DVT/PE  - Continued Xarelto      S/p Recent Tib/Fib Fx   - s/p ORIF on 11/6   - Has been in SNF since   - PT/OT - discharged back to [facility]     Obesity  - Body mass index is 36.04 kg/m². - Complicating assessment and treatment. Placing patient at risk for multiple co-morbidities as well as early death and contributing to the patient's presentation.   - Counseled on weight loss    Procedures (Please Review Full Report for Details)  N/A    Consults    N/A    Physical Exam at Discharge:    /87   Pulse 85   Temp 98 °F (36.7 °C)   Resp 18   Ht 5' 8\" (1.727 m)   Wt 237 lb (107.5 kg)   SpO2 94%   BMI 36.04 kg/m²     Gen: middle aged female, No distress. Alert. Eyes: PERRL. No sclera icterus. No conjunctival injection. ENT: No discharge. Pharynx clear. Neck: No JVD. No Carotid Bruit. Trachea midline. Resp: No accessory muscle use. No crackles. No wheezes. No rhonchi. CV: Regular rate. Regular rhythm. No murmur. No rub. No edema. Capillary Refill: Brisk,< 3 seconds   Peripheral Pulses: +2 palpable, equal bilaterally   GI: Non-tender. Non-distended. No masses. No organomegaly. Normal bowel sounds. No hernia. Skin: Warm and dry. No nodule on exposed extremities. No rash on exposed extremities. Surgical scars on right tibia and leg healed well  Neuro: Awake. Grossly nonfocal    Psych: Oriented x 3.  No anxiety or agitation.        CBC:   Recent Labs     12/14/20 1742 12/16/20  0642   WBC 6.4 10.4   HGB 12.2 12.8   HCT 37.3 41.0   MCV 97.3 101.9*    285     BMP:   Recent Labs     12/14/20 1742 12/16/20  0642    140   K 4.3 4.2    108   CO2 20* 20*   BUN 14 10   CREATININE 0.9 0.7     LIVER PROFILE:   Recent Labs     12/14/20 1742 12/16/20  0642   AST 17 17   ALT 14 13   LIPASE 40.0  --    BILIDIR  --  <0.2   BILITOT 0.5 0.6   ALKPHOS 101 109     UA:  Recent Labs     12/14/20  2101   COLORU Yellow   PHUR 6.0   WBCUA >100*   RBCUA 5-10*   BACTERIA 2+*   CLARITYU SL CLOUDY*   SPECGRAV 1.025   LEUKOCYTESUR LARGE*   UROBILINOGEN 1.0   BILIRUBINUR MODERATE* BLOODU SMALL*   GLUCOSEU Negative     CULTURES    Blood cx x2: NGTD    Urine cx: <50,000 CFU/mL mixed skin, urogenital isauro    RADIOLOGY    XR ABDOMEN (KUB) (SINGLE AP VIEW) 12/16/2020   Final Result   Negative supine abdomen radiograph examination. CT ABDOMEN PELVIS W IV CONTRAST Additional Contrast? None 12/14/2020   Final Result   There is mild wall thickening of the midportion of the sigmoid. There is   subtle injection of the surrounding fat. This could be due to the partially   contracted state of the colon, or an early finding of colitis. Recommend   correlation with any recent colonoscopy. Bilateral inguinal hernias. No resultant bowel obstruction. Remote   appearing pelvic fractures      Indeterminate left adrenal nodule. This was described on outside chest CT   report 09/09/2016, measuring 2.5 x 2.2 cm in size. Stability in size favors   benign etiology      Tiny nonobstructing right renal stone         XR CHEST PORTABLE 12/14/2020   Final Result   Small amount of atelectasis versus pneumonia in the right lower lobe.       Outward protrusion of the right lobe mediastinal shadow is most commonly due   to a pericardial fat pad or pericardial cyst.  A mass can not be excluded and   a follow-up full inspiration PA and lateral view chest x-ray or chest CT   should be considered         CT CHEST WO CONTRAST    (Results Pending)     Discharge Medications     Medication List      START taking these medications    docusate sodium 100 MG capsule  Commonly known as: COLACE  Take 1 capsule by mouth 2 times daily     promethazine 12.5 MG tablet  Commonly known as: PHENERGAN  Take 1 tablet by mouth 4 times daily as needed for Nausea        CONTINUE taking these medications    amLODIPine 10 MG tablet  Commonly known as: NORVASC     atorvastatin 40 MG tablet  Commonly known as: LIPITOR     calcium carbonate 600 MG Tabs tablet     Flaxseed Oil Oil     latanoprost 0.005 % ophthalmic solution  Commonly known as: XALATAN     lisinopril 20 MG tablet  Commonly known as: PRINIVIL;ZESTRIL     meloxicam 15 MG tablet  Commonly known as: MOBIC     metoprolol succinate 25 MG extended release tablet  Commonly known as: TOPROL XL     oxybutynin 5 MG tablet  Commonly known as: DITROPAN     potassium chloride 20 MEQ packet  Commonly known as: KLOR-CON     Tagamet  MG tablet  Generic drug: cimetidine     vitamin D3 10 MCG (400 UNIT) Tabs tablet  Commonly known as: CHOLECALCIFEROL     Xarelto 20 MG Tabs tablet  Generic drug: rivaroxaban           Where to Get Your Medications      Information about where to get these medications is not yet available    Ask your nurse or doctor about these medications  · docusate sodium 100 MG capsule  · promethazine 12.5 MG tablet           Discharged in stable condition to [facility]. Follow Up: Follow up with physician at SNF.

## 2020-12-18 LAB — BLOOD CULTURE, ROUTINE: NORMAL

## 2020-12-19 LAB — CULTURE, BLOOD 2: NORMAL

## 2020-12-28 ENCOUNTER — ANESTHESIA EVENT (OUTPATIENT)
Dept: ENDOSCOPY | Age: 60
End: 2020-12-28
Payer: MEDICAID

## 2020-12-28 ENCOUNTER — HOSPITAL ENCOUNTER (OUTPATIENT)
Age: 60
Setting detail: OBSERVATION
Discharge: ANOTHER HOSPITAL | End: 2020-12-29
Attending: STUDENT IN AN ORGANIZED HEALTH CARE EDUCATION/TRAINING PROGRAM | Admitting: INTERNAL MEDICINE
Payer: MEDICAID

## 2020-12-28 ENCOUNTER — APPOINTMENT (OUTPATIENT)
Dept: CT IMAGING | Age: 60
End: 2020-12-28
Payer: MEDICAID

## 2020-12-28 ENCOUNTER — INITIAL CONSULT (OUTPATIENT)
Dept: GASTROENTEROLOGY | Age: 60
End: 2020-12-28
Payer: MEDICAID

## 2020-12-28 VITALS
HEIGHT: 68 IN | WEIGHT: 237 LBS | DIASTOLIC BLOOD PRESSURE: 88 MMHG | TEMPERATURE: 96.9 F | SYSTOLIC BLOOD PRESSURE: 123 MMHG | BODY MASS INDEX: 35.92 KG/M2 | HEART RATE: 89 BPM

## 2020-12-28 DIAGNOSIS — R11.2 INTRACTABLE VOMITING WITH NAUSEA, UNSPECIFIED VOMITING TYPE: Primary | ICD-10-CM

## 2020-12-28 LAB
A/G RATIO: 1.1 (ref 1.1–2.2)
ALBUMIN SERPL-MCNC: 3.3 G/DL (ref 3.4–5)
ALP BLD-CCNC: 102 U/L (ref 40–129)
ALT SERPL-CCNC: 11 U/L (ref 10–40)
ANION GAP SERPL CALCULATED.3IONS-SCNC: 16 MMOL/L (ref 3–16)
AST SERPL-CCNC: 16 U/L (ref 15–37)
BANDED NEUTROPHILS RELATIVE PERCENT: 1 % (ref 0–7)
BASOPHILS ABSOLUTE: 0.1 K/UL (ref 0–0.2)
BASOPHILS RELATIVE PERCENT: 1 %
BILIRUB SERPL-MCNC: 0.7 MG/DL (ref 0–1)
BUN BLDV-MCNC: 8 MG/DL (ref 7–20)
CALCIUM SERPL-MCNC: 9.8 MG/DL (ref 8.3–10.6)
CHLORIDE BLD-SCNC: 102 MMOL/L (ref 99–110)
CO2: 22 MMOL/L (ref 21–32)
CREAT SERPL-MCNC: 0.6 MG/DL (ref 0.6–1.2)
EKG ATRIAL RATE: 68 BPM
EKG DIAGNOSIS: NORMAL
EKG P AXIS: 41 DEGREES
EKG P-R INTERVAL: 132 MS
EKG Q-T INTERVAL: 370 MS
EKG QRS DURATION: 78 MS
EKG QTC CALCULATION (BAZETT): 393 MS
EKG R AXIS: -6 DEGREES
EKG T AXIS: 143 DEGREES
EKG VENTRICULAR RATE: 68 BPM
EOSINOPHILS ABSOLUTE: 0.1 K/UL (ref 0–0.6)
EOSINOPHILS RELATIVE PERCENT: 1 %
GFR AFRICAN AMERICAN: >60
GFR NON-AFRICAN AMERICAN: >60
GLOBULIN: 2.9 G/DL
GLUCOSE BLD-MCNC: 63 MG/DL (ref 70–99)
GLUCOSE BLD-MCNC: 70 MG/DL (ref 70–99)
GLUCOSE BLD-MCNC: 95 MG/DL (ref 70–99)
HCT VFR BLD CALC: 40.8 % (ref 36–48)
HEMOGLOBIN: 13.3 G/DL (ref 12–16)
LIPASE: 22 U/L (ref 13–60)
LYMPHOCYTES ABSOLUTE: 1 K/UL (ref 1–5.1)
LYMPHOCYTES RELATIVE PERCENT: 16 %
MCH RBC QN AUTO: 31.8 PG (ref 26–34)
MCHC RBC AUTO-ENTMCNC: 32.6 G/DL (ref 31–36)
MCV RBC AUTO: 97.6 FL (ref 80–100)
MONOCYTES ABSOLUTE: 0.3 K/UL (ref 0–1.3)
MONOCYTES RELATIVE PERCENT: 4 %
NEUTROPHILS ABSOLUTE: 4.9 K/UL (ref 1.7–7.7)
NEUTROPHILS RELATIVE PERCENT: 77 %
PDW BLD-RTO: 16.5 % (ref 12.4–15.4)
PERFORMED ON: NORMAL
PERFORMED ON: NORMAL
PLATELET # BLD: 237 K/UL (ref 135–450)
PLATELET SLIDE REVIEW: ADEQUATE
PMV BLD AUTO: 10.6 FL (ref 5–10.5)
POTASSIUM REFLEX MAGNESIUM: 4 MMOL/L (ref 3.5–5.1)
RBC # BLD: 4.17 M/UL (ref 4–5.2)
SLIDE REVIEW: ABNORMAL
SODIUM BLD-SCNC: 140 MMOL/L (ref 136–145)
TOTAL PROTEIN: 6.2 G/DL (ref 6.4–8.2)
TROPONIN: <0.01 NG/ML
WBC # BLD: 6.3 K/UL (ref 4–11)

## 2020-12-28 PROCEDURE — 71250 CT THORAX DX C-: CPT

## 2020-12-28 PROCEDURE — 36415 COLL VENOUS BLD VENIPUNCTURE: CPT

## 2020-12-28 PROCEDURE — 83690 ASSAY OF LIPASE: CPT

## 2020-12-28 PROCEDURE — 1111F DSCHRG MED/CURRENT MED MERGE: CPT | Performed by: INTERNAL MEDICINE

## 2020-12-28 PROCEDURE — 2580000003 HC RX 258: Performed by: NURSE PRACTITIONER

## 2020-12-28 PROCEDURE — G0378 HOSPITAL OBSERVATION PER HR: HCPCS

## 2020-12-28 PROCEDURE — 99219 PR INITIAL OBSERVATION CARE/DAY 50 MINUTES: CPT | Performed by: PHYSICIAN ASSISTANT

## 2020-12-28 PROCEDURE — 2580000003 HC RX 258: Performed by: STUDENT IN AN ORGANIZED HEALTH CARE EDUCATION/TRAINING PROGRAM

## 2020-12-28 PROCEDURE — 99204 OFFICE O/P NEW MOD 45 MIN: CPT | Performed by: INTERNAL MEDICINE

## 2020-12-28 PROCEDURE — 80053 COMPREHEN METABOLIC PANEL: CPT

## 2020-12-28 PROCEDURE — 1036F TOBACCO NON-USER: CPT | Performed by: INTERNAL MEDICINE

## 2020-12-28 PROCEDURE — 96374 THER/PROPH/DIAG INJ IV PUSH: CPT

## 2020-12-28 PROCEDURE — G8428 CUR MEDS NOT DOCUMENT: HCPCS | Performed by: INTERNAL MEDICINE

## 2020-12-28 PROCEDURE — 93005 ELECTROCARDIOGRAM TRACING: CPT | Performed by: STUDENT IN AN ORGANIZED HEALTH CARE EDUCATION/TRAINING PROGRAM

## 2020-12-28 PROCEDURE — 93010 ELECTROCARDIOGRAM REPORT: CPT | Performed by: INTERNAL MEDICINE

## 2020-12-28 PROCEDURE — 99285 EMERGENCY DEPT VISIT HI MDM: CPT

## 2020-12-28 PROCEDURE — 6370000000 HC RX 637 (ALT 250 FOR IP): Performed by: NURSE PRACTITIONER

## 2020-12-28 PROCEDURE — 3017F COLORECTAL CA SCREEN DOC REV: CPT | Performed by: INTERNAL MEDICINE

## 2020-12-28 PROCEDURE — 6360000002 HC RX W HCPCS: Performed by: STUDENT IN AN ORGANIZED HEALTH CARE EDUCATION/TRAINING PROGRAM

## 2020-12-28 PROCEDURE — 84484 ASSAY OF TROPONIN QUANT: CPT

## 2020-12-28 PROCEDURE — G8484 FLU IMMUNIZE NO ADMIN: HCPCS | Performed by: INTERNAL MEDICINE

## 2020-12-28 PROCEDURE — G8417 CALC BMI ABV UP PARAM F/U: HCPCS | Performed by: INTERNAL MEDICINE

## 2020-12-28 PROCEDURE — 85025 COMPLETE CBC W/AUTO DIFF WBC: CPT

## 2020-12-28 PROCEDURE — 96375 TX/PRO/DX INJ NEW DRUG ADDON: CPT

## 2020-12-28 RX ORDER — OMEGA-3S/DHA/EPA/FISH OIL/D3 300MG-1000
400 CAPSULE ORAL DAILY
Status: DISCONTINUED | OUTPATIENT
Start: 2020-12-29 | End: 2020-12-29 | Stop reason: HOSPADM

## 2020-12-28 RX ORDER — ONDANSETRON 2 MG/ML
4 INJECTION INTRAMUSCULAR; INTRAVENOUS EVERY 6 HOURS PRN
Status: DISCONTINUED | OUTPATIENT
Start: 2020-12-28 | End: 2020-12-28

## 2020-12-28 RX ORDER — PROMETHAZINE HYDROCHLORIDE 25 MG/ML
6.25 INJECTION, SOLUTION INTRAMUSCULAR; INTRAVENOUS ONCE
Status: DISCONTINUED | OUTPATIENT
Start: 2020-12-28 | End: 2020-12-28

## 2020-12-28 RX ORDER — SODIUM CHLORIDE 0.9 % (FLUSH) 0.9 %
10 SYRINGE (ML) INJECTION EVERY 12 HOURS SCHEDULED
Status: DISCONTINUED | OUTPATIENT
Start: 2020-12-28 | End: 2020-12-29 | Stop reason: HOSPADM

## 2020-12-28 RX ORDER — DEXTROSE MONOHYDRATE 25 G/50ML
12.5 INJECTION, SOLUTION INTRAVENOUS PRN
Status: DISCONTINUED | OUTPATIENT
Start: 2020-12-28 | End: 2020-12-28

## 2020-12-28 RX ORDER — POLYETHYLENE GLYCOL 3350 17 G/17G
17 POWDER, FOR SOLUTION ORAL DAILY PRN
Status: DISCONTINUED | OUTPATIENT
Start: 2020-12-28 | End: 2020-12-29 | Stop reason: HOSPADM

## 2020-12-28 RX ORDER — SODIUM CHLORIDE 0.9 % (FLUSH) 0.9 %
10 SYRINGE (ML) INJECTION PRN
Status: DISCONTINUED | OUTPATIENT
Start: 2020-12-28 | End: 2020-12-29 | Stop reason: HOSPADM

## 2020-12-28 RX ORDER — SODIUM CHLORIDE 9 MG/ML
INJECTION, SOLUTION INTRAVENOUS CONTINUOUS
Status: DISCONTINUED | OUTPATIENT
Start: 2020-12-28 | End: 2020-12-29 | Stop reason: HOSPADM

## 2020-12-28 RX ORDER — CALCIUM CARBONATE 500(1250)
500 TABLET ORAL DAILY
Status: DISCONTINUED | OUTPATIENT
Start: 2020-12-28 | End: 2020-12-29 | Stop reason: HOSPADM

## 2020-12-28 RX ORDER — METOPROLOL SUCCINATE 25 MG/1
25 TABLET, EXTENDED RELEASE ORAL DAILY
Status: DISCONTINUED | OUTPATIENT
Start: 2020-12-28 | End: 2020-12-29 | Stop reason: HOSPADM

## 2020-12-28 RX ORDER — PROMETHAZINE HYDROCHLORIDE 25 MG/ML
6.25 INJECTION, SOLUTION INTRAMUSCULAR; INTRAVENOUS ONCE
Status: COMPLETED | OUTPATIENT
Start: 2020-12-28 | End: 2020-12-28

## 2020-12-28 RX ORDER — OXYBUTYNIN CHLORIDE 5 MG/1
5 TABLET ORAL 3 TIMES DAILY
Status: DISCONTINUED | OUTPATIENT
Start: 2020-12-28 | End: 2020-12-29 | Stop reason: HOSPADM

## 2020-12-28 RX ORDER — ONDANSETRON 2 MG/ML
4 INJECTION INTRAMUSCULAR; INTRAVENOUS EVERY 6 HOURS PRN
Status: DISCONTINUED | OUTPATIENT
Start: 2020-12-28 | End: 2020-12-29 | Stop reason: HOSPADM

## 2020-12-28 RX ORDER — ACETAMINOPHEN 325 MG/1
650 TABLET ORAL EVERY 6 HOURS PRN
Status: DISCONTINUED | OUTPATIENT
Start: 2020-12-28 | End: 2020-12-29 | Stop reason: HOSPADM

## 2020-12-28 RX ORDER — LISINOPRIL 20 MG/1
40 TABLET ORAL DAILY
Status: DISCONTINUED | OUTPATIENT
Start: 2020-12-28 | End: 2020-12-29 | Stop reason: HOSPADM

## 2020-12-28 RX ORDER — AMLODIPINE BESYLATE 5 MG/1
10 TABLET ORAL DAILY
Status: DISCONTINUED | OUTPATIENT
Start: 2020-12-28 | End: 2020-12-29 | Stop reason: HOSPADM

## 2020-12-28 RX ORDER — ACETAMINOPHEN 650 MG/1
650 SUPPOSITORY RECTAL EVERY 6 HOURS PRN
Status: DISCONTINUED | OUTPATIENT
Start: 2020-12-28 | End: 2020-12-29 | Stop reason: HOSPADM

## 2020-12-28 RX ORDER — ATORVASTATIN CALCIUM 40 MG/1
40 TABLET, FILM COATED ORAL NIGHTLY
Status: DISCONTINUED | OUTPATIENT
Start: 2020-12-28 | End: 2020-12-29 | Stop reason: HOSPADM

## 2020-12-28 RX ORDER — PROMETHAZINE HYDROCHLORIDE 25 MG/1
12.5 TABLET ORAL EVERY 6 HOURS PRN
Status: DISCONTINUED | OUTPATIENT
Start: 2020-12-28 | End: 2020-12-29 | Stop reason: HOSPADM

## 2020-12-28 RX ADMIN — DEXTROSE MONOHYDRATE 12.5 G: 25 INJECTION, SOLUTION INTRAVENOUS at 13:37

## 2020-12-28 RX ADMIN — Medication 10 ML: at 20:18

## 2020-12-28 RX ADMIN — PROMETHAZINE HYDROCHLORIDE 6.25 MG: 25 INJECTION INTRAMUSCULAR; INTRAVENOUS at 14:12

## 2020-12-28 RX ADMIN — SODIUM CHLORIDE: 9 INJECTION, SOLUTION INTRAVENOUS at 19:42

## 2020-12-28 NOTE — ED PROVIDER NOTES
succinate (TOPROL XL) 25 MG extended release tablet Take 25 mg by mouth daily      rivaroxaban (XARELTO) 20 MG TABS tablet Take 20 mg by mouth      lisinopril (PRINIVIL;ZESTRIL) 20 MG tablet Take 40 mg by mouth daily       potassium chloride (KLOR-CON) 20 MEQ packet Take 20 mEq by mouth 2 times daily       No Known Allergies    REVIEW OF SYSTEMS  10 systems reviewed, pertinent positives per HPI otherwise noted to be negative. PHYSICAL EXAM  /77   Pulse 78   Temp 97.9 °F (36.6 °C) (Oral)   Resp 18   Ht 5' 8\" (1.727 m)   Wt 230 lb (104.3 kg)   SpO2 99%   BMI 34.97 kg/m²   GENERAL APPEARANCE: Awake and alert. Cooperative. no distress. HENT: Normocephalic. Atraumatic. Mucous membranes are moist  NECK: Supple. Full range of motion without pain or stiffness  EYES: PERRL. EOM's grossly intact. HEART/CHEST: RRR. No murmurs. LUNGS: Respirations unlabored. CTAB. Good air exchange. Speaking comfortably in full sentences. ABDOMEN: Nontender. Soft. Non-distended. No masses. No organomegaly. No guarding or rebound. MUSCULOSKELETAL: No extremity edema. Compartments soft. No deformity. No tenderness in the extremities. All extremities neurovascularly intact. SKIN: Warm and dry. No acute rashes. NEUROLOGICAL: Alert and oriented. No gross facial drooping. Strength 5/5, sensation intact. PSYCHIATRIC: Normal mood and affect. LABS  I have reviewed all labs for this visit.    Results for orders placed or performed during the hospital encounter of 12/28/20   CBC Auto Differential   Result Value Ref Range    WBC 6.3 4.0 - 11.0 K/uL    RBC 4.17 4.00 - 5.20 M/uL    Hemoglobin 13.3 12.0 - 16.0 g/dL    Hematocrit 40.8 36.0 - 48.0 %    MCV 97.6 80.0 - 100.0 fL    MCH 31.8 26.0 - 34.0 pg    MCHC 32.6 31.0 - 36.0 g/dL    RDW 16.5 (H) 12.4 - 15.4 %    Platelets 741 858 - 800 K/uL    MPV 10.6 (H) 5.0 - 10.5 fL    PLATELET SLIDE REVIEW Adequate     SLIDE REVIEW see below     Neutrophils % 77.0 %    Lymphocytes % 16.0 %    Monocytes % 4.0 %    Eosinophils % 1.0 %    Basophils % 1.0 %    Neutrophils Absolute 4.9 1.7 - 7.7 K/uL    Lymphocytes Absolute 1.0 1.0 - 5.1 K/uL    Monocytes Absolute 0.3 0.0 - 1.3 K/uL    Eosinophils Absolute 0.1 0.0 - 0.6 K/uL    Basophils Absolute 0.1 0.0 - 0.2 K/uL    Bands Relative 1 0 - 7 %   Comprehensive Metabolic Panel w/ Reflex to MG   Result Value Ref Range    Sodium 140 136 - 145 mmol/L    Potassium reflex Magnesium 4.0 3.5 - 5.1 mmol/L    Chloride 102 99 - 110 mmol/L    CO2 22 21 - 32 mmol/L    Anion Gap 16 3 - 16    Glucose 63 (L) 70 - 99 mg/dL    BUN 8 7 - 20 mg/dL    CREATININE 0.6 0.6 - 1.2 mg/dL    GFR Non-African American >60 >60    GFR African American >60 >60    Calcium 9.8 8.3 - 10.6 mg/dL    Total Protein 6.2 (L) 6.4 - 8.2 g/dL    Alb 3.3 (L) 3.4 - 5.0 g/dL    Albumin/Globulin Ratio 1.1 1.1 - 2.2    Total Bilirubin 0.7 0.0 - 1.0 mg/dL    Alkaline Phosphatase 102 40 - 129 U/L    ALT 11 10 - 40 U/L    AST 16 15 - 37 U/L    Globulin 2.9 g/dL   Lipase   Result Value Ref Range    Lipase 22.0 13.0 - 60.0 U/L   Troponin   Result Value Ref Range    Troponin <0.01 <0.01 ng/mL   POCT Glucose   Result Value Ref Range    POC Glucose 95 70 - 99 mg/dl    Performed on ACCU-CHEK    EKG 12 Lead   Result Value Ref Range    Ventricular Rate 68 BPM    Atrial Rate 68 BPM    P-R Interval 132 ms    QRS Duration 78 ms    Q-T Interval 370 ms    QTc Calculation (Bazett) 393 ms    P Axis 41 degrees    R Axis -6 degrees    T Axis 143 degrees    Diagnosis       Normal sinus rhythmST & T wave abnormality, consider anterolateral ischemiaAbnormal ECGWhen compared with ECG of 12/14/20No significant change was foundConfirmed by ORIANA Hawkins MD ([id number]) on 12/28/2020 4:13:03 PM       ECG  The Ekg interpreted by me shows  normal sinus rhythm with a rate of 68  Axis is   Left axis deviation  QTc is  normal  Intervals and Durations are unremarkable.       ST Segments: nonspecific changes  No significant change from prior EKG dated 12/14/20    ED COURSE  Patient seen and evaluated. Old records reviewed. Labs and imaging reviewed and results discussed with patient. Overall, patient in no acute distress presenting for intractable vomiting for the past 1.5 months. Patient came to the emergency department after evaluation by gastroenterology in the clinic with plan for admission for EGD tomorrow. Physical exam unremarkable. Differential diagnosis includes but is not limited to: Gastroparesis, gastritis, esophageal web, esophageal stricture, gastric outlet obstruction, low suspicion for bowel obstruction    Laboratory studies obtained. Based on duration of symptoms and patient is still passing stool I do not feel that CT scan of the abdomen and pelvis is indicated at this time. Patient had a CT scan on 12/14 which showed mild wall thickening of the midportion of the sigmoid. Did say that this could be findings consistent for early colitis. However, patient denies any abdominal pain or diarrhea so at this time I have low suspicion for colitis. Patient has bilateral inguinal hernias. She is passing stool, I have low suspicion for small bowel obstruction. During the patient's ED course, the patient was given:  Medications   dextrose 50 % IV solution (12.5 g Intravenous Given 12/28/20 1337)   promethazine (PHENERGAN) injection 6.25 mg (6.25 mg Intravenous Given 12/28/20 1412)      Patient presents after evaluation in gastroenterology clinic for intractable vomiting of 1.5 months. She denies any change in her symptoms or any abdominal pain. GI had recommended admission for evaluation via EGD. Patient has no electrolyte abnormalities or evidence of kidney dysfunction. She did have mild hypoglycemia, no symptoms of hypoglycemia. Given that she is having intractable vomiting, did give D50 which resolved hypoglycemia.     Liver function testing does show some evidence of malnutrition, no transaminitis or other acute pathology. Lipase is within normal limits, low suspicion for pancreatitis. No leukocytosis, anemia, thrombocytopenia. Troponin within normal limits, EKG shows no evidence of ischemia, do not suspect ACS. As previously stated given that patient's symptoms have not changed since her previous CT scan, I do not feel that it needs to be repeated at this time. Furthermore, patient is not having any abdominal pain. Low suspicion for acute surgical pathology at this time. At this time, do feel the patient requires admission for further work-up and management occluding EGD tomorrow. Discussed the patient with hospital team.    CLINICAL IMPRESSION  1. Intractable vomiting with nausea, unspecified vomiting type        Blood pressure 109/84, pulse 76, temperature 97.9 °F (36.6 °C), temperature source Oral, resp. rate 20, height 5' 8\" (1.727 m), weight 230 lb (104.3 kg), SpO2 96 %. Jael Salazar was admitted in stable condition. DISCLAIMER: This chart was created using Dragon dictation software. Efforts were made by me to ensure accuracy, however some errors may be present due to limitations of this technology and occasionally words are not transcribed correctly.       Jim Peter MD  12/28/20 6643

## 2020-12-28 NOTE — PROGRESS NOTES
[address] ,  [facility]  Phone: [phone]    CHIEF COMPLAINT     Chief Complaint   Patient presents with    New Patient     vomiting     HPI (location/symptom, timing/onset, duration, quality/severity, context, modifying factors, and associated signs/symptoms)     Thank you Mal Johnson MD for asking me to see Hamzah Cabezas in consultation. She is a  [5] White [1] 63 y.o. Benedetta Ou female seen with transport who presents with the following GI complaints:    Hamzah Cabezas  Is brought in from the nursing home today. She states she has not been able to keep anything down for weeks including water. Available antiemetics are not working. No associated heartburn or dysphagia. Has lost 70lbs. No hematemesis. Was previously taking mobic but not currently on her med rec. Takes xarelto for h/o blood clots. No pertinent GI family history. Had a positive covid 2 weeks ago without symptoms. Last Encounter Reviewed:   Pertinent PMH, FH, SH is reviewed below. Last EGD: none  Last Colonoscopy: none    Review of available records reveals:   Wt Readings from Last 50 Encounters:   12/28/20 230 lb (104.3 kg)   12/28/20 237 lb (107.5 kg)   12/14/20 237 lb (107.5 kg)   11/20/20 (!) 308 lb (139.7 kg)   11/09/20 (!) 308 lb 13.8 oz (140.1 kg)   10/30/20 290 lb (131.5 kg)   12/01/17 230 lb (104.3 kg)   09/02/16 230 lb (104.3 kg)       No components found for: HGBA1C  BP Readings from Last 3 Encounters:   12/28/20 122/81   12/28/20 123/88   12/16/20 118/87     Health Maintenance   Topic Date Due    Hepatitis C screen  1960    Lipid screen  11/01/1969    HIV screen  11/01/1974    Cervical cancer screen  11/01/1980    Breast cancer screen  11/01/2009    Shingles Vaccine (1 of 2) 11/01/2009    Colon cancer screen colonoscopy  11/01/2009    Flu vaccine (1) 09/01/2020    Potassium monitoring  12/28/2021  Creatinine monitoring  12/28/2021    DTaP/Tdap/Td vaccine (3 - Td) 06/15/2030    Hepatitis A vaccine  Aged Out    Hepatitis B vaccine  Aged Out    Hib vaccine  Aged Out    Meningococcal (ACWY) vaccine  Aged Out    Pneumococcal 0-64 years Vaccine  Aged Out       No components found for: [employer]     PAST MEDICAL HISTORY     Past Medical History:   Diagnosis Date    [facility]est[facility] failure) ([facility]     COVID-19 12/15/2020    HTN (hypertension)     UTI (urinary tract infection)      FAMILY HISTORY   No family history on file. SOCIAL HISTORY     Social History     Socioeconomic History    Marital status:       Spouse name: Not on file    Number of children: Not on file    Years of education: Not on file    Highest education level: Not on file   Occupational History    Not on file   Social Needs    Financial resource strain: Not on file    Food insecurity     Worry: Not on file     Inability: Not on file    Transportation needs     Medical: Not on file     Non-medical: Not on file   Tobacco Use    Smoking status: Never Smoker    Smokeless tobacco: Never Used   Substance and Sexual Activity    Alcohol use: Not Currently    Drug use: Never    Sexual activity: Not on file   Lifestyle    Physical activity     Days per week: Not on file     Minutes per session: Not on file    Stress: Not on file   Relationships    Social connections     Talks on phone: Not on file     Gets together: Not on file     Attends Tenri[employer] service: Not on file     Active member of club or organization: Not on file     Attends meetings of clubs or organizations: Not on file     Relationship status: Not on file    Intimate partner violence     Fear of current or ex partner: Not on file     Emotionally abused: Not on file     Physically abused: Not on file     Forced sexual activity: Not on file   Other Topics Concern    Not on file   Social History Narrative    Not on file     SURGICAL HISTORY Past Surgical History:   Procedure Laterality Date    BLADDER REPAIR       SECTION      CHOLECYSTECTOMY      CYSTOSCOPY      TIBIA FRACTURE SURGERY Right 2020    RIGHT TIBIA IM NAIL INSERTION performed by Latasha Jin MD at [facility]   (This list may include medications prescribed during this encounter as epic can not insert only the list prior to this encounter.)  Current Outpatient Rx   Medication Sig Dispense Refill    docusate sodium (COLACE) 100 MG capsule Take 1 capsule by mouth 2 times daily 60 capsule 0    amLODIPine (NORVASC) 10 MG tablet TAKE 1 TABLET BY MOUTH ONCE DAILY FOR 90 DAYS      atorvastatin (LIPITOR) 40 MG tablet       oxybutynin (DITROPAN) 5 MG tablet TAKE 1 TABLET BY MOUTH EVERY 8 HOURS      vitamin D3 (CHOLECALCIFEROL) 400 units TABS tablet Take 400 Units by mouth daily      Flaxseed Oil OIL 1,400 mg by Does not apply route daily      calcium carbonate 600 MG TABS tablet Take 1 tablet by mouth daily      cimetidine (TAGAMET HB) 200 MG tablet Take 200 mg by mouth as needed      latanoprost (XALATAN) 0.005 % ophthalmic solution 1 drop nightly      meloxicam (MOBIC) 15 MG tablet Take 15 mg by mouth daily      metoprolol succinate (TOPROL XL) 25 MG extended release tablet Take 25 mg by mouth daily      rivaroxaban (XARELTO) 20 MG TABS tablet Take 20 mg by mouth      lisinopril (PRINIVIL;ZESTRIL) 20 MG tablet Take 40 mg by mouth daily       potassium chloride (KLOR-CON) 20 MEQ packet Take 20 mEq by mouth 2 times daily       ALLERGIES   No Known Allergies  IMMUNIZATIONS     There is no immunization history on file for this patient. REVIEW OF SYSTEMS (2-9 systems for level 4, 10 or more for level 5)   See HPI for further details and pertinent postiives. Negative for the following:  Constitutional: Negative for weight change. Negative for appetite change and fatigue. HENT: Negative for nosebleeds, sore throat, mouth sores, and voice change. Respiratory: Negative for cough, choking and chest tightness. Cardiovascular: Negative for chest pain   Gastrointestinal: No abdominal pain, heartburn, bloating, dysphagia, cough, chest pain, globus, regurgitation, diarrhea, constipation. Positive for n/v, weight loss. Musculoskeletal: Negative for arthralgias. Skin: Negative for pallor. Neurological: Negative for weakness and light-headedness. Hematological: Negative for adenopathy. Does not bruise/bleed easily. Psychiatric/Behavioral: Negative for suicidal ideas. PHYSICAL EXAM   VITAL SIGNS: /88 (Site: Right Wrist)   Pulse 89   Temp 96.9 °F (36.1 °C)   Ht 5' 8\" (1.727 m)   Wt 237 lb (107.5 kg)   BMI 36.04 kg/m²   Wt Readings from Last 3 Encounters:   12/28/20 230 lb (104.3 kg)   12/28/20 237 lb (107.5 kg)   12/14/20 237 lb (107.5 kg)     Constitutional: Well developed, Well nourished, No acute distress, Non-toxic appearance. HENT: Normocephalic, Atraumatic, Bilateral external ears normal, Oropharynx moist, No oral exudates, Nose normal.   Eyes: Conjunctiva normal, No discharge. Neck: Normal range of motion, No tenderness, Supple, No stridor. Lymphatic: No cervical, subclavian, or axillary lymphadenopathy. Cardiovascular: Normal heart rate, Normal rhythm, No murmurs, No rubs, No gallops. Thorax & Lungs: Normal breath sounds, No respiratory distress, No wheezing, No chest tenderness. No gynecomastia. Abdomen/GI: scars consistent with stated surgeries, no hernias, no HSM, soft NTND   Rectal:  Deferred. Skin: Warm, Dry, No erythema, No rash. No bruising. No spider hemangiomas. Back: No tenderness, No CVA tenderness. Lower Extremities: Intact distal pulses, No edema, No tenderness, No cyanosis, No clubbing. Neurologic: Alert & oriented x 3, Normal motor function, Normal sensory function, No focal deficits noted. No asterixis.   RADIOLOGY/PROCEDURES       FINAL IMPRESSION     Orders Placed This Encounter   Procedures  Droplet Plus Isolation - (Use only for COVID-19)     Standing Status:   Standing     Number of Occurrences:   1     There are no diagnoses linked to this encounter. ORDERED FUTURE/PENDING TESTS     Lab Frequency Next Occurrence   HYALGAN/SUPARTZ INJECTION (For Auth/Precert) Once 60/56/1084   CT CHEST WO CONTRAST Once 12/16/2020       FOLLOWUP   No follow-ups on file. Patient sent to the ER for admission feeling she could not wait. Will proceed with EGD in am.  No need to repeat covid testing.         MYLENE MORRISON 12/28/20 10:40 AM EST    CC:  Grace Lay MD

## 2020-12-28 NOTE — ANESTHESIA PRE PROCEDURE
 Primary osteoarthritis of left knee    Right medial tibial plateau fracture, closed, initial encounter    HTN (hypertension)    Morbid obesity [facility] [address]    HLD (hyperlipidemia)    Bacterial urinary tract infection    Intractable nausea and vomiting    COVID-19 virus infection    Nausea and vomiting     Past Medical History:     CHF [facility]congestive heart failure) ([facility] [address]     COVID-19 12/15/2020    HTN (hypertension)     UTI (urinary tract infection)      Past Surgical History:     BLADDER REPAIR       SECTION      CHOLECYSTECTOMY      CYSTOSCOPY      TIBIA FRACTURE SURGERY Right 2020    RIGHT TIBIA IM NAIL INSERTION performed by Cristofer Waggoner MD at [place] [address] History:    Tobacco Use    Smoking status: Never Smoker    Smokeless tobacco: Never Used   Substance Use Topics    Alcohol use: Not Currently     Vital Signs (Current):    BP: 139/88 Pulse: 88   Resp: 18 SpO2: 98   Temp: 97.5 °F (36.4 °C)   Height: 5' 8\" (1.727 m)  (20) Weight: 259 lb 14.4 oz (117.9 kg)  (20)   BMI: 39.6            20 1328 20 1336 20 1406 20 1436   BP: (!) 123/92 129/82 121/84 126/85   Pulse: 76 69 72 74   Resp: 17 19 20 21   SpO2: 97% 98% 96% 95%                                     BP Readings from Last 3 Encounters:   20 126/85   20 123/88   20 118/87     NPO Status: >8 hrs                      BMI:   Wt Readings from Last 3 Encounters:   20 230 lb (104.3 kg)   20 237 lb (107.5 kg)   20 237 lb (107.5 kg)       CBC:    WBC 6.3 2020    HGB 13.3 2020    HCT 40.8 2020     2020     CMP:     2020    K 4.0 2020     2020    CO2 22 2020    BUN 8 2020    CREATININE 0.6 2020    GLUCOSE 63 2020    PROT 6.2 2020    CALCIUM 9.8 2020    BILITOT 0.7 2020    ALKPHOS 102 2020    AST 16 2020    ALT 11 2020     POC Tests: 12/29/20   0702   POCGLU 76     COVID-19 Screening (If Applicable): [address] DETECTED 12/15/2020     Anesthesia Evaluation  Patient summary reviewed and Nursing notes reviewed  Airway: Mallampati: II  TM distance: >3 FB   Neck ROM: full  Mouth opening: < 3 FB Dental:          Pulmonary: breath sounds clear to auscultation  (+) pneumonia:      (-) wheezes                           Cardiovascular:    (+) hypertension:, dysrhythmias: atrial fibrillation, CHF:,     (-)  angina and murmur    ECG reviewed  Rhythm: regular  Rate: normal                 ROS comment: H/O PAF and PE/DVT- on Xarelto     Neuro/Psych:   (+) neuromuscular disease:,             GI/Hepatic/Renal:   (+) morbid obesity     (-) no renal disease      ROS comment: GI: See HPI. Endo/Other:        (-) diabetes mellitus, hypothyroidism               Abdominal:           Vascular:   + DVT, PE. Anesthesia Plan      TIVA     ASA 3       Induction: intravenous. MIPS: Prophylactic antiemetics administered. Anesthetic plan and risks discussed with patient. Plan discussed with CRNA.             Kale Velez MD

## 2020-12-28 NOTE — H&P
Hospital Medicine History & Physical      PCP: Anil Reeves MD    Date of Admission: 2020    Date of Service: Pt seen/examined on 2020    Chief Complaint:    Chief Complaint   Patient presents with    Nausea     at gi doctor for this complaint andMD told ems to bring her to ED     History Of Present Illness: The patient is a 63 y.o. female with CHF, hypertension and recently diagnosed with COVID-19 who presented to [facility] ED with complaint of nausea and vomiting. Patient reports that she is had recent admission and since being discharged she thought that her symptoms were better with her nausea and vomiting however over the last week it has significantly worsened. She reports she can't keep anything down. She states that she feels miserable. She had a scheduled follow-up appointment with the GI team but was sent down to the ER given her persistent symptoms. She reports feeling very thirsty but every attempt at trying to drink water resulted in vomiting. She denies any associated cough or shortness of breath. She denies any fevers or chills. She denies any associated abdominal pain. She has been having normal bowel movements without any blood noted. She is not noted any blood in her emesis either. Past Medical History:        Diagnosis Date    CHF (congestive heart failure) ([address]     COVID-19 12/15/2020    HTN (hypertension)     UTI (urinary tract infection)        Past Surgical History:        Procedure Laterality Date    BLADDER REPAIR       SECTION      CHOLECYSTECTOMY      CYSTOSCOPY      TIBIA FRACTURE SURGERY Right 2020    RIGHT TIBIA IM NAIL INSERTION performed by Marie Sheets MD at [facility]       Medications Prior to Admission:    Prior to Admission medications    Medication Sig Start Date End Date Taking?  Authorizing Provider   docusate sodium (COLACE) 100 MG capsule Take 1 capsule by mouth 2 times daily 12/16/20 1/15/21  Kellee Sheppard MD amLODIPine (NORVASC) 10 MG tablet TAKE 1 TABLET BY MOUTH ONCE DAILY FOR 90 DAYS 10/8/20   Historical Provider, MD   atorvastatin (LIPITOR) 40 MG tablet  10/29/20   Historical Provider, MD   oxybutynin (DITROPAN) 5 MG tablet TAKE 1 TABLET BY MOUTH EVERY 8 HOURS 7/31/20   Historical Provider, MD   vitamin D3 (CHOLECALCIFEROL) 400 units TABS tablet Take 400 Units by mouth daily    Historical Provider, MD   Flaxseed Oil OIL 1,400 mg by Does not apply route daily    Historical Provider, MD   calcium carbonate 600 MG TABS tablet Take 1 tablet by mouth daily    Historical Provider, MD   cimetidine (TAGAMET HB) 200 MG tablet Take 200 mg by mouth as needed    Historical Provider, MD   latanoprost (XALATAN) 0.005 % ophthalmic solution 1 drop nightly    Historical Provider, MD   meloxicam (MOBIC) 15 MG tablet Take 15 mg by mouth daily    Historical Provider, MD   metoprolol succinate (TOPROL XL) 25 MG extended release tablet Take 25 mg by mouth daily    Historical Provider, MD   rivaroxaban (XARELTO) 20 MG TABS tablet Take 20 mg by mouth    Historical Provider, MD   lisinopril (PRINIVIL;ZESTRIL) 20 MG tablet Take 40 mg by mouth daily     Historical Provider, MD   potassium chloride (KLOR-CON) 20 MEQ packet Take 20 mEq by mouth 2 times daily    Historical Provider, MD       Allergies:  Patient has no known allergies. Social History:  The patient currently lives at home     TOBACCO:   reports that she has never smoked. She has never used smokeless tobacco.  ETOH:   reports previous alcohol use. Family History:   Positive as follows:    History reviewed. No pertinent family history.     REVIEW OF SYSTEMS:     Constitutional: + Malaise, negative for fever   HENT: Negative for sore throat   Eyes: Negative for redness   Respiratory: Negative  for dyspnea, cough   Cardiovascular: Negative for chest pain   Gastrointestinal: + Nausea, + vomiting, negative abdominal pain, negative diarrhea  Genitourinary: Negative for hematuria Musculoskeletal: Negative for arthralgias   Skin: Negative for rash   Neurological: Negative for syncope   Hematological: Negative for adenopathy   Psychiatric/Behavorial: Negative for anxiety    PHYSICAL EXAM:    /87   Pulse 77   Temp 97.7 °F (36.5 °C) (Oral)   Resp 20   Ht 5' 8\" (1.727 m)   Wt 259 lb 14.4 oz (117.9 kg)   SpO2 97%   BMI 39.52 kg/m²   Gen: Elderly female, appears older than stated age no distress. Alert. Eyes: PERRL. No conjunctival injection. ENT: No discharge. Pharynx clear. Neck: Trachea midline. Resp: No accessory muscle use. No crackles. No wheezes. No rhonchi. CV: Regular rate. Regular rhythm. No murmur. No rub. + edema. Capillary Refill: Brisk,< 3 seconds   Peripheral Pulses: +2 palpable, equal bilaterally   GI: Non-tender. Non-distended. Normal bowel sounds.  + Right inguinal hernia, mild tenderness, soft, partially reducible, no overlying skin changes  Skin: Warm and dry. Well-healed scar to the right lower extremity  M/S: Wrists with right lower extremity externally rotated, no tenderness to palpation to the bony anatomy of the right hip, able to logroll without difficulty/pain  Neuro: Awake. Grossly nonfocal, generalized weakness  Psych: Oriented x 3. No anxiety or agitation. CBC:   Recent Labs     12/28/20  1246   WBC 6.3   HGB 13.3   HCT 40.8   MCV 97.6        BMP:   Recent Labs     12/28/20  1246      K 4.0      CO2 22   BUN 8   CREATININE 0.6     LIVER PROFILE:   Recent Labs     12/28/20  1246   AST 16   ALT 11   LIPASE 22.0   BILITOT 0.7   ALKPHOS 102     CULTURES  None     EKG:  I have reviewed the EKG with the following interpretation:   NSR, normal axis, normal interval, T wave inversions in anterolateral leads, no acute ST segment changes concerning for acute ischemia     RADIOLOGY  CT CHEST WO CONTRAST   Final Result   1.  Right diaphragmatic hernia containing mesenteric fat extending into the   right cardiophrenic space, corresponding with the abnormal chest radiograph. Pertinent previous results reviewed   SARS-CoV-2, NAAT DETECTEDPanic   Not Detected Final 12/15/2020  5:25 AM [facility]      ASSESSMENT/PLAN:  Nausea/vomiting  Diaphragmatic hernia  -GI consult: Plans for EGD tomorrow  -CT chest performed, see below, shows a diaphragmatic hernia with mesenteric fat in the cardiophrenic space--> general surgery consult  -Diet NPO  -PRN antiemetics    Hypoglycemia  -Blood glucose 63 on admission  -Repeat blood glucose improved to 95  -Had hypoglycemia during her last admission as well with a blood glucose of 61 on arrival with no history of diabetes  -Thought to be secondary to poor p.o. intake  - Was given D5, BG improved   -Check insulin level, cortisol a.m.     COVID-19 infection  -Recent admission with discharge on 12/16 for Covid infection  -Did not have associated hypoxia did not require Decadron, remdesivir or CCP  -Continue droplet plus precautions    Pericardial fat pad/cyst vs mass   - noted on CXR during last admission   - was recommended for CT chest to rule out mass, was ordered not completed--> CT chest on admission shows diaphragmatic hernia, see above     Paroxysmal atrial fibrillation  -EKG with NSR  -Continue dose Lovenox (Xarelto held) and beta-blocker  -Rates controlled    History of DVT/PE  -Continue anticoagulation with Xarelto--> hold for now, anticoagulate with full dose Lovenox    Recent tib-fib fracture  -Status post ORIF on 11/6  -Currently in skilled nursing facility     HTN  - BP is well controlled  - Continue Norvasc, BB, ACE-I     Abnormal EKG  -Was noted on last admission with anterior lateral T wave inversion changes  -No associated acute ACS symptoms at that time  -EKG with continued anterior lateral T wave changes  -Check and trend troponins    DVT Prophylaxis: Lovenox full dose  Diet: Diet NPO Effective Now   Code Status: Full Code      Richy Gallagher PA-C  12/28/2020 7:14 PM

## 2020-12-28 NOTE — ED NOTES
1328- Perfect serve sent to Dr. Bc Glez for admission     [phone]- Call was returned by Dasia Wyatt and spoke to Dr. Annabel Wilson  12/28/20 3728 Blanca Nelson  12/28/20 9144

## 2020-12-28 NOTE — ED NOTES
Bed: 09  Expected date:   Expected time:   Means of arrival:   Comments:     Anisa Tanner RN  12/28/20 4743

## 2020-12-29 ENCOUNTER — APPOINTMENT (OUTPATIENT)
Dept: GENERAL RADIOLOGY | Age: 60
End: 2020-12-29
Payer: MEDICAID

## 2020-12-29 ENCOUNTER — ANESTHESIA (OUTPATIENT)
Dept: ENDOSCOPY | Age: 60
End: 2020-12-29
Payer: MEDICAID

## 2020-12-29 VITALS
OXYGEN SATURATION: 96 % | SYSTOLIC BLOOD PRESSURE: 120 MMHG | BODY MASS INDEX: 39.39 KG/M2 | RESPIRATION RATE: 18 BRPM | HEIGHT: 68 IN | HEART RATE: 76 BPM | WEIGHT: 259.9 LBS | DIASTOLIC BLOOD PRESSURE: 76 MMHG | TEMPERATURE: 97.8 F

## 2020-12-29 VITALS
RESPIRATION RATE: 23 BRPM | DIASTOLIC BLOOD PRESSURE: 67 MMHG | SYSTOLIC BLOOD PRESSURE: 115 MMHG | OXYGEN SATURATION: 99 %

## 2020-12-29 LAB
ANION GAP SERPL CALCULATED.3IONS-SCNC: 13 MMOL/L (ref 3–16)
BUN BLDV-MCNC: 8 MG/DL (ref 7–20)
CALCIUM SERPL-MCNC: 9.2 MG/DL (ref 8.3–10.6)
CHLORIDE BLD-SCNC: 102 MMOL/L (ref 99–110)
CO2: 22 MMOL/L (ref 21–32)
CREAT SERPL-MCNC: <0.5 MG/DL (ref 0.6–1.2)
GFR AFRICAN AMERICAN: >60
GFR NON-AFRICAN AMERICAN: >60
GLUCOSE BLD-MCNC: 100 MG/DL (ref 70–99)
GLUCOSE BLD-MCNC: 64 MG/DL (ref 70–99)
GLUCOSE BLD-MCNC: 76 MG/DL (ref 70–99)
HCT VFR BLD CALC: 41 % (ref 36–48)
HEMOGLOBIN: 13.3 G/DL (ref 12–16)
MCH RBC QN AUTO: 32.1 PG (ref 26–34)
MCHC RBC AUTO-ENTMCNC: 32.3 G/DL (ref 31–36)
MCV RBC AUTO: 99.2 FL (ref 80–100)
PDW BLD-RTO: 16.8 % (ref 12.4–15.4)
PERFORMED ON: ABNORMAL
PERFORMED ON: ABNORMAL
PLATELET # BLD: 216 K/UL (ref 135–450)
PMV BLD AUTO: 10.5 FL (ref 5–10.5)
POTASSIUM REFLEX MAGNESIUM: 3.6 MMOL/L (ref 3.5–5.1)
RBC # BLD: 4.13 M/UL (ref 4–5.2)
SODIUM BLD-SCNC: 137 MMOL/L (ref 136–145)
WBC # BLD: 5.7 K/UL (ref 4–11)

## 2020-12-29 PROCEDURE — 7100000010 HC PHASE II RECOVERY - FIRST 15 MIN: Performed by: INTERNAL MEDICINE

## 2020-12-29 PROCEDURE — 2580000003 HC RX 258: Performed by: NURSE PRACTITIONER

## 2020-12-29 PROCEDURE — 99244 OFF/OP CNSLTJ NEW/EST MOD 40: CPT | Performed by: SURGERY

## 2020-12-29 PROCEDURE — 99217 PR OBSERVATION CARE DISCHARGE MANAGEMENT: CPT | Performed by: INTERNAL MEDICINE

## 2020-12-29 PROCEDURE — G0378 HOSPITAL OBSERVATION PER HR: HCPCS

## 2020-12-29 PROCEDURE — 43235 EGD DIAGNOSTIC BRUSH WASH: CPT | Performed by: INTERNAL MEDICINE

## 2020-12-29 PROCEDURE — 85027 COMPLETE CBC AUTOMATED: CPT

## 2020-12-29 PROCEDURE — 36415 COLL VENOUS BLD VENIPUNCTURE: CPT

## 2020-12-29 PROCEDURE — 3609017100 HC EGD: Performed by: INTERNAL MEDICINE

## 2020-12-29 PROCEDURE — 7100000011 HC PHASE II RECOVERY - ADDTL 15 MIN: Performed by: INTERNAL MEDICINE

## 2020-12-29 PROCEDURE — 6370000000 HC RX 637 (ALT 250 FOR IP): Performed by: PHYSICIAN ASSISTANT

## 2020-12-29 PROCEDURE — 74018 RADEX ABDOMEN 1 VIEW: CPT

## 2020-12-29 PROCEDURE — 3700000000 HC ANESTHESIA ATTENDED CARE: Performed by: INTERNAL MEDICINE

## 2020-12-29 PROCEDURE — 2709999900 HC NON-CHARGEABLE SUPPLY: Performed by: INTERNAL MEDICINE

## 2020-12-29 PROCEDURE — 6360000002 HC RX W HCPCS: Performed by: NURSE ANESTHETIST, CERTIFIED REGISTERED

## 2020-12-29 PROCEDURE — 3700000001 HC ADD 15 MINUTES (ANESTHESIA): Performed by: INTERNAL MEDICINE

## 2020-12-29 PROCEDURE — 6360000002 HC RX W HCPCS: Performed by: NURSE PRACTITIONER

## 2020-12-29 PROCEDURE — 80048 BASIC METABOLIC PNL TOTAL CA: CPT

## 2020-12-29 PROCEDURE — 2500000003 HC RX 250 WO HCPCS: Performed by: NURSE ANESTHETIST, CERTIFIED REGISTERED

## 2020-12-29 RX ORDER — FENTANYL CITRATE 50 UG/ML
25 INJECTION, SOLUTION INTRAMUSCULAR; INTRAVENOUS EVERY 5 MIN PRN
Status: DISCONTINUED | OUTPATIENT
Start: 2020-12-29 | End: 2020-12-29 | Stop reason: HOSPADM

## 2020-12-29 RX ORDER — ONDANSETRON 2 MG/ML
4 INJECTION INTRAMUSCULAR; INTRAVENOUS
Status: DISCONTINUED | OUTPATIENT
Start: 2020-12-29 | End: 2020-12-29 | Stop reason: HOSPADM

## 2020-12-29 RX ORDER — OXYCODONE HYDROCHLORIDE AND ACETAMINOPHEN 5; 325 MG/1; MG/1
2 TABLET ORAL PRN
Status: DISCONTINUED | OUTPATIENT
Start: 2020-12-29 | End: 2020-12-29 | Stop reason: HOSPADM

## 2020-12-29 RX ORDER — PROMETHAZINE HYDROCHLORIDE 25 MG/ML
6.25 INJECTION, SOLUTION INTRAMUSCULAR; INTRAVENOUS
Status: DISCONTINUED | OUTPATIENT
Start: 2020-12-29 | End: 2020-12-29 | Stop reason: HOSPADM

## 2020-12-29 RX ORDER — CALCIUM CARBONATE 200(500)MG
500 TABLET,CHEWABLE ORAL 3 TIMES DAILY PRN
Status: DISCONTINUED | OUTPATIENT
Start: 2020-12-29 | End: 2020-12-29 | Stop reason: HOSPADM

## 2020-12-29 RX ORDER — PANTOPRAZOLE SODIUM 40 MG/1
40 TABLET, DELAYED RELEASE ORAL
Qty: 30 TABLET | Refills: 3 | Status: ON HOLD
Start: 2020-12-30 | End: 2021-03-12 | Stop reason: HOSPADM

## 2020-12-29 RX ORDER — PANTOPRAZOLE SODIUM 40 MG/1
40 TABLET, DELAYED RELEASE ORAL
Status: DISCONTINUED | OUTPATIENT
Start: 2020-12-29 | End: 2020-12-29 | Stop reason: HOSPADM

## 2020-12-29 RX ORDER — MAGNESIUM HYDROXIDE/ALUMINUM HYDROXICE/SIMETHICONE 120; 1200; 1200 MG/30ML; MG/30ML; MG/30ML
30 SUSPENSION ORAL EVERY 6 HOURS PRN
Status: DISCONTINUED | OUTPATIENT
Start: 2020-12-29 | End: 2020-12-29 | Stop reason: HOSPADM

## 2020-12-29 RX ORDER — MEPERIDINE HYDROCHLORIDE 25 MG/ML
12.5 INJECTION INTRAMUSCULAR; INTRAVENOUS; SUBCUTANEOUS EVERY 5 MIN PRN
Status: DISCONTINUED | OUTPATIENT
Start: 2020-12-29 | End: 2020-12-29 | Stop reason: HOSPADM

## 2020-12-29 RX ORDER — HYDRALAZINE HYDROCHLORIDE 20 MG/ML
5 INJECTION INTRAMUSCULAR; INTRAVENOUS EVERY 10 MIN PRN
Status: DISCONTINUED | OUTPATIENT
Start: 2020-12-29 | End: 2020-12-29 | Stop reason: HOSPADM

## 2020-12-29 RX ORDER — PROPOFOL 10 MG/ML
INJECTION, EMULSION INTRAVENOUS PRN
Status: DISCONTINUED | OUTPATIENT
Start: 2020-12-29 | End: 2020-12-29 | Stop reason: SDUPTHER

## 2020-12-29 RX ORDER — LIDOCAINE HYDROCHLORIDE 20 MG/ML
INJECTION, SOLUTION INFILTRATION; PERINEURAL PRN
Status: DISCONTINUED | OUTPATIENT
Start: 2020-12-29 | End: 2020-12-29 | Stop reason: SDUPTHER

## 2020-12-29 RX ORDER — OXYCODONE HYDROCHLORIDE AND ACETAMINOPHEN 5; 325 MG/1; MG/1
1 TABLET ORAL PRN
Status: DISCONTINUED | OUTPATIENT
Start: 2020-12-29 | End: 2020-12-29 | Stop reason: HOSPADM

## 2020-12-29 RX ADMIN — CALCIUM CARBONATE 500 MG: 500 TABLET, CHEWABLE ORAL at 11:13

## 2020-12-29 RX ADMIN — LIDOCAINE HYDROCHLORIDE 50 MG: 20 INJECTION, SOLUTION INFILTRATION; PERINEURAL at 08:10

## 2020-12-29 RX ADMIN — SODIUM CHLORIDE: 9 INJECTION, SOLUTION INTRAVENOUS at 08:30

## 2020-12-29 RX ADMIN — PROPOFOL 120 MG: 10 INJECTION, EMULSION INTRAVENOUS at 08:10

## 2020-12-29 RX ADMIN — ONDANSETRON HYDROCHLORIDE 4 MG: 2 INJECTION, SOLUTION INTRAMUSCULAR; INTRAVENOUS at 11:13

## 2020-12-29 NOTE — CONSULTS
Department of General Surgery Consult    PATIENT NAME: Toribio Reyes OF BIRTH: 1960    ADMISSION DATE: 2020 11:15 AM      TODAY'S DATE: 2020    Reason for Consult:  Diaphragmatic hernia    Chief Complaint: Nausea and vomiting    Requesting Physician:  Hilary Price    HISTORY OF PRESENT ILLNESS:              The patient is a 63 y.o. female who presents with nausea and vomiting. For the past week or so. She denies abdominal pain. She has been very weak recovering from Covid. No SOB, fever, chills. Bowels working normally. EGD unremarkable.     Past Medical History:        Diagnosis Date    [facility]F [facility]congestive heart failure) ([facility] [address]     COVID-19 12/15/2020    HTN (hypertension)     UTI (urinary tract infection)        Past Surgical History:        Procedure Laterality Date    BLADDER REPAIR       SECTION      CHOLECYSTECTOMY      CYSTOSCOPY      TIBIA FRACTURE SURGERY Right 2020    RIGHT TIBIA IM NAIL INSERTION performed by Carla Iniguez MD at [place] [address]       Current Medications:   Current Facility-Administered Medications: meperidine (DEMEROL) injection 12.5 mg, 12.5 mg, Intravenous, Q5 Min PRN  fentaNYL (SUBLIMAZE) injection 25 mcg, 25 mcg, Intravenous, Q5 Min PRN  HYDROmorphone (DILAUDID) injection 0.5 mg, 0.5 mg, Intravenous, Q5 Min PRN  HYDROmorphone (DILAUDID) injection 0.25 mg, 0.25 mg, Intravenous, Q5 Min PRN  HYDROmorphone (DILAUDID) injection 0.5 mg, 0.5 mg, Intravenous, Q5 Min PRN  oxyCODONE-acetaminophen (PERCOCET) 5-325 MG per tablet 1 tablet, 1 tablet, Oral, PRN **OR** oxyCODONE-acetaminophen (PERCOCET) 5-325 MG per tablet 2 tablet, 2 tablet, Oral, PRN  ondansetron (ZOFRAN) injection 4 mg, 4 mg, Intravenous, Once PRN  promethazine (PHENERGAN) injection 6.25 mg, 6.25 mg, Intramuscular, Once PRN  hydrALAZINE (APRESOLINE) injection 5 mg, 5 mg, Intravenous, Q10 Min PRN  pantoprazole (PROTONIX) tablet 40 mg, 40 mg, Oral, QAM AC  aluminum & magnesium hydroxide-simethicone (MAALOX) [phone] MG/5ML suspension 30 mL, 30 mL, Oral, Q6H PRN  calcium carbonate (TUMS) chewable tablet 500 mg, 500 mg, Oral, TID PRN  amLODIPine (NORVASC) tablet 10 mg, 10 mg, Oral, Daily  atorvastatin (LIPITOR) tablet 40 mg, 40 mg, Oral, Nightly  calcium elemental (OSCAL) tablet 500 mg, 500 mg, Oral, Daily  lisinopril (PRINIVIL;ZESTRIL) tablet 40 mg, 40 mg, Oral, Daily  metoprolol succinate (TOPROL XL) extended release tablet 25 mg, 25 mg, Oral, Daily  oxybutynin (DITROPAN) tablet 5 mg, 5 mg, Oral, TID  potassium bicarb-citric acid (EFFER-K) effervescent tablet 20 mEq, 20 mEq, Oral, BID  vitamin D3 (CHOLECALCIFEROL) tablet 400 Units, 400 Units, Oral, Daily  sodium chloride flush 0.9 % injection 10 mL, 10 mL, Intravenous, 2 times per day  sodium chloride flush 0.9 % injection 10 mL, 10 mL, Intravenous, PRN  promethazine (PHENERGAN) tablet 12.5 mg, 12.5 mg, Oral, Q6H PRN **OR** ondansetron (ZOFRAN) injection 4 mg, 4 mg, Intravenous, Q6H PRN  polyethylene glycol (GLYCOLAX) packet 17 g, 17 g, Oral, Daily PRN  acetaminophen (TYLENOL) tablet 650 mg, 650 mg, Oral, Q6H PRN **OR** acetaminophen (TYLENOL) suppository 650 mg, 650 mg, Rectal, Q6H PRN  0.9 % sodium chloride infusion, , Intravenous, Continuous  enoxaparin (LOVENOX) injection 120 mg, 1 mg/kg, Subcutaneous, BID  Prior to Admission medications    Medication Sig Start Date End Date Taking?  Authorizing Provider   docusate sodium (COLACE) 100 MG capsule Take 1 capsule by mouth 2 times daily 12/16/20 1/15/21  Stephanie Phillips MD   amLODIPine (NORVASC) 10 MG tablet TAKE 1 TABLET BY MOUTH ONCE DAILY FOR 90 DAYS 10/8/20   Historical Provider, MD   atorvastatin (LIPITOR) 40 MG tablet  10/29/20   Historical Provider, MD   oxybutynin (DITROPAN) 5 MG tablet TAKE 1 TABLET BY MOUTH EVERY 8 HOURS 7/31/20   Historical Provider, MD   vitamin D3 (CHOLECALCIFEROL) 400 units TABS tablet Take 400 Units by mouth daily    Historical Provider, MD   Flaxseed Oil OIL 1,400 mg by Does not apply route daily    Historical Provider, MD   calcium carbonate 600 MG TABS tablet Take 1 tablet by mouth daily    Historical Provider, MD   cimetidine (TAGAMET HB) 200 MG tablet Take 200 mg by mouth as needed    Historical Provider, MD   latanoprost (XALATAN) 0.005 % ophthalmic solution 1 drop nightly    Historical Provider, MD   meloxicam (MOBIC) 15 MG tablet Take 15 mg by mouth daily    Historical Provider, MD   metoprolol succinate (TOPROL XL) 25 MG extended release tablet Take 25 mg by mouth daily    Historical Provider, MD   rivaroxaban (XARELTO) 20 MG TABS tablet Take 20 mg by mouth    Historical Provider, MD   lisinopril (PRINIVIL;ZESTRIL) 20 MG tablet Take 40 mg by mouth daily     Historical Provider, MD   potassium chloride (KLOR-CON) 20 MEQ packet Take 20 mEq by mouth 2 times daily    Historical Provider, MD        Allergies:  Patient has no known allergies. Social History:   TOBACCO:   reports that she has never smoked. She has never used smokeless tobacco.  ETOH:   reports previous alcohol use. DRUGS:   reports no history of drug use. Family History:    History reviewed. No pertinent family history. REVIEW OF SYSTEMS:  CONSTITUTIONAL:  positive for  fatigue and malaise  HEENT:  negative  RESPIRATORY:  negative  CARDIOVASCULAR:  negative  GASTROINTESTINAL:  negative except for nausea and vomiting  GENITOURINARY:  negative  HEMATOLOGIC/LYMPHATIC:  negative  NEUROLOGICAL:  Negative  * All other ROS reviewed and negative. PHYSICAL EXAM:  VITALS:  /63   Pulse 68   Temp 97.6 °F (36.4 °C) (Oral)   Resp 21   Ht 5' 8\" (1.727 m)   Wt 259 lb 14.4 oz (117.9 kg)   SpO2 99%   BMI 39.52 kg/m²   24HR INTAKE/OUTPUT:    No intake/output data recorded. I/O this shift:   In: 800 [I.V.:800]  Out: -     CONSTITUTIONAL:  alert, no apparent distress and moderately obese  EYES:  PERRL, sclera clear  ENT:  Normocephalic,atraumatic, without obvious abnormality  NECK: supple, symmetrical, trachea midline  LUNGS: Resp effort easy and unlabored, clear to auscultation  CARDIOVASCULAR:  NO JVD, regular rate and rhythm and no murmur noted  ABDOMEN:  normal bowel sounds, soft, non-distended, non-tender, voluntary guarding absent, no masses palpated and inguinal hernias spontaneously reduced  MUSCULOSKELETAL: No clubbing or cyanosis, 1+ pitting edema lower extremities  NEUROLOGIC:  Mental Status Exam:  Level of Alertness:   awake  PSYCHIATRIC:   person, place, time  SKIN:  no bruising or bleeding, normal skin color, texture, turgor and no redness, warmth, or swelling    DATA:    CBC:   Recent Labs     12/28/20  1246 12/29/20  0702   WBC 6.3 5.7   HGB 13.3 13.3   HCT 40.8 41.0    216     BMP:    Recent Labs     12/28/20  1246 12/29/20  0702    137   K 4.0 3.6    102   CO2 22 22   BUN 8 8   CREATININE 0.6 <0.5*   GLUCOSE 63* 76     Hepatic:   Recent Labs     12/28/20  1246   AST 16   ALT 11   BILITOT 0.7   ALKPHOS 102     Mag:    No results for input(s): MG in the last 72 hours. Phos:   No results for input(s): PHOS in the last 72 hours. INR: No results for input(s): INR in the last 72 hours. Radiology Review: Images personally reviewed by me. CT chest shows fat containing anterior diaphragmatic hernia      IMPRESSION/RECOMMENDATIONS:    Nausea and vomiting. Etiology uncertain but no sign of bowel obstruction or abdominal pain that would indicate a process needing surgery. Her Morgagni hernia only contains fat. OK to try diet.  Recommend outpt referral to Dr. Annamaria Wasserman at the [facility] for complex abdominal wall reconstruction    Electronically signed by Teresita Contreras MD     [address] Surgery  [id number]

## 2020-12-29 NOTE — PROGRESS NOTES
Pt resting in bed with eyes closed. Resps e/e. No s/s of distress noted at this time. Call light within reach.

## 2020-12-29 NOTE — OP NOTE
fundus, the body, left undisturbed c/w fundic gland polyps    Specimens: Was Not Obtained    Complications:   None; patient tolerated the procedure well. Disposition:   PACU - hemodynamically stable. Estimated Blood loss:  none    Impression:   -See post-procedure diagnoses. Recommendations:  -Continue acid suppression. -GERD diet: avoid carbonated/acid beverages, fried and fatty foods. peppermint, chocolate, alcohol, coffee, citrus fruits and juices, tomoato products; avoid lying down for 2 to 3 hours after eating, avoid tight fitting clothing. Weight loss frequent helps heartburn/reflux especially with the presence of a hiatal hernia. Low residue diet.  -Should sit upright the majority of the day. -KUB to rule out ileus or any other cause. MYLENE MORRISON 12/29/20 8:15 AM EST    SUMMARY AND RECOMMENDATIONS  ? Fundic gland polyps may occur sporadically, in association with polyposis syndromes, or in response to chronic PPI therapy. They have very low to no malignant potential when associated with PPI use or occurring sporadically. Fundic gland polyps associated with polyposis syndromes have been associated with dysplasia but progression to malignancy is rare. Fundic gland polyps ? 1 cm in diameter, polyps with ulceration, or located in the antrum should be resected. In patients with multiple (?20) or large (?1 cm) sporadic fundic gland polyps, withdrawal of the PPI should be considered.

## 2020-12-29 NOTE — PROGRESS NOTES
Consult has been called to Dr. Jo Valdez on 12/29/20. Spoke with christy.  10:22 AM    Nicole Holding  12/29/2020

## 2020-12-29 NOTE — PROGRESS NOTES
Writer called [employer] to give report on pt. Writer told RN that pt needs to follow up with Dr. Omar Waite at the [facility] for complex abdominal wall reconstruction. Also that pt has refused all medications today. No questions/concerns at this time.  Yanique Araya RN

## 2020-12-29 NOTE — PROGRESS NOTES
4 Eyes Skin Assessment     The patient is being assess for   Admission    I agree that 2 RN's have performed a thorough Head to Toe Skin Assessment on the patient. ALL assessment sites listed below have been assessed. Areas assessed by both nurses:   [x]   Head, Face, and Ears   [x]   Shoulders, Back, and Chest, Abdomen  [x]   Arms, Elbows, and Hands   [x]   Coccyx, Sacrum, and Ischium  [x]   Legs, Feet, and Heels        Pt has scattered stretch marks on abdomen  Pt has scattered bruising on upper extremities  Pt has a few stage 1 wounds on the buttocks  Pt has pale, flaky heels and feet bilat +4 pitting Edema bilat   Surgical scar noted RLE from a R tib fracture                SHARE this note so that the co-signing nurse is able to place an eSignature:  Droplet Plus isolation     Does the Patient have Skin Breakdown?   GONSALO Osullivan Prevention initiated:  GONSALO   Wound Care Orders initiated:  GONSALO      WOC nurse consulted for Pressure Injury (Stage 3,4, Unstageable, DTI, NWPT, Complex wounds)and New or Established Ostomies:  NA      Primary Nurse eSignature: Electronically signed by Eileen Constantino RN on 12/28/20 at 7:58 PM EST

## 2020-12-29 NOTE — DISCHARGE SUMMARY
level, cortisol a.m.     COVID-19 infection  -Recent admission with discharge on 12/16 for Covid infection  -Did not have associated hypoxia did not require Decadron, remdesivir or CCP  -Continued droplet plus precautions     Pericardial fat pad/cyst vs mass   - noted on CXR during last admission   - was recommended for CT chest to rule out mass, was ordered not completed--> CT chest on admission shows diaphragmatic hernia, see below     Paroxysmal atrial fibrillation  -EKG with NSR  -Continued dose Lovenox (Xarelto held) and beta-blocker  -Rates controlled     History of DVT/PE  -On Xarelto at home--> held for now, anticoagulate with full dose Lovenox     Recent tib-fib fracture  -Status post ORIF on 11/6  -Currently in skilled nursing facility      HTN  - BP is well controlled  - Continued Norvasc, BB, ACE-I      Abnormal EKG  -Was noted on last admission with anterior lateral T wave inversion changes  -No associated acute ACS symptoms at that time  -EKG with continued anterior lateral T wave changes  -Check and trend troponins. Negative.      DVT Prophylaxis: Lovenox full dose    Procedures (Please Review Full Report for Details)  EGD: Findings: -normal esophagus, stomach, and duodenum  -Medium to large (5 cm) sliding hiatal hernia. -polyp, many 2-3 mm in size, located in the fundus, the body, left undisturbed c/w fundic gland polyps    Consults    GI  General surgery      Physical Exam at Discharge:    /76   Pulse 76   Temp 97.8 °F (36.6 °C) (Oral)   Resp 18   Ht 5' 8\" (1.727 m)   Wt 259 lb 14.4 oz (117.9 kg)   SpO2 96%   BMI 39.52 kg/m²      Gen: Elderly female, appears older than stated age no distress. Alert. Eyes: PERRL. No conjunctival injection. ENT: No discharge. Pharynx clear. Neck: Trachea midline. Resp: No accessory muscle use. No crackles. No wheezes. No rhonchi. CV: Regular rate. Regular rhythm. No murmur. No rub. + edema.    Capillary Refill: Brisk,< 3 seconds   Peripheral

## 2020-12-29 NOTE — H&P
[address] ,  [address] [facility], [facility]  Phone: [phone]     CHIEF COMPLAINT           Chief Complaint   Patient presents with    New Patient       vomiting      HPI (location/symptom, timing/onset, duration, quality/severity, context, modifying factors, and associated signs/symptoms)      Thank you Blake Clay MD for asking me to see Milvia Long in consultation. She is a  [5] White [1] 63 y.o. Stann Marsha female seen with transport who presents with the following GI complaints:     Kezia Lopez  Is brought in from the nursing home today. She states she has not been able to keep anything down for weeks including water. Available antiemetics are not working. No associated heartburn or dysphagia. Has lost 70lbs. No hematemesis. Was previously taking mobic but not currently on her med rec. Takes xarelto for h/o blood clots. No pertinent GI family history. Had a positive covid 2 weeks ago without symptoms.     Last Encounter Reviewed:   Pertinent PMH, FH, SH is reviewed below. Last EGD: none  Last Colonoscopy: none     Review of available records reveals:       Wt Readings from Last 50 Encounters:   12/28/20 230 lb (104.3 kg)   12/28/20 237 lb (107.5 kg)   12/14/20 237 lb (107.5 kg)   11/20/20 (!) 308 lb (139.7 kg)   11/09/20 (!) 308 lb 13.8 oz (140.1 kg)   10/30/20 290 lb (131.5 kg)   12/01/17 230 lb (104.3 kg)   09/02/16 230 lb (104.3 kg)         No components found for: HGBA1C      BP Readings from Last 3 Encounters:   12/28/20 122/81   12/28/20 123/88   12/16/20 118/87           Health Maintenance   Topic Date Due    Hepatitis C screen  1960    Lipid screen  11/01/1969    HIV screen  11/01/1974    Cervical cancer screen  11/01/1980    Breast cancer screen  11/01/2009    Shingles Vaccine (1 of 2) 11/01/2009    Colon cancer screen colonoscopy  11/01/2009    Flu vaccine (1) 09/01/2020    Potassium monitoring  12/28/2021    Creatinine monitoring  12/28/2021    DTaP/Tdap/Td vaccine (3 - Td) 06/15/2030    Hepatitis A vaccine  Aged Out    Hepatitis B vaccine  Aged Out    Hib vaccine  Aged Out    Meningococcal (ACWY) vaccine  Aged Out    Pneumococcal 0-64 years Vaccine  Aged Out         No components found for: [employer]      PAST MEDICAL HISTORY      Past Medical History        Past Medical History:   Diagnosis Date    CHF (congestive heart failure) ([facility] [address]      COVID-19 12/15/2020    HTN (hypertension)      UTI (urinary tract infection)           FAMILY HISTORY   Family History   No family history on file. SOCIAL HISTORY      Social History               Socioeconomic History    Marital status:         Spouse name: Not on file    Number of children: Not on file    Years of education: Not on file    Highest education level: Not on file   Occupational History    Not on file   Social Needs    Financial resource strain: Not on file    Food insecurity       Worry: Not on file       Inability: Not on file    Transportation needs       Medical: Not on file       Non-medical: Not on file   Tobacco Use    Smoking status: Never Smoker    Smokeless tobacco: Never Used   Substance and Sexual Activity    Alcohol use: Not Currently    Drug use: Never    Sexual activity: Not on file   Lifestyle    Physical activity       Days per week: Not on file       Minutes per session: Not on file    Stress: Not on file   Relationships    Social connections       Talks on phone: Not on file       Gets together: Not on file       Attends Restorationist service: Not on file       Active member of club or organization: Not on file       Attends meetings of clubs or organizations: Not on file       Relationship status: Not on file    Intimate partner violence       Fear of current or ex partner: Not on file       Emotionally abused: Not on file       Physically abused: Not on file       Forced sexual activity: Not on file   Other Topics Concern    Not on file   Social History Narrative    Not on file         SURGICAL HISTORY      Past Surgical History   Past Surgical History:   Procedure Laterality Date    BLADDER REPAIR         SECTION        CHOLECYSTECTOMY        CYSTOSCOPY        TIBIA FRACTURE SURGERY Right 2020     RIGHT TIBIA IM NAIL INSERTION performed by Odessa Gates MD at [address]   (This list may include medications prescribed during this encounter as epic can not insert only the list prior to this encounter.)  Current Outpatient Rx   Current Outpatient Rx   Medication Sig Dispense Refill    docusate sodium (COLACE) 100 MG capsule Take 1 capsule by mouth 2 times daily 60 capsule 0    amLODIPine (NORVASC) 10 MG tablet TAKE 1 TABLET BY MOUTH ONCE DAILY FOR 90 DAYS        atorvastatin (LIPITOR) 40 MG tablet          oxybutynin (DITROPAN) 5 MG tablet TAKE 1 TABLET BY MOUTH EVERY 8 HOURS        vitamin D3 (CHOLECALCIFEROL) 400 units TABS tablet Take 400 Units by mouth daily        Flaxseed Oil OIL 1,400 mg by Does not apply route daily        calcium carbonate 600 MG TABS tablet Take 1 tablet by mouth daily        cimetidine (TAGAMET HB) 200 MG tablet Take 200 mg by mouth as needed        latanoprost (XALATAN) 0.005 % ophthalmic solution 1 drop nightly        meloxicam (MOBIC) 15 MG tablet Take 15 mg by mouth daily        metoprolol succinate (TOPROL XL) 25 MG extended release tablet Take 25 mg by mouth daily        rivaroxaban (XARELTO) 20 MG TABS tablet Take 20 mg by mouth        lisinopril (PRINIVIL;ZESTRIL) 20 MG tablet Take 40 mg by mouth daily         potassium chloride (KLOR-CON) 20 MEQ packet Take 20 mEq by mouth 2 times daily             ALLERGIES   No Known Allergies  IMMUNIZATIONS      There is no immunization history on file for this patient.   REVIEW OF SYSTEMS (2-9 systems for level 4, 10 or more for level 5)   See HPI for further details and pertinent postiives. Negative for the following:  Constitutional: Negative for weight change. Negative for appetite change and fatigue. HENT: Negative for nosebleeds, sore throat, mouth sores, and voice change. Respiratory: Negative for cough, choking and chest tightness. Cardiovascular: Negative for chest pain   Gastrointestinal: No abdominal pain, heartburn, bloating, dysphagia, cough, chest pain, globus, regurgitation, diarrhea, constipation. Positive for n/v, weight loss. Musculoskeletal: Negative for arthralgias. Skin: Negative for pallor. Neurological: Negative for weakness and light-headedness. Hematological: Negative for adenopathy. Does not bruise/bleed easily. Psychiatric/Behavioral: Negative for suicidal ideas. PHYSICAL EXAM   VITAL SIGNS: /88 (Site: Right Wrist)   Pulse 89   Temp 96.9 °F (36.1 °C)   Ht 5' 8\" (1.727 m)   Wt 237 lb (107.5 kg)   BMI 36.04 kg/m²       Wt Readings from Last 3 Encounters:   12/28/20 230 lb (104.3 kg)   12/28/20 237 lb (107.5 kg)   12/14/20 237 lb (107.5 kg)      Constitutional: Well developed, Well nourished, No acute distress, Non-toxic appearance. HENT: Normocephalic, Atraumatic, Bilateral external ears normal, Oropharynx moist, No oral exudates, Nose normal.   Eyes: Conjunctiva normal, No discharge. Neck: Normal range of motion, No tenderness, Supple, No stridor. Lymphatic: No cervical, subclavian, or axillary lymphadenopathy. Cardiovascular: Normal heart rate, Normal rhythm, No murmurs, No rubs, No gallops. Thorax & Lungs: Normal breath sounds, No respiratory distress, No wheezing, No chest tenderness. No gynecomastia. Abdomen/GI: scars consistent with stated surgeries, no hernias, no HSM, soft NTND   Rectal:  Deferred. Skin: Warm, Dry, No erythema, No rash. No bruising. No spider hemangiomas. Back: No tenderness, No CVA tenderness. Lower Extremities: Intact distal pulses, No edema, No tenderness, No cyanosis, No clubbing.   Neurologic: Alert & oriented x 3, Normal motor function, Normal sensory function, No focal deficits noted. No asterixis. RADIOLOGY/PROCEDURES      CT CHEST WO CONTRAST  Narrative: EXAMINATION:  CT OF THE CHEST WITHOUT CONTRAST 12/28/2020 4:35 pm    TECHNIQUE:  CT of the chest was performed without the administration of intravenous  contrast. Multiplanar reformatted images are provided for review. Dose  modulation, iterative reconstruction, and/or weight based adjustment of the  mA/kV was utilized to reduce the radiation dose to as low as reasonably  achievable. COMPARISON:  12/14/2020    HISTORY:  ORDERING SYSTEM PROVIDED HISTORY: CXR with pericardia fat pad vs mass  TECHNOLOGIST PROVIDED HISTORY:  Reason for exam:->CXR with pericardia fat pad vs mass  Reason for Exam: CXR with pericardia fat pad vs mass  Acuity: Unknown  Type of Exam: Unknown    FINDINGS:  Mediastinum: Coronary artery calcifications are a marker of atherosclerosis. There are no enlarged thoracic lymph nodes. The thyroid gland is  heterogeneous. Debris is present within the mid esophagus. Corresponding to the abnormal chest radiograph, there is an unchanged right  anterior diaphragmatic hernia containing mesenteric fat extending into the  right cardiophrenic space. The mouth of the hernia measures 4.2 cm, best  demonstrated in the coronal projection. No change in the calcification  within the right cardiophrenic space likely due to fat necrosis. Lungs/pleura: The tracheobronchial tree is patent. There is no pneumothorax  or pleural effusion. There is bibasilar scarring. Upper Abdomen: No change in the small to moderate hiatal hernia. Status post  cholecystectomy. No change in the 2.0 x 2.3 cm indeterminate left adrenal  mass. Soft Tissues/Bones: Degenerative changes involve the thoracic spine. Impression: 1.  Right diaphragmatic hernia containing mesenteric fat extending into the  right cardiophrenic space, corresponding with the abnormal chest radiograph.     A/P  Weight loss, n/v - EGD            Shae MORRISON 12/29/20 8:02 AM EST

## 2020-12-29 NOTE — DISCHARGE INSTR - COC
Continuity of Care Form    Patient Name: Kam Novoa   :  1960  MRN:  [id number]    Admit date:  2020  Discharge date:  2020    Code Status Order: Full Code   Advance Directives:   [facility] Documentation       Date/Time Healthcare Directive Type of Healthcare Directive Copy in [address] Agent's Name Healthcare Agent's Phone Number    [phone]  No, patient does not have an advance directive for healthcare treatment -- -- -- -- --            Admitting Physician:  Marilin Ramey MD  PCP: Saurabh Basilio MD    Discharging Nurse: Shruthi Best Unit/Room#: 8169/0895-75  Discharging Unit Phone Number: [phone]    Emergency Contact:   Extended Emergency Contact Information  Primary Emergency Contact: Grover Faria   [address] Phone: [phone]  Mobile Phone: [phone]  Relation: Parent    Past Surgical History:  Past Surgical History:   Procedure Laterality Date    BLADDER REPAIR       SECTION      CHOLECYSTECTOMY      CYSTOSCOPY      [address] Right 2020    RIGHT TIBIA IM NAIL INSERTION performed by Tia Vaughn MD at [facility] 2020    EGD W/ANES. (8:00) COVID + performed by Radha Christensen MD at [address] [place] [address]       Immunization History: There is no immunization history on file for this patient.     Active Problems:  Patient Active Problem List   Diagnosis Code    Pubic ramus fracture ([facility] [address] S32.599A    Right sided sciatica M54.31    Degenerative tear of lateral meniscus of right knee M23.300    Primary osteoarthritis of right knee M17.11    Lateral knee pain M25.569    Primary osteoarthritis of left knee M17.12    Right medial tibial plateau fracture, closed, initial encounter S82.131A    HTN (hypertension) I10    Morbid obesity ([address] E66.01    HLD (hyperlipidemia) E78.5    Bacterial

## 2020-12-29 NOTE — CARE COORDINATION
Case Management Assessment  Initial Evaluation      Patient Name: Brad Cheng  YOB: 1960  Diagnosis: Nausea and vomiting [R11.2]  Date / Time: 12/28/2020 11:15 AM    Admission status/Date: 12/28/2020 Observation   Chart Reviewed: Yes      Patient Interviewed: Yes via call to bedside phone due to covid-19 isolation   Family Interviewed:  No      Hospitalization in the last 30 days:  Yes from 12/15/2020-12/16/2020 with UTI. Pt discharged to [facility] on 12/16/2020      Health Care Decision Maker :     (CM - must 1st enter selection under Navigator - emergency contact- Health Care Decision Maker Relationship and pick relationship)   Who do you trust or have selected to make healthcare decisions for you      Met with: pt  Interview conducted  (bedside/phone): phone    Current PCP: Dr. Anson Golden (unsure of spelling) in [place] [facility] required for SNF : Y  -unless waived due to covid-19        3 night stay required -  [employer]  Support Systems/Care Needs:  family  Transportation: EMS transportation    Meal Preparation: facility     Housing  Living Arrangements: pt was at [facility] skilled pta.  Prior to SNF, she lives alone  Steps: unknown  Intent for return to present living arrangements: pt was not clear  Identified Issues: none    Home Care Information  Active with Home Health Care : No Agency:(Services)     Passport/Waiver : No  :                      Phone Number:    Passport/Waiver Services: n/a          Durable Medical Equiptment   DME Provider:   Equipment:   Walker_x__Cane___RTS___ BSC___Shower Chair___Hospital Bed___W/C____Other________  02 at ____Liter(s)---wears(frequency)_______ HHN ___ CPAP___ BiPap___   N/A____      Home O2 Use :  No    If No for home O2---if presently on O2 during hospitalization:  No  if yes CM to follow for potential DC O2 need  Informed of need for care provider to bring portable home O2 tank on day of discharge for nursing to connect prior to leaving:   Not Indicated  Verbalized agreement/Understanding:   Not Indicated    Community Service Affiliation  Dialysis:  No    · Agency:  · Location:  · Dialysis Schedule:  · Phone:   · Fax: Other Community Services: n/a    DISCHARGE PLAN: Explained Case Management role/services. Chart review completed. Pt is a readmission and is covid19 +. Called and spoke with pt via call to bedside phone. Pt at first was not wanting to answer the questions but then state she would speak with writer. Pt kept stating \"I need to go to [facility]". Inquired if she met the swing unit or the hospital. She did not answer. Inquired if she knew where she was and she was able to state she was at [facility]. Explained CM would provide her with another SNF list to review if she is wanting a different SNF. Pt stated okay but did not give further information. Pt did state no preference on the transportation company when writer inquired on discharge to a SNF. Spoke with pt's RN who was made aware of the above. She stated she would speak with pt about [facility] to see what she met. She will take pt a new SNF list to review. PT/OT will be needed for SNF. Please notify CM if needs or concerns arise. CM will follow.

## 2020-12-29 NOTE — PROGRESS NOTES
4 Eyes Skin Assessment     The patient is being assess for   transfer back to [facility]    I agree that 2 RN's have performed a thorough Head to Toe Skin Assessment on the patient. ALL assessment sites listed below have been assessed. Areas assessed by both nurses:   [x]   Head, Face, and Ears   [x]   Shoulders, Back, and Chest, Abdomen  [x]   Arms, Elbows, and Hands   [x]   Coccyx, Sacrum, and Ischium  [x]   Legs, Feet, and Heels        Pt has scattered stretch marks on abdomen  Pt has scattered bruising on upper extremities  Pt has clustered areas to each buttocks, stage 1. Pt has pale, flaky heels.     Surgical scar noted RLE from a R tib fracture        **SHARE this note so that the co-signing nurse is able to place an eSignature**    Co-signer eSignature: Electronically signed by Joan Ruano RN on 12/29/20 at 4:58 PM EST    Does the Patient have Skin Breakdown? yes         Abran Prevention initiated:  Yes   Wound Care Orders initiated:  Yes      [address]  nurse consulted for Pressure Injury (Stage 3,4, Unstageable, DTI, NWPT, Complex wounds)and New or Established Ostomies:  NA      Primary Nurse eSignature: Electronically signed by Harika Mendoza RN on 12/29/20 at 5:03 PM EST

## 2020-12-29 NOTE — DISCHARGE INSTR - DIET

## 2020-12-29 NOTE — CARE COORDINATION
DISCHARGE ORDER  Date/Time 2020 3:50 PM  Completed by: Dafne Ceron, Case Management    Patient Name: Cordell Altman    : 1960      Admit order Date and Status: 20 inpt  Noted discharge order. (verify MD's last order for status of admission/Traditional Medicare 3 [place] Inpatient qualifying stay required for SNF)    Confirmed discharge plan with:              Patient:  Yes              When pt confirms DC plan does any support person need to be contacted by CM No             Discharge to Facility: [address] [place] [address] phone number for staff giving report: [phone]   Pre-certification completed: N/A waived   Hospital Exemption Notification (HENS) completed: N/A came from SNF   Discharge orders and Continuity of Care faxed to facility:  Yes     Transportation:               Medical Transport explained with choice list offered to pt/family. Choice:(no preference)  Agency used: Reyna Nyhan up time:   18:45     Pt/family/Nursing/Facility aware of  time: Yes Names:  Erlinda German , [address] [place] [address], Pt and Lincoln Granados at [employer] form completed:  Yes:      Comments: Order for dc noted. Spoke with pt who would like ACM. Spoke with [facility] who states can take covid pt but no bed and also out of network with insurance. Discussed with pt who will return [email or web address] . Spoke with Lincoln Granados who states can accept back today. Chart reviewed and no other dc needs identified. Pt is being d/c'd to home today. Pt's O2 sats are 99% on RA. Discharge timeout done with  Nsg, CM and pr. All discharge needs and concerns addressed. Discharging nurse to complete ERNA, reconcile AVS, and place final copy with patient's discharge packet.  Discharging RN to ensure that written prescriptions for  Level II medications are sent with patient to the facility as per protocol.

## 2020-12-29 NOTE — PROGRESS NOTES
Called report to floor, spoke to [employer] the nurse in charge of care. Will continue to monitor.

## 2020-12-29 NOTE — PROGRESS NOTES
Pt requesting tums for heartburn and nausea medication. Writer administered PRN Tums and zofran.  Florencio Souza RN

## 2020-12-29 NOTE — PROGRESS NOTES
Consult has been called to Dr. vJ Ruby on 12/29/20. Spoke with corry.  10:21 AM    Anoop Balderas  12/29/2020

## 2020-12-29 NOTE — ANESTHESIA POSTPROCEDURE EVALUATION
Department of Anesthesiology  Postprocedure Note    Patient: Franky Mccoy  MRN: [id number]  YOB: 1960  Date of evaluation: 12/29/2020    Procedure Summary     Date: 12/29/20 Room / Location: [address] [place] [address] / [facility]    Anesthesia Start: 2041 Anesthesia Stop: 2942    Procedure: EGD W/ANES. (8:00) COVID + (N/A ) Diagnosis: (?)    Surgeons: Adam Felix MD Responsible Provider: Nadeem Urbina MD    Anesthesia Type: TIVA, General ASA Status: 3          Anesthesia Type: TIVA, General    Shelly Phase I: Shelly Score: 10    Shelly Phase II: Shelly Score: 10    Last vitals: Reviewed and per EMR flowsheets.        Anesthesia Post Evaluation   Anesthetic Problems: no   Cardiovascular System Stable: yes  Respiratory Function: Airway Patent yes  ETT no  Ventilator no  Level of consciousness: awake, alert and oriented  Post-op pain: adequate analgesia  Hydration Adequate: yes  Nausea/Vomiting:no  Other Issues:     Grupo Fernandez MD

## 2020-12-29 NOTE — PLAN OF CARE
Problem: Falls - Risk of:  Goal: Will remain free from falls  Description: Will remain free from falls  12/28/2020 2035 by Jeri Kamara RN  Outcome: Ongoing  12/28/2020 1920 by Viral Rose RN  Outcome: Ongoing  Goal: Absence of physical injury  Description: Absence of physical injury  12/28/2020 2035 by Jeri Kamara RN  Outcome: Ongoing  12/28/2020 1920 by Viral Rose RN  Outcome: Ongoing     Problem: Airway Clearance - Ineffective  Goal: Achieve or maintain patent airway  12/28/2020 2035 by Jeri Kamara RN  Outcome: Ongoing  12/28/2020 1920 by Viral Rose RN  Outcome: Ongoing     Problem: Gas Exchange - Impaired  Goal: Absence of hypoxia  12/28/2020 2035 by Jeri Kamara RN  Outcome: Ongoing  12/28/2020 1920 by Viral Rose RN  Outcome: Ongoing  Goal: Promote optimal lung function  12/28/2020 2035 by Jeri Kamraa RN  Outcome: Ongoing  12/28/2020 1920 by Viral Rose RN  Outcome: Ongoing     Problem: Breathing Pattern - Ineffective  Goal: Ability to achieve and maintain a regular respiratory rate  12/28/2020 2035 by Jeri Kamara RN  Outcome: Ongoing  12/28/2020 1920 by Viral Rose RN  Outcome: Ongoing     Problem:  Body Temperature -  Risk of, Imbalanced  Goal: Ability to maintain a body temperature within defined limits  12/28/2020 2035 by Jeri Kamara RN  Outcome: Ongoing  12/28/2020 1920 by Viral Rose RN  Outcome: Ongoing  Goal: Will regain or maintain usual level of consciousness  12/28/2020 2035 by Jeri Kamara RN  Outcome: Ongoing  12/28/2020 1920 by Viral Rose RN  Outcome: Ongoing  Goal: Complications related to the disease process, condition or treatment will be avoided or minimized  12/28/2020 2035 by Jeri Kamara RN  Outcome: Ongoing  12/28/2020 1920 by Viral Rose RN  Outcome: Ongoing     Problem: Isolation Precautions - Risk of Spread of Infection  Goal: Prevent transmission of infection  12/28/2020 2035 by Jeri Kamara RN  Outcome: Ongoing  12/28/2020 1920 by Michelle Graham RN  Outcome: Ongoing     Problem: Nutrition Deficits  Goal: Optimize nutrtional status  12/28/2020 2035 by Adarsh Ash RN  Outcome: Ongoing  12/28/2020 1920 by Michelle Graham RN  Outcome: Ongoing     Problem: Risk for Fluid Volume Deficit  Goal: Maintain normal heart rhythm  12/28/2020 2035 by Adarsh Ash RN  Outcome: Ongoing  12/28/2020 1920 by Michelle Graham RN  Outcome: Ongoing  Goal: Maintain absence of muscle cramping  12/28/2020 2035 by Adarsh Ash RN  Outcome: Ongoing  12/28/2020 1920 by Michelle Graham RN  Outcome: Ongoing  Goal: Maintain normal serum potassium, sodium, calcium, phosphorus, and pH  12/28/2020 2035 by Adarsh Ash RN  Outcome: Ongoing  12/28/2020 1920 by Michelle Graham RN  Outcome: Ongoing     Problem: Loneliness or Risk for Loneliness  Goal: Demonstrate positive use of time alone when socialization is not possible  12/28/2020 2035 by Adarsh Ash RN  Outcome: Ongoing  12/28/2020 1920 by Micehlle Graham RN  Outcome: Ongoing     Problem: Fatigue  Goal: Verbalize increase energy and improved vitality  12/28/2020 2035 by Adarsh Ash RN  Outcome: Ongoing  12/28/2020 1920 by Michelle Graham RN  Outcome: Ongoing     Problem: Patient Education: Go to Patient Education Activity  Goal: Patient/Family Education  12/28/2020 2035 by Adarsh Ash RN  Outcome: Ongoing  12/28/2020 1920 by Michelle Graham RN  Outcome: Ongoing     Problem: Nausea/Vomiting:  Goal: Absence of nausea/vomiting  Description: Absence of nausea/vomiting  Outcome: Ongoing  Goal: Able to drink  Description: Able to drink  Outcome: Ongoing  Goal: Able to eat  Description: Able to eat  Outcome: Ongoing  Goal: Ability to achieve adequate nutritional intake will improve  Description: Ability to achieve adequate nutritional intake will improve  Outcome: Ongoing

## 2020-12-29 NOTE — PLAN OF CARE
Problem: Falls - Risk of:  Goal: Will remain free from falls  Description: Will remain free from falls  Outcome: Ongoing  Goal: Absence of physical injury  Description: Absence of physical injury  Outcome: Ongoing     Problem: Airway Clearance - Ineffective  Goal: Achieve or maintain patent airway  Outcome: Ongoing     Problem: Gas Exchange - Impaired  Goal: Absence of hypoxia  Outcome: Ongoing  Goal: Promote optimal lung function  Outcome: Ongoing     Problem: Breathing Pattern - Ineffective  Goal: Ability to achieve and maintain a regular respiratory rate  Outcome: Ongoing     Problem:  Body Temperature -  Risk of, Imbalanced  Goal: Ability to maintain a body temperature within defined limits  Outcome: Ongoing  Goal: Will regain or maintain usual level of consciousness  Outcome: Ongoing  Goal: Complications related to the disease process, condition or treatment will be avoided or minimized  Outcome: Ongoing     Problem: Isolation Precautions - Risk of Spread of Infection  Goal: Prevent transmission of infection  Outcome: Ongoing     Problem: Nutrition Deficits  Goal: Optimize nutrtional status  Outcome: Ongoing     Problem: Risk for Fluid Volume Deficit  Goal: Maintain normal heart rhythm  Outcome: Ongoing  Goal: Maintain absence of muscle cramping  Outcome: Ongoing  Goal: Maintain normal serum potassium, sodium, calcium, phosphorus, and pH  Outcome: Ongoing     Problem: Loneliness or Risk for Loneliness  Goal: Demonstrate positive use of time alone when socialization is not possible  Outcome: Ongoing     Problem: Fatigue  Goal: Verbalize increase energy and improved vitality  Outcome: Ongoing     Problem: Patient Education: Go to Patient Education Activity  Goal: Patient/Family Education  Outcome: Ongoing     Problem: Nausea/Vomiting:  Goal: Absence of nausea/vomiting  Description: Absence of nausea/vomiting  Outcome: Ongoing  Goal: Able to drink  Description: Able to drink  Outcome: Ongoing  Goal: Able to eat  Description: Able to eat  Outcome: Ongoing  Goal: Ability to achieve adequate nutritional intake will improve  Description: Ability to achieve adequate nutritional intake will improve  Outcome: Ongoing     Problem: Skin Integrity:  Goal: Will show no infection signs and symptoms  Description: Will show no infection signs and symptoms  Outcome: Ongoing  Goal: Absence of new skin breakdown  Description: Absence of new skin breakdown  Outcome: Ongoing   Aj Britt RN

## 2020-12-29 NOTE — FLOWSHEET NOTE
12/28/20 1739   Vital Signs   Temp 97.7 °F (36.5 °C)   Temp Source Oral   Pulse 77   Heart Rate Source Monitor   Resp 20   /87   BP Location Left upper arm   Level of Consciousness Alert (0)   MEWS Score 1   Patient Currently in Pain Denies     Pt arrived on 2W via stretcher. VS stable. No questions/concerns at this time. Call light in reach.  Sukumar Washington RN
12/29/20 0915   Vital Signs   Temp 97.6 °F (36.4 °C)   Temp Source Oral   Pulse 68   Resp 21   /63   BP Location Right upper arm   Patient Position Lying left side   Level of Consciousness Alert (0)   MEWS Score 2   Patient Currently in Pain Denies     Pt received back on 2W from ENDO. VS stable. Pt refusing all morning medications due to being nauseous and having heartburn. Pt has no other complaints at this time. Call light in reach.   Ryan Bourne RN
Unknown

## 2020-12-29 NOTE — PROGRESS NOTES
Pt denied evening medications. Pt states the facility she is going to will have her medications. Call light in reach.  Tenisha Krause RN

## 2021-01-07 ENCOUNTER — OFFICE VISIT (OUTPATIENT)
Dept: ORTHOPEDIC SURGERY | Age: 61
End: 2021-01-07

## 2021-01-07 VITALS — BODY MASS INDEX: 39.25 KG/M2 | HEIGHT: 68 IN | WEIGHT: 259 LBS

## 2021-01-07 DIAGNOSIS — R52 PAIN: Primary | ICD-10-CM

## 2021-01-07 DIAGNOSIS — S82.401A CLOSED FRACTURE OF RIGHT TIBIA AND FIBULA, INITIAL ENCOUNTER: ICD-10-CM

## 2021-01-07 DIAGNOSIS — S82.201A CLOSED FRACTURE OF RIGHT TIBIA AND FIBULA, INITIAL ENCOUNTER: ICD-10-CM

## 2021-01-07 PROCEDURE — 99024 POSTOP FOLLOW-UP VISIT: CPT | Performed by: PHYSICIAN ASSISTANT

## 2021-01-12 ENCOUNTER — OUTSIDE SERVICES (OUTPATIENT)
Dept: WOUND CARE | Age: 61
End: 2021-01-12
Payer: MEDICAID

## 2021-01-12 DIAGNOSIS — T81.89XA NON-HEALING SURGICAL WOUND, INITIAL ENCOUNTER: ICD-10-CM

## 2021-01-12 PROCEDURE — 99308 SBSQ NF CARE LOW MDM 20: CPT | Performed by: CLINICAL NURSE SPECIALIST

## 2021-01-12 NOTE — PROGRESS NOTES
[facility]    Patient name: Nikia Messer  :   58  Facility:  [address] of Service: skilled nursing facility (31)    Primary diagnosis for wound-care consultation: Nonhealing surgical wound, right lower leg    Additional ulcer(s) noted?  none    History of Present Illness: Initial visit. Consult from surgeon, Dr. Drea Prajapati. Open area along the incision line to right lower leg, proximal incision. Patient with history of displaced fracture of medial condyle of right tibia. Patient reports recent cellulitis. Wound bed is necrotic. Recommend sharp debridement. Until then, I recommend Santyl, iodoform strip gauze and dry dressing daily. Distal incision is healed. Moderate amount leg edema. PT and DP pulse absent with Doppler. Toes are warm. Also recommend Ace wraps, toes to knees, on in the morning and off at night. Patient reports poor intake for 3 months. Albumin 3.1 on 20. House supplement provided with meals. Patient is refusing meals and supplements at times. Patient reports mostly only taking water by mouth. Surgical consult for abdominal wall reconstruction scheduled for 2021. Decreased mobility. Working with OT and PT. Review of Systems: Pertinent systems reviewed in the HPI; all other systems reviewed, and negative. Pertinent elements of past medical, surgical, family, and/or social history: Patient with history of displaced fracture of medial condyle of right tibia, muscle wasting and atrophy, recent Covid 23, essential hypertension, hypertensive heart disease with heart failure, osteoarthritis and morbid obesity.     Medications and allergies are detailed in the nursing home chart, and were reviewed by me today.  _______________________    General Physical exam:    Vital signs:  134/74, 97.4, 80, 16 General Appearance: alert and oriented to person, place and time, well developed and well-nourished, in no acute distress  Psychiatric:  Mood and affect appropriate for situation  Skin: warm and dry, no rash  Head: normocephalic and atraumatic  Eyes: pupils equal, round, sclerae anicteric, conjunctivae normal  ENT: no thrush or oral ulcers  Neck:No complaints, normal appearance  Pulmonary/Chest: Respirations easy at rest, no cough or respiratory distress  Cardiovascular: No chest pain, normal rate, toes warm, cap refill normal, PT and DP pulse absent with Doppler  Abdomen: No nausea or vomiting  Extremities: no cyanosis or cellulitis. Moderate leg edema  Musculoskeletal: Working with therapy, moves all extremities, no deformities  Neurologic: distal sensation to light touch intact, no allodynia. Wound exam:    Wound location: Right lower leg   Length (cm) 0.6   Width (cm) 0.3   Depth (cm) 0.9   Tunneling 0   Undermining 0    Wound type:   Surgical wound  Grade - stage - thickness: Full thickness     Description of periwound: Intact    Description of wound bed: Wound necrotic. Wound bed moist, moderate amount of serous drainage, edges attached. Surrounding tissue and ulcer without signs and symptoms of infection. No purulence, malodor, erythema, increased temperature, or increased pain.  _______________________    Recent labs and data reviewed: 12/14/2020: Glucose 55, BUN/creatinine 13, creatinine 0.8, GFR 73.  12/17/2020: Hemoglobin/hematocrit: 11/34, RBC 6.3.  11/26/2020:  Albumin 3.1  _______________________ Wound-care diagnoses & assessment: Nonhealing surgical wound to right lower leg, proximal incision line. Patient with history of displaced fracture of medial condyle of right tibia. Patient reports recent cellulitis. Wound bed is necrotic. Recommend sharp debridement. Until then, I recommend Santyl, iodoform strip gauze and dry dressing daily. Distal incision is healed. Moderate amount leg edema. PT and DP pulse absent with Doppler. Toes are warm. Also recommend Ace wraps, toes to knees, on in the morning and off at night. Patient reports poor intake for 3 months. Albumin 3.1 on 11/26/20. House supplement provided with meals. Patient is refusing meals and supplements at times. Patient reports mostly only taking water by mouth. Surgical consult for abdomi    Debridement is  indicated today, based on the history and exam , however, this is a consultant visit only with recommendations. _______________________    Procedure:    Consent obtained. Time out performed per [facility]. _______________________    Recommendations:    - Dressings / Compression / Offloading: Santyl, iodoform gauze and dry dressing daily. Ace wraps, toes to knee, on AM off PM    - Labs / Diagnostic studies: none    - Medications / nutritional support: House supplements with meals    - Further Consultations recommended: Surgical consult for abdominal wall reconstruction on 1/14/2021. Working with OT and PT.    - Anticipated follow-up: As requested by surgeon.   _______________________    Electronically signed by LEIGHA Begum CNP on 1/12/2021 at 5:21 PM

## 2021-01-14 ENCOUNTER — OFFICE VISIT (OUTPATIENT)
Dept: BARIATRICS/WEIGHT MGMT | Age: 61
End: 2021-01-14
Payer: MEDICAID

## 2021-01-14 ENCOUNTER — TELEPHONE (OUTPATIENT)
Dept: BARIATRICS/WEIGHT MGMT | Age: 61
End: 2021-01-14

## 2021-01-14 VITALS
HEART RATE: 94 BPM | HEIGHT: 68 IN | DIASTOLIC BLOOD PRESSURE: 78 MMHG | BODY MASS INDEX: 39.38 KG/M2 | SYSTOLIC BLOOD PRESSURE: 123 MMHG

## 2021-01-14 DIAGNOSIS — K40.20 NON-RECURRENT BILATERAL INGUINAL HERNIA WITHOUT OBSTRUCTION OR GANGRENE: ICD-10-CM

## 2021-01-14 DIAGNOSIS — R13.14 PHARYNGOESOPHAGEAL DYSPHAGIA: ICD-10-CM

## 2021-01-14 DIAGNOSIS — K44.9 PARAESOPHAGEAL HIATAL HERNIA: Primary | ICD-10-CM

## 2021-01-14 DIAGNOSIS — K43.9 VENTRAL HERNIA WITHOUT OBSTRUCTION OR GANGRENE: ICD-10-CM

## 2021-01-14 PROCEDURE — 1111F DSCHRG MED/CURRENT MED MERGE: CPT | Performed by: SURGERY

## 2021-01-14 PROCEDURE — G8484 FLU IMMUNIZE NO ADMIN: HCPCS | Performed by: SURGERY

## 2021-01-14 PROCEDURE — 1036F TOBACCO NON-USER: CPT | Performed by: SURGERY

## 2021-01-14 PROCEDURE — G8427 DOCREV CUR MEDS BY ELIG CLIN: HCPCS | Performed by: SURGERY

## 2021-01-14 PROCEDURE — 99215 OFFICE O/P EST HI 40 MIN: CPT | Performed by: SURGERY

## 2021-01-14 PROCEDURE — G8417 CALC BMI ABV UP PARAM F/U: HCPCS | Performed by: SURGERY

## 2021-01-14 PROCEDURE — 3017F COLORECTAL CA SCREEN DOC REV: CPT | Performed by: SURGERY

## 2021-01-14 RX ORDER — OMEPRAZOLE 10 MG/1
10 CAPSULE, DELAYED RELEASE ORAL DAILY
Status: ON HOLD | COMMUNITY
End: 2021-03-12 | Stop reason: HOSPADM

## 2021-01-14 RX ORDER — CALCIUM CARBONATE 200(500)MG
1 TABLET,CHEWABLE ORAL DAILY
COMMUNITY

## 2021-01-14 RX ORDER — NITROFURANTOIN MACROCRYSTALS 50 MG/1
50 CAPSULE ORAL 4 TIMES DAILY
Status: ON HOLD | COMMUNITY
End: 2021-03-12 | Stop reason: HOSPADM

## 2021-01-14 RX ORDER — ONDANSETRON HYDROCHLORIDE 4 MG/5ML
SOLUTION ORAL ONCE
COMMUNITY

## 2021-01-14 RX ORDER — NYSTATIN 10B UNIT
POWDER (EA) MISCELLANEOUS 2 TIMES DAILY
COMMUNITY

## 2021-01-14 RX ORDER — METOCLOPRAMIDE 10 MG/1
10 TABLET ORAL 4 TIMES DAILY
Status: ON HOLD | COMMUNITY
End: 2021-03-12 | Stop reason: HOSPADM

## 2021-01-14 RX ORDER — OXYCODONE HYDROCHLORIDE AND ACETAMINOPHEN 5; 325 MG/1; MG/1
1 TABLET ORAL EVERY 4 HOURS PRN
Status: ON HOLD | COMMUNITY
End: 2021-03-12 | Stop reason: HOSPADM

## 2021-01-14 RX ORDER — PROMETHAZINE HYDROCHLORIDE 25 MG/ML
6.25 INJECTION, SOLUTION INTRAMUSCULAR; INTRAVENOUS EVERY 6 HOURS PRN
COMMUNITY

## 2021-01-14 NOTE — TELEPHONE ENCOUNTER
Call placed to SNF where pt currently resides: [id number] Spoke with pt's nurse: Beltran Rehman to give her surgery information and to give our office contact information for any questions she may have. All paperwork was sent with pt back to SNF. Details discussed with nurse. Nurse stated patient refuses to eat, take meds or even drink chicken broth. Pt only drinks water. Nurse advised to call with any further questions. Surgery/covid info scanned to [facility]. Routing refill request to provider for review/approval because:  Drug not on the [facility] refill protocol     Requested Prescriptions   Pending Prescriptions Disp Refills     nystatin (MYCOSTATIN) [id number] UNIT/ML suspension [Pharmacy Med Name: NYSTATIN [id number]/ML SUSP] 200 mL 0     Sig: TAKE 5 MLS BY MOUTH FOUR TIMES A DAY FOR 10 DAYS       There is no refill protocol information for this order

## 2021-01-19 ENCOUNTER — TELEPHONE (OUTPATIENT)
Dept: BARIATRICS/WEIGHT MGMT | Age: 61
End: 2021-01-19

## 2021-01-19 ENCOUNTER — OUTSIDE SERVICES (OUTPATIENT)
Dept: WOUND CARE | Age: 61
End: 2021-01-19
Payer: MEDICAID

## 2021-01-19 DIAGNOSIS — T81.89XD NON-HEALING SURGICAL WOUND, SUBSEQUENT ENCOUNTER: ICD-10-CM

## 2021-01-19 PROCEDURE — 99308 SBSQ NF CARE LOW MDM 20: CPT | Performed by: CLINICAL NURSE SPECIALIST

## 2021-01-19 NOTE — PROGRESS NOTES
Psychiatric:  Mood and affect appropriate for situation  Skin: warm and dry, no rash  Head: normocephalic and atraumatic  Eyes: pupils equal, round, sclerae anicteric, conjunctivae normal  ENT: no thrush or oral ulcers  Neck:No complaints, normal appearance  Pulmonary/Chest: Respirations easy at rest, no cough or respiratory distress  Cardiovascular: No chest pain, normal rate, toes warm, cap refill normal however, PT and DP pulse absent with Doppler  Abdomen: Ongoing nausea and vomiting  Extremities: no cyanosis or cellulitis. Leg edema  Musculoskeletal: Working with therapy, moves all extremities, no deformities  Neurologic: distal sensation to light touch intact, no allodynia. Wound exam:    Wound location: Right lower leg   Length (cm) 0.6   Width (cm) 0.3   Depth (cm) 0.9   Tunneling 0   Undermining 8 cm @ 6:00 from 12 o'clock to 12 o'clock. Wound type:   Surgical wound  Grade - stage - thickness: Full thickness     Description of periwound: Intact, in scar line    Description of wound bed: Wound bed necrotic. Small granulation bud. Wound bed moist, moderate amount of serous drainage, edges unattached. Surrounding tissue and ulcer without signs and symptoms of infection.   No purulence, malodor, erythema, increased temperature, or increased pain.  _______________________    Recent labs and data reviewed: No new labs  _______________________ Wound-care diagnoses & assessment: Consult from surgeon, Dr. Deedee Steel. Follow up with Dr. Deedee Steel in 2 weeks. Open area along the incision line to right lower leg, proximal incision. Patient with history of displaced fracture of medial condyle of right tibia. Patient reports recent cellulitis, resolved. Wound bed is necrotic with circumferential undermining. Small granulation bud at base of wound. Until seen by surgeon, I recommend Santyl, Iodoform gauze and dry dressing daily. PT and DP pulse absent with Doppler, however, toes are warm. Bilateral leg edema. Ace wraps toes to knees, on in the morning off at night. Poor intake for 3 months. Ongoing nausea and vomiting. Albumin 3.1 on 11/26/20. Patient is refusing meals and supplements. Surgical consult for abdominal wall reconstruction on 1/14/2021. Surgery scheduled 2/5/2021. Debridement is  indicated today, based on the history and exam above, however, visit is for consult only. _______________________    Procedure:    Consent obtained. Time out performed per [facility]. _______________________    Recommendations:    - Dressings / Compression / Offloading: Santyl, iodoform gauze and dry dressing daily.  Ace wraps toes to knee, on AM off PM    - Labs / Diagnostic studies: None    - Medications / nutritional support: House supplements at meals    - Further Consultations recommended: Surgical follow up appointment in 2 weeks    - Anticipated follow-up: Weekly evaluation  _______________________    Electronically signed by LEIGHA Meeks - CNP on 1/19/2021 at 3:58 PM

## 2021-01-22 NOTE — PROGRESS NOTES
[facility] / [facility] ([facility]) [address] [facility], [address]    Acknowledgment of Informed Consent for Surgical or Medical Procedure and Sedation  I agree to allow doctor(s) Governor Lange and his/her associates or assistants, including residents and/or other qualified medical practitioner to perform the following medical treatment or procedure and to administer or direct the administration of sedation as necessary:  Procedure(s): ROBOTIC PARAESOPHAGEAL HIATAL HERNIA REPAIR WITH MESH, NISSEN FUNDOPLICATION, LAPAROSCOPIC VENTRAL [address]  My doctor has explained the following regarding the proposed procedure:  ? the explanation of the procedure  ? the benefits of the procedure  ? the potential problems that might occur during recuperation  ? the risks and side effects of the procedure which could include but are not limited to severe blood loss, infection, stroke or death  ? the benefits, risks and side effect of alternative procedures including the consequences of declining this procedure or any alternative procedures  ? the likelihood of achieving satisfactory results. I acknowledge no guarantee or assurance has been made to me regarding the results. I understand that during the course of this treatment/procedure, unforeseen conditions can occur which require an additional or different procedure. I agree to allow my physician or assistants to perform such extension of the original procedure as they may find necessary. I understand that sedation will often result in temporary impairment of memory and fine motor skills and that sedation can occasionally progress to a state of deep sedation or general anesthesia. I understand the risks of anesthesia for surgery include, but are not limited to, sore throat, hoarseness, injury to face, mouth, or teeth; nausea; headache; injury to blood vessels or nerves; death, brain damage, or paralysis. I understand that if I have a Limitation of Treatment order in effect during my hospitalization, the order may or may not be in effect during this procedure. I give my doctor permission to give me blood or blood products. I understand that there are risks with receiving blood such as hepatitis, AIDS, fever, or allergic reaction. I acknowledge that the risks, benefits, and alternatives of this treatment have been explained to me and that no express or implied warranty has been given by the hospital, any blood bank, or any person or entity as to the blood or blood components transfused. At the discretion of my doctor, I agree to allow observers, equipment/product representatives and allow photographing, and/or televising of the procedure, provided my name or identity is maintained confidentially. I agree the hospital may dispose of or use for scientific or educational purposes any tissue, fluid, or body parts which may be removed.     ________________________________Date________Time______ am/pm  ([facility])  Patient or Signature of Closest Relative or Legal Guardian    ________________________________Date________Time______am/pm      Page 1 of  1  Witness

## 2021-01-24 NOTE — PROGRESS NOTES
[facility]) Physicians   General & Laparoscopic Surgery  [employer]       HPI:  Rufino Neff is a very pleasant 63 y.o. female  who is referred for consultation by Dr. Cici Harley with regards to severe reflux disease and progressively worsening dysphagia. It started years ago, patient tried several medications , lifestyle modifications however, with persistent symptoms. Patient does watch diet very carefully, The heartburn is associated with severe dysphagia, intially to solids, now with softer foods and even thick liquids. Denies bloating. Patient denies vomiting, fevers, chills, chest pain, shortness of breath, but has had significant weight loss of about 50# in a few months due to inability to eat. Recent CT Chest/ Abd/Pelvis as well as EGD showed large paraesophageal hiatal hernia      Past Medical History:   Diagnosis Date    CHF (congestive heart failure) ([address]     COVID-19 12/15/2020    HTN (hypertension)     UTI (urinary tract infection)      Past Surgical History:   Procedure Laterality Date    BLADDER REPAIR       SECTION      CHOLECYSTECTOMY      CYSTOSCOPY      TIBIA FRACTURE SURGERY Right 2020    RIGHT TIBIA IM NAIL INSERTION performed by George Cosme MD at [facility] 2020    EGD W/ANES. (8:00) COVID + performed by Porter Au MD at [address] [facility]     No family history on file. Social History     Tobacco Use    Smoking status: Never Smoker    Smokeless tobacco: Never Used   Substance Use Topics    Alcohol use: Not Currently     I counseled the patient on the importance of not smoking and risks of ETOH. No Known Allergies  Vitals:    21 0954   BP: 123/78   Pulse: 94   Height: 5' 8\" (1.727 m)       Body mass index is 39.38 kg/m².       Current Outpatient Medications:     promethazine (PHENERGAN) 25 MG/ML injection, Inject 6.25 mg into the muscle every 6 hours as needed, Disp: , Rfl:   metoclopramide (REGLAN) 10 MG tablet, Take 10 mg by mouth 4 times daily, Disp: , Rfl:     calcium carbonate (TUMS) 500 MG chewable tablet, Take 1 tablet by mouth daily, Disp: , Rfl:     omeprazole (PRILOSEC) 10 MG delayed release capsule, Take 10 mg by mouth daily, Disp: , Rfl:     nystatin (MYCOSTATIN) POWD powder, Apply topically 2 times daily, Disp: , Rfl:     nitrofurantoin (MACRODANTIN) 50 MG capsule, Take 50 mg by mouth 4 times daily, Disp: , Rfl:     ondansetron (ZOFRAN) 4 MG/5ML solution, Take by mouth once, Disp: , Rfl:     oxyCODONE-acetaminophen (PERCOCET) 5-325 MG per tablet, Take 1 tablet by mouth every 4 hours as needed for Pain., Disp: , Rfl:     pantoprazole (PROTONIX) 40 MG tablet, Take 1 tablet by mouth every morning (before breakfast), Disp: 30 tablet, Rfl: 3    amLODIPine (NORVASC) 10 MG tablet, TAKE 1 TABLET BY MOUTH ONCE DAILY FOR 90 DAYS, Disp: , Rfl:     atorvastatin (LIPITOR) 40 MG tablet, , Disp: , Rfl:     oxybutynin (DITROPAN) 5 MG tablet, TAKE 1 TABLET BY MOUTH EVERY 8 HOURS, Disp: , Rfl:     vitamin D3 (CHOLECALCIFEROL) 400 units TABS tablet, Take 400 Units by mouth daily, Disp: , Rfl:     Flaxseed Oil OIL, 1,400 mg by Does not apply route daily, Disp: , Rfl:     calcium carbonate 600 MG TABS tablet, Take 1 tablet by mouth daily, Disp: , Rfl:     latanoprost (XALATAN) 0.005 % ophthalmic solution, 1 drop nightly, Disp: , Rfl:     metoprolol succinate (TOPROL XL) 25 MG extended release tablet, Take 25 mg by mouth daily, Disp: , Rfl:     rivaroxaban (XARELTO) 20 MG TABS tablet, Take 20 mg by mouth, Disp: , Rfl:     lisinopril (PRINIVIL;ZESTRIL) 20 MG tablet, Take 40 mg by mouth daily , Disp: , Rfl:     potassium chloride (KLOR-CON) 20 MEQ packet, Take 20 mEq by mouth 2 times daily, Disp: , Rfl:     ROS  Review of Systems - History obtained from the patient  General ROS: negative  Psychological ROS: negative  Ophthalmic ROS: negative  Neurological ROS: negative ENT ROS: negative  Allergy and Immunology ROS: negative  Hematological and Lymphatic ROS: negative  Endocrine ROS: negative  Respiratory ROS: negative  Cardiovascular ROS: negative  Gastrointestinal ROS: Heartburn, dysphagia  Genito-Urinary ROS: negative  Musculoskeletal ROS: negative   Skin ROS: negative        Physical Exam   Constitutional: Patient is oriented to person, place, and time. Vital signs are normal. Patient  appears well-developed and well-nourished. Patient  is active and cooperative. Non-toxic appearance. No distress. HENT:   Head: Normocephalic and atraumatic. Head is without laceration. Right Ear: External ear normal. No lacerations. No drainage, swelling or tenderness. Left Ear: External ear normal. No lacerations. No drainage, swelling or tenderness. Nose: Nose normal. No nose lacerations or nasal deformity. Mouth/Throat: Uvula is midline, oropharynx is clear and moist and mucous membranes are normal. No oropharyngeal exudate. Eyes: Conjunctivae, EOM and lids are normal. Pupils are equal, round, and reactive to light. Right eye exhibits no discharge. No foreign body present in the right eye. Left eye exhibits no discharge. No foreign body present in the left eye. No scleral icterus. Neck: Trachea normal and normal range of motion. Neck supple. No JVD present. No tracheal tenderness present. Carotid bruit is not present. No rigidity. No tracheal deviation and no edema present. No thyromegaly present. Cardiovascular: Normal rate, regular rhythm, normal heart sounds, intact distal pulses and normal pulses. Pulmonary/Chest: Effort normal and breath sounds normal. No stridor. No respiratory distress. Patient  has no wheezes. Patient has no rales. Patient exhibits no tenderness and no crepitus. Abdominal: Soft. Normal appearance and bowel sounds are normal. Patient exhibits no distension, no abdominal bruit, no ascites and no mass. There is no hepatosplenomegaly. There is no tenderness. There is no rigidity, no rebound, no guarding and no CVA tenderness. Hernia confirmed in the ventral area, as well as bilateral inguinal regions, Left > Right   Musculoskeletal: Normal range of motion. Patient exhibits no edema or tenderness. Lymphadenopathy:        Head (right side): No submental, no submandibular, no preauricular, no posterior auricular and no occipital adenopathy present. Head (left side): No submental, no submandibular, no preauricular, no posterior auricular and no occipital adenopathy present. Patient  has no cervical adenopathy. Right: No supraclavicular adenopathy present. Left: No supraclavicular adenopathy present. Neurological: Patient is alert and oriented to person, place, and time. Patient has normal strength. Coordination and gait normal. GCS eye subscore is 4. GCS verbal subscore is 5. GCS motor subscore is 6. Skin: Skin is warm and dry. No abrasion and no rash noted. Patient  is not diaphoretic. No cyanosis or erythema. Psychiatric: Patient has a normal mood and affect. speech is normal and behavior is normal. Cognition and memory are normal.       Radiology:  I have personally reviewed the radiological studies and discussed it with the patient. A/P:  Theodora Cano is a very pleasant 63 y.o. female with persistent GERD despite medical therapy as well Hiatal/paraesophageal hernia. I recommended to the patient hiatal/paraesophageal hernia repair. Both open and laparoscopic approach were explained in details, also risks and benefits including but not limited to; hemothorax, pneumothorax, recurrence, difficulty swallowing, persistent symptoms, reflux and need for medications, esophageal, splenic, lung, heart, bowel, vagus nerve or gastric injuries.

## 2021-01-25 NOTE — TELEPHONE ENCOUNTER
Clinicals requested to complete prior auth.  Clinical notes uploaded to provider portal, with same pending prior auth number: [id number]

## 2021-01-26 ENCOUNTER — OUTSIDE SERVICES (OUTPATIENT)
Dept: WOUND CARE | Age: 61
End: 2021-01-26
Payer: MEDICAID

## 2021-01-26 DIAGNOSIS — T81.89XD NON-HEALING SURGICAL WOUND, SUBSEQUENT ENCOUNTER: ICD-10-CM

## 2021-01-26 PROCEDURE — 99308 SBSQ NF CARE LOW MDM 20: CPT | Performed by: CLINICAL NURSE SPECIALIST

## 2021-01-26 NOTE — PROGRESS NOTES
[facility]    Patient name: Cordell Altman  :   [birth year]  Facility:  [address] of Service: skilled nursing facility (31)    Primary diagnosis for wound-care consultation: Nonhealing surgical wound, right lower leg    Additional ulcer(s) noted? None    History of Present Illness: Nonhealing surgical wound to right lower leg. Consult from surgeon, Dr. Cristino Alcala. Open area along the incision line to the right lower leg, proximal incision. Wound with increased granulation and minimal fibrin and nonviable tissue today. Treatment has been with Santyl and iodoform strip gauze and dry dressing daily. Patient with little to no intake. Scheduled for complex abdominal wall reconstruction surgery on 2021. To follow-up with orthopedic surgeon on 2021. OT and PT has been discontinued secondary to lack of progress. No fever or chills. Labs are pending. Review of Systems: Pertinent systems reviewed in the HPI; all other systems reviewed, and negative. Pertinent elements of past medical, surgical, family, and/or social history: Patient with history of to place fracture of medial condyle right tibia, muscle wasting and atrophy, recent Covid 23, essential hypertension, hypertensive heart disease with heart failure, osteoarthritis and morbid obesity.     Medications and allergies are detailed in the nursing home chart, and were reviewed by me today.  _______________________    General Physical exam:    Vital signs:  118/80, 98.1, 88, 16    General Appearance: alert and oriented to person, place and time, obese, in no acute distress  Psychiatric:  Mood and affect appropriate for situation  Skin: warm and dry, no rash  Head: normocephalic and atraumatic  Eyes: pupils equal, round, sclerae anicteric, conjunctivae normal  ENT: no thrush or oral ulcers  Neck:No complaints, normal appearance Pulmonary/Chest: Respirations easy at rest, no cough or respiratory distress  Cardiovascular: No chest pain, normal rate, toes warm, cap refill normal  Abdomen: No nausea or vomiting  Extremities: no cyanosis or cellulitis. Bilateral leg edema  Musculoskeletal: Nonambulatory, moves all extremities, no deformities  Neurologic: distal sensation to light touch intact, no allodynia. Wound exam:    Wound location: Right lower leg   Length (cm) 0.9   Width (cm) 0.5   Depth (cm) 0.7   Tunneling 0   Undermining 0.5 cm from 12 o'clock to 12 o'clock. Wound type:   Surgical wound  Grade - stage - thickness: Full thickness     Description of periwound: Intact    Description of wound bed: Wound with increased red granulation and minimal fibrin. Wound bed moist, moderate amount of serous drainage, edges open and unattached. Surrounding tissue and ulcer without signs and symptoms of infection. No purulence, malodor, erythema, increased temperature, or increased pain.  _______________________    Recent labs and data reviewed: Pending  _______________________     Carla Paredes diagnoses & assessment: Nonhealing surgical wound to right lower leg. Consult from surgeon, Dr. Parul Amaya. Open area along the incision line to the right lower leg, proximal incision. Wound with increased granulation and minimal fibrin and nonviable tissue today. Treatment has been with Santyl and iodoform strip gauze and dry dressing daily. Little to no intake. Debridement is  indicated today, based on the history and exam above, however, enzymatic debridement until follow-up with surgeon. _______________________    Procedure:    Consent obtained. Time out performed per [facility]. _______________________    Recommendations:    - Dressings / Compression / Offloading: Santyl, iodoform gauze and dry dressing daily. Ace wraps toes to knees, on in the morning and off at night.       - Labs / Diagnostic studies: Labs are pending - Medications / nutritional support: House supplements are provided    - Further Consultations recommended: Orthopedic surgeon follow-up. Abdominal surgery scheduled.     - Anticipated follow-up: Weekly evaluation until follow up with Dr. Angel Howard  _______________________    Electronically signed by LEIGHA Cortés CNP on 1/26/2021 at 2:13 PM

## 2021-01-28 NOTE — TELEPHONE ENCOUNTER
Surgery was denied due to lack of medical necessity.  A peer to peer was scheduled on 1/27/21 at 4 pm. Result of peer to peer: Ressie Bosworth # [id number]

## 2021-02-01 NOTE — PROGRESS NOTES
Multiple attempts were made to contact nurse at [facility] [phone] [facility] but not her nurse, he states her nurse Poonam Corcoran will call us back. I did ask him to remind her to send requested paperwork that we faxed on 1/27/2021.

## 2021-02-02 ENCOUNTER — OUTSIDE SERVICES (OUTPATIENT)
Dept: WOUND CARE | Age: 61
End: 2021-02-02
Payer: MEDICAID

## 2021-02-02 DIAGNOSIS — T81.89XD NON-HEALING SURGICAL WOUND, SUBSEQUENT ENCOUNTER: ICD-10-CM

## 2021-02-02 PROCEDURE — 99308 SBSQ NF CARE LOW MDM 20: CPT | Performed by: CLINICAL NURSE SPECIALIST

## 2021-02-02 NOTE — PROGRESS NOTES
[facility]    Patient name: Joice Merlin  :   [birth year]  Facility:  [address] of Service: skilled nursing facility (31)    Primary diagnosis for wound-care consultation: Nonhealing surgical wound, right lower leg    Additional ulcer(s) noted? None    History of Present Illness: Wound along the proximal incision line patient with history of displaced fracture of medial condyle of right tibia. Wound with improved granulation, nearly 60% with enzymatic debridement. Follow-up with Orthopedic Surgeon was scheduled for 21 but appointment has been rescheduled. Patient is scheduled for complex abdominal wall reconstruction on 2021. Patient continues with poor intake. Reporting minimal to no intake with ongoing nausea and vomiting. Continues to refuse meals and supplements. Decreased mobility. No fever or chills. Review of Systems: Pertinent systems reviewed in the HPI; all other systems reviewed, and negative. Pertinent elements of past medical, surgical, family, and/or social history: Patient with history of displaced fracture of medial condyle right tibia, muscle wasting and atrophy, recent Covid 23, essential hypertension, hypertensive heart disease with heart failure, osteoarthritis and morbid obesity.     Medications and allergies are detailed in the nursing home chart, and were reviewed by me today.  _______________________    General Physical exam:    Vital signs:  126/81, 97.8, 104, 16    General Appearance: alert and oriented to person, place and time, morbidly obese, in no acute distress  Psychiatric:  Mood and affect appropriate for situation  Skin: warm and dry, no rash  Head: normocephalic and atraumatic  Eyes: pupils equal, round, sclerae anicteric, conjunctivae normal  ENT: no thrush or oral ulcers  Neck:No complaints, normal appearance  Pulmonary/Chest: Respirations easy at rest, no cough or respiratory distress Cardiovascular: No chest pain, normal rate, toes warm, cap refill normal  Abdomen: Complaints of ongoing nausea and vomiting  Extremities: no cyanosis, edema or cellulitis  Musculoskeletal: Nonambulatory, moves all extremities minimally, no deformities  Neurologic: distal sensation to light touch intact, no allodynia. Wound exam:    Wound location: Right lower leg   Length (cm) 0.8   Width (cm) 1.1   Depth (cm) 0.8   Tunneling 0   Undermining 0.6 cm from 12 o'clock to 12 o'clock. Wound type:   Surgical wound  Grade - stage - thickness: Full thickness     Description of periwound: Intact    Description of wound bed: Wound with increased red granulation (60%) and fibrin. Wound bed moist, moderate amount of serous drainage, edges open and unattached. Surrounding tissue and ulcer without signs and symptoms of infection. No purulence, malodor, erythema, increased temperature, or increased pain.  _______________________    Recent labs and data reviewed: 1/25/2021: Glucose 76, BUN 7, creatinine 0.4, , hemoglobin/hematocrit: 12/37  _______________________     Palma Decorina diagnoses & assessment: Wound along the proximal incision line patient with history of displaced fracture of medial condyle of right tibia. Wound with improved granulation, nearly 60% with enzymatic debridement. Follow-up with Orthopedic Surgeon was scheduled for 2/4/21 but appointment has been rescheduled. Patient is scheduled for complex abdominal wall reconstruction on 2/5/2021. Patient continues with poor intake. Reporting minimal to no intake with ongoing nausea and vomiting. Continues to refuse meals and supplements. Decreased mobility. No fever or chills. Debridement is  indicated today, based on the history and exam above, however, consultation only. Continue with enzymatic debridement.    _______________________    Procedure:    Consent obtained. Time out performed per [employer].      _______________________ Recommendations:    - Dressings / Compression / Offloading: Santyl, iodoform gauze and dry dressing daily. Ace wraps toes to knees, on in the morning and off at night. - Labs / Diagnostic studies: None    - Medications / nutritional support: Health supplements with meals    - Further Consultations recommended: Scheduled for surgical procedure, abdominal wall reconstruction on 2/5/2021. Follow-up with Orthopedic Surgeon after abdominal surgery.     - Anticipated follow-up: Weekly evaluation  _______________________    Electronically signed by LEIGHA Lieberman CNP on 2/2/2021 at 4:40 PM

## 2021-02-02 NOTE — PROGRESS NOTES
[facility] A[facility] [address]    Patient will come per facility transportation. Patient bed ridden  and refused all medications and most food for last couple months. FULL CODE  Wound on Right knee from previous surgery which has opened back up. Patient can communicate verbally and read. Spoke with nurse Lee. Patient will be NPO after midnight night before. MD and cardiologist will see patient 2/3 for H&P and cardiac clearance. 2/3 Dez Perdomo with [facility], at [facility]. She will fax H&P, CC, and any recent EKG on file (not sure if they have one).

## 2021-02-04 ENCOUNTER — ANESTHESIA EVENT (OUTPATIENT)
Dept: OPERATING ROOM | Age: 61
DRG: 254 | End: 2021-02-04
Payer: MEDICAID

## 2021-02-04 ENCOUNTER — TELEPHONE (OUTPATIENT)
Dept: BARIATRICS/WEIGHT MGMT | Age: 61
End: 2021-02-04

## 2021-02-04 NOTE — TELEPHONE ENCOUNTER
Spoke with Georgina Dougherty RN-she will fax the H&P and cardiac clearance.      Pt's last dose of Xarelto was: 1/26/21 per Georgina Dougherty

## 2021-02-04 NOTE — TELEPHONE ENCOUNTER
Spoke with Enedina at [facility] to confirm [address]'s arrival between 5:30-6 am for her scheduled surgery at 7:15 am on 2/5/21. Nurse stated pt would be NPO after midnight.

## 2021-02-05 ENCOUNTER — APPOINTMENT (OUTPATIENT)
Dept: GENERAL RADIOLOGY | Age: 61
DRG: 254 | End: 2021-02-05
Attending: SURGERY
Payer: MEDICAID

## 2021-02-05 ENCOUNTER — ANESTHESIA (OUTPATIENT)
Dept: OPERATING ROOM | Age: 61
DRG: 254 | End: 2021-02-05
Payer: MEDICAID

## 2021-02-05 ENCOUNTER — HOSPITAL ENCOUNTER (INPATIENT)
Age: 61
LOS: 5 days | Discharge: NURSING, REHAB OR LONG-TERM CARE | DRG: 254 | End: 2021-02-10
Attending: SURGERY | Admitting: SURGERY
Payer: MEDICAID

## 2021-02-05 VITALS — OXYGEN SATURATION: 80 % | SYSTOLIC BLOOD PRESSURE: 126 MMHG | DIASTOLIC BLOOD PRESSURE: 70 MMHG

## 2021-02-05 DIAGNOSIS — R63.4 WEIGHT LOSS: ICD-10-CM

## 2021-02-05 DIAGNOSIS — K44.9 PARAESOPHAGEAL HERNIA: Primary | ICD-10-CM

## 2021-02-05 LAB
ABO/RH: NORMAL
ALBUMIN SERPL-MCNC: 2.2 G/DL (ref 3.4–5)
ALBUMIN SERPL-MCNC: 2.3 G/DL (ref 3.4–5)
ALP BLD-CCNC: 75 U/L (ref 40–129)
ALT SERPL-CCNC: 19 U/L (ref 10–40)
ANION GAP SERPL CALCULATED.3IONS-SCNC: 20 MMOL/L (ref 3–16)
ANION GAP SERPL CALCULATED.3IONS-SCNC: 23 MMOL/L (ref 3–16)
ANTIBODY SCREEN: NORMAL
AST SERPL-CCNC: 30 U/L (ref 15–37)
BILIRUB SERPL-MCNC: 1 MG/DL (ref 0–1)
BILIRUBIN DIRECT: 0.5 MG/DL (ref 0–0.3)
BILIRUBIN URINE: ABNORMAL
BILIRUBIN, INDIRECT: 0.5 MG/DL (ref 0–1)
BLOOD, URINE: ABNORMAL
BUN BLDV-MCNC: 7 MG/DL (ref 7–20)
BUN BLDV-MCNC: 7 MG/DL (ref 7–20)
CALCIUM SERPL-MCNC: 8.5 MG/DL (ref 8.3–10.6)
CALCIUM SERPL-MCNC: 8.7 MG/DL (ref 8.3–10.6)
CHLORIDE BLD-SCNC: 93 MMOL/L (ref 99–110)
CHLORIDE BLD-SCNC: 94 MMOL/L (ref 99–110)
CLARITY: CLEAR
CO2: 20 MMOL/L (ref 21–32)
CO2: 22 MMOL/L (ref 21–32)
COLOR: YELLOW
CREAT SERPL-MCNC: 0.7 MG/DL (ref 0.6–1.2)
CREAT SERPL-MCNC: 0.8 MG/DL (ref 0.6–1.2)
EKG ATRIAL RATE: 98 BPM
EKG DIAGNOSIS: NORMAL
EKG P AXIS: 32 DEGREES
EKG P-R INTERVAL: 134 MS
EKG Q-T INTERVAL: 558 MS
EKG QRS DURATION: 84 MS
EKG QTC CALCULATION (BAZETT): 712 MS
EKG R AXIS: -7 DEGREES
EKG T AXIS: 51 DEGREES
EKG VENTRICULAR RATE: 98 BPM
EPITHELIAL CELLS, UA: ABNORMAL /HPF (ref 0–5)
GFR AFRICAN AMERICAN: >60
GFR AFRICAN AMERICAN: >60
GFR NON-AFRICAN AMERICAN: >60
GFR NON-AFRICAN AMERICAN: >60
GLUCOSE BLD-MCNC: 140 MG/DL (ref 70–99)
GLUCOSE BLD-MCNC: 153 MG/DL (ref 70–99)
GLUCOSE URINE: 100 MG/DL
HCT VFR BLD CALC: 33.3 % (ref 36–48)
HEMOGLOBIN: 11.1 G/DL (ref 12–16)
INR BLD: 1.36 (ref 0.86–1.14)
KETONES, URINE: 15 MG/DL
LACTIC ACID: 5.4 MMOL/L (ref 0.4–2)
LACTIC ACID: 5.5 MMOL/L (ref 0.4–2)
LEUKOCYTE ESTERASE, URINE: NEGATIVE
LV EF: 58 %
LVEF MODALITY: NORMAL
MAGNESIUM: 1.2 MG/DL (ref 1.8–2.4)
MAGNESIUM: 1.4 MG/DL (ref 1.8–2.4)
MCH RBC QN AUTO: 31.2 PG (ref 26–34)
MCHC RBC AUTO-ENTMCNC: 33.3 G/DL (ref 31–36)
MCV RBC AUTO: 93.5 FL (ref 80–100)
MICROSCOPIC EXAMINATION: YES
MUCUS: ABNORMAL /LPF
NITRITE, URINE: POSITIVE
PDW BLD-RTO: 16.2 % (ref 12.4–15.4)
PH UA: 7 (ref 5–8)
PHOSPHORUS: 3.5 MG/DL (ref 2.5–4.9)
PHOSPHORUS: 3.7 MG/DL (ref 2.5–4.9)
PLATELET # BLD: 270 K/UL (ref 135–450)
PMV BLD AUTO: 8.8 FL (ref 5–10.5)
POTASSIUM SERPL-SCNC: 2.1 MMOL/L (ref 3.5–5.1)
POTASSIUM SERPL-SCNC: 2.3 MMOL/L (ref 3.5–5.1)
PRO-BNP: 1317 PG/ML (ref 0–124)
PROTEIN UA: 100 MG/DL
PROTHROMBIN TIME: 15.8 SEC (ref 10–13.2)
RBC # BLD: 3.56 M/UL (ref 4–5.2)
RBC UA: ABNORMAL /HPF (ref 0–4)
SODIUM BLD-SCNC: 135 MMOL/L (ref 136–145)
SODIUM BLD-SCNC: 137 MMOL/L (ref 136–145)
SPECIFIC GRAVITY UA: 1.02 (ref 1–1.03)
TOTAL PROTEIN: 4.4 G/DL (ref 6.4–8.2)
TROPONIN: 0.03 NG/ML
URINE REFLEX TO CULTURE: ABNORMAL
URINE TYPE: ABNORMAL
UROBILINOGEN, URINE: 1 E.U./DL
WBC # BLD: 11.7 K/UL (ref 4–11)
WBC UA: ABNORMAL /HPF (ref 0–5)

## 2021-02-05 PROCEDURE — 6360000002 HC RX W HCPCS: Performed by: STUDENT IN AN ORGANIZED HEALTH CARE EDUCATION/TRAINING PROGRAM

## 2021-02-05 PROCEDURE — 36556 INSERT NON-TUNNEL CV CATH: CPT

## 2021-02-05 PROCEDURE — 2709999900 HC NON-CHARGEABLE SUPPLY: Performed by: SURGERY

## 2021-02-05 PROCEDURE — 3600000009 HC SURGERY ROBOT BASE: Performed by: SURGERY

## 2021-02-05 PROCEDURE — 99222 1ST HOSP IP/OBS MODERATE 55: CPT | Performed by: INTERNAL MEDICINE

## 2021-02-05 PROCEDURE — 2700000000 HC OXYGEN THERAPY PER DAY

## 2021-02-05 PROCEDURE — 84466 ASSAY OF TRANSFERRIN: CPT

## 2021-02-05 PROCEDURE — B246ZZZ ULTRASONOGRAPHY OF RIGHT AND LEFT HEART: ICD-10-PCS | Performed by: INTERNAL MEDICINE

## 2021-02-05 PROCEDURE — 86900 BLOOD TYPING SEROLOGIC ABO: CPT

## 2021-02-05 PROCEDURE — 3700000000 HC ANESTHESIA ATTENDED CARE: Performed by: SURGERY

## 2021-02-05 PROCEDURE — 6370000000 HC RX 637 (ALT 250 FOR IP): Performed by: STUDENT IN AN ORGANIZED HEALTH CARE EDUCATION/TRAINING PROGRAM

## 2021-02-05 PROCEDURE — 74018 RADEX ABDOMEN 1 VIEW: CPT

## 2021-02-05 PROCEDURE — 80076 HEPATIC FUNCTION PANEL: CPT

## 2021-02-05 PROCEDURE — 84134 ASSAY OF PREALBUMIN: CPT

## 2021-02-05 PROCEDURE — 2500000003 HC RX 250 WO HCPCS: Performed by: NURSE ANESTHETIST, CERTIFIED REGISTERED

## 2021-02-05 PROCEDURE — 4A133BC MONITORING OF ARTERIAL PRESSURE, CORONARY, PERCUTANEOUS APPROACH: ICD-10-PCS | Performed by: ANESTHESIOLOGY

## 2021-02-05 PROCEDURE — S2900 ROBOTIC SURGICAL SYSTEM: HCPCS | Performed by: SURGERY

## 2021-02-05 PROCEDURE — 3600000019 HC SURGERY ROBOT ADDTL 15MIN: Performed by: SURGERY

## 2021-02-05 PROCEDURE — 7100000001 HC PACU RECOVERY - ADDTL 15 MIN: Performed by: SURGERY

## 2021-02-05 PROCEDURE — 3E0G76Z INTRODUCTION OF NUTRITIONAL SUBSTANCE INTO UPPER GI, VIA NATURAL OR ARTIFICIAL OPENING: ICD-10-PCS | Performed by: RADIOLOGY

## 2021-02-05 PROCEDURE — 1200000000 HC SEMI PRIVATE

## 2021-02-05 PROCEDURE — 83735 ASSAY OF MAGNESIUM: CPT

## 2021-02-05 PROCEDURE — 0DH67UZ INSERTION OF FEEDING DEVICE INTO STOMACH, VIA NATURAL OR ARTIFICIAL OPENING: ICD-10-PCS | Performed by: RADIOLOGY

## 2021-02-05 PROCEDURE — 86901 BLOOD TYPING SEROLOGIC RH(D): CPT

## 2021-02-05 PROCEDURE — 6360000002 HC RX W HCPCS: Performed by: SURGERY

## 2021-02-05 PROCEDURE — 2580000003 HC RX 258: Performed by: STUDENT IN AN ORGANIZED HEALTH CARE EDUCATION/TRAINING PROGRAM

## 2021-02-05 PROCEDURE — 94761 N-INVAS EAR/PLS OXIMETRY MLT: CPT

## 2021-02-05 PROCEDURE — 83880 ASSAY OF NATRIURETIC PEPTIDE: CPT

## 2021-02-05 PROCEDURE — 81001 URINALYSIS AUTO W/SCOPE: CPT

## 2021-02-05 PROCEDURE — 86850 RBC ANTIBODY SCREEN: CPT

## 2021-02-05 PROCEDURE — 6360000004 HC RX CONTRAST MEDICATION: Performed by: INTERNAL MEDICINE

## 2021-02-05 PROCEDURE — 2500000003 HC RX 250 WO HCPCS: Performed by: STUDENT IN AN ORGANIZED HEALTH CARE EDUCATION/TRAINING PROGRAM

## 2021-02-05 PROCEDURE — 94150 VITAL CAPACITY TEST: CPT

## 2021-02-05 PROCEDURE — 84484 ASSAY OF TROPONIN QUANT: CPT

## 2021-02-05 PROCEDURE — 3700000001 HC ADD 15 MINUTES (ANESTHESIA): Performed by: SURGERY

## 2021-02-05 PROCEDURE — 85027 COMPLETE CBC AUTOMATED: CPT

## 2021-02-05 PROCEDURE — 2580000003 HC RX 258: Performed by: ANESTHESIOLOGY

## 2021-02-05 PROCEDURE — 93005 ELECTROCARDIOGRAM TRACING: CPT | Performed by: INTERNAL MEDICINE

## 2021-02-05 PROCEDURE — 85610 PROTHROMBIN TIME: CPT

## 2021-02-05 PROCEDURE — 99223 1ST HOSP IP/OBS HIGH 75: CPT | Performed by: SURGERY

## 2021-02-05 PROCEDURE — 93010 ELECTROCARDIOGRAM REPORT: CPT | Performed by: INTERNAL MEDICINE

## 2021-02-05 PROCEDURE — 83605 ASSAY OF LACTIC ACID: CPT

## 2021-02-05 PROCEDURE — 6360000002 HC RX W HCPCS: Performed by: NURSE ANESTHETIST, CERTIFIED REGISTERED

## 2021-02-05 PROCEDURE — 71045 X-RAY EXAM CHEST 1 VIEW: CPT

## 2021-02-05 PROCEDURE — 02HV33Z INSERTION OF INFUSION DEVICE INTO SUPERIOR VENA CAVA, PERCUTANEOUS APPROACH: ICD-10-PCS | Performed by: SURGERY

## 2021-02-05 PROCEDURE — 7100000000 HC PACU RECOVERY - FIRST 15 MIN: Performed by: SURGERY

## 2021-02-05 PROCEDURE — 80069 RENAL FUNCTION PANEL: CPT

## 2021-02-05 PROCEDURE — C9113 INJ PANTOPRAZOLE SODIUM, VIA: HCPCS | Performed by: STUDENT IN AN ORGANIZED HEALTH CARE EDUCATION/TRAINING PROGRAM

## 2021-02-05 PROCEDURE — C8929 TTE W OR WO FOL WCON,DOPPLER: HCPCS

## 2021-02-05 PROCEDURE — 36415 COLL VENOUS BLD VENIPUNCTURE: CPT

## 2021-02-05 RX ORDER — CALCIUM CHLORIDE 100 MG/ML
INJECTION INTRAVENOUS; INTRAVENTRICULAR PRN
Status: DISCONTINUED | OUTPATIENT
Start: 2021-02-05 | End: 2021-02-05 | Stop reason: SDUPTHER

## 2021-02-05 RX ORDER — METOCLOPRAMIDE HYDROCHLORIDE 5 MG/ML
5 INJECTION INTRAMUSCULAR; INTRAVENOUS EVERY 6 HOURS
Status: DISCONTINUED | OUTPATIENT
Start: 2021-02-05 | End: 2021-02-09

## 2021-02-05 RX ORDER — ROCURONIUM BROMIDE 10 MG/ML
INJECTION, SOLUTION INTRAVENOUS PRN
Status: DISCONTINUED | OUTPATIENT
Start: 2021-02-05 | End: 2021-02-05 | Stop reason: SDUPTHER

## 2021-02-05 RX ORDER — PANTOPRAZOLE SODIUM 40 MG/10ML
40 INJECTION, POWDER, LYOPHILIZED, FOR SOLUTION INTRAVENOUS DAILY
Status: DISCONTINUED | OUTPATIENT
Start: 2021-02-05 | End: 2021-02-09

## 2021-02-05 RX ORDER — HYDROCODONE BITARTRATE AND ACETAMINOPHEN 5; 325 MG/1; MG/1
1 TABLET ORAL
Status: DISCONTINUED | OUTPATIENT
Start: 2021-02-05 | End: 2021-02-05 | Stop reason: HOSPADM

## 2021-02-05 RX ORDER — ONDANSETRON 2 MG/ML
INJECTION INTRAMUSCULAR; INTRAVENOUS PRN
Status: DISCONTINUED | OUTPATIENT
Start: 2021-02-05 | End: 2021-02-05 | Stop reason: SDUPTHER

## 2021-02-05 RX ORDER — PROCHLORPERAZINE MALEATE 5 MG/1
5 TABLET ORAL EVERY 6 HOURS PRN
Status: DISCONTINUED | OUTPATIENT
Start: 2021-02-05 | End: 2021-02-10 | Stop reason: HOSPADM

## 2021-02-05 RX ORDER — SODIUM CHLORIDE 0.9 % (FLUSH) 0.9 %
10 SYRINGE (ML) INJECTION PRN
Status: DISCONTINUED | OUTPATIENT
Start: 2021-02-05 | End: 2021-02-10 | Stop reason: HOSPADM

## 2021-02-05 RX ORDER — SODIUM CHLORIDE, SODIUM GLUCONATE, SODIUM ACETATE, POTASSIUM CHLORIDE AND MAGNESIUM CHLORIDE 526; 502; 368; 37; 30 MG/100ML; MG/100ML; MG/100ML; MG/100ML; MG/100ML
1000 INJECTION, SOLUTION INTRAVENOUS CONTINUOUS
Status: DISCONTINUED | OUTPATIENT
Start: 2021-02-05 | End: 2021-02-05

## 2021-02-05 RX ORDER — ONDANSETRON 4 MG/1
4 TABLET, ORALLY DISINTEGRATING ORAL EVERY 8 HOURS PRN
Status: DISCONTINUED | OUTPATIENT
Start: 2021-02-05 | End: 2021-02-05 | Stop reason: ALTCHOICE

## 2021-02-05 RX ORDER — PROCHLORPERAZINE EDISYLATE 5 MG/ML
5 INJECTION INTRAMUSCULAR; INTRAVENOUS EVERY 6 HOURS PRN
Status: DISCONTINUED | OUTPATIENT
Start: 2021-02-05 | End: 2021-02-10 | Stop reason: HOSPADM

## 2021-02-05 RX ORDER — DIPHENHYDRAMINE HYDROCHLORIDE 50 MG/ML
12.5 INJECTION INTRAMUSCULAR; INTRAVENOUS
Status: DISCONTINUED | OUTPATIENT
Start: 2021-02-05 | End: 2021-02-05 | Stop reason: HOSPADM

## 2021-02-05 RX ORDER — ONDANSETRON 2 MG/ML
4 INJECTION INTRAMUSCULAR; INTRAVENOUS EVERY 6 HOURS PRN
Status: DISCONTINUED | OUTPATIENT
Start: 2021-02-05 | End: 2021-02-05 | Stop reason: ALTCHOICE

## 2021-02-05 RX ORDER — DEXTROSE, SODIUM CHLORIDE, AND POTASSIUM CHLORIDE 5; .45; .15 G/100ML; G/100ML; G/100ML
INJECTION INTRAVENOUS CONTINUOUS
Status: DISCONTINUED | OUTPATIENT
Start: 2021-02-05 | End: 2021-02-05

## 2021-02-05 RX ORDER — MAGNESIUM SULFATE IN WATER 40 MG/ML
4000 INJECTION, SOLUTION INTRAVENOUS ONCE
Status: DISCONTINUED | OUTPATIENT
Start: 2021-02-05 | End: 2021-02-05

## 2021-02-05 RX ORDER — PROCHLORPERAZINE EDISYLATE 5 MG/ML
5 INJECTION INTRAMUSCULAR; INTRAVENOUS
Status: DISCONTINUED | OUTPATIENT
Start: 2021-02-05 | End: 2021-02-05

## 2021-02-05 RX ORDER — SODIUM CHLORIDE, SODIUM GLUCONATE, SODIUM ACETATE, POTASSIUM CHLORIDE AND MAGNESIUM CHLORIDE 526; 502; 368; 37; 30 MG/100ML; MG/100ML; MG/100ML; MG/100ML; MG/100ML
INJECTION, SOLUTION INTRAVENOUS CONTINUOUS
Status: DISCONTINUED | OUTPATIENT
Start: 2021-02-05 | End: 2021-02-07

## 2021-02-05 RX ORDER — CEFAZOLIN SODIUM 2 G/50ML
2000 SOLUTION INTRAVENOUS ONCE
Status: DISCONTINUED | OUTPATIENT
Start: 2021-02-05 | End: 2021-02-05 | Stop reason: HOSPADM

## 2021-02-05 RX ORDER — SODIUM CHLORIDE 0.9 % (FLUSH) 0.9 %
10 SYRINGE (ML) INJECTION EVERY 12 HOURS SCHEDULED
Status: DISCONTINUED | OUTPATIENT
Start: 2021-02-05 | End: 2021-02-10 | Stop reason: HOSPADM

## 2021-02-05 RX ORDER — ATORVASTATIN CALCIUM 40 MG/1
40 TABLET, FILM COATED ORAL DAILY
Status: DISCONTINUED | OUTPATIENT
Start: 2021-02-05 | End: 2021-02-10 | Stop reason: HOSPADM

## 2021-02-05 RX ORDER — POTASSIUM CHLORIDE 7.45 MG/ML
10 INJECTION INTRAVENOUS
Status: DISPENSED | OUTPATIENT
Start: 2021-02-05 | End: 2021-02-05

## 2021-02-05 RX ORDER — DEXTROSE, SODIUM CHLORIDE, AND POTASSIUM CHLORIDE 5; .45; .15 G/100ML; G/100ML; G/100ML
INJECTION INTRAVENOUS CONTINUOUS
Status: CANCELLED | OUTPATIENT
Start: 2021-02-05

## 2021-02-05 RX ORDER — SUCCINYLCHOLINE/SOD CL,ISO/PF 200MG/10ML
SYRINGE (ML) INTRAVENOUS PRN
Status: DISCONTINUED | OUTPATIENT
Start: 2021-02-05 | End: 2021-02-05 | Stop reason: SDUPTHER

## 2021-02-05 RX ORDER — LIDOCAINE HYDROCHLORIDE 20 MG/ML
INJECTION, SOLUTION INTRAVENOUS PRN
Status: DISCONTINUED | OUTPATIENT
Start: 2021-02-05 | End: 2021-02-05 | Stop reason: SDUPTHER

## 2021-02-05 RX ORDER — 0.9 % SODIUM CHLORIDE 0.9 %
500 INTRAVENOUS SOLUTION INTRAVENOUS
Status: DISCONTINUED | OUTPATIENT
Start: 2021-02-05 | End: 2021-02-05 | Stop reason: HOSPADM

## 2021-02-05 RX ORDER — PHENYLEPHRINE HYDROCHLORIDE 10 MG/ML
INJECTION INTRAVENOUS PRN
Status: DISCONTINUED | OUTPATIENT
Start: 2021-02-05 | End: 2021-02-05 | Stop reason: SDUPTHER

## 2021-02-05 RX ORDER — SODIUM CHLORIDE, SODIUM LACTATE, POTASSIUM CHLORIDE, CALCIUM CHLORIDE 600; 310; 30; 20 MG/100ML; MG/100ML; MG/100ML; MG/100ML
INJECTION, SOLUTION INTRAVENOUS CONTINUOUS
Status: CANCELLED | OUTPATIENT
Start: 2021-02-05

## 2021-02-05 RX ORDER — HYDRALAZINE HYDROCHLORIDE 20 MG/ML
5 INJECTION INTRAMUSCULAR; INTRAVENOUS EVERY 10 MIN PRN
Status: DISCONTINUED | OUTPATIENT
Start: 2021-02-05 | End: 2021-02-05 | Stop reason: HOSPADM

## 2021-02-05 RX ORDER — 0.9 % SODIUM CHLORIDE 0.9 %
1000 INTRAVENOUS SOLUTION INTRAVENOUS ONCE
Status: DISCONTINUED | OUTPATIENT
Start: 2021-02-05 | End: 2021-02-10 | Stop reason: HOSPADM

## 2021-02-05 RX ORDER — MEPERIDINE HYDROCHLORIDE 25 MG/ML
12.5 INJECTION INTRAMUSCULAR; INTRAVENOUS; SUBCUTANEOUS EVERY 5 MIN PRN
Status: DISCONTINUED | OUTPATIENT
Start: 2021-02-05 | End: 2021-02-05 | Stop reason: HOSPADM

## 2021-02-05 RX ORDER — SODIUM CHLORIDE, SODIUM GLUCONATE, SODIUM ACETATE, POTASSIUM CHLORIDE AND MAGNESIUM CHLORIDE 526; 502; 368; 37; 30 MG/100ML; MG/100ML; MG/100ML; MG/100ML; MG/100ML
INJECTION, SOLUTION INTRAVENOUS CONTINUOUS
Status: DISPENSED | OUTPATIENT
Start: 2021-02-05 | End: 2021-02-05

## 2021-02-05 RX ORDER — METOPROLOL TARTRATE 5 MG/5ML
5 INJECTION INTRAVENOUS EVERY 6 HOURS
Status: DISCONTINUED | OUTPATIENT
Start: 2021-02-05 | End: 2021-02-08

## 2021-02-05 RX ORDER — SODIUM CHLORIDE, SODIUM LACTATE, POTASSIUM CHLORIDE, CALCIUM CHLORIDE 600; 310; 30; 20 MG/100ML; MG/100ML; MG/100ML; MG/100ML
INJECTION, SOLUTION INTRAVENOUS CONTINUOUS
Status: DISCONTINUED | OUTPATIENT
Start: 2021-02-05 | End: 2021-02-05

## 2021-02-05 RX ORDER — POTASSIUM CHLORIDE 7.45 MG/ML
10 INJECTION INTRAVENOUS
Status: COMPLETED | OUTPATIENT
Start: 2021-02-05 | End: 2021-02-06

## 2021-02-05 RX ORDER — ONDANSETRON 2 MG/ML
4 INJECTION INTRAMUSCULAR; INTRAVENOUS
Status: DISCONTINUED | OUTPATIENT
Start: 2021-02-05 | End: 2021-02-05

## 2021-02-05 RX ORDER — MAGNESIUM SULFATE IN WATER 40 MG/ML
4000 INJECTION, SOLUTION INTRAVENOUS ONCE
Status: COMPLETED | OUTPATIENT
Start: 2021-02-05 | End: 2021-02-06

## 2021-02-05 RX ORDER — HEPARIN SODIUM 5000 [USP'U]/ML
5000 INJECTION, SOLUTION INTRAVENOUS; SUBCUTANEOUS ONCE
Status: COMPLETED | OUTPATIENT
Start: 2021-02-05 | End: 2021-02-05

## 2021-02-05 RX ORDER — MIDAZOLAM HYDROCHLORIDE 1 MG/ML
INJECTION INTRAMUSCULAR; INTRAVENOUS PRN
Status: DISCONTINUED | OUTPATIENT
Start: 2021-02-05 | End: 2021-02-05 | Stop reason: SDUPTHER

## 2021-02-05 RX ORDER — PROPOFOL 10 MG/ML
INJECTION, EMULSION INTRAVENOUS PRN
Status: DISCONTINUED | OUTPATIENT
Start: 2021-02-05 | End: 2021-02-05 | Stop reason: SDUPTHER

## 2021-02-05 RX ADMIN — SODIUM CHLORIDE, SODIUM GLUCONATE, SODIUM ACETATE, POTASSIUM CHLORIDE AND MAGNESIUM CHLORIDE: 526; 502; 368; 37; 30 INJECTION, SOLUTION INTRAVENOUS at 22:37

## 2021-02-05 RX ADMIN — LIDOCAINE HYDROCHLORIDE 100 MG: 20 INJECTION, SOLUTION INTRAVENOUS at 07:34

## 2021-02-05 RX ADMIN — HEPARIN SODIUM 5000 UNITS: 5000 INJECTION INTRAVENOUS; SUBCUTANEOUS at 06:54

## 2021-02-05 RX ADMIN — PHENYLEPHRINE HYDROCHLORIDE 100 MCG: 10 INJECTION INTRAVENOUS at 07:34

## 2021-02-05 RX ADMIN — MIDAZOLAM HYDROCHLORIDE 1 MG: 2 INJECTION, SOLUTION INTRAMUSCULAR; INTRAVENOUS at 07:34

## 2021-02-05 RX ADMIN — PERFLUTREN 2.2 MG: 6.52 INJECTION, SUSPENSION INTRAVENOUS at 14:22

## 2021-02-05 RX ADMIN — CEFTRIAXONE 1000 MG: 1 INJECTION, POWDER, FOR SOLUTION INTRAMUSCULAR; INTRAVENOUS at 15:40

## 2021-02-05 RX ADMIN — PHENYLEPHRINE HYDROCHLORIDE 100 MCG: 10 INJECTION INTRAVENOUS at 07:42

## 2021-02-05 RX ADMIN — PHENYLEPHRINE HYDROCHLORIDE 100 MCG: 10 INJECTION INTRAVENOUS at 08:14

## 2021-02-05 RX ADMIN — POTASSIUM CHLORIDE 10 MEQ: 10 INJECTION, SOLUTION INTRAVENOUS at 23:49

## 2021-02-05 RX ADMIN — ENOXAPARIN SODIUM 100 MG: 100 INJECTION SUBCUTANEOUS at 17:35

## 2021-02-05 RX ADMIN — POTASSIUM CHLORIDE 10 MEQ: 7.46 INJECTION, SOLUTION INTRAVENOUS at 10:14

## 2021-02-05 RX ADMIN — ONDANSETRON 4 MG: 2 INJECTION INTRAMUSCULAR; INTRAVENOUS at 07:34

## 2021-02-05 RX ADMIN — PROPOFOL 50 MG: 10 INJECTION, EMULSION INTRAVENOUS at 07:34

## 2021-02-05 RX ADMIN — CALCIUM CHLORIDE 0.5 G: 100 INJECTION, SOLUTION INTRAVENOUS at 07:34

## 2021-02-05 RX ADMIN — ATORVASTATIN CALCIUM 40 MG: 40 TABLET, FILM COATED ORAL at 15:44

## 2021-02-05 RX ADMIN — PANTOPRAZOLE SODIUM 40 MG: 40 INJECTION, POWDER, FOR SOLUTION INTRAVENOUS at 15:44

## 2021-02-05 RX ADMIN — METOCLOPRAMIDE HYDROCHLORIDE 5 MG: 5 INJECTION INTRAMUSCULAR; INTRAVENOUS at 22:48

## 2021-02-05 RX ADMIN — PHENYLEPHRINE HYDROCHLORIDE 200 MCG: 10 INJECTION INTRAVENOUS at 08:09

## 2021-02-05 RX ADMIN — METOPROLOL TARTRATE 5 MG: 5 INJECTION INTRAVENOUS at 15:44

## 2021-02-05 RX ADMIN — SODIUM CHLORIDE, SODIUM LACTATE, POTASSIUM CHLORIDE, AND CALCIUM CHLORIDE: .6; .31; .03; .02 INJECTION, SOLUTION INTRAVENOUS at 07:17

## 2021-02-05 RX ADMIN — METOCLOPRAMIDE HYDROCHLORIDE 5 MG: 5 INJECTION INTRAMUSCULAR; INTRAVENOUS at 15:43

## 2021-02-05 RX ADMIN — Medication 10 ML: at 22:44

## 2021-02-05 RX ADMIN — Medication 120 MG: at 07:34

## 2021-02-05 RX ADMIN — POTASSIUM CHLORIDE 10 MEQ: 10 INJECTION, SOLUTION INTRAVENOUS at 22:42

## 2021-02-05 RX ADMIN — METOPROLOL TARTRATE 5 MG: 5 INJECTION INTRAVENOUS at 22:48

## 2021-02-05 RX ADMIN — ROCURONIUM BROMIDE 5 MG: 10 INJECTION INTRAVENOUS at 07:34

## 2021-02-05 RX ADMIN — PHENYLEPHRINE HYDROCHLORIDE 100 MCG: 10 INJECTION INTRAVENOUS at 07:45

## 2021-02-05 RX ADMIN — SODIUM CHLORIDE, SODIUM GLUCONATE, SODIUM ACETATE, POTASSIUM CHLORIDE AND MAGNESIUM CHLORIDE 1000 ML: 526; 502; 368; 37; 30 INJECTION, SOLUTION INTRAVENOUS at 11:58

## 2021-02-05 RX ADMIN — FAMOTIDINE 20 MG: 10 INJECTION, SOLUTION INTRAVENOUS at 07:34

## 2021-02-05 RX ADMIN — POTASSIUM BICARBONATE 20 MEQ: 782 TABLET, EFFERVESCENT ORAL at 10:46

## 2021-02-05 ASSESSMENT — PULMONARY FUNCTION TESTS
PIF_VALUE: 2
PIF_VALUE: 14
PIF_VALUE: 15
PIF_VALUE: 0
PIF_VALUE: 15
PIF_VALUE: 14
PIF_VALUE: 0
PIF_VALUE: 15
PIF_VALUE: 14
PIF_VALUE: 14
PIF_VALUE: 3
PIF_VALUE: 14
PIF_VALUE: 15
PIF_VALUE: 14
PIF_VALUE: 1
PIF_VALUE: 15
PIF_VALUE: 14
PIF_VALUE: 0
PIF_VALUE: 14
PIF_VALUE: 3
PIF_VALUE: 14
PIF_VALUE: 18
PIF_VALUE: 15
PIF_VALUE: 13
PIF_VALUE: 8
PIF_VALUE: 20
PIF_VALUE: 2
PIF_VALUE: 19
PIF_VALUE: 13
PIF_VALUE: 14
PIF_VALUE: 15
PIF_VALUE: 14
PIF_VALUE: 16
PIF_VALUE: 15
PIF_VALUE: 19

## 2021-02-05 ASSESSMENT — LIFESTYLE VARIABLES: SMOKING_STATUS: 1

## 2021-02-05 ASSESSMENT — PAIN SCALES - GENERAL
PAINLEVEL_OUTOF10: 0
PAINLEVEL_OUTOF10: 0

## 2021-02-05 NOTE — PROGRESS NOTES
Admitted to pacu at this time. Surgery cancelled due to low BP. Labs drawn by CRNA and sent to lab prior to transfer. VSS on monitor. NG in RT nares. Placement verified with air bolus. Pt denies pain.  Camilla and cuff BP correlate

## 2021-02-05 NOTE — H&P
Livan Dowling    [id number]    [facility] Same Day Surgery Update H & P  Department of General Surgery   Surgical Service   Pre-operative History and Physical  Last H & P within the last 30 days. DIAGNOSIS:   Hiatal hernia [K44.9]  Ventral hernia without obstruction or gangrene [K43.9]  Non-recurrent bilateral inguinal hernia without obstruction or gangrene [K40.20]    Procedure(s):  ROBOTIC PARAESOPHAGEAL HIATAL HERNIA REPAIR WITH MESH, NISSEN FUNDOPLICATION, LAPAROSCOPIC VENTRAL HERNIA REPAIR;  OPEN BILATERAL INGUINAL HERNIA REPAIR     HISTORY OF PRESENT ILLNESS:   Patient with 1) c/o severe reflux disease and progressively worsening dysphagia 2) bilateral inguinal hernias and 3) ventral hernia presents today for the above procedure. Covid 19:  Patient denies fever, chills, cough or known exposure to Covid-19. Past Medical History:        Diagnosis Date    Arthritis     [facility]est[facility] failure) [address]     COVID-19 12/15/2020    ESBL (extended spectrum beta-lactamase) producing bacteria infection     Glaucoma     Hemorrhoids     HTN (hypertension)     Hyperlipidemia     Morbid obesity ([address]     Muscular wasting and disuse atrophy     UTI (urinary tract infection)      Past Surgical History:        Procedure Laterality Date    BLADDER REPAIR       SECTION      CHOLECYSTECTOMY      CYSTOSCOPY      TIBIA FRACTURE SURGERY Right 2020    RIGHT TIBIA IM NAIL INSERTION performed by Joyce Webber MD at [facility] 2020    EGD W/ANES. (8:00) COVID + performed by Sawyer Pearson MD at [facility] ENDOSCOPY     Past Social History:  Social History     Socioeconomic History    Marital status:       Spouse name: None    Number of children: None    Years of education: None    Highest education level: None   Occupational History    None   Social Needs    Financial resource strain: None    Food insecurity     Worry: None Inability: None    Transportation needs     Medical: None     Non-medical: None   Tobacco Use    Smoking status: Never Smoker    Smokeless tobacco: Never Used   Substance and Sexual Activity    Alcohol use: Not Currently    Drug use: Never    Sexual activity: None   Lifestyle    Physical activity     Days per week: None     Minutes per session: None    Stress: None   Relationships    Social connections     Talks on phone: None     Gets together: None     Attends Rastafari service: None     Active member of club or organization: None     Attends meetings of clubs or organizations: None     Relationship status: None    Intimate partner violence     Fear of current or ex partner: None     Emotionally abused: None     Physically abused: None     Forced sexual activity: None   Other Topics Concern    None   Social History Narrative    None         Medications Prior to Admission:      Prior to Admission medications    Medication Sig Start Date End Date Taking? Authorizing Provider   promethazine (PHENERGAN) 25 MG/ML injection Inject 6.25 mg into the muscle every 6 hours as needed   Yes Historical Provider, MD   metoclopramide (REGLAN) 10 MG tablet Take 10 mg by mouth 4 times daily    Historical Provider, MD   calcium carbonate (TUMS) 500 MG chewable tablet Take 1 tablet by mouth daily    Historical Provider, MD   omeprazole (PRILOSEC) 10 MG delayed release capsule Take 10 mg by mouth daily    Historical Provider, MD   nystatin (MYCOSTATIN) POWD powder Apply topically 2 times daily    Historical Provider, MD   nitrofurantoin (MACRODANTIN) 50 MG capsule Take 50 mg by mouth 4 times daily    Historical Provider, MD   ondansetron (ZOFRAN) 4 MG/5ML solution Take by mouth once    Historical Provider, MD   oxyCODONE-acetaminophen (PERCOCET) 5-325 MG per tablet Take 1 tablet by mouth every 4 hours as needed for Pain.     Historical Provider, MD   pantoprazole (PROTONIX) 40 MG tablet Take 1 tablet by mouth every morning (before breakfast) 12/30/20   Jagdish Salcedo MD   amLODIPine (NORVASC) 10 MG tablet TAKE 1 TABLET BY MOUTH ONCE DAILY FOR 90 DAYS 10/8/20   Historical Provider, MD   atorvastatin (LIPITOR) 40 MG tablet  10/29/20   Historical Provider, MD   oxybutynin (DITROPAN) 5 MG tablet TAKE 1 TABLET BY MOUTH EVERY 8 HOURS 7/31/20   Historical Provider, MD   vitamin D3 (CHOLECALCIFEROL) 400 units TABS tablet Take 400 Units by mouth daily    Historical Provider, MD   Flaxseed Oil OIL 1,400 mg by Does not apply route daily    Historical Provider, MD   calcium carbonate 600 MG TABS tablet Take 1 tablet by mouth daily    Historical Provider, MD   latanoprost (XALATAN) 0.005 % ophthalmic solution 1 drop nightly    Historical Provider, MD   metoprolol succinate (TOPROL XL) 25 MG extended release tablet Take 25 mg by mouth daily    Historical Provider, MD   rivaroxaban (XARELTO) 20 MG TABS tablet Take 20 mg by mouth    Historical Provider, MD   lisinopril (PRINIVIL;ZESTRIL) 20 MG tablet Take 40 mg by mouth daily     Historical Provider, MD   potassium chloride (KLOR-CON) 20 MEQ packet Take 20 mEq by mouth 2 times daily    Historical Provider, MD         Allergies:  Patient has no known allergies. PHYSICAL EXAM:      BP 83/67   Pulse 116   Temp 97.7 °F (36.5 °C)   Resp 16   Ht 5' 6\" (1.676 m)   Wt 245 lb (111.1 kg)   SpO2 96%   BMI 39.54 kg/m²      Airway:  Airway patent with no audible stridor    Heart:  Tachycardia, regular rate, No murmur noted    Lungs:  No increased work of breathing, good air exchange, clear to auscultation bilaterally, no crackles or wheezing    Abdomen:  soft, non-distended, non-tender, no rebound tenderness or guarding, normal active bowel sounds and no masses palpated    ASSESSMENT AND PLAN     Patient is a 63 y.o. female with above specified procedure planned. 1.  Patient seen and focused exam done today- no new changes since last physical exam on 2/3/21    2.   Access to ancillary services are available per request of the provider.     LEIGHA Melo - NENA     2/5/2021

## 2021-02-05 NOTE — CARE COORDINATION
[facility] pt [facility] [address]  [phone], plans to return back. Pt plans OR Monday surgery canceled today due to hypotensive will optimize over weekend.   Electronically signed by Jacqueline Brady RN on 2/5/2021 at 4:07 PM  [phone]

## 2021-02-05 NOTE — CONSULTS
Pulmonary Consult Note      Reason for Consult: History of PE, needs surgical clearance  Requesting Physician: Dr. Lesley Junior:     [facility] / HPI:            Brad Cheng is a 63 y.o. female w/ PMHX of COVID (12/15/2020), DVT/PE (2016 s/p tpa), Atrial thrombus, Afib (on xarelto), HTN, ESBL UTI who presented to [facility] for elective hernia repair but developed hypotension during induction of surgery, thus surgery was aborted. Upon evaluation of patient, she was lethargic and could not contribute much history. She reported feeling tired but denied any CP or SOB. She denied any history of hypotension but did report she has had very poor PO intake over the last several days. Per chart review, patient has no history of COPD or asthma. She has a history of DVT and extensive bilateral PE (with R heart strain) in 2016 following a pelvic fracture. She underwent tpa and was was eventually transitioned to Xarelto. She is reportedly still on [facility] for Afib. Work up thus far demonstrated a lactic acidosis, hypokalemia,hypomagnesemia and leukocytosis. Pulmonology was consulted for pulmonary clearance as patient plans to undergo her originally scheduled hernia repair for today sometime early next week.        Past Medical History:      Diagnosis Date    Arthritis     CHF (congestive heart failure) ([address]     COVID-19 12/15/2020    ESBL (extended spectrum beta-lactamase) producing bacteria infection     Glaucoma     Hemorrhoids     HTN (hypertension)     Hyperlipidemia     Morbid obesity ([address]     Muscular wasting and disuse atrophy     UTI (urinary tract infection)       Past Surgical History:        Procedure Laterality Date    BLADDER REPAIR       SECTION      CHOLECYSTECTOMY      CYSTOSCOPY      TIBIA FRACTURE SURGERY Right 2020    RIGHT TIBIA IM NAIL INSERTION performed by Excell Blizzard, MD at [address] [place] [address] 2020    EGD W/EMERITA. (8:00) COVID + performed by Jayesh Mauricio MD at [facility]     Current Medications:     atorvastatin  40 mg Oral Daily    metoclopramide  5 mg Intravenous Q6H    metoprolol  5 mg Intravenous Q6H    pantoprazole  40 mg Intravenous Daily    sodium chloride flush  10 mL Intravenous 2 times per day    enoxaparin  100 mg Subcutaneous Q12H    potassium chloride  10 mEq Intravenous Q1H    cefTRIAXone (ROCEPHIN) IV  1,000 mg Intravenous Q24H    magnesium sulfate  4,000 mg Intravenous Once     Allergies:  No Known Allergies  Social History:    TOBACCO:   reports that she has never smoked. She has never used smokeless tobacco.  ETOH:   reports previous alcohol use. Family History:   History reviewed. No pertinent family history. REVIEW OF SYSTEMS:    CONSTITUTIONAL:  negative for fevers, chills, diaphoresis, activity change, appetite change, fatigue, night sweats and unexpected weight change.    EYES:  negative for blurred vision, eye discharge, visual disturbance and icterus  HEENT:  negative for hearing loss, tinnitus, ear drainage, sinus pressure, nasal congestion, epistaxis and snoring  RESPIRATORY:  See HPI  CARDIOVASCULAR:  negative for chest pain, palpitations, exertional chest pressure/discomfort, edema, syncope  GASTROINTESTINAL:  negative for nausea, vomiting, diarrhea, constipation, blood in stool and abdominal pain  GENITOURINARY:  negative for frequency, dysuria, urinary incontinence, decreased urine volume, and hematuria  HEMATOLOGIC/LYMPHATIC:  negative for easy bruising, bleeding and lymphadenopathy  ALLERGIC/IMMUNOLOGIC:  negative for recurrent infections, angioedema, anaphylaxis and drug reactions  ENDOCRINE:  negative for weight changes and diabetic symptoms including polyuria, polydipsia and polyphagia  MUSCULOSKELETAL:  negative for  pain, joint swelling, decreased range of motion and muscle weakness  NEUROLOGICAL:  negative for headaches, slurred speech, unilateral weakness  PSYCHIATRIC/BEHAVIORAL: negative for hallucinations, behavioral problems, confusion and agitation. Objective:   PHYSICAL EXAM:      VITALS:  /75   Pulse 98   Temp 97.4 °F (36.3 °C) (Axillary)   Resp 20   Ht 5' 6\" (1.676 m)   Wt 245 lb (111.1 kg)   SpO2 100%   BMI 39.54 kg/m²   24HR INTAKE/OUTPUT:      Intake/Output Summary (Last 24 hours) at 2021 1327  Last data filed at 2021 0903  Gross per 24 hour   Intake 1500 ml   Output 150 ml   Net 1350 ml     CURRENT PULSE OXIMETRY:  SpO2: 100 %  24HR PULSE OXIMETRY RANGE:  SpO2  Av.8 %  Min: 33 %  Max: 100 % on RA    CONSTITUTIONAL:  Lethargic, alert, cooperative, in no apparnet distress, and appears stated age  NECK:  Supple, symmetrical, trachea midline, no adenopathy, thyroid symmetric, not enlarged and no tenderness, skin normal  LUNGS:  No increased work of breathing and clear to auscultation. No accessory muscle use  CARDIOVASCULAR:  normal S1 and S2, LE edema present. no JVD  ABDOMEN:  normal bowel sounds, non-distended and no masses palpated, and no tenderness to palpation. No hepatospleenomegaly  LYMPHADENOPATHY:  no axillary or supraclavicular adenopathy. No cervical adnenopathy  PSYCHIATRIC: Oriented to person place and time. No obvious depression or anxiety. MUSCULOSKELETAL: No obvious misalignment or effusion of the joints. No clubbing, cyanosis of the digits. SKIN:  normal skin color, texture, turgor and no redness, warmth, or swelling.  No palpable nodules    DATA:    Old records have been reviewed  CBC with Differential:    Lab Results   Component Value Date    WBC 11.7 2021    RBC 3.56 2021    HGB 11.1 2021    HCT 33.3 2021     2021    MCV 93.5 2021    MCH 31.2 2021    MCHC 33.3 2021    RDW 16.2 2021    BANDSPCT 1 2020    LYMPHOPCT 16.0 2020    MONOPCT 4.0 2020    BASOPCT 1.0 2020    MONOSABS 0.3 2020    LYMPHSABS 1.0 2020 EOSABS 0.1 12/28/2020    BASOSABS 0.1 12/28/2020     BMP:    Lab Results   Component Value Date     02/05/2021    K 2.1 02/05/2021    K 3.6 12/29/2020    CL 93 02/05/2021    CO2 22 02/05/2021    BUN 7 02/05/2021    CREATININE 0.7 02/05/2021    CALCIUM 8.5 02/05/2021    GFRAA >60 02/05/2021    LABGLOM >60 02/05/2021    GLUCOSE 153 02/05/2021     Hepatic Function Panel:    Lab Results   Component Value Date    ALKPHOS 75 02/05/2021    ALT 19 02/05/2021    AST 30 02/05/2021    PROT 4.4 02/05/2021    BILITOT 1.0 02/05/2021    BILIDIR 0.5 02/05/2021    IBILI 0.5 02/05/2021     ABG:  No results found for: ATO0BKF, BEART, Y6JOCVEO, PHART, THGBART, RAY6XCJ, PO2ART, CPA9JVY    Cultures:   Blood Culture:    Sputum Culture:        Radiology Review:  All pertinent images / reports were reviewed as a part of this visit. imaging reveals the following:  XR ABDOMEN (KUB) (SINGLE AP VIEW)   Final Result      As above. Other diagnostic test:          Assessment/Plan   63 y.o. female with history of PE who became hypotensive following surgery induction. Hypotension  Lactic Acidosis   Elevated Troponin  History of PE 2016  Atrial Fib on Xarelto     Patient's hernia repair was aborted today following hypotension after surgery induction. Pulmonology was consulted for pulmonary clearance for her re-scheduled hernia repair sometime next week. Patient has no history of COPD or asthma. She did have a PE in 2016 following a pelvic fracture and is on Xarelto for her history of AFib. She is currently breathing comfortably on RA without tachycardia. Her pressures improved following IVFs. I suspect her presentation this morning was 2/2 hypovolemia, especially since patient admitted she has not been eating or drinking well. No concern for PE at this time.    She had COVID in December of 2020 with CXR showing atelectasis and an outward protrusion of the right lower mediastinal shadow consistent with pericardial fat pad vs pericardial cyst. CT chest confirmed abnormal CXR finding was 2/2 mesenteric fat extending into the R cardiophrenic space. From a pulmonary standpoint, she is okay to proceed with surgery on Monday. Will staff and discuss with Dr. Lucy Gonzalez      Patient seen, examined and discussed with the resident and I agree with the assessment and plan. Briefly, this is a 63 y.o. female  with history of a pulmonary embolism in 2016 who became hemodynamically unstable when induced for general anesthesia. Patient had to have her surgery aborted today because of hypotension and elevated lactic acid when induced for general anesthesia. Pulmonary was consulted for clearance and optimization for surgery on Monday. Other than the diaphragmatic hernia that they were going to repair she does not have any history of chronic lung disease. She did have pulmonary emboli in 2016 following a pelvic fracture at that time had an echo that suggested pulmonary hypertension. That echo of course showed pulmonary hypertension because she had pulmonary emboli blocking the right ventricular outflow. Since anticoagulation the clots have resolved. Echo today was performed did not show any evidence of pulmonary hypertension. She does not have any history of bronchospasm and no PFTs on record indicate history of COPD. She is not wheezing on exam.    Her breathing appears to be at baseline although it is a little difficult to assess as she is a poor historian. I cannot appreciate any underlying pulmonary pathology so I have no significant concerns for her going to the operating room on Monday from a pulmonary perspective. Cardiology is evaluating her as well, as her issues with induction appear to have been primarily hemodynamic. Please call with questions.     Lindsay Dominguez MD

## 2021-02-05 NOTE — PROGRESS NOTES
Patient presents from [place] [address] with 50# weight loss due to dysphagia first to solids and now to liquids    Medical and Cardiac clearance was obtained prior to surgery    Patient taken to OR and hypotensive on induction  Given pressor, calcium, and still severely hypotensive with discoloration of finger tips  Urine concentrated in [place]    Discussion with anesthesiologist about risks of proceeding given need for pneumoperitoneum and reverse trendelenburg   Decision made to abort   NG tube placed    Did not start surgery. Case aborted after intubation.  Plan to optimize over the weekend and plan for surgery Monday    Discussed with patient in PACU who understands and is in agreemeent    Cari Newton

## 2021-02-05 NOTE — PROGRESS NOTES
PIV infiltrated    Attempted to insert PIV x2 RN without success     ED consulted for PIV placement     Resident aware    Will monitor    Electronically signed by Sheila Cornejo RN on 2/5/2021 at 5:21 PM

## 2021-02-05 NOTE — ANESTHESIA POSTPROCEDURE EVALUATION
Department of Anesthesiology  Postprocedure Note    Patient: Glenny Devlin  MRN: [id number]  YOB: 1960  Date of evaluation: 2/5/2021  Time:  11:41 AM     Procedure Summary     Date: 02/05/21 Room / Location: [address] [place] [address]    Anesthesia Start: 0720 Anesthesia Stop: 9235    Procedures:       ROBOTIC PARAESOPHAGEAL HIATAL HERNIA REPAIR WITH MESH, NISSEN FUNDOPLICATION, LAPAROSCOPIC VENTRAL HERNIA REPAIR; (N/A )      OPEN BILATERAL INGUINAL HERNIA REPAIR (Bilateral ) Diagnosis:       Hiatal hernia      Ventral hernia without obstruction or gangrene      Non-recurrent bilateral inguinal hernia without obstruction or gangrene      (Hiatal hernia [K44.9] Ventral hernia without obstruction or gangrene [K43.9] Non-recurrent bilateral inguinal hernia without obstruction or gangrene [K40.20])    Surgeons: Debbi Ladd DO Responsible Provider: Venus Granado DO    Anesthesia Type: general ASA Status: 3          Anesthesia Type: general    Shelly Phase I: Shelly Score: 10    Shelly Phase II:      Last vitals: Reviewed and per EMR flowsheets.        Anesthesia Post Evaluation    Patient location during evaluation: PACU  Patient participation: complete - patient participated  Level of consciousness: awake and alert  Pain score: 0  Airway patency: patent  Nausea & Vomiting: no nausea and no vomiting  Complications: no  Cardiovascular status: hemodynamically stable  Respiratory status: acceptable  Hydration status: stable  Comments: Case cx after hypotension   And in light of having to do reverse trendelenburg along with pneumoperitoneum d/w surgeon will cx and optimize pt   Cardiology consult

## 2021-02-05 NOTE — PROGRESS NOTES
Elaina Acevedo and dr Chan Young aware of unable to obtain type and screen. States will get in the or. .     Patient states has not eaten or drank anything for days. Dr Delores Robledo and oumar aware of blood pressure.   Iv wide open

## 2021-02-05 NOTE — PROGRESS NOTES
Patient is Aox3 and still sleepy; VSS, exception to [facility] 94-[facility]; RRR; bowel sounds audible 4Q; IVF as ordered; one PIV, awaiting ED to place possible second to administer medications, resident aware; patient is swollen and pale; finger tips cool and dusky; TF started at 25 ml/hr, progressing to goal; bed in lowest position; call light within reach; bed alarm engaged; will monitor.      Electronically signed by Casandra Wakefield RN on 2/5/2021 at 6:52 PM

## 2021-02-05 NOTE — PROGRESS NOTES
PACU Transfer Note    Vitals:    02/05/21 1000   BP: (!) 91/57   Pulse: 97   Resp: 20   Temp:    SpO2: 100%       In: 1500 [I.V.:1500]  Out: 150 [Urine:150]    Pain assessment:   Pain Level: 0    Report given to Receiving unit RN.    2/5/2021 12:07 PM

## 2021-02-05 NOTE — ANESTHESIA PRE PROCEDURE
Department of Anesthesiology  Preprocedure Note       Name:  Temo Fiore   Age:  63 y.o.  :  1960                                          MRN:  [id number]         Date:  2021      Surgeon: Adilene De La Cruz): DO Adelia Dale DO    Procedure: Procedure(s):  ROBOTIC PARAESOPHAGEAL HIATAL HERNIA REPAIR WITH MESH, NISSEN FUNDOPLICATION, LAPAROSCOPIC VENTRAL HERNIA REPAIR;  OPEN BILATERAL INGUINAL HERNIA REPAIR    Medications prior to admission:   Prior to Admission medications    Medication Sig Start Date End Date Taking? Authorizing Provider   promethazine (PHENERGAN) 25 MG/ML injection Inject 6.25 mg into the muscle every 6 hours as needed   Yes Historical Provider, MD   metoclopramide (REGLAN) 10 MG tablet Take 10 mg by mouth 4 times daily    Historical Provider, MD   calcium carbonate (TUMS) 500 MG chewable tablet Take 1 tablet by mouth daily    Historical Provider, MD   omeprazole (PRILOSEC) 10 MG delayed release capsule Take 10 mg by mouth daily    Historical Provider, MD   nystatin (MYCOSTATIN) POWD powder Apply topically 2 times daily    Historical Provider, MD   nitrofurantoin (MACRODANTIN) 50 MG capsule Take 50 mg by mouth 4 times daily    Historical Provider, MD   ondansetron (ZOFRAN) 4 MG/5ML solution Take by mouth once    Historical Provider, MD   oxyCODONE-acetaminophen (PERCOCET) 5-325 MG per tablet Take 1 tablet by mouth every 4 hours as needed for Pain.     Historical Provider, MD   pantoprazole (PROTONIX) 40 MG tablet Take 1 tablet by mouth every morning (before breakfast) 20   Sebas Kam MD   amLODIPine (NORVASC) 10 MG tablet TAKE 1 TABLET BY MOUTH ONCE DAILY FOR 90 DAYS 10/8/20   Historical Provider, MD   atorvastatin (LIPITOR) 40 MG tablet  10/29/20   Historical Provider, MD   oxybutynin (DITROPAN) 5 MG tablet TAKE 1 TABLET BY MOUTH EVERY 8 HOURS 20   Historical Provider, MD  History of pulmonary embolism Z86.711    Abnormal EKG R94.31    Weight loss R63.4       Past Medical History:        Diagnosis Date    Arthritis     [facility]     COVID-19 12/15/2020    ESBL (extended spectrum beta-lactamase) producing bacteria infection     Glaucoma     Hemorrhoids     HTN (hypertension)     Hyperlipidemia     Morbid obesity [facility] [address]     Muscular wasting and disuse atrophy     UTI (urinary tract infection)        Past Surgical History:        Procedure Laterality Date    BLADDER REPAIR       SECTION      CHOLECYSTECTOMY      CYSTOSCOPY      TIBIA FRACTURE SURGERY Right 2020    RIGHT TIBIA IM NAIL INSERTION performed by Stevan Manuel MD at [address] 2020    EGD W/ANES. (8:00) COVID + performed by Jeri Sánchez MD at [address] [facility] History:    Social History     Tobacco Use    Smoking status: Never Smoker    Smokeless tobacco: Never Used   Substance Use Topics    Alcohol use: Not Currently                                Counseling given: Not Answered      Vital Signs (Current):   Vitals:    21 1119 21 0637 21 0640   BP:   83   Pulse:  116    Resp:  16    Temp:  97.7 °F (36.5 °C)    SpO2:  96%    Weight: 245 lb 4.8 oz (111.3 kg) 245 lb (111.1 kg)    Height: 5' 6\" (1.676 m) 5' 6\" (1.676 m)                                               BP Readings from Last 3 Encounters:   21 83/67   21 123/78   20 115/67       NPO Status:                                                                            Date of last solid food consumption: 21    BMI:   Wt Readings from Last 3 Encounters:   21 245 lb (111.1 kg)   21 259 lb (117.5 kg)   20 259 lb 14.4 oz (117.9 kg)     Body mass index is 39.54 kg/m².     CBC:   Lab Results   Component Value Date    WBC 5.7 2020    RBC 4.13 2020    HGB 13.3 2020 Anesthesia Plan      general     ASA 3       Induction: intravenous. MIPS: Prophylactic antiemetics administered. Anesthetic plan and risks discussed with patient. Plan discussed with CRNA.     Attending anesthesiologist reviewed and agrees with DO Lesley   2/5/2021

## 2021-02-05 NOTE — ANESTHESIA PROCEDURE NOTES
Arterial Line:    An arterial line was placed using ultrasound guidance, in the OR for the following indication(s): continuous blood pressure monitoring and blood sampling needed. A 20 gauge (size), 4.45 cm (length), Angiocath (type) catheter was placed, Seldinger technique used, into the left brachial artery, secured by suture, tape and Tegaderm and biopatch. Anesthesia type: General    Events:  patient tolerated procedure well with no complications and EBL < 5mL.   2/5/2021 7:56 AM2/5/2021 8:01 AM  Anesthesiologist: Vannesa Herr DO  Performed: Anesthesiologist   Preanesthetic Checklist  Completed: patient identified, IV checked, site marked, risks and benefits discussed, surgical consent, monitors and equipment checked, pre-op evaluation, timeout performed, anesthesia consent given, oxygen available and patient being monitored

## 2021-02-06 ENCOUNTER — APPOINTMENT (OUTPATIENT)
Dept: GENERAL RADIOLOGY | Age: 61
DRG: 254 | End: 2021-02-06
Attending: SURGERY
Payer: MEDICAID

## 2021-02-06 PROBLEM — E44.1 MILD MALNUTRITION (HCC): Chronic | Status: ACTIVE | Noted: 2021-02-06

## 2021-02-06 LAB
ALBUMIN SERPL-MCNC: 2.3 G/DL (ref 3.4–5)
ALBUMIN SERPL-MCNC: 2.3 G/DL (ref 3.4–5)
ALBUMIN SERPL-MCNC: 2.4 G/DL (ref 3.4–5)
ANION GAP SERPL CALCULATED.3IONS-SCNC: 10 MMOL/L (ref 3–16)
ANION GAP SERPL CALCULATED.3IONS-SCNC: 6 MMOL/L (ref 3–16)
ANION GAP SERPL CALCULATED.3IONS-SCNC: 9 MMOL/L (ref 3–16)
BASOPHILS ABSOLUTE: 0.1 K/UL (ref 0–0.2)
BASOPHILS ABSOLUTE: 0.1 K/UL (ref 0–0.2)
BASOPHILS RELATIVE PERCENT: 0.7 %
BASOPHILS RELATIVE PERCENT: 1 %
BUN BLDV-MCNC: 7 MG/DL (ref 7–20)
BUN BLDV-MCNC: 8 MG/DL (ref 7–20)
BUN BLDV-MCNC: 8 MG/DL (ref 7–20)
CALCIUM SERPL-MCNC: 7.3 MG/DL (ref 8.3–10.6)
CALCIUM SERPL-MCNC: 7.9 MG/DL (ref 8.3–10.6)
CALCIUM SERPL-MCNC: 8 MG/DL (ref 8.3–10.6)
CHLORIDE BLD-SCNC: 94 MMOL/L (ref 99–110)
CHLORIDE BLD-SCNC: 96 MMOL/L (ref 99–110)
CHLORIDE BLD-SCNC: 97 MMOL/L (ref 99–110)
CO2: 32 MMOL/L (ref 21–32)
CO2: 32 MMOL/L (ref 21–32)
CO2: 33 MMOL/L (ref 21–32)
CREAT SERPL-MCNC: 0.6 MG/DL (ref 0.6–1.2)
CREAT SERPL-MCNC: 0.7 MG/DL (ref 0.6–1.2)
CREAT SERPL-MCNC: <0.5 MG/DL (ref 0.6–1.2)
EOSINOPHILS ABSOLUTE: 0.1 K/UL (ref 0–0.6)
EOSINOPHILS ABSOLUTE: 0.1 K/UL (ref 0–0.6)
EOSINOPHILS RELATIVE PERCENT: 1 %
EOSINOPHILS RELATIVE PERCENT: 2.1 %
GFR AFRICAN AMERICAN: >60
GFR NON-AFRICAN AMERICAN: >60
GLUCOSE BLD-MCNC: 144 MG/DL (ref 70–99)
GLUCOSE BLD-MCNC: 168 MG/DL (ref 70–99)
GLUCOSE BLD-MCNC: 99 MG/DL (ref 70–99)
HCT VFR BLD CALC: 28.5 % (ref 36–48)
HCT VFR BLD CALC: 29.5 % (ref 36–48)
HEMOGLOBIN: 10 G/DL (ref 12–16)
HEMOGLOBIN: 9.7 G/DL (ref 12–16)
LACTIC ACID: 1.1 MMOL/L (ref 0.4–2)
LACTIC ACID: 2.1 MMOL/L (ref 0.4–2)
LYMPHOCYTES ABSOLUTE: 1.2 K/UL (ref 1–5.1)
LYMPHOCYTES ABSOLUTE: 1.4 K/UL (ref 1–5.1)
LYMPHOCYTES RELATIVE PERCENT: 17.5 %
LYMPHOCYTES RELATIVE PERCENT: 21.8 %
MAGNESIUM: 1.5 MG/DL (ref 1.8–2.4)
MAGNESIUM: 2.4 MG/DL (ref 1.8–2.4)
MCH RBC QN AUTO: 31.6 PG (ref 26–34)
MCH RBC QN AUTO: 31.7 PG (ref 26–34)
MCHC RBC AUTO-ENTMCNC: 33.9 G/DL (ref 31–36)
MCHC RBC AUTO-ENTMCNC: 34 G/DL (ref 31–36)
MCV RBC AUTO: 93.1 FL (ref 80–100)
MCV RBC AUTO: 93.4 FL (ref 80–100)
MONOCYTES ABSOLUTE: 0.3 K/UL (ref 0–1.3)
MONOCYTES ABSOLUTE: 0.3 K/UL (ref 0–1.3)
MONOCYTES RELATIVE PERCENT: 4.8 %
MONOCYTES RELATIVE PERCENT: 4.9 %
NEUTROPHILS ABSOLUTE: 4.5 K/UL (ref 1.7–7.7)
NEUTROPHILS ABSOLUTE: 5.4 K/UL (ref 1.7–7.7)
NEUTROPHILS RELATIVE PERCENT: 70.2 %
NEUTROPHILS RELATIVE PERCENT: 76 %
PDW BLD-RTO: 16.3 % (ref 12.4–15.4)
PDW BLD-RTO: 16.6 % (ref 12.4–15.4)
PHOSPHORUS: 1.2 MG/DL (ref 2.5–4.9)
PHOSPHORUS: 1.9 MG/DL (ref 2.5–4.9)
PHOSPHORUS: 2.5 MG/DL (ref 2.5–4.9)
PLATELET # BLD: 236 K/UL (ref 135–450)
PLATELET # BLD: 246 K/UL (ref 135–450)
PMV BLD AUTO: 8.7 FL (ref 5–10.5)
PMV BLD AUTO: 8.9 FL (ref 5–10.5)
POTASSIUM SERPL-SCNC: 2.9 MMOL/L (ref 3.5–5.1)
POTASSIUM SERPL-SCNC: 3.2 MMOL/L (ref 3.5–5.1)
POTASSIUM SERPL-SCNC: 3.8 MMOL/L (ref 3.5–5.1)
PREALBUMIN: 3.8 MG/DL (ref 20–40)
PRO-BNP: 1492 PG/ML (ref 0–124)
RBC # BLD: 3.05 M/UL (ref 4–5.2)
RBC # BLD: 3.17 M/UL (ref 4–5.2)
SODIUM BLD-SCNC: 135 MMOL/L (ref 136–145)
SODIUM BLD-SCNC: 136 MMOL/L (ref 136–145)
SODIUM BLD-SCNC: 138 MMOL/L (ref 136–145)
TRANSFERRIN: 88 MG/DL (ref 200–360)
WBC # BLD: 6.4 K/UL (ref 4–11)
WBC # BLD: 7.1 K/UL (ref 4–11)

## 2021-02-06 PROCEDURE — 2580000003 HC RX 258: Performed by: SURGERY

## 2021-02-06 PROCEDURE — 6360000002 HC RX W HCPCS: Performed by: SURGERY

## 2021-02-06 PROCEDURE — 2700000000 HC OXYGEN THERAPY PER DAY

## 2021-02-06 PROCEDURE — 74018 RADEX ABDOMEN 1 VIEW: CPT

## 2021-02-06 PROCEDURE — 1200000000 HC SEMI PRIVATE

## 2021-02-06 PROCEDURE — 92526 ORAL FUNCTION THERAPY: CPT

## 2021-02-06 PROCEDURE — C9113 INJ PANTOPRAZOLE SODIUM, VIA: HCPCS | Performed by: STUDENT IN AN ORGANIZED HEALTH CARE EDUCATION/TRAINING PROGRAM

## 2021-02-06 PROCEDURE — 6360000002 HC RX W HCPCS: Performed by: STUDENT IN AN ORGANIZED HEALTH CARE EDUCATION/TRAINING PROGRAM

## 2021-02-06 PROCEDURE — 2500000003 HC RX 250 WO HCPCS: Performed by: STUDENT IN AN ORGANIZED HEALTH CARE EDUCATION/TRAINING PROGRAM

## 2021-02-06 PROCEDURE — 2580000003 HC RX 258: Performed by: STUDENT IN AN ORGANIZED HEALTH CARE EDUCATION/TRAINING PROGRAM

## 2021-02-06 PROCEDURE — 85025 COMPLETE CBC W/AUTO DIFF WBC: CPT

## 2021-02-06 PROCEDURE — 99232 SBSQ HOSP IP/OBS MODERATE 35: CPT | Performed by: SURGERY

## 2021-02-06 PROCEDURE — 94150 VITAL CAPACITY TEST: CPT

## 2021-02-06 PROCEDURE — 80069 RENAL FUNCTION PANEL: CPT

## 2021-02-06 PROCEDURE — 83880 ASSAY OF NATRIURETIC PEPTIDE: CPT

## 2021-02-06 PROCEDURE — 94761 N-INVAS EAR/PLS OXIMETRY MLT: CPT

## 2021-02-06 PROCEDURE — 83735 ASSAY OF MAGNESIUM: CPT

## 2021-02-06 PROCEDURE — 2500000003 HC RX 250 WO HCPCS: Performed by: SURGERY

## 2021-02-06 PROCEDURE — 83605 ASSAY OF LACTIC ACID: CPT

## 2021-02-06 PROCEDURE — 92610 EVALUATE SWALLOWING FUNCTION: CPT

## 2021-02-06 RX ORDER — SODIUM CHLORIDE, SODIUM GLUCONATE, SODIUM ACETATE, POTASSIUM CHLORIDE AND MAGNESIUM CHLORIDE 526; 502; 368; 37; 30 MG/100ML; MG/100ML; MG/100ML; MG/100ML; MG/100ML
INJECTION, SOLUTION INTRAVENOUS ONCE
Status: COMPLETED | OUTPATIENT
Start: 2021-02-06 | End: 2021-02-06

## 2021-02-06 RX ORDER — POTASSIUM CHLORIDE 29.8 MG/ML
20 INJECTION INTRAVENOUS
Status: COMPLETED | OUTPATIENT
Start: 2021-02-06 | End: 2021-02-06

## 2021-02-06 RX ADMIN — METOCLOPRAMIDE HYDROCHLORIDE 5 MG: 5 INJECTION INTRAMUSCULAR; INTRAVENOUS at 05:32

## 2021-02-06 RX ADMIN — METOCLOPRAMIDE HYDROCHLORIDE 5 MG: 5 INJECTION INTRAMUSCULAR; INTRAVENOUS at 16:41

## 2021-02-06 RX ADMIN — POTASSIUM CHLORIDE 10 MEQ: 10 INJECTION, SOLUTION INTRAVENOUS at 03:18

## 2021-02-06 RX ADMIN — SODIUM CHLORIDE, SODIUM GLUCONATE, SODIUM ACETATE, POTASSIUM CHLORIDE AND MAGNESIUM CHLORIDE 1000 ML: 526; 502; 368; 37; 30 INJECTION, SOLUTION INTRAVENOUS at 02:57

## 2021-02-06 RX ADMIN — POTASSIUM CHLORIDE 20 MEQ: 29.8 INJECTION, SOLUTION INTRAVENOUS at 10:19

## 2021-02-06 RX ADMIN — MAGNESIUM SULFATE 4000 MG: 4 INJECTION INTRAVENOUS at 00:01

## 2021-02-06 RX ADMIN — METOPROLOL TARTRATE 5 MG: 5 INJECTION INTRAVENOUS at 12:44

## 2021-02-06 RX ADMIN — SODIUM CHLORIDE, SODIUM GLUCONATE, SODIUM ACETATE, POTASSIUM CHLORIDE AND MAGNESIUM CHLORIDE 1000 ML: 526; 502; 368; 37; 30 INJECTION, SOLUTION INTRAVENOUS at 06:00

## 2021-02-06 RX ADMIN — Medication 10 ML: at 08:10

## 2021-02-06 RX ADMIN — SODIUM PHOSPHATE, MONOBASIC, MONOHYDRATE 30 MMOL: 276; 142 INJECTION, SOLUTION INTRAVENOUS at 08:10

## 2021-02-06 RX ADMIN — POTASSIUM CHLORIDE 20 MEQ: 29.8 INJECTION, SOLUTION INTRAVENOUS at 12:26

## 2021-02-06 RX ADMIN — SODIUM CHLORIDE, SODIUM GLUCONATE, SODIUM ACETATE, POTASSIUM CHLORIDE AND MAGNESIUM CHLORIDE: 526; 502; 368; 37; 30 INJECTION, SOLUTION INTRAVENOUS at 14:44

## 2021-02-06 RX ADMIN — POTASSIUM CHLORIDE 10 MEQ: 10 INJECTION, SOLUTION INTRAVENOUS at 02:13

## 2021-02-06 RX ADMIN — POTASSIUM CHLORIDE 10 MEQ: 10 INJECTION, SOLUTION INTRAVENOUS at 01:08

## 2021-02-06 RX ADMIN — POTASSIUM CHLORIDE 20 MEQ: 29.8 INJECTION, SOLUTION INTRAVENOUS at 09:13

## 2021-02-06 RX ADMIN — POTASSIUM PHOSPHATE, MONOBASIC AND POTASSIUM PHOSPHATE, DIBASIC 20 MMOL: 224; 236 INJECTION, SOLUTION, CONCENTRATE INTRAVENOUS at 18:25

## 2021-02-06 RX ADMIN — POTASSIUM CHLORIDE 20 MEQ: 29.8 INJECTION, SOLUTION INTRAVENOUS at 11:12

## 2021-02-06 RX ADMIN — CEFTRIAXONE 1000 MG: 1 INJECTION, POWDER, FOR SOLUTION INTRAMUSCULAR; INTRAVENOUS at 12:44

## 2021-02-06 RX ADMIN — METOCLOPRAMIDE HYDROCHLORIDE 5 MG: 5 INJECTION INTRAMUSCULAR; INTRAVENOUS at 22:38

## 2021-02-06 RX ADMIN — PANTOPRAZOLE SODIUM 40 MG: 40 INJECTION, POWDER, FOR SOLUTION INTRAVENOUS at 08:10

## 2021-02-06 RX ADMIN — ENOXAPARIN SODIUM 100 MG: 100 INJECTION SUBCUTANEOUS at 03:14

## 2021-02-06 RX ADMIN — Medication 10 ML: at 20:59

## 2021-02-06 RX ADMIN — SODIUM CHLORIDE, SODIUM GLUCONATE, SODIUM ACETATE, POTASSIUM CHLORIDE AND MAGNESIUM CHLORIDE: 526; 502; 368; 37; 30 INJECTION, SOLUTION INTRAVENOUS at 00:29

## 2021-02-06 RX ADMIN — METOCLOPRAMIDE HYDROCHLORIDE 5 MG: 5 INJECTION INTRAMUSCULAR; INTRAVENOUS at 11:13

## 2021-02-06 RX ADMIN — ENOXAPARIN SODIUM 100 MG: 100 INJECTION SUBCUTANEOUS at 12:45

## 2021-02-06 ASSESSMENT — PAIN SCALES - PAIN ASSESSMENT IN ADVANCED DEMENTIA (PAINAD)
BODYLANGUAGE: 0
NEGVOCALIZATION: 0
FACIALEXPRESSION: 0

## 2021-02-06 NOTE — PROGRESS NOTES
Pt has been A&Ox4, VSS aside from her BP which has dropped into the 80s/50s-60s at times but has come up on it's own each time without interventions, surgical resident made aware each time. UO has increased since last shift, gotten more yellow and more clear. Lungs have continued to be diminished, RN has not auscultated any crackles. Pt's NG has remained in place since replaced and secured with bridle this morning, tolerating tube feed at goal rate of 60ml/hr. Pt has continued to be confused, has been found pulling at her NG when awake, even asking RN repeatedly within a 10-15 minute time period if it can come out. RN has attempted to reorient pt at every opportunity, so far pt has not shown any signs of retaining the information. Pt has been repositioned frequently, Will continue to monitor.

## 2021-02-06 NOTE — PROGRESS NOTES
NUTRITION ASSESSMENT  Admission Date: 2/5/2021     Type and Reason for Visit: Initial, Positive Nutrition Screen    NUTRITION RECOMMENDATIONS:   1. Once NGT replaced, restart EN formula Standard Formula with Fiber  Jevity 1.2  @ goal rate 60 ml/hr  provide 1440 ml, 1728 kcal, 80 g protein, and 1162 ml free water. 2. Recommend maintenance water flush of 30 ml every 4 hours, with IVF currently running at 150 ml/hr. 3. Consider reducing amount of protein modulars to more closely match pt nutrition needs. Suggest just 1 protein modular daily either via NGT or PO.     NUTRITION ASSESSMENT:  Nutrition eval triggered d/t pt new on EN- pt with failed surgical attempt for elective hernia repair, but pt with hypotension prior to surgery and surgery delayed. NGT was placed d/t pt report of severe dysphagia where pt recently unable to tolerate even consuming liquid diet and RD notes 63 lb wt loss in 6 mo. Per RN/MD notes- NGT was pulled this AM- per surgery this will be replaced and EN to start. RD discussed protein modulars providing 150% + pt nutrition needs and suggesting to reduce to just 1 protein shot per day to prevent overfeeding pt. 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 for elective hernia repair but developed hypotension during induction of surgery, thus surgery was aborted.         MALNUTRITION ASSESSMENT  Context of Malnutrition: Acute Illness   Malnutrition Status: Mild malnutrition  Findings of the 6 clinical characteristics of malnutrition (Minimum of 2 out of 6 clinical characteristics is required to make the diagnosis of moderate or severe Protein Calorie Malnutrition based on AND/ASPEN Guidelines):  Energy Intake: Less than/equal to 50% of estimated energy requirements    Energy Intake Time: since admit; ihsan w/o pt hx   Weight Loss %: 20% loss or greater Weight loss Time: Greater than or equal to 3 months   Due to current CDC guidelines recommending 6-ft distancing for social isolation for COVID19 prevention, physical aspects of the malnutrition assessment were withheld at this time. NUTRITION DIAGNOSIS   Problem: Problem #1: Inadequate oral intake  Etiology: Altered GI function  Signs & Symptoms: Diet history of poor intake  and Weight loss     NUTRITION INTERVENTION  Food and/or Nutrient Delivery:Continue NPO or Start Tube Feeding   Nutrition education/counseling/coordination of care: Continue Inpatient Monitoring     NUTRITION MONITORING & EVALUATION:  Evaluation:Goals set   Goals:Goals: pt will tolerate EN and PO diet to consume greater than 75% of pt nutrition needs at this time.    Monitoring: Nutrition Progression , Pertinent Labs , TF Intake , TF Tolerance  or Weight      OBJECTIVE DATA:  · Nutrition-Focused Physical Findings: lbm pta;   · Wounds None      Past Medical History:   Diagnosis Date    Arthritis     [facility]est[facility] failure) ([facility] [address]     COVID-19 12/15/2020    ESBL (extended spectrum beta-lactamase) producing bacteria infection     Glaucoma     Hemorrhoids     HTN (hypertension)     Hyperlipidemia     Morbid obesity ([facility] [address]     Muscular wasting and disuse atrophy     UTI (urinary tract infection)         ANTHROPOMETRICS  Current Height: 5' 6\" (167.6 cm)  Current Weight: 245 lb (111.1 kg)    Admission weight: 245 lb 4.8 oz (111.3 kg)  Ideal Bodyweight 130 lb (59.1 kg)    Usual Bodyweight ~ 290-300 lb per EMR;   Weight Changes -63 lb in 3 mo        BMI BMI (Calculated): 39.6    Wt Readings from Last 50 Encounters:   02/05/21 245 lb (111.1 kg)   01/07/21 259 lb (117.5 kg)   12/28/20 259 lb 14.4 oz (117.9 kg)   12/28/20 237 lb (107.5 kg)   12/14/20 237 lb (107.5 kg)   11/20/20 (!) 308 lb (139.7 kg)   11/09/20 (!) 308 lb 13.8 oz (140.1 kg)   10/30/20 290 lb (131.5 kg)   12/01/17 230 lb (104.3 kg)   09/02/16 230 lb (104.3 kg) COMPARATIVE STANDARDS  Estimated Total Kcals/Day : 15-18 Current Bodyweight (111.1 kg) ~4734-8732 kcal    Estimated Total Protein (g/day) : 1.2-1.4 Ideal Bodyweight  (59.1 kg) 71-83g/day  Estimated Daily Total Fluid (ml/day): 8845-5930 mL per day     Food / Nutrition-Related History  Pre-Admission / Home Diet:    none listed   Home Supplements / Herbals:   none noted  Food Restrictions / Cultural Requests:   none noted    Current Nutrition Therapies   DIET CLEAR LIQUID;  Dietary Nutrition Supplements: Protein Modular, Wound Healing Oral Supplement  Diet Tube Feed Continuous/Cyclic w/ Diet     Current Tube Feeding (TF) Orders:  · Feeding Route: Nasogastric  · Formula: Standard w/Fiber  · Schedule: Continuous  · Additives/Modulars: Protein, Wound Healing  · Water Flushes:   none w/IVF   · Current TF & Flush Orders Provides: ON HOLD d/t pt pulled NGT; ORDER PER SURGERY: Jev 1.2 @ 60 ml/hr to provide 1440 ml, 1728 kcal, 80 g protein, and 1162 ml free water.    · Goal TF & Flush Orders Provides: Continue current recs per surgery + 95 ml water bolus every 4 hours when no IVF  PO Intake: NPO  PO Supplement: NPO   PO Supplement Intake: NPO  IVF: Plasmalyte-A solution @ 150 ml/hr     NUTRITION RISK LEVEL: Risk Level: [address] [facility], RD, LD  [place]:  [phone]  Office:  [phone]

## 2021-02-06 NOTE — PROCEDURES
Skin prep agent dried: skin prep agent completely dried prior to procedure  Sterile barriers: all five maximum sterile barriers used - cap, mask, sterile gown, sterile gloves, and large sterile sheet  Hand hygiene: hand hygiene performed prior to central venous catheter insertion  Location details: right internal jugular  Patient position: Trendelenburg  Catheter type: triple lumen  Catheter size: 7 Fr  Pre-procedure: landmarks identified  Ultrasound guidance: yes  Sterile ultrasound techniques: sterile gel and sterile probe covers were used  Number of attempts: 2  Successful placement: yes  Post-procedure: line sutured and dressing applied  Assessment: blood return through all ports,  free fluid flow and placement verified by x-ray  Patient tolerance: patient tolerated the procedure well with no immediate complications  Comments: EBL: 5cc    Wire placement confirm with u/s please see media tab for ultrasound image.      CXR demonstrated the tip of the wire is in the distal SVC           Kortney Mcgowan DO  2/5/2021

## 2021-02-06 NOTE — PLAN OF CARE
Problem: Falls - Risk of:  Goal: Will remain free from falls  Description: Will remain free from falls  Outcome: Ongoing  Note: Pt is A&Earl person and intermittently oriented to place, time, and situation, is high fall risk, Pt educated and encouraged to use call light to notify and wait for assistance from staff before getting OOB, pt uses call light appropriately, has made no unsafe movements, no attempts to get OOB without assistance. Pt's bed alarm remained on throughout shift, bed in lowest position, 2/4 side rails up, call light and bedside table within reach. No falls so far this shift, will continue to monitor. Problem: Skin Integrity:  Goal: Will show no infection signs and symptoms  Description: Will show no infection signs and symptoms  Outcome: Ongoing  Note: Pt's skin was assessed this shift and was found to be intact. Pt is able to turn themselves as needed and encouraged to turn frequently while awake. Pt has duran catheter and is rarely moist. No evidence of any skin breakdown so far this shift. Will continue to monitor. Problem: Nutrition  Goal: Optimal nutrition therapy  Outcome: Ongoing  Note: Pt has been restarted on the tube feed at goal once surgery was able to replace the NG, restarted at goal per surgery as it had been going at goal prior to pt removing NG. Pt has been tolerating well. Will continue to monitor.      Problem: Confusion - Acute:  Goal: Absence of continued neurological deterioration signs and symptoms  Description: Absence of continued neurological deterioration signs and symptoms  Outcome: Not Met This Shift Note: Pt has been asked the orientation questions a few times throughout this shift, pt is aware she is in the hospital, could not tell RN which one, delayed responses, and was not able to tell me the correct month consistently but did tell RN February one of the times it was asked. Pt also consistently has asked how long the NG tube has to remain in, if she can \"cut this thing off\" and take it out. RN has attempted reorientation multiple times but shows no signs of holding onto the information.

## 2021-02-06 NOTE — PROGRESS NOTES
Speech Language Pathology  Facility/Department: [address]   CLINICAL BEDSIDE SWALLOW EVALUATION  & Treatment Note    NAME: Annemarie Chi  : 1960  MRN: [id number]    ADMISSION DATE: 2021  ADMITTING DIAGNOSIS: has Pubic ramus fracture ([address] Right sided sciatica; Degenerative tear of lateral meniscus of right knee; Primary osteoarthritis of right knee; Lateral knee pain; Primary osteoarthritis of left knee; Right medial tibial plateau fracture, closed, initial encounter; HTN (hypertension); Morbid obesity ([address] HLD (hyperlipidemia); Bacterial urinary tract infection; Intractable nausea and vomiting; COVID-19 virus infection; Nausea and vomiting; Intractable vomiting with nausea; Hiatal hernia; Hypoglycemia; Paroxysmal atrial fibrillation ([address] History of pulmonary embolism; Abnormal EKG; Weight loss; Paraesophageal hernia; Hypotension; Abnormal ECG; and Elevated troponin on their problem list.  ONSET DATE: 2021    Recent Chest Xray: (2021)  Impression       1. Right medial patchy basilar airspace consolidation or atelectasis   2. Right IJ central venous catheter placed with the tip in the distal SVC. NG tube remains in place       Date of Eval: 2021  Evaluating Therapist: Vane Pollard    Current Diet level:  Current Diet : NPO  Current Liquid Diet : Clear      Primary Complaint  Patient Complaint: Pt reports occasional difficulty swallowing; did not elaborate. Pain:  Pain Assessment  Pain Assessment: 0-10  Pain Level: 0  POSS Score (Patient Ctrl Analgesia): 2(no verbal response,eyes open)    Reason for Referral  Annemarie Chi was referred for a bedside swallow evaluation to assess the efficiency of her swallow function, identify signs and symptoms of aspiration and make recommendations regarding safe dietary consistencies, effective compensatory strategies, and safe eating environment.     Impression  Dysphagia Diagnosis: Suspected needs further assessment Dysphagia Impression : Limited assessment, pt accepting only some PO trials. Has known h/o dysphagia 2/2 hiatal hernia. Tolerated ice chips/sips thins via straw with positive oral acceptance, swallow/laryngeal movement, good oral clearance, no overt signs of aspiration/penetration. Will continue to follow. Dysphagia Outcome Severity Scale: Level 5: Mild dysphagia- Distant supervision. May need one diet consistency restricted     Treatment Plan  Requires SLP Intervention: Yes  Duration/Frequency of Treatment: 1-2x/wk for LOS        Recommended Diet and Intervention  Diet Solids Recommendation: NPO; continue alternate means nutrition  Liquid Consistency Recommendation: Clear; continue alternate means hydration  Recommended Form of Meds: Via alternative means of nutrition  Recommendations: Dysphagia treatment  Therapeutic Interventions: Therapeutic PO trials with SLP; Diet tolerance monitoring;Patient/Family education    Compensatory Swallowing Strategies  Compensatory Swallowing Strategies: Eat/Feed slowly; Remain upright for 30-45 minutes after meals;Small bites/sips;Upright as possible for all oral intake    Treatment/Goals  Short-term Goals  Timeframe for Short-term Goals: 1-2 wks or LOS  Goal 1: Patient will tolerate least restrictive diet without overt signs of aspiration or associated decline in respiratory status. Goal 2: Patient/caregiver will demonstrate understanding of swallowing concerns/recommendations. 2/6: Educated pt regarding dysphagia, swallow anatomy/function, diet recommendations, safe swallow strategies (upright, slow rate, small sips). Pt indicated some comprehension, suspect needs reinforcement. Cont goal.    General  Chart Reviewed:  Yes Comments: Per admitting H&P (02/05/2021): 'Patient with 1) c/o severe reflux disease and progressively worsening dysphagia 2) bilateral inguinal hernias and 3) ventral hernia presents today for the above procedure.' Per RN/chart review, surgery postpone d/t hypotension at surgery induction. Surgery rescheduled. Subjective  Subjective: Patient awake, resting in bed, on room air. NG tube in place, feeding running. Pt oriented to self. Slow response to questions. Behavior/Cognition: Alert;Confused; Distractible;Requires cueing  Respiratory Status: Room air  O2 Device: None (Room air)  Communication Observation: Functional  Follows Directions: Simple  Dentition: Adequate  Patient Positioning: Upright in bed  Baseline Vocal Quality: Weak  Volitional Cough: Absent  Volitional Swallow: Absent  Prior Dysphagia History: Per chart, worsening dysphagia d/t hiatal hernia (surgery planned)  Consistencies Administered: Ice Chips; Thin - straw    Vision/Hearing  Vision  Vision: Impaired  Vision Exceptions: Wears glasses at all times  Hearing  Hearing: Within functional limits    Oral Motor Deficits  Oral/Motor  Oral Motor: Exceptions to [facility]  Labial Coordination: Reduced  Lingual Coordination: Reduced    Prognosis  Prognosis  Prognosis for safe diet advancement: good  Individuals consulted  Consulted and agree with results and recommendations: Patient    Education  Patient Education: Patient educated re: role of SLP, purpose of visit, swallowing, recommendations  Patient Education Response: Needs reinforcement  Safety Devices in place: Yes  Type of devices: Left in bed;Bed alarm in place;Nurse notified       Therapy Time  SLP Individual Minutes  Time In: 0914  Time Out: 0940  Minutes: 32     SLP Total Treatment Time  Timed Code Treatment Minutes: 0 Minutes  Total Treatment Time: 26    Plan Diet Recommendations: Ice chips, thin liquids for comfort; recommend continue temporary alternate means nutrition/hydration. Plan to re-assess swallow function s/p hiatal hernia surgery. If signs of aspiration or associated decline in respiratory status arise, recommend withhold PO and contact speech. Discharge Plan:  TBD  Discussed with RN, [place] Kayser. Needs within reach. Electronically Signed by:  Christine Rg M.A., Rm Cordova   Speech-Language Pathologist  Pager #[phone]    This document will serve as a discharge summary if pt discharges before next treatment.

## 2021-02-06 NOTE — PLAN OF CARE
Completed bedside swallowing evaluation. Please refer to EMR.     Vito Bianchi M.A., Rm [address] [address]  Speech-Language Pathologist

## 2021-02-06 NOTE — PROGRESS NOTES
Surgery Daily Progress Note      CC: Paraesophageal hernia    SUBJECTIVE:  Patient disoriented overnight and pulled out NG tube  Denies pain  Denies appetitie      ROS:   A 14 point review of systems was conducted, significant findings as noted above. All other systems negative. OBJECTIVE:    PHYSICAL EXAM:  Vitals:    02/05/21 2138 02/05/21 2247 02/06/21 0302 02/06/21 0601   BP: 115/79 115/78 89/61 89/65   Pulse: 103 100 106 104   Resp: 17 18 18    Temp: 97.3 °F (36.3 °C)  97.3 °F (36.3 °C)    TempSrc: Oral  Oral    SpO2: 100% 100% 91%    Weight:       Height:           General appearance: alert, no acute distress, grooming appropriate  Neuro: A&Ox3, no focal deficits, sensation intact  HEENT: Grossly normal  Chest/Lungs: normal effort, no accessory muscle use, on RA  Cardiovascular: RRR  Abdomen: soft, non-tender, non-distended, no guarding/rigidity  : duran in place, clear yellow urine/no duran  Extremities: no edema, no cyanosis      ASSESSMENT & PLAN:   This is a 63 y.o. female with a diagnosis of paraesophageal hernia s/p aborted operation secondary to instability after anesthesia induction (2/5). - NG tube removed by patient and will be replaced this AM  - Continue IVF at 150  - Lactic acid normalized to 1.1  - Continue therapeutic Lovenox per cardiology for hx of a fib and will adjust to prophylactic on Sunday  - OK for CLD   - Out of bed ambulating  - Continue duran for accurate I/Os  - Continue rocephin for UTI    Jong June, DO 02/06/21  7:28 AM  [phone]    I have seen, examined, and reviewed the patients chart. I agree with the residents assessment and have made appropriate changes.     Ben Mathis

## 2021-02-06 NOTE — PLAN OF CARE
Nutrition Problem #1: Inadequate oral intake  Intervention: Food and/or Nutrient Delivery: Continue Current Diet, Continue Current Tube Feeding(modifiy modulars )  Nutritional Goals: pt will tolerate EN and PO diet to consume greater than 75% of pt nutrition needs at this time.

## 2021-02-07 LAB
ALBUMIN SERPL-MCNC: 2 G/DL (ref 3.4–5)
ANION GAP SERPL CALCULATED.3IONS-SCNC: 6 MMOL/L (ref 3–16)
BASOPHILS ABSOLUTE: 0 K/UL (ref 0–0.2)
BASOPHILS RELATIVE PERCENT: 0.9 %
BUN BLDV-MCNC: 6 MG/DL (ref 7–20)
CALCIUM SERPL-MCNC: 7.3 MG/DL (ref 8.3–10.6)
CHLORIDE BLD-SCNC: 101 MMOL/L (ref 99–110)
CO2: 33 MMOL/L (ref 21–32)
CREAT SERPL-MCNC: <0.5 MG/DL (ref 0.6–1.2)
EOSINOPHILS ABSOLUTE: 0.1 K/UL (ref 0–0.6)
EOSINOPHILS RELATIVE PERCENT: 2.8 %
GFR AFRICAN AMERICAN: >60
GFR NON-AFRICAN AMERICAN: >60
GLUCOSE BLD-MCNC: 134 MG/DL (ref 70–99)
HCT VFR BLD CALC: 26.4 % (ref 36–48)
HEMOGLOBIN: 8.9 G/DL (ref 12–16)
INR BLD: 1.03 (ref 0.86–1.14)
LYMPHOCYTES ABSOLUTE: 1.2 K/UL (ref 1–5.1)
LYMPHOCYTES RELATIVE PERCENT: 21.8 %
MAGNESIUM: 1.8 MG/DL (ref 1.8–2.4)
MCH RBC QN AUTO: 31.4 PG (ref 26–34)
MCHC RBC AUTO-ENTMCNC: 33.6 G/DL (ref 31–36)
MCV RBC AUTO: 93.4 FL (ref 80–100)
MONOCYTES ABSOLUTE: 0.3 K/UL (ref 0–1.3)
MONOCYTES RELATIVE PERCENT: 6.3 %
NEUTROPHILS ABSOLUTE: 3.7 K/UL (ref 1.7–7.7)
NEUTROPHILS RELATIVE PERCENT: 68.2 %
PDW BLD-RTO: 16.4 % (ref 12.4–15.4)
PHOSPHORUS: 2 MG/DL (ref 2.5–4.9)
PLATELET # BLD: 192 K/UL (ref 135–450)
PMV BLD AUTO: 8.8 FL (ref 5–10.5)
POTASSIUM SERPL-SCNC: 3.8 MMOL/L (ref 3.5–5.1)
PROTHROMBIN TIME: 11.9 SEC (ref 10–13.2)
RBC # BLD: 2.83 M/UL (ref 4–5.2)
SODIUM BLD-SCNC: 140 MMOL/L (ref 136–145)
WBC # BLD: 5.4 K/UL (ref 4–11)

## 2021-02-07 PROCEDURE — 99231 SBSQ HOSP IP/OBS SF/LOW 25: CPT | Performed by: SURGERY

## 2021-02-07 PROCEDURE — 2500000003 HC RX 250 WO HCPCS: Performed by: STUDENT IN AN ORGANIZED HEALTH CARE EDUCATION/TRAINING PROGRAM

## 2021-02-07 PROCEDURE — 6370000000 HC RX 637 (ALT 250 FOR IP): Performed by: STUDENT IN AN ORGANIZED HEALTH CARE EDUCATION/TRAINING PROGRAM

## 2021-02-07 PROCEDURE — 97535 SELF CARE MNGMENT TRAINING: CPT

## 2021-02-07 PROCEDURE — 6360000002 HC RX W HCPCS: Performed by: STUDENT IN AN ORGANIZED HEALTH CARE EDUCATION/TRAINING PROGRAM

## 2021-02-07 PROCEDURE — 2580000003 HC RX 258: Performed by: STUDENT IN AN ORGANIZED HEALTH CARE EDUCATION/TRAINING PROGRAM

## 2021-02-07 PROCEDURE — C9113 INJ PANTOPRAZOLE SODIUM, VIA: HCPCS | Performed by: STUDENT IN AN ORGANIZED HEALTH CARE EDUCATION/TRAINING PROGRAM

## 2021-02-07 PROCEDURE — 97530 THERAPEUTIC ACTIVITIES: CPT

## 2021-02-07 PROCEDURE — 80069 RENAL FUNCTION PANEL: CPT

## 2021-02-07 PROCEDURE — 85610 PROTHROMBIN TIME: CPT

## 2021-02-07 PROCEDURE — 85025 COMPLETE CBC W/AUTO DIFF WBC: CPT

## 2021-02-07 PROCEDURE — 83735 ASSAY OF MAGNESIUM: CPT

## 2021-02-07 PROCEDURE — 97162 PT EVAL MOD COMPLEX 30 MIN: CPT

## 2021-02-07 PROCEDURE — 1200000000 HC SEMI PRIVATE

## 2021-02-07 PROCEDURE — 97166 OT EVAL MOD COMPLEX 45 MIN: CPT

## 2021-02-07 RX ORDER — DOCUSATE SODIUM 100 MG/1
100 CAPSULE, LIQUID FILLED ORAL 2 TIMES DAILY
Status: DISCONTINUED | OUTPATIENT
Start: 2021-02-07 | End: 2021-02-10 | Stop reason: HOSPADM

## 2021-02-07 RX ORDER — SODIUM CHLORIDE 0.9 % (FLUSH) 0.9 %
10 SYRINGE (ML) INJECTION EVERY 12 HOURS SCHEDULED
Status: DISCONTINUED | OUTPATIENT
Start: 2021-02-07 | End: 2021-02-07

## 2021-02-07 RX ORDER — SODIUM CHLORIDE 0.9 % (FLUSH) 0.9 %
10 SYRINGE (ML) INJECTION PRN
Status: DISCONTINUED | OUTPATIENT
Start: 2021-02-07 | End: 2021-02-07

## 2021-02-07 RX ORDER — POLYETHYLENE GLYCOL 3350 17 G/17G
17 POWDER, FOR SOLUTION ORAL 2 TIMES DAILY
Status: DISCONTINUED | OUTPATIENT
Start: 2021-02-07 | End: 2021-02-10 | Stop reason: HOSPADM

## 2021-02-07 RX ORDER — MAGNESIUM SULFATE IN WATER 40 MG/ML
2000 INJECTION, SOLUTION INTRAVENOUS ONCE
Status: COMPLETED | OUTPATIENT
Start: 2021-02-07 | End: 2021-02-07

## 2021-02-07 RX ORDER — BISACODYL 10 MG
10 SUPPOSITORY, RECTAL RECTAL ONCE
Status: COMPLETED | OUTPATIENT
Start: 2021-02-07 | End: 2021-02-07

## 2021-02-07 RX ORDER — LIDOCAINE HYDROCHLORIDE 10 MG/ML
5 INJECTION, SOLUTION INFILTRATION; PERINEURAL ONCE
Status: COMPLETED | OUTPATIENT
Start: 2021-02-07 | End: 2021-02-08

## 2021-02-07 RX ADMIN — POLYETHYLENE GLYCOL 3350 17 G: 17 POWDER, FOR SOLUTION ORAL at 10:26

## 2021-02-07 RX ADMIN — METOCLOPRAMIDE HYDROCHLORIDE 5 MG: 5 INJECTION INTRAMUSCULAR; INTRAVENOUS at 04:51

## 2021-02-07 RX ADMIN — DOCUSATE SODIUM 100 MG: 100 CAPSULE, LIQUID FILLED ORAL at 10:25

## 2021-02-07 RX ADMIN — METOPROLOL TARTRATE 5 MG: 5 INJECTION INTRAVENOUS at 00:49

## 2021-02-07 RX ADMIN — METOCLOPRAMIDE HYDROCHLORIDE 5 MG: 5 INJECTION INTRAMUSCULAR; INTRAVENOUS at 10:27

## 2021-02-07 RX ADMIN — BISACODYL 10 MG: 10 SUPPOSITORY RECTAL at 13:04

## 2021-02-07 RX ADMIN — ATORVASTATIN CALCIUM 40 MG: 40 TABLET, FILM COATED ORAL at 10:25

## 2021-02-07 RX ADMIN — ENOXAPARIN SODIUM 100 MG: 100 INJECTION SUBCUTANEOUS at 00:49

## 2021-02-07 RX ADMIN — POTASSIUM PHOSPHATE, MONOBASIC AND POTASSIUM PHOSPHATE, DIBASIC 20 MMOL: 224; 236 INJECTION, SOLUTION, CONCENTRATE INTRAVENOUS at 13:04

## 2021-02-07 RX ADMIN — Medication 10 ML: at 10:26

## 2021-02-07 RX ADMIN — MAGNESIUM SULFATE HEPTAHYDRATE 2000 MG: 40 INJECTION, SOLUTION INTRAVENOUS at 10:34

## 2021-02-07 RX ADMIN — PANTOPRAZOLE SODIUM 40 MG: 40 INJECTION, POWDER, FOR SOLUTION INTRAVENOUS at 10:33

## 2021-02-07 RX ADMIN — SODIUM CHLORIDE, SODIUM GLUCONATE, SODIUM ACETATE, POTASSIUM CHLORIDE AND MAGNESIUM CHLORIDE: 526; 502; 368; 37; 30 INJECTION, SOLUTION INTRAVENOUS at 00:48

## 2021-02-07 RX ADMIN — METOPROLOL TARTRATE 5 MG: 5 INJECTION INTRAVENOUS at 19:31

## 2021-02-07 RX ADMIN — METOCLOPRAMIDE HYDROCHLORIDE 5 MG: 5 INJECTION INTRAMUSCULAR; INTRAVENOUS at 16:42

## 2021-02-07 RX ADMIN — ENOXAPARIN SODIUM 40 MG: 40 INJECTION SUBCUTANEOUS at 10:26

## 2021-02-07 RX ADMIN — CEFTRIAXONE 1000 MG: 1 INJECTION, POWDER, FOR SOLUTION INTRAMUSCULAR; INTRAVENOUS at 13:04

## 2021-02-07 ASSESSMENT — PAIN SCALES - PAIN ASSESSMENT IN ADVANCED DEMENTIA (PAINAD)
TOTALSCORE: 0
CONSOLABILITY: 0
BODYLANGUAGE: 0
TOTALSCORE: 0
NEGVOCALIZATION: 0
BODYLANGUAGE: 0
TOTALSCORE: 0
NEGVOCALIZATION: 0
NEGVOCALIZATION: 0
FACIALEXPRESSION: 0
CONSOLABILITY: 0
FACIALEXPRESSION: 0
BODYLANGUAGE: 0
FACIALEXPRESSION: 0
BREATHING: 0
TOTALSCORE: 0
FACIALEXPRESSION: 0
BREATHING: 0
BODYLANGUAGE: 0
NEGVOCALIZATION: 0
CONSOLABILITY: 0
CONSOLABILITY: 0

## 2021-02-07 ASSESSMENT — PAIN SCALES - GENERAL: PAINLEVEL_OUTOF10: 0

## 2021-02-07 NOTE — PROGRESS NOTES
Physical Therapy    Facility/Department: [address] [place] [address] 5 [facility]  Initial Assessment and treatment    NAME: Donato Langford  : 1960  MRN: [id number]    Date of Service: 2021    Discharge Recommendations:    Donato Langford scored a 6/24 on the AM-PAC short mobility form. Current research shows that an AM-PAC score of 17 or less is typically not associated with a discharge to the patient's home setting. Based on the patient's AM-PAC score and their current functional mobility deficits, it is recommended that the patient have 3-5 sessions per week of Physical Therapy at d/c to increase the patient's independence. Please see assessment section for further patient specific details. PT Equipment Recommendations  Equipment Needed: No    Assessment   Body structures, Functions, Activity limitations: Decreased functional mobility ; Decreased safe awareness;Decreased balance;Decreased cognition;Decreased endurance;Decreased strength  Assessment: Patient tolerated session fair with mobility being limited due to significant weakness and overall decreased activity tolerance. Patient requiring total assist x 2 for all bed mobility and demonstrates fair/fair - static sitting balance. Patient unable to clear buttocks with multiple attempts to stand at [address] [facility] stedy; recommend out of bed to chair via rosibel and RN staff as tolerated. Unsure of prior level of function as patient unable to provide. Recommend continued skilled PT upon discharge. Will follow while in acute care setting to improve functional mobility.   Treatment Diagnosis: impaired mobility associated with hiatal hernia  Decision Making: Medium Complexity  PT Education: PT Role  Patient Education: patient needs re-inforcement  REQUIRES PT FOLLOW UP: Yes  Activity Tolerance  Activity Tolerance: Patient limited by fatigue;Patient limited by endurance  Activity Tolerance: patient with significant weakness, unable to stand; recommend out of bed to chair with Orientation  Overall Orientation Status: Impaired  Orientation Level: Oriented to person;Oriented to time;Oriented to place; Disoriented to situation(oriented to \"February\" but not year)  Social/Functional History  Social/Functional History  Type of Home: Facility(pt has been living in NH since admission in November)  Additional Comments: unable to provide further PLOF information, likely receiving total A for self care  Cognition   Cognition  Overall Cognitive Status: Exceptions  Arousal/Alertness: Delayed responses to stimuli  Following Commands: Follows one step commands with increased time; Follows one step commands with repetition(follows approximately 75% of simple commands)  Attention Span: Attends with cues to redirect  Memory: Decreased short term memory;Decreased long term memory  Safety Judgement: Decreased awareness of need for assistance  Problem Solving: Decreased awareness of errors  Insights: Not aware of deficits  Initiation: Requires cues for all  Sequencing: Requires cues for all    Objective          PROM RLE (degrees)  RLE PROM: WFL  AROM RLE (degrees)  RLE AROM: Exceptions  RLE General AROM: limited AROM noted  PROM LLE (degrees)  LLE PROM: WFL  AROM LLE (degrees)  LLE AROM : Exceptions  LLE General AROM: limited AROM noted  Strength RLE  Strength RLE: Exception  Comment: appears grossly weakened with mobility  Strength LLE  Strength LLE: Exception  Comment: appears grossly weakened with mobility        Bed mobility  Rolling to Left: Dependent/Total;2 Person assistance  Rolling to Right: 2 Person assistance;Dependent/Total  Supine to Sit: 2 Person assistance;Dependent/Total(assist with LEs and assist with trunk)  Sit to Supine: 2 Person assistance;Dependent/Total  Scootin Person assistance;Dependent/Total(total assist x 2 to scoot toward head of bed)  Transfers  Comment: attempted to stand x 3 trials from EOB to genaro walker, patient unable to clear buttocks from bed despite total assist x 2 Ambulation  Ambulation?: No  Stairs/Curb  Stairs?: No     Balance  Sitting - Static: Fair(CGA, forward flexed posture with downward gaze, patient unable to hold head up in neutral position once placed, sat EOB x 15 minutes)        Plan   Plan  Times per week: 2-5  Current Treatment Recommendations: Strengthening, Transfer Training, Endurance Training, Patient/Caregiver Education & Training, Balance Training, Functional Mobility Training, Safety Education & Training  Safety Devices  Type of devices: Left in bed, Call light within reach, Bed alarm in place, Nurse notified, Gait belt      OutComes Score                                                  AM-PAC Score  AM-PAC Inpatient Mobility Raw Score : 6 (02/07/21 1315)  AM-PAC Inpatient T-Scale Score : 23.55 (02/07/21 1315)  Mobility Inpatient CMS 0-100% Score: 100 (02/07/21 1315)  Mobility Inpatient CMS G-Code Modifier : CN (02/07/21 1315)          Goals  Short term goals  Time Frame for Short term goals: discharge  Short term goal 1: patient will perform bed mobility with moderate assist x 2  Short term goal 2: patient will perform transfers sit<>stand with moderate assist x 2  Short term goal 3: patient will perform 10 reps AAROM to BLEs  Short term goal 4: patient will sit EOB x 10 minutes with supervision  Patient Goals   Patient goals : none stated       Therapy Time   Individual Concurrent Group Co-treatment   Time In 1020         Time Out 1100         Minutes 40         Timed Code Treatment Minutes: 25 Minutes    Timed Code Treatment Minutes:  25 Minutes    Total Treatment Minutes:    25 minutes treatment + 15 minutes evaluation = 40 total treatment minutes  If patient discharges prior to next session this note will serve as a discharge summary. Please see below for the latest assessment towards goals.    David [address]

## 2021-02-07 NOTE — PROGRESS NOTES
Surgery Daily Progress Note      CC: Paraesophageal hernia    SUBJECTIVE:  Patient with ongoing confusion and need for reorientation overnight. No nausea or emesis, tolerating tube feeds at 60 ml/hr. Currently on a CLD, tolerating water. No BM's. ROS:   A 14 point review of systems was conducted, significant findings as noted above. All other systems negative. OBJECTIVE:    PHYSICAL EXAM:  Vitals:    02/06/21 2243 02/07/21 0048 02/07/21 0313 02/07/21 0630   BP: 105/67 103/71 104/64 94/62   Pulse: 103 105 99 105   Resp: 16  16    Temp: 97.8 °F (36.6 °C)  98.1 °F (36.7 °C)    TempSrc: Oral  Oral    SpO2: 95%  93%    Weight:       Height:           General appearance: alert, no acute distress, grooming appropriate  Neuro: A&Ox3, no focal deficits, sensation intact  HEENT: Grossly normal, NG tube in place - tube feeds running at 60 ml/hr  Chest/Lungs: normal effort, no accessory muscle use, on RA  Cardiovascular: RRR  Abdomen: soft, non-tender, non-distended, no guarding/rigidity  : duran in place, clear yellow urine  Extremities: no edema, no cyanosis      ASSESSMENT & PLAN:   This is a 63 y.o. female with a diagnosis of paraesophageal hernia s/p aborted operation secondary to instability after anesthesia induction (2/5). - NG tube replaced yesterday, continue TF at goal  - plan for PICC TPN tomorrow for nutrition optimization   - plan for UGI Monday   - Continue IVF at 150  - changed to ppx lovenox from tx lovenox  - Continue for CLD   - Out of bed ambulating- every shift   - remove duran, void check   - Continue rocephin for UTI      Lisa Charles MD  02/07/21  7:56 AM  [phone]    I have seen, examined, and reviewed the patients chart. I agree with the residents assessment and have made appropriate changes.     Marian Vega

## 2021-02-07 NOTE — PROGRESS NOTES
Patient has been alert and oriented to person and place but disoriented to time and situation. VSS and afebrile with BP in low 100's/ 60's. Patient has been in sinus tach to normal sinus. Patient has stated having some discomfort but was repositioned and has since been more comfortable . Patient has been resting in bed with bed alarm on. NG tube is in place and has continuous tube feedings infusing. Sagastume in place draining adequate yellow output. Fall precautions in place. No needs stated at this time.

## 2021-02-07 NOTE — PROGRESS NOTES
Occupational Therapy   Occupational Therapy Initial Assessment/ Treatment  Date: 2021   Patient Name: Sacih Contreras  MRN: [id number]     : 1960    Date of Service: 2021    Discharge Recommendations:    Sachi Contreras scored a 6/24 on the AM-PAC ADL Inpatient form. Current research shows that an AM-PAC score of 17 or less is typically not associated with a discharge to the patient's home setting. Based on the patient's AM-PAC score and their current ADL deficits, it is recommended that the patient have 3-5 sessions per week of Occupational Therapy at d/c to increase the patient's independence. Please see assessment section for further patient specific details. If patient discharges prior to next session this note will serve as a discharge summary. Please see below for the latest assessment towards goals. OT Equipment Recommendations  Equipment Needed: No  Other: defer to dc location    Assessment   Performance deficits / Impairments: Decreased functional mobility ; Decreased endurance;Decreased coordination;Decreased ADL status; Decreased balance;Decreased cognition  Assessment: Prior to admission pt was living in NH, was requiring assist for ADLs and transfers, pt reporting she has not been getting out of bed lately. However prior to november pt had been independent with ADLs. Pt now presenting significantly below her baseline, unable to participate in STS transfers and OOB tasks. Pt requiring max A for grooming and assist for postural control while seated EOB. Pt demonstrated max fatigue after unsupported sitting tasks. Pt would benefit from continued OT while in acute care, AMPA score indicates non homebound discharge. Continue OT POC.   Treatment Diagnosis: decreased ADLS and transfers  Prognosis: Fair  Decision Making: Medium Complexity  OT Education: OT Role;Plan of Care  Patient Education: verb understanding  REQUIRES OT FOLLOW UP: Yes  Activity Tolerance  Activity Tolerance: Patient limited by fatigue;Treatment limited secondary to decreased cognition  Activity Tolerance: Pt reported max fatigue after unsupported sitting balance tasks  Safety Devices  Safety Devices in place: Yes  Type of devices: Nurse notified; Left in bed;Bed alarm in place;Call light within reach           Patient Diagnosis(es): The primary encounter diagnosis was Paraesophageal hernia. A diagnosis of Weight loss was also pertinent to this visit. has a past medical history of Arthritis, CHF (congestive heart failure) ([address] COVID-19, ESBL (extended spectrum beta-lactamase) producing bacteria infection, Glaucoma, Hemorrhoids, HTN (hypertension), Hyperlipidemia, Morbid obesity ([address] Muscular wasting and disuse atrophy, and UTI (urinary tract infection). has a past surgical history that includes  section; bladder repair; Cholecystectomy; Cystoscopy; Tibia fracture surgery (Right, 2020); and Upper gastrointestinal endoscopy (N/A, 2020). Treatment Diagnosis: decreased ADLS and transfers      Restrictions  Position Activity Restriction  Other position/activity restrictions: up as tolerated    Subjective   General  Chart Reviewed: Yes  Patient assessed for rehabilitation services?: Yes  Additional Pertinent Hx: Pt admitted to ED from NH with c/o AMS and decreased appetite. Planned for hernia repair, however surgery was cancelled 2/2 low BP. PMHx includes: Arthritis, CHF (congestive heart failure) ([address] COVID-19 (12/15/2020), ESBL (extended spectrum beta-lactamase) producing bacteria infection, Glaucoma, Hemorrhoids, HTN (hypertension), Hyperlipidemia, Morbid obesity ([address] Muscular wasting and disuse atrophy, and UTI (urinary tract infection). Family / Caregiver Present: No  Referring Practitioner: Hanh Love DO  Diagnosis: hiatial hernia  Subjective  Subjective: Pt semi supine in bed upon arrival, agreeable to OT eval and treat.     Social/Functional History  Social/Functional History  Type of Home: Facility(pt has been living in NH since admission in November)  Additional Comments: unable to provide further PLOF information, likely receiving total A for self care       Objective        Orientation  Overall Orientation Status: Impaired  Orientation Level: Oriented to person;Oriented to time(pt reporting location as \"[place]\" however able to respond yes to being in hospital)     Balance  Sitting Balance: Minimal assistance(progressing to CGA at times; pt noted with flexed posture and max assist to lift head)  Standing Balance  Time: 15 mins seated EOB  Activity: seated EOB pt requred max assist to lift head, pt reporting multiple times \"Help me\" however noted minimal attempts to lift her head on her own. neck at flexed posture during majority of session  ADL  Grooming: Maximum assistance           Transfers  Transfer Comments: attempted sit to stand transfer via [address] however pt unable to lift buttocks to stand from bed with multiple attempts and total A x 2     Cognition  Overall Cognitive Status: Exceptions  Arousal/Alertness: Delayed responses to stimuli  Following Commands: Follows one step commands with increased time; Follows one step commands with repetition(follows approximately 75% of simple commands)  Attention Span: Attends with cues to redirect  Memory: Decreased short term memory;Decreased long term memory  Safety Judgement: Decreased awareness of need for assistance  Problem Solving: Decreased awareness of errors  Insights: Not aware of deficits  Initiation: Requires cues for all  Sequencing: Requires cues for all                 LUE AROM (degrees)  LUE General AROM: shoulder flexion to approximately 75 deg  RUE AROM (degrees)  RUE General AROM: shoulder flexion to approximately 75 deg              Treatment included functional transfer training, ADLs, and patient education.           Plan   Plan  Times per week: 2-5  Times per day: Daily  Current Treatment Recommendations: Strengthening, Balance Training, Functional Mobility Training, Endurance Training, Self-Care / ADL, Neuromuscular Re-education    AM-PAC Score        AM-PAC Inpatient Daily Activity Raw Score: 6 (02/07/21 1316)  AM-PAC Inpatient ADL T-Scale Score : 17.07 (02/07/21 1316)  ADL Inpatient CMS 0-100% Score: 100 (02/07/21 1316)  ADL Inpatient CMS G-Code Modifier : CN (02/07/21 1316)    Goals  Short term goals  Time Frame for Short term goals: by dc  Short term goal 1: Pt will complete BSC transfer with max A x 2  Short term goal 2: Pt will complete grooming with mod A  Short term goal 3: Pt will complete BUE HEP x 10 reps  Short term goal 4: Pt will complete unsupported sitting balance x 10 mins with spv  Patient Goals   Patient goals : not stated       Therapy Time   Individual Concurrent Group Co-treatment   Time In 1020         Time Out 1100         Minutes 40         Timed Code Treatment Minutes: 25 Minutes(+15 min eval)     Judi MILLS  [address] [facility], OTR/L [id number]

## 2021-02-07 NOTE — PLAN OF CARE
Problem: Skin Integrity:  Goal: Absence of new skin breakdown  Description: Absence of new skin breakdown  Outcome: Ongoing  Note: Patient has shown no new skin breakdown. Patient has been turned every two hours. Patient was stating some discomfort and was repositioned and then stated being more comfortable. Will reassess skin as needed. Problem: Confusion - Acute:  Goal: Mental status will be restored to baseline  Description: Mental status will be restored to baseline  Outcome: Ongoing  Note: Patient is still only oriented to self and place. Patient has been sleeping through most of the shift. Has delayed responses when answering questions.

## 2021-02-08 ENCOUNTER — APPOINTMENT (OUTPATIENT)
Dept: GENERAL RADIOLOGY | Age: 61
DRG: 254 | End: 2021-02-08
Attending: SURGERY
Payer: MEDICAID

## 2021-02-08 LAB
ALBUMIN SERPL-MCNC: 2.1 G/DL (ref 3.4–5)
ANION GAP SERPL CALCULATED.3IONS-SCNC: 5 MMOL/L (ref 3–16)
BASOPHILS ABSOLUTE: 0.1 K/UL (ref 0–0.2)
BASOPHILS RELATIVE PERCENT: 1 %
BILIRUBIN URINE: NEGATIVE
BLOOD, URINE: NEGATIVE
BUN BLDV-MCNC: 6 MG/DL (ref 7–20)
C-REACTIVE PROTEIN: 39.8 MG/L (ref 0–5.1)
CALCIUM SERPL-MCNC: 7.7 MG/DL (ref 8.3–10.6)
CHLORIDE BLD-SCNC: 102 MMOL/L (ref 99–110)
CLARITY: CLEAR
CO2: 32 MMOL/L (ref 21–32)
COLOR: YELLOW
CREAT SERPL-MCNC: <0.5 MG/DL (ref 0.6–1.2)
EOSINOPHILS ABSOLUTE: 0.2 K/UL (ref 0–0.6)
EOSINOPHILS RELATIVE PERCENT: 4 %
GFR AFRICAN AMERICAN: >60
GFR NON-AFRICAN AMERICAN: >60
GLUCOSE BLD-MCNC: 128 MG/DL (ref 70–99)
GLUCOSE URINE: NEGATIVE MG/DL
HCT VFR BLD CALC: 26.6 % (ref 36–48)
HEMOGLOBIN: 8.9 G/DL (ref 12–16)
KETONES, URINE: NEGATIVE MG/DL
LEUKOCYTE ESTERASE, URINE: NEGATIVE
LYMPHOCYTES ABSOLUTE: 1.7 K/UL (ref 1–5.1)
LYMPHOCYTES RELATIVE PERCENT: 32 %
MAGNESIUM: 1.8 MG/DL (ref 1.8–2.4)
MCH RBC QN AUTO: 31.9 PG (ref 26–34)
MCHC RBC AUTO-ENTMCNC: 33.5 G/DL (ref 31–36)
MCV RBC AUTO: 95.1 FL (ref 80–100)
MICROSCOPIC EXAMINATION: NORMAL
MONOCYTES ABSOLUTE: 0.3 K/UL (ref 0–1.3)
MONOCYTES RELATIVE PERCENT: 6 %
NEUTROPHILS ABSOLUTE: 3.1 K/UL (ref 1.7–7.7)
NEUTROPHILS RELATIVE PERCENT: 57 %
NITRITE, URINE: NEGATIVE
PDW BLD-RTO: 16.6 % (ref 12.4–15.4)
PH UA: 7.5 (ref 5–8)
PHOSPHORUS: 1.6 MG/DL (ref 2.5–4.9)
PLATELET # BLD: 188 K/UL (ref 135–450)
PMV BLD AUTO: 9.2 FL (ref 5–10.5)
POTASSIUM SERPL-SCNC: 4.2 MMOL/L (ref 3.5–5.1)
PREALBUMIN: 4.8 MG/DL (ref 20–40)
PROTEIN UA: NEGATIVE MG/DL
RBC # BLD: 2.8 M/UL (ref 4–5.2)
SODIUM BLD-SCNC: 139 MMOL/L (ref 136–145)
SPECIFIC GRAVITY UA: 1.01 (ref 1–1.03)
TRANSFERRIN: 89 MG/DL (ref 200–360)
URINE TYPE: NORMAL
UROBILINOGEN, URINE: 0.2 E.U./DL
WBC # BLD: 5.4 K/UL (ref 4–11)

## 2021-02-08 PROCEDURE — 81003 URINALYSIS AUTO W/O SCOPE: CPT

## 2021-02-08 PROCEDURE — 2580000003 HC RX 258: Performed by: STUDENT IN AN ORGANIZED HEALTH CARE EDUCATION/TRAINING PROGRAM

## 2021-02-08 PROCEDURE — 2580000003 HC RX 258: Performed by: SURGERY

## 2021-02-08 PROCEDURE — 86140 C-REACTIVE PROTEIN: CPT

## 2021-02-08 PROCEDURE — 1200000000 HC SEMI PRIVATE

## 2021-02-08 PROCEDURE — 84134 ASSAY OF PREALBUMIN: CPT

## 2021-02-08 PROCEDURE — 92526 ORAL FUNCTION THERAPY: CPT

## 2021-02-08 PROCEDURE — 36569 INSJ PICC 5 YR+ W/O IMAGING: CPT

## 2021-02-08 PROCEDURE — 6370000000 HC RX 637 (ALT 250 FOR IP): Performed by: STUDENT IN AN ORGANIZED HEALTH CARE EDUCATION/TRAINING PROGRAM

## 2021-02-08 PROCEDURE — 02HV33Z INSERTION OF INFUSION DEVICE INTO SUPERIOR VENA CAVA, PERCUTANEOUS APPROACH: ICD-10-PCS | Performed by: SURGERY

## 2021-02-08 PROCEDURE — 6360000002 HC RX W HCPCS: Performed by: STUDENT IN AN ORGANIZED HEALTH CARE EDUCATION/TRAINING PROGRAM

## 2021-02-08 PROCEDURE — 74018 RADEX ABDOMEN 1 VIEW: CPT

## 2021-02-08 PROCEDURE — 80069 RENAL FUNCTION PANEL: CPT

## 2021-02-08 PROCEDURE — 2500000003 HC RX 250 WO HCPCS: Performed by: STUDENT IN AN ORGANIZED HEALTH CARE EDUCATION/TRAINING PROGRAM

## 2021-02-08 PROCEDURE — 99232 SBSQ HOSP IP/OBS MODERATE 35: CPT | Performed by: SURGERY

## 2021-02-08 PROCEDURE — C1751 CATH, INF, PER/CENT/MIDLINE: HCPCS

## 2021-02-08 PROCEDURE — 83735 ASSAY OF MAGNESIUM: CPT

## 2021-02-08 PROCEDURE — C9113 INJ PANTOPRAZOLE SODIUM, VIA: HCPCS | Performed by: STUDENT IN AN ORGANIZED HEALTH CARE EDUCATION/TRAINING PROGRAM

## 2021-02-08 PROCEDURE — 85025 COMPLETE CBC W/AUTO DIFF WBC: CPT

## 2021-02-08 PROCEDURE — 94669 MECHANICAL CHEST WALL OSCILL: CPT

## 2021-02-08 PROCEDURE — 84466 ASSAY OF TRANSFERRIN: CPT

## 2021-02-08 RX ORDER — DEXTROSE, SODIUM CHLORIDE, AND POTASSIUM CHLORIDE 5; .45; .15 G/100ML; G/100ML; G/100ML
INJECTION INTRAVENOUS CONTINUOUS
Status: DISCONTINUED | OUTPATIENT
Start: 2021-02-08 | End: 2021-02-09

## 2021-02-08 RX ORDER — SODIUM CHLORIDE, SODIUM LACTATE, POTASSIUM CHLORIDE, AND CALCIUM CHLORIDE .6; .31; .03; .02 G/100ML; G/100ML; G/100ML; G/100ML
250 INJECTION, SOLUTION INTRAVENOUS ONCE
Status: COMPLETED | OUTPATIENT
Start: 2021-02-08 | End: 2021-02-08

## 2021-02-08 RX ORDER — MAGNESIUM SULFATE IN WATER 40 MG/ML
2000 INJECTION, SOLUTION INTRAVENOUS ONCE
Status: COMPLETED | OUTPATIENT
Start: 2021-02-08 | End: 2021-02-08

## 2021-02-08 RX ORDER — METOPROLOL SUCCINATE 50 MG/1
25 TABLET, EXTENDED RELEASE ORAL DAILY
Status: DISCONTINUED | OUTPATIENT
Start: 2021-02-08 | End: 2021-02-10 | Stop reason: HOSPADM

## 2021-02-08 RX ADMIN — DOCUSATE SODIUM 100 MG: 100 CAPSULE, LIQUID FILLED ORAL at 20:10

## 2021-02-08 RX ADMIN — SODIUM PHOSPHATE, MONOBASIC, MONOHYDRATE AND SODIUM PHOSPHATE, DIBASIC, ANHYDROUS 30 MMOL: 276; 142 INJECTION, SOLUTION INTRAVENOUS at 10:26

## 2021-02-08 RX ADMIN — CEFTRIAXONE 1000 MG: 1 INJECTION, POWDER, FOR SOLUTION INTRAMUSCULAR; INTRAVENOUS at 11:15

## 2021-02-08 RX ADMIN — PANTOPRAZOLE SODIUM 40 MG: 40 INJECTION, POWDER, FOR SOLUTION INTRAVENOUS at 11:15

## 2021-02-08 RX ADMIN — POLYETHYLENE GLYCOL 3350 17 G: 17 POWDER, FOR SOLUTION ORAL at 11:49

## 2021-02-08 RX ADMIN — METOCLOPRAMIDE HYDROCHLORIDE 5 MG: 5 INJECTION INTRAMUSCULAR; INTRAVENOUS at 06:07

## 2021-02-08 RX ADMIN — I.V. FAT EMULSION 250 ML: 20 EMULSION INTRAVENOUS at 17:54

## 2021-02-08 RX ADMIN — METOCLOPRAMIDE HYDROCHLORIDE 5 MG: 5 INJECTION INTRAMUSCULAR; INTRAVENOUS at 00:03

## 2021-02-08 RX ADMIN — POTASSIUM CHLORIDE, DEXTROSE MONOHYDRATE AND SODIUM CHLORIDE: 150; 5; 450 INJECTION, SOLUTION INTRAVENOUS at 16:20

## 2021-02-08 RX ADMIN — METOCLOPRAMIDE HYDROCHLORIDE 5 MG: 5 INJECTION INTRAMUSCULAR; INTRAVENOUS at 11:15

## 2021-02-08 RX ADMIN — Medication 10 ML: at 20:10

## 2021-02-08 RX ADMIN — ASCORBIC ACID, VITAMIN A PALMITATE, CHOLECALCIFEROL, THIAMINE HYDROCHLORIDE, RIBOFLAVIN-5 PHOSPHATE SODIUM, PYRIDOXINE HYDROCHLORIDE, NIACINAMIDE, DEXPANTHENOL, ALPHA-TOCOPHEROL ACETATE, VITAMIN K1, FOLIC ACID, BIOTIN, CYANOCOBALAMIN: 200; 3300; 200; 6; 3.6; 6; 40; 15; 10; 150; 600; 60; 5 INJECTION, SOLUTION INTRAVENOUS at 17:53

## 2021-02-08 RX ADMIN — SODIUM CHLORIDE, POTASSIUM CHLORIDE, SODIUM LACTATE AND CALCIUM CHLORIDE 250 ML: 600; 310; 30; 20 INJECTION, SOLUTION INTRAVENOUS at 21:10

## 2021-02-08 RX ADMIN — METOCLOPRAMIDE HYDROCHLORIDE 5 MG: 5 INJECTION INTRAMUSCULAR; INTRAVENOUS at 16:47

## 2021-02-08 RX ADMIN — Medication 10 ML: at 10:30

## 2021-02-08 RX ADMIN — MAGNESIUM SULFATE HEPTAHYDRATE 2000 MG: 40 INJECTION, SOLUTION INTRAVENOUS at 06:46

## 2021-02-08 RX ADMIN — Medication 10 ML: at 00:04

## 2021-02-08 RX ADMIN — ENOXAPARIN SODIUM 40 MG: 40 INJECTION SUBCUTANEOUS at 11:48

## 2021-02-08 RX ADMIN — LIDOCAINE HYDROCHLORIDE 5 ML: 10 INJECTION, SOLUTION INFILTRATION; PERINEURAL at 16:03

## 2021-02-08 RX ADMIN — ATORVASTATIN CALCIUM 40 MG: 40 TABLET, FILM COATED ORAL at 11:50

## 2021-02-08 RX ADMIN — DOCUSATE SODIUM 100 MG: 100 CAPSULE, LIQUID FILLED ORAL at 11:51

## 2021-02-08 RX ADMIN — METOPROLOL SUCCINATE 25 MG: 50 TABLET, EXTENDED RELEASE ORAL at 11:51

## 2021-02-08 RX ADMIN — POLYETHYLENE GLYCOL 3350 17 G: 17 POWDER, FOR SOLUTION ORAL at 20:10

## 2021-02-08 ASSESSMENT — PAIN SCALES - PAIN ASSESSMENT IN ADVANCED DEMENTIA (PAINAD)
TOTALSCORE: 0
BREATHING: 0

## 2021-02-08 ASSESSMENT — PAIN SCALES - GENERAL: PAINLEVEL_OUTOF10: 0

## 2021-02-08 NOTE — PLAN OF CARE
Nutrition Problem #1: Inadequate oral intake  Intervention: Food and/or Nutrient Delivery: Continue Current Diet, Start Parenteral Nutrititon  Nutritional Goals: Pt will tolerate the most appropriate form of nutrition therapy this admission

## 2021-02-08 NOTE — CARE COORDINATION
CM following, spoke with Armond at [facility] [phone] she state that they can take TPN but need formula 24 hours prior to DC so they can get TPN made. Armond also indicates pt makes own medical decisions and the [facility] Lucina Prost [phone] has never been able to be reached since pt was admitted there. I called emily DONATO for [facility] Dc planning and need for consent. Plan is for pt to return back to [facility] at DC, no precert needed as on bed hold, will need COVID test prior to admission and TPN formula the day before DC.    Electronically signed by Danny Cabrera RN on 2/8/2021 at 2:12 PM  [phone]

## 2021-02-08 NOTE — PLAN OF CARE
Problem: Falls - Risk of:  Goal: Will remain free from falls  Outcome: Ongoing  Patient is alert and oriented to person only. Able to follow simple commands. Generalized weakness. Patient is complete care. She is able to express needs. In bed with alarm activated and call light in reach. Will continue to monitor foe safety. Problem: Nutrition  Goal: Optimal nutrition therapy  Outcome: Ongoing  Patient has poor appetite. Takes small amounts of clear liquids. TF continues per NG. Patient has tolerated without nausea or vomiting. Will monitor.

## 2021-02-08 NOTE — PROGRESS NOTES
Patient alert and oriented to self and place only. VSS and afebrile. Patient's BP has been systolic in 44'K overnight. Patient had 2 medium sized soft bowel movements so far overnight. Patient has been voiding adequately. NG tube in place with tube feeding infusing at goal rate. Fall precautions in place. No needs stated at this time.

## 2021-02-08 NOTE — PROCEDURES
SINGLE PICC PROCEDURE NOTE  Chart reviewed for allergies, diagnosis, labs, known contraindications, reason for line placement and planned length of treatment. Informed consent noted to be signed and on chart. Insertion procedure discussed with patient/family member. Three patient identifiers  Patient name,   and MRN -  completed &  confirmed verbally. Time out performed Hat, mask and eye shield donned. PICC site scrubbed with Chloraprep  One-Step applicator for 30 seconds x 1. Hand Hygiene  performed with 3% Chlorhexidine surgical scrub x1 min prior to  Sterile gloves, sterile gown being donned. Patient draped using maximal sterile barrier technique ( head to toe ). PICC site scrubbed a 2nd time with Chloraprep One-Step applicator x 30 sec. Modified Seldinger  technique/ultrasound assisted and tip locating system utilized for insertion. 1% Lidocaine 5 ml injected intradermal pre-insertion. PICC tip location in the SVC confirmed by ECG technology Sherlock 3CG. Positive brisk blood return obtained from all lumens  and each lumen flushed with 10 mls  0.9% Sterile Sodium Chloride. All lumens flush easily with no resistance. Valve placed on all lumens followed by Alcohol Swab Caps on end of each. Bio-patch in place. Catheter secured with non-sutured locking device per hospital protocol. Sterile Tegaderm applied over PICC site. Sterile field maintained during procedure. Positioning and rhythm wire accounted for post procedure and disposed of in sharps. Appearance of site is Clean dry and intact without bleeding or edema. All edges of Tegaderm occlusive. Site marked with date and initials of RN placing line. Teaching performed to pt/family and noted in education section. Bed placed in low position post-procedure. Top 2 side rails in up position call button in reach. Pt. Response to procedure tolerated well.   RN notified of all of the above. Slight difficulty in keeping pt still due to mild confusion. A Single Lumen Power PICC was trimmed at 40 CM and placed  CRUZ per basilic vein, 1 cm showing from insertion site.

## 2021-02-08 NOTE — PROGRESS NOTES
Speech Language Pathology  Facility/Department: [address] [facility] SURGERY  Dysphagia Daily Treatment Note    NAME: Jessica Valles  : 1960  MRN: [id number]    Patient Diagnosis(es):   Patient Active Problem List    Diagnosis Date Noted    Mild malnutrition ([facility] [address] 2021    Paraesophageal hernia 2021    Hypotension     Abnormal ECG     Elevated troponin     Weight loss     Nausea and vomiting 2020    Intractable vomiting with nausea 2020    Hiatal hernia     Hypoglycemia     Paroxysmal atrial fibrillation (HCC)     History of pulmonary embolism     Abnormal EKG     Bacterial urinary tract infection 12/15/2020    Intractable nausea and vomiting     COVID-19 virus infection     Right medial tibial plateau fracture, closed, initial encounter 2020    HTN (hypertension)     Morbid obesity ([address]     HLD (hyperlipidemia)     Primary osteoarthritis of left knee 10/30/2020    Degenerative tear of lateral meniscus of right knee 2017    Primary osteoarthritis of right knee 2017    Lateral knee pain 2017    Pubic ramus fracture ([facility] [address] 2016    Right sided sciatica 2016     Allergies: No Known Allergies  Onset Date: 2021      CXR (2021)-  Impression       1. Right medial patchy basilar airspace consolidation or atelectasis   2. Right IJ central venous catheter placed with the tip in the distal SVC. NG tube remains in place     Previous MBS (date)    Chart reviewed. Medical Diagnosis:   Treatment Diagnosis:    BSE Impression (2021)-  Dysphagia Impression : Limited assessment, pt accepting only some PO trials. Has known h/o dysphagia 2/2 hiatal hernia. Tolerated ice chips/sips thins via straw with positive oral acceptance, swallow/laryngeal movement, good oral clearance, no overt signs of aspiration/penetration. Will continue to follow.     MBS results (date)-n/a    Pain: none stated    Current Diet : clear liquid Treatment:  Pt seen bedside to address the following goals:  Goal 1: Patient will tolerate least restrictive diet without overt signs of aspiration or associated decline in respiratory status. 2/8: Reviewed chart/discussed with RN; unclear if pt going for UGI today or having surgery. NG tube removed this am. Order noted for TPN to support nutritional needs. Received pt awake, alert, oriented to self only. Repositioned upright; analyzed tolerance ice chips, thins via straw; pt with positive oral acceptance, positive swallow movement, no overt signs of aspiration. Pt declined further PO trials. Recommend continue current diet (clear liquid/thins) as tolerated. Recommend monitor need/appropriateness for MBS pending possible surgery/UGI. Will continue to follow  Cont goal.  Goal 2: Patient/caregiver will demonstrate understanding of swallowing concerns/recommendations. 2/6: Educated pt regarding dysphagia, swallow anatomy/function, diet recommendations, safe swallow strategies (upright, slow rate, small sips). Pt indicated some comprehension, suspect needs reinforcement. Cont goal.  2/8: Educated pt re: role of SLP, purpose of visit, recommendations, safety strategies (upright during/after PO intake, small bites/sips, slow rate), dysphagia therapy POC. Suspect needs further reinforcement. Cont goal.    Patient/Family/Caregiver Education:  See goal 2 above. Compensatory Strategies:  Upright position during/30 minutes after PO intake  Small bites/sips  Slow rate     Plan:  Continued daily Dysphagia treatment with goals per  plan of care. Diet recommendations: Clear liquids (thins); as tolerated  DC recommendation: TBD  Treatment: 15  D/W nursing, Aliyah Power  Needs met prior to leaving room, call button in reach. Angela Robles M.A., [address] [place] [phone]  Speech-Language Pathologist  Pg.  # [phone]    If patient is discharged prior to next treatment, this note will serve as the discharge summary

## 2021-02-08 NOTE — CONSULTS
Clinical Pharmacy Progress Note  Pharmacy to dose PN    Admit date: 2/5/2021     Subjective/Objective:  Pt is a 62yof with a PMHx that includes HTN, HF, HLD, glaucoma, and morbid obesity who is admitted with paraesophageal hernia. Pt is s/p aborted operation secondary to instability after anesthesia induction (2/5/21). Interval update:  NG removed this AM.  Planning to start parenteral nutrition today. Pharmacy is consulted to dose PN per Dr. Elsy Eduardo  Today is PN day #1  Current diet order:  NPO except sips / chips / popsicles    Height:  5' 6\" (167.6 cm)  Weight: 245 lb (111.1 kg)    Recent Labs     02/07/21  0500 02/08/21  0533    139   K 3.8 4.2    102   CO2 33* 32   BUN 6* 6*   CREATININE <0.5* <0.5*   GLUCOSE 134* 128*       Calcium 7.7   Albumin 2.1   Corrected Calcium 9.2  Phosphorus 1.6  Magnesium 1.8      Recent Labs     02/07/21  0500 02/08/21  0533   WBC 5.4 5.4   HGB 8.9* 8.9*    188         Assessment/Plan:  1)  Parenteral Nutrition:  Pharmacy to dose  · Ordered Clinimix-E 5/15 at 41.7mL/hr (total volume = 1000mL/day) per day 1 standard formulation. Ordered Lipids 20% 250mL to infuse over 12 hrs. Dietitian consulted for ongoing rec's re: formulation / volume. · Clinimix-E includes standard electrolyte formulation. No additional electrolytes added. Will monitor electrolytes daily and will replete with supplemental IVPBs as needed. · Pt will receive the following electrolytes via supplemental IVPB today:  · Sodium Phos 30 mmol IVPB x1  · Mag sulfate 2g IVPB x1.   Glucose control:  Glucose on AM labs = 128 prior to starting TPN. Will monitor for hyperglycemia once Clinimix is started - if glucose on AM labs tomorrow, will recommend fingersticks + SSI.  Patient will receive MVI 10 mL/day & Trace Elements 1 mL/day with PN daily.  Labs will be monitored daily and PN will be re-ordered daily. Weekly triglyceride ordered per PN protocol.       Please call with questions--  Thanks--  Lucina Wu, PharmD, BCPS, BCGP  [id number] ([facility])   2/8/2021 9:45 AM

## 2021-02-08 NOTE — PROGRESS NOTES
Physician Progress Note      Nadya Reeves  [facility] #:                  [id number]  :                       1960  ADMIT DATE:       2021 5:54 AM  DISCH DATE:  RESPONDING  PROVIDER #:        Juve Overton DO          QUERY TEXT:    Pt admitted for hernia repair. Pt noted to have UTI and increased HR, low   temperature. If possible, please document in the progress notes and discharge   summary if you are evaluating and /or treating any of the following: The medical record reflects the following:  Risk Factors: 62 yo w/ UTI, hypotension  Clinical Indicators: Per PN : - Continue rocephin for UTI. WBC 11.7, temp   95.9,   Treatment: IV Ceftriaxone, IVF  Options provided:  -- Sepsis 2/2 UTI present on admission  -- No Sepsis, UTI only  -- Other - I will add my own diagnosis  -- Disagree - Not applicable / Not valid  -- Disagree - Clinically unable to determine / Unknown  -- Refer to Clinical Documentation Reviewer    PROVIDER RESPONSE TEXT:    This patient has UTI only, patient is not septic.     Query created by: Bola Hernandez on 2021 7:16 AM      Electronically signed by:  Juve Overton DO 2021 10:54 AM

## 2021-02-08 NOTE — CONSULTS
NUTRITION ASSESSMENT  Admission Date: 2/5/2021     Type and Reason for Visit: Reassess    NUTRITION RECOMMENDATIONS:   1. Continue CLD    PARENTERAL NUTRITION RECOMMENDATIONS:   1. Day 1: Start PN Clinimix 5/15 E at goal rate 41.7 mL per hour to provide 1000 mL total volume, 710 calories, 50 g protein, 150 g dextrose   2. Day 2: Advance PN Clinimix 5/15 E at goal rate 70 mL per hour to provide 1680 mL total volume, 1193 calories, 84 g protein, 252 g dextrose and 1.6 mg/kg/min GIR   3. Provide 250 ml of 20% lipids daily to provide 50 g lipids and 500 calories per day. Total Nutrition provided =1693 kcal; 84 g protein, 50 g lipids and 252 g dextrose. (100% kcal and 100% protein needs met). 4. Obtain Labs: (Phos,Mg,K+) to monitor risk of refeeding syndrome. Weekly TG recommended. 3. Pharmacy to adjust electrolytes, MVI and Trace Elements as appropriate. NUTRITION ASSESSMENT:  Pt remains at risk for malnutrition AEB poor po intakes on CLD. Noted pt had NGT placed and was tolerating TF at goal rate. Consult for PN recs as pt was actually not tolerating TF per surgery- continued to pull out NG d/t discomfort. Continuing to recommend enteral nutrition over PN to maintain gut integrity. Will place PN recs in note as started per pharmacy. RD to monitor nutritional status. 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 for elective hernia repair but developed hypotension during induction of surgery, thus surgery was aborted.       MALNUTRITION ASSESSMENT  Context of Malnutrition: Acute Illness   Malnutrition Status: Mild malnutrition  Findings of the 6 clinical characteristics of malnutrition (Minimum of 2 out of 6 clinical characteristics is required to make the diagnosis of moderate or severe Protein Calorie Malnutrition based on AND/ASPEN Guidelines):  Energy Intake: Less than/equal to 50% of estimated energy requirements Energy Intake Time: since admit; ihsan w/o pt hx   Weight Loss %: 20% loss or greater    Weight loss Time: Greater than or equal to 3 months   Due to current CDC guidelines recommending 6-ft distancing for social isolation for COVID19 prevention, physical aspects of the malnutrition assessment were withheld at this time. NUTRITION DIAGNOSIS   Problem: Problem #1: Inadequate oral intake  Etiology:  Altered GI function  Signs & Symptoms: Diet history of poor intake  and Weight loss     NUTRITION INTERVENTION  Food and/or Nutrient Delivery:Continue NPO or Start Parenteral Nutrition   Nutrition education/counseling/coordination of care: Continue Inpatient Monitoring     NUTRITION MONITORING & EVALUATION:  Evaluation:Modified current goal   Goals:Goals: Pt will tolerate the most appropriate form of nutrition therapy this admission   Monitoring: Nutrition Progression , Pertinent Labs , TF Intake , TF Tolerance  or Weight      OBJECTIVE DATA:  · Nutrition-Focused Physical Findings: lbm pta;   · Wounds None      Past Medical History:   Diagnosis Date    Arthritis     CHF (congestive heart failure) ([address]     COVID-19 12/15/2020    ESBL (extended spectrum beta-lactamase) producing bacteria infection     Glaucoma     Hemorrhoids     HTN (hypertension)     Hyperlipidemia     Morbid obesity ([address]     Muscular wasting and disuse atrophy     UTI (urinary tract infection)         ANTHROPOMETRICS  Current Height: 5' 6\" (167.6 cm)  Current Weight: 245 lb (111.1 kg)    Admission weight: 245 lb 4.8 oz (111.3 kg)- no method   Ideal Bodyweight 130 lb (59.1 kg)    Usual Bodyweight ~ 290-300 lb per EMR;   Weight Changes -63 lb in 3 mo        BMI BMI (Calculated): 39.6    Wt Readings from Last 50 Encounters:   02/05/21 245 lb (111.1 kg)   01/07/21 259 lb (117.5 kg)   12/28/20 259 lb 14.4 oz (117.9 kg)   12/28/20 237 lb (107.5 kg)   12/14/20 237 lb (107.5 kg)   11/20/20 (!) 308 lb (139.7 kg)   11/09/20 (!) 308 lb 13.8 oz (140.1 kg)   10/30/20 290 lb (131.5 kg)   12/01/17 230 lb (104.3 kg)   09/02/16 230 lb (104.3 kg)       COMPARATIVE STANDARDS  Estimated Total Kcals/Day : 15-18 Current Bodyweight (111 kg) 9037-8850 kcal    Estimated Total Protein (g/day) : 1.2-1.5 Ideal Bodyweight  (59 kg) 71-89 g/day  Estimated Daily Total Fluid (ml/day): 1 mL/kcal per day     Food / Nutrition-Related History  Pre-Admission / Home Diet:    none listed   Home Supplements / Herbals:   none noted  Food Restrictions / Cultural Requests:   none noted    Current Nutrition Therapies   PN-Adult Premix 5/15 - Standard Electrolytes - Central Line  DIET CLEAR LIQUID;  Dietary Nutrition Supplements: Protein Modular, Wound Healing Oral Supplement     PO Intake: NPO  PO Supplement: NPO   PO Supplement Intake: NPO  IVF: n/a    NUTRITION RISK LEVEL: Risk Level: [facility], [address] [place] [address]  [place]:  [phone]  Office:  [phone]

## 2021-02-08 NOTE — PROGRESS NOTES
Patient's tube feeding stopped at 0530 and line flushed with 30mL of water. Patient has not drank anything since 0530. NG tube still in place at [address]

## 2021-02-08 NOTE — PROGRESS NOTES
Surgery Daily Progress Note      CC: Paraesophageal hernia    SUBJECTIVE:  Patient rested well overnight. No nausea or emesis, tolerating tube feeds at 60 ml/hr (which are held this morning). Tolerating water. ROS:   A 14 point review of systems was conducted, significant findings as noted above. All other systems negative. OBJECTIVE:    PHYSICAL EXAM:  Vitals:    02/08/21 0009 02/08/21 0017 02/08/21 0320 02/08/21 0607   BP: 94/61 94/61 (!) 91/57 94/65   Pulse: 104 104 106 109   Resp: 16 16 16    Temp: 97 °F (36.1 °C) 97 °F (36.1 °C) 97 °F (36.1 °C)    TempSrc: Oral Oral Oral    SpO2: 93% 93% 95%    Weight:       Height:           General appearance: alert, no acute distress, grooming appropriate  Neuro: A&Ox3, no focal deficits, sensation intact  HEENT: Grossly normal, NG tube in place - tube feeds running at 60 ml/hr (held temporarily)  Chest/Lungs: normal effort, no accessory muscle use, on RA  Cardiovascular: RRR  Abdomen: soft, non-tender, non-distended, no guarding/rigidity  Extremities: no edema, no cyanosis      ASSESSMENT & PLAN:   This is a 63 y.o. female with a diagnosis of paraesophageal hernia s/p aborted operation secondary to instability after anesthesia induction (2/5). - TF paused for upper GI this morning  - plan for PICC TPN today for nutrition optimization   - SLIV  - changed to ppx lovenox from tx lovenox (will restart home Xeralto after PICC placement)  - Continue for CLD   - Out of bed ambulating- every shift   - Consult social work for placement and TPN  - Continue rocephin for UTI      Steff Dodson DO  02/08/21  6:32 AM  [phone]    I have seen, examined, and reviewed the patients chart. I agree with the residents assessment and have made appropriate changes.     Clarence Murdock

## 2021-02-09 LAB
ALBUMIN SERPL-MCNC: 2.1 G/DL (ref 3.4–5)
ANION GAP SERPL CALCULATED.3IONS-SCNC: 6 MMOL/L (ref 3–16)
ANISOCYTOSIS: ABNORMAL
BASOPHILS ABSOLUTE: 0 K/UL (ref 0–0.2)
BASOPHILS RELATIVE PERCENT: 0 %
BUN BLDV-MCNC: 6 MG/DL (ref 7–20)
CALCIUM SERPL-MCNC: 8.3 MG/DL (ref 8.3–10.6)
CHLORIDE BLD-SCNC: 104 MMOL/L (ref 99–110)
CO2: 28 MMOL/L (ref 21–32)
CREAT SERPL-MCNC: <0.5 MG/DL (ref 0.6–1.2)
EOSINOPHILS ABSOLUTE: 0.2 K/UL (ref 0–0.6)
EOSINOPHILS RELATIVE PERCENT: 3 %
GFR AFRICAN AMERICAN: >60
GFR NON-AFRICAN AMERICAN: >60
GLUCOSE BLD-MCNC: 118 MG/DL (ref 70–99)
HCT VFR BLD CALC: 27.1 % (ref 36–48)
HEMOGLOBIN: 8.8 G/DL (ref 12–16)
LYMPHOCYTES ABSOLUTE: 1.5 K/UL (ref 1–5.1)
LYMPHOCYTES RELATIVE PERCENT: 29 %
MAGNESIUM: 1.8 MG/DL (ref 1.8–2.4)
MCH RBC QN AUTO: 30.8 PG (ref 26–34)
MCHC RBC AUTO-ENTMCNC: 32.4 G/DL (ref 31–36)
MCV RBC AUTO: 95.1 FL (ref 80–100)
METAMYELOCYTES RELATIVE PERCENT: 2 %
MONOCYTES ABSOLUTE: 0.7 K/UL (ref 0–1.3)
MONOCYTES RELATIVE PERCENT: 13 %
NEUTROPHILS ABSOLUTE: 2.9 K/UL (ref 1.7–7.7)
NEUTROPHILS RELATIVE PERCENT: 53 %
NUCLEATED RED BLOOD CELLS: 1 /100 WBC
PDW BLD-RTO: 16.5 % (ref 12.4–15.4)
PHOSPHORUS: 3 MG/DL (ref 2.5–4.9)
PLATELET # BLD: 188 K/UL (ref 135–450)
PMV BLD AUTO: 9 FL (ref 5–10.5)
POTASSIUM SERPL-SCNC: 4.3 MMOL/L (ref 3.5–5.1)
RBC # BLD: 2.85 M/UL (ref 4–5.2)
SARS-COV-2, NAAT: NOT DETECTED
SODIUM BLD-SCNC: 138 MMOL/L (ref 136–145)
TRIGL SERPL-MCNC: 109 MG/DL (ref 0–150)
WBC # BLD: 5.3 K/UL (ref 4–11)

## 2021-02-09 PROCEDURE — 94669 MECHANICAL CHEST WALL OSCILL: CPT

## 2021-02-09 PROCEDURE — C9113 INJ PANTOPRAZOLE SODIUM, VIA: HCPCS | Performed by: STUDENT IN AN ORGANIZED HEALTH CARE EDUCATION/TRAINING PROGRAM

## 2021-02-09 PROCEDURE — 6370000000 HC RX 637 (ALT 250 FOR IP): Performed by: STUDENT IN AN ORGANIZED HEALTH CARE EDUCATION/TRAINING PROGRAM

## 2021-02-09 PROCEDURE — 2500000003 HC RX 250 WO HCPCS: Performed by: STUDENT IN AN ORGANIZED HEALTH CARE EDUCATION/TRAINING PROGRAM

## 2021-02-09 PROCEDURE — 02HV33Z INSERTION OF INFUSION DEVICE INTO SUPERIOR VENA CAVA, PERCUTANEOUS APPROACH: ICD-10-PCS | Performed by: SURGERY

## 2021-02-09 PROCEDURE — 1200000000 HC SEMI PRIVATE

## 2021-02-09 PROCEDURE — 83735 ASSAY OF MAGNESIUM: CPT

## 2021-02-09 PROCEDURE — 6360000002 HC RX W HCPCS: Performed by: STUDENT IN AN ORGANIZED HEALTH CARE EDUCATION/TRAINING PROGRAM

## 2021-02-09 PROCEDURE — 92526 ORAL FUNCTION THERAPY: CPT

## 2021-02-09 PROCEDURE — 87186 SC STD MICRODIL/AGAR DIL: CPT

## 2021-02-09 PROCEDURE — 36569 INSJ PICC 5 YR+ W/O IMAGING: CPT

## 2021-02-09 PROCEDURE — 87086 URINE CULTURE/COLONY COUNT: CPT

## 2021-02-09 PROCEDURE — 87077 CULTURE AEROBIC IDENTIFY: CPT

## 2021-02-09 PROCEDURE — 80069 RENAL FUNCTION PANEL: CPT

## 2021-02-09 PROCEDURE — 84478 ASSAY OF TRIGLYCERIDES: CPT

## 2021-02-09 PROCEDURE — 2580000003 HC RX 258: Performed by: STUDENT IN AN ORGANIZED HEALTH CARE EDUCATION/TRAINING PROGRAM

## 2021-02-09 PROCEDURE — U0002 COVID-19 LAB TEST NON-CDC: HCPCS

## 2021-02-09 PROCEDURE — C1751 CATH, INF, PER/CENT/MIDLINE: HCPCS

## 2021-02-09 PROCEDURE — 85025 COMPLETE CBC W/AUTO DIFF WBC: CPT

## 2021-02-09 RX ORDER — MAGNESIUM SULFATE IN WATER 40 MG/ML
2000 INJECTION, SOLUTION INTRAVENOUS ONCE
Status: COMPLETED | OUTPATIENT
Start: 2021-02-09 | End: 2021-02-09

## 2021-02-09 RX ORDER — PANTOPRAZOLE SODIUM 40 MG/1
40 TABLET, DELAYED RELEASE ORAL
Status: DISCONTINUED | OUTPATIENT
Start: 2021-02-10 | End: 2021-02-10 | Stop reason: HOSPADM

## 2021-02-09 RX ORDER — SODIUM CHLORIDE 0.9 % (FLUSH) 0.9 %
10 SYRINGE (ML) INJECTION EVERY 12 HOURS SCHEDULED
Status: DISCONTINUED | OUTPATIENT
Start: 2021-02-09 | End: 2021-02-10 | Stop reason: HOSPADM

## 2021-02-09 RX ORDER — SODIUM CHLORIDE 0.9 % (FLUSH) 0.9 %
10 SYRINGE (ML) INJECTION PRN
Status: DISCONTINUED | OUTPATIENT
Start: 2021-02-09 | End: 2021-02-10 | Stop reason: HOSPADM

## 2021-02-09 RX ORDER — LIDOCAINE HYDROCHLORIDE 10 MG/ML
5 INJECTION, SOLUTION INFILTRATION; PERINEURAL ONCE
Status: COMPLETED | OUTPATIENT
Start: 2021-02-09 | End: 2021-02-09

## 2021-02-09 RX ADMIN — I.V. FAT EMULSION 250 ML: 20 EMULSION INTRAVENOUS at 17:58

## 2021-02-09 RX ADMIN — METOPROLOL SUCCINATE 25 MG: 50 TABLET, EXTENDED RELEASE ORAL at 09:06

## 2021-02-09 RX ADMIN — METOCLOPRAMIDE HYDROCHLORIDE 5 MG: 5 INJECTION INTRAMUSCULAR; INTRAVENOUS at 05:31

## 2021-02-09 RX ADMIN — Medication 10 ML: at 19:57

## 2021-02-09 RX ADMIN — LIDOCAINE HYDROCHLORIDE 5 ML: 10 INJECTION, SOLUTION INFILTRATION; PERINEURAL at 17:19

## 2021-02-09 RX ADMIN — ASCORBIC ACID, VITAMIN A PALMITATE, CHOLECALCIFEROL, THIAMINE HYDROCHLORIDE, RIBOFLAVIN-5 PHOSPHATE SODIUM, PYRIDOXINE HYDROCHLORIDE, NIACINAMIDE, DEXPANTHENOL, ALPHA-TOCOPHEROL ACETATE, VITAMIN K1, FOLIC ACID, BIOTIN, CYANOCOBALAMIN: 200; 3300; 200; 6; 3.6; 6; 40; 15; 10; 150; 600; 60; 5 INJECTION, SOLUTION INTRAVENOUS at 17:58

## 2021-02-09 RX ADMIN — ATORVASTATIN CALCIUM 40 MG: 40 TABLET, FILM COATED ORAL at 09:06

## 2021-02-09 RX ADMIN — RIVAROXABAN 20 MG: 20 TABLET, FILM COATED ORAL at 17:58

## 2021-02-09 RX ADMIN — METOCLOPRAMIDE HYDROCHLORIDE 5 MG: 5 INJECTION INTRAMUSCULAR; INTRAVENOUS at 00:19

## 2021-02-09 RX ADMIN — Medication 10 ML: at 09:07

## 2021-02-09 RX ADMIN — POTASSIUM CHLORIDE, DEXTROSE MONOHYDRATE AND SODIUM CHLORIDE: 150; 5; 450 INJECTION, SOLUTION INTRAVENOUS at 05:31

## 2021-02-09 RX ADMIN — PANTOPRAZOLE SODIUM 40 MG: 40 INJECTION, POWDER, FOR SOLUTION INTRAVENOUS at 09:06

## 2021-02-09 RX ADMIN — DOCUSATE SODIUM 100 MG: 100 CAPSULE, LIQUID FILLED ORAL at 09:06

## 2021-02-09 RX ADMIN — POLYETHYLENE GLYCOL 3350 17 G: 17 POWDER, FOR SOLUTION ORAL at 09:06

## 2021-02-09 RX ADMIN — MAGNESIUM SULFATE HEPTAHYDRATE 2000 MG: 40 INJECTION, SOLUTION INTRAVENOUS at 06:44

## 2021-02-09 ASSESSMENT — PAIN SCALES - GENERAL
PAINLEVEL_OUTOF10: 0

## 2021-02-09 NOTE — PROGRESS NOTES
Speech Language Pathology  Facility/Department: [facility] SURGERY  Dysphagia Daily Treatment Note    NAME: Cordell Altman  : 1960  MRN: [id number]    Patient Diagnosis(es):   Patient Active Problem List    Diagnosis Date Noted    Moderate protein-calorie malnutrition ([address]     Pharyngoesophageal dysphagia     Mild malnutrition ([address] 2021    Paraesophageal hernia 2021    Hypotension     Abnormal ECG     Elevated troponin     Weight loss     Nausea and vomiting 2020    Intractable vomiting with nausea 2020    Hiatal hernia     Hypoglycemia     Paroxysmal atrial fibrillation (HCC)     History of pulmonary embolism     Abnormal EKG     Bacterial urinary tract infection 12/15/2020    Intractable nausea and vomiting     COVID-19 virus infection     Right medial tibial plateau fracture, closed, initial encounter 2020    HTN (hypertension)     Morbid obesity ([address]     HLD (hyperlipidemia)     Primary osteoarthritis of left knee 10/30/2020    Degenerative tear of lateral meniscus of right knee 2017    Primary osteoarthritis of right knee 2017    Lateral knee pain 2017    Pubic ramus fracture ([address] 2016    Right sided sciatica 2016     Allergies: No Known Allergies  Onset Date: 2021      CXR (2021)-  Impression       1. Right medial patchy basilar airspace consolidation or atelectasis   2. Right IJ central venous catheter placed with the tip in the distal SVC. NG tube remains in place     Previous MBS (date)    Chart reviewed. Medical Diagnosis:   Treatment Diagnosis:    BSE Impression (2021)-  Dysphagia Impression : Limited assessment, pt accepting only some PO trials. Has known h/o dysphagia 2/2 hiatal hernia. Tolerated ice chips/sips thins via straw with positive oral acceptance, swallow/laryngeal movement, good oral clearance, no overt signs of aspiration/penetration. Will continue to follow. MBS results -n/a    Pain: none stated    Current Diet : clear liquid    Treatment:  Pt seen bedside to address the following goals:  Goal 1: Patient will tolerate least restrictive diet without overt signs of aspiration or associated decline in respiratory status. 2/8: Reviewed chart/discussed with RN; unclear if pt going for UGI today or having surgery. NG tube removed this am. Order noted for TPN to support nutritional needs. Received pt awake, alert, oriented to self only. Repositioned upright; analyzed tolerance ice chips, thins via straw; pt with positive oral acceptance, positive swallow movement, no overt signs of aspiration. Pt declined further PO trials. Recommend continue current diet (clear liquid/thins) as tolerated. Recommend monitor need/appropriateness for MBS pending possible surgery/UGI. Will continue to follow  Cont goal.  2/9- RN reported lungs to be clear/ \"quiet\"with no rhonchi or crackles. Pt alert, but with flat affect and limited responses to questions. Pt was analyzed with ice chips, water by straw and jello. Pt noted to have 2 instances of subtle cough following one of 2 trials with ice chips and 1 of 6 sips of water. When questioned pt to the nature of the cough, pt did not readily respond. When asked if it felt as if it went down the wrong way, pt nodded head \"yes\", but pt is a questionable historian. Discussed concerns with RN who will con't to closely monitor lung status and alert SLP with any changes. Unable to take pt to radiology of MBS d/t digestive issues and need for clear liquid diet. con't goal     Goal 2: Patient/caregiver will demonstrate understanding of swallowing concerns/recommendations. 2/6: Educated pt regarding dysphagia, swallow anatomy/function, diet recommendations, safe swallow strategies (upright, slow rate, small sips). Pt indicated some comprehension, suspect needs reinforcement.   Cont goal.  2/8: Educated pt re: role of SLP, purpose of visit, recommendations, safety strategies (upright during/after PO intake, small bites/sips, slow rate), dysphagia therapy POC. Suspect needs further reinforcement. Cont goal.  2/9- Pt was educated to the purpose of the visit, swallowing strategies and was encouraged to alert staff if difficulty with swallowing emerges. Pt had questionable comprehension so would benefit from reinforcement. con't goal    Patient/Family/Caregiver Education:  See goal 2 above. Compensatory Strategies:  Upright position during/30 minutes after PO intake  Small bites/sips  Slow rate     Plan:  Continued daily Dysphagia treatment with goals per  plan of care. Diet recommendations: Clear liquids (thins); as tolerated  DC recommendation: TBD  Treatment: 15  D/W nursing, Pricilla Escobar  Needs met prior to leaving room, call button in reach.     Chloé Escobar [address]  Speech-Language Pathologist  Pager [phone]    If patient is discharged prior to next treatment, this note will serve as the discharge summary

## 2021-02-09 NOTE — PLAN OF CARE
Problem: Falls - Risk of:  Goal: Will remain free from falls  Description: Will remain free from falls  Outcome: Ongoing  Note: Pt does not attempt to get OOB. Bed in lowest and locked position. Call light within reach. Hourly rounding being implemented. Nonskid footwear on. Problem: Skin Integrity:  Goal: Will show no infection signs and symptoms  Description: Will show no infection signs and symptoms  Outcome: Ongoing  Note: Pt remains afebrile. No open wounds. Skin intact. Will continue to monitor for s/s of infection. Problem: Nutrition  Goal: Optimal nutrition therapy  Outcome: Ongoing  Note: Pt receiving TPN through PICC. Pt also on clear liquid diet with encouragement from RN to sip on fluids.

## 2021-02-09 NOTE — PROGRESS NOTES
Surgery Daily Progress Note      CC: Paraesophageal hernia    SUBJECTIVE:  Patient rested well overnight. No nausea or emesis. Tube feeds stopped yesterday (2/8). PICC functioning with TPN currently running. Patient tolerating CLD with no nausea or vomiting. Afebrile and hemodynamically stable    ROS:   A 14 point review of systems was conducted, significant findings as noted above. All other systems negative. OBJECTIVE:    PHYSICAL EXAM:  Vitals:    02/08/21 1958 02/08/21 2213 02/08/21 2259 02/09/21 0307   BP: (!) 83/55 96/60 99/66 108/75   Pulse: 108 105 101 100   Resp: 18  18 18   Temp: 97.8 °F (36.6 °C)  97.7 °F (36.5 °C) 98.1 °F (36.7 °C)   TempSrc: Oral  Oral Oral   SpO2: 94%  95% 95%   Weight:       Height:           General appearance: alert, no acute distress, grooming appropriate  Neuro: A&Ox3, no focal deficits, sensation intact  HEENT: Grossly normal, no lymphadenopathy, neck supple, right IJ CVC  Chest/Lungs: normal effort, no accessory muscle use, on RA  Cardiovascular: RRR, well perfused  Abdomen: soft, non-tender, non-distended, no guarding/rigidity  Extremities: no edema, no cyanosis, PICC on right upper extremity      ASSESSMENT & PLAN:   This is a 63 y.o. female with a diagnosis of paraesophageal hernia s/p aborted operation secondary to instability after anesthesia induction (2/5).      - Upper GI attempted 2/8 but unable to be completed as patient was not cooperating   - PICC placed (2/9) and TPN started  - IVF running at 75cc/hr will SLIV once TPN at goal rate  - Start home Xeralto this morning  - Continue with CLD   - Remove CVC in R. IJ, ensure patient has adequate access prior to removal.  - Out of bed ambulating- every shift   - Social work on board for placement and [address], DO, [address]  PGY1, General Surgery  02/09/21  5:45 AM  [phone]

## 2021-02-09 NOTE — PROGRESS NOTES
Pt BP low at beginning of shift. Surgical resident notified and 250 mL bolus given. Pt VSS now. Denies pain. Patient disoriented to time and situation. Oriented to self and birthday. Patient pushing call light and asking who keeps calling. RN saw patient picking at central line dressings. Placed AvaSys in room for safety. RN giving frequent re-orientation to patient. Incontinent of bowel and bladder this shift. Replaced purwick for incontinence and to collect urine for sample. Pt has pitting edema in BLL and OCHOA extremities. PICC in CRUZ infusing TPN. Right IJ infusing fluids. Hourly rounding being implemented. Fall precautions in place. Will continue to monitor.

## 2021-02-09 NOTE — PROGRESS NOTES
Clinical Pharmacy Progress Note  Pharmacy to dose PN    Admit date: 2/5/2021     Subjective/Objective:  Pt is a 62yof with a PMHx that includes HTN, HF, HLD, glaucoma, and morbid obesity who is admitted with paraesophageal hernia. Pt is s/p aborted operation secondary to instability after anesthesia induction (2/5/21). Interval update: Tolerating TPN, SW working on placement for outpatient TPN management. Pharmacy is consulted to dose PN per Dr. Camilo Owen  Today is PN day #2  Current diet order:  Clear liquid diet     Height:  5' 6\" (167.6 cm)  Weight: 245 lb (111.1 kg)    Recent Labs     02/08/21  0533 02/09/21  0453    138   K 4.2 4.3    104   CO2 32 28   BUN 6* 6*   CREATININE <0.5* <0.5*   GLUCOSE 128* 118*       Calcium 8.3   Albumin 2.1   Corrected Calcium 9.0  Phosphorus 3.0  Magnesium 1.8      Recent Labs     02/08/21  0533 02/09/21  0453   WBC 5.4 5.3   HGB 8.9* 8.8*    188         Assessment/Plan:  1)  Parenteral Nutrition:  Pharmacy to dose  · Ordered Clinimix 5/15 E at 70 mL/hr (goal rate) to provide 1680 mL total volume, 1193 calories, 84 g protein, 252 g dextrose and 1.6 mg/kg/min GIR. Ordered Lipids 20% 250mL to infuse over 12 hrs. Appreciate dietary recommendations. · Clinimix-E includes standard electrolyte formulation. No additional electrolytes added. Will monitor electrolytes daily and will replete with supplemental IVPBs as needed. · Pt will receive the following electrolytes via supplemental IVPB today:  · Mag sulfate 2g IVPB x1.   Glucose control:  Glucose on AM labs = 118. Will monitor for hyperglycemia. No POC tests currently ordered.  Patient will receive MVI 10 mL/day & Trace Elements 1 mL/day with PN daily.  Labs will be monitored daily and PN will be re-ordered daily. Weekly triglyceride ordered per PN protocol.       Please call with questions--  Thanks--  Andrew Rajput PharmD, [address] [place] [address] Street: [phone]  [facility]: [phone] 2/9/2021 9:47 AM

## 2021-02-09 NOTE — PROCEDURES
DOUBLE PICC PROCEDURE NOTE  Chart reviewed for allergies, diagnosis, labs, known contraindications, reason for line placement and planned length of treatment. Informed consent noted to be signed and on chart. Insertion procedure discussed with patient/family member. Three patient identifiers  Patient name,   and MRN -  completed &  confirmed verbally. Time out performed Hat, mask and eye shield donned. PICC site scrubbed with Chloraprep  One-Step applicator for 30 seconds x 1. Hand Hygiene  performed with 3% Chlorhexidine surgical scrub x1 min prior to  Sterile gloves, sterile gown being donned. Patient draped using maximal sterile barrier technique ( head to toe ). PICC site scrubbed a 2nd time with Chloraprep One-Step applicator x 30 sec. Modified Seldinger  technique/ultrasound assisted and tip locating system utilized for insertion. 1% Lidocaine 5 ml injected intradermal pre-insertion. PICC tip location in the SVC confirmed by ECG technology Sherlock 3CG. Positive brisk blood return obtained from all lumens  and each lumen flushed with 10 mls  0.9% Sterile Sodium Chloride. All lumens flush easily with no resistance. Valve placed on all lumens followed by Alcohol Swab Caps on end of each. Bio-patch in place. Catheter secured with non-sutured locking device per hospital protocol. Sterile Tegaderm applied over PICC site. Sterile field maintained during procedure. Positioning and rhythm wire accounted for post procedure and disposed of in sharps. Appearance of site is Clean dry and intact without bleeding or edema. All edges of Tegaderm occlusive. Site marked with date and initials of RN placing line. Teaching performed to pt/family and noted in education section. Bed placed in low position post-procedure. Top 2 side rails in up position call button in reach. Pt. Response to procedure tolerated well.   RN notified of

## 2021-02-09 NOTE — PROGRESS NOTES
Pt has continued to be oriented to person only, but on Friday when this RN had her pt was able to tell RN the month and that she was in the hospital, but not which one, whereas today pt keeps repeated her birthday when RN asks her what day it is, even after RN tried to hold eye contact and asked specifically what month it was. Pt also calls out \"hello\" repeatedly even while RN is in same room as patient and within eyesight. Pt has been turned per protocol for skin integrity, pt's emergency contact number was invalid, Kiana Moise also had the same emergency contact and staff was able to discover that her contact/dad was in a nursing home. Pt's 's wife had called pt's room when RN was rounding and RN asked her if she was aware of any contact numbers, the 's wife was able to give RN her sister's number, with which RN was able to get phone consent for PICC line for TPN. Will continue to monitor.

## 2021-02-09 NOTE — DISCHARGE INSTR - COC
Continuity of Care Form    Patient Name: Dixie Samuels   :  1960  MRN:  [id number]    Admit date:  2021  Discharge date:  ***    Code Status Order: Full Code   Advance Directives:      Admitting Physician: Arben Sanchez DO  PCP: Ewelina Fracnis MD    Discharging Nurse: [facility] Unit/Room#: 4788/9167-93  Discharging Unit Phone Number: ***    Emergency Contact:   Extended Emergency Contact Information  Primary Emergency Contact: Monica Parish  Mobile Phone: [phone]  Relation: Brother/Sister    Past Surgical History:  Past Surgical History:   Procedure Laterality Date    BLADDER REPAIR       SECTION      CHOLECYSTECTOMY      CYSTOSCOPY      TIBIA FRACTURE SURGERY Right 2020    RIGHT TIBIA IM NAIL INSERTION performed by Yasemin Mckeon MD at [address] [place] [address] 2020    EGD W/ANES. (8:00) COVID + performed by Makayla Salguero MD at [address] [place] [address]       Immunization History: There is no immunization history on file for this patient.     Active Problems:  Patient Active Problem List   Diagnosis Code    Pubic ramus fracture ([facility] S32.599A    Right sided sciatica M54.31    Degenerative tear of lateral meniscus of right knee M23.300    Primary osteoarthritis of right knee M17.11    Lateral knee pain M25.569    Primary osteoarthritis of left knee M17.12    Right medial tibial plateau fracture, closed, initial encounter S82.131A    HTN (hypertension) I10    Morbid obesity ([facility] E66.01    HLD (hyperlipidemia) E78.5    Bacterial urinary tract infection N39.0, A49.9    Intractable nausea and vomiting R11.2    COVID-19 virus infection U07.1    Nausea and vomiting R11.2    Intractable vomiting with nausea R11.2    Hiatal hernia K44.9    Hypoglycemia E16.2    Paroxysmal atrial fibrillation (HCC) I48.0    History of pulmonary embolism Z86.711    Abnormal EKG R94.31    Weight loss R63.4    Paraesophageal hernia K44.9    Therapy:  Current Nutrition Therapy:   - Oral Diet:  General    Routes of Feeding: Oral  Liquids: Thin Liquids  Daily Fluid Restriction: no  Last Modified Barium Swallow with Video (Video Swallowing Test): not done    Treatments at the Time of Hospital Discharge:   Respiratory Treatments: ***  Oxygen Therapy:  is not on home oxygen therapy.   Ventilator:    - No ventilator support    Rehab Therapies: Physical Therapy and Occupational Therapy  Weight Bearing Status/Restrictions: No weight bearing restirctions  Other Medical Equipment (for information only, NOT a DME order):  wheelchair  Other Treatments: ***    Patient's personal belongings (please select all that are sent with patient):  Glasses, clothing    RN SIGNATURE:  Electronically signed by Tenisha Mari RN on 2/10/21 at 10:35 AM EST    CASE MANAGEMENT/SOCIAL WORK SECTION    Inpatient Status Date: 2/5/2021    Readmission Risk Assessment Score:  Readmission Risk              Risk of Unplanned Readmission:        18           Discharging to Facility/ Agency   · Name: Isael Lopes  Address:  [address] [place] [address] Sex: Female   [place] city: [address] [place] [address]             ·   · [phone]  · [phone] Fax:      / signature: Electronically signed by Molly Almaguer RN on 2/10/21 at 10:37 AM EST    PHYSICIAN SECTION    Prognosis: Good    Condition at Discharge: Stable    Rehab Potential (if transferring to Rehab): Good    Recommended Labs or Other Treatments After Discharge: Nutritional labs to be drawn weekly:    CBC with differential  Renal  Magnesium  Phosphorous  Pre Albumin  Transferrin  Albumin  CRP    Please fax these results to Dr. Annamaria Wasserman at [phone]    Protein supplements three times a day including protein shots drinks    Physician Certification: I certify the above information and transfer of Livan Dowling  is necessary for the continuing treatment of the diagnosis listed and that she requires Reji Smith for greater 30 days. Update Admission H&P: No change in H&P    PHYSICIAN SIGNATURE:  Electronically signed by LEIGHA Munoz CNP on 2/9/21 at 9:28 AM EST    Patient will need PICC line care per your facility protocol    TPN:    The values shown are based on the patient receiving 0.99 bags over 24 hours.    Order Details    Infusion Rate (mL/hr) 41.7   Infusion Site [facility]   Weight Used 111.1 kg       Macronutrients      Range   Amino Acids (g) 49.5 --   Amino Acid Type Clinimix-E    Dextrose (g) 148.5 --   Dextrose Concentration (%) 15 <=25       Energy Contribution     kcal %   Protein 198 28.17   Dextrose 504.9 71.83   Lipids -- --   Total 702.9     Electrolytes    Cations Amount Range   Sodium (mEq/L) 35 <=150   Potassium (mEq/L) 30 <=100   Calcium (mEq/L) 4.5 <=8   Magnesium (mEq/L) 5 <=12   Anions     Phosphate (mmol/L) 15 <=15   Chloride (mEq/L) 39 <=150   Acetate (mEq/L) 80 <=150       Mixture Compatibility      Range   Calcium Magnesium Sum (mEq/L) 9.5 <=20   Calcium Phosphate Solubility Curve (mEq/L of Calcium) 4.5 <=17.1   Volume of Ingredients

## 2021-02-10 VITALS
RESPIRATION RATE: 16 BRPM | SYSTOLIC BLOOD PRESSURE: 121 MMHG | WEIGHT: 245 LBS | HEART RATE: 109 BPM | BODY MASS INDEX: 39.37 KG/M2 | OXYGEN SATURATION: 98 % | TEMPERATURE: 98.1 F | HEIGHT: 66 IN | DIASTOLIC BLOOD PRESSURE: 87 MMHG

## 2021-02-10 LAB
ALBUMIN SERPL-MCNC: 2.4 G/DL (ref 3.4–5)
ANION GAP SERPL CALCULATED.3IONS-SCNC: 7 MMOL/L (ref 3–16)
ANISOCYTOSIS: ABNORMAL
ATYPICAL LYMPHOCYTE RELATIVE PERCENT: 1 % (ref 0–6)
BASOPHILS ABSOLUTE: 0 K/UL (ref 0–0.2)
BASOPHILS RELATIVE PERCENT: 0 %
BUN BLDV-MCNC: 6 MG/DL (ref 7–20)
CALCIUM SERPL-MCNC: 8.7 MG/DL (ref 8.3–10.6)
CHLORIDE BLD-SCNC: 109 MMOL/L (ref 99–110)
CO2: 26 MMOL/L (ref 21–32)
CREAT SERPL-MCNC: <0.5 MG/DL (ref 0.6–1.2)
EOSINOPHILS ABSOLUTE: 0 K/UL (ref 0–0.6)
EOSINOPHILS RELATIVE PERCENT: 0 %
GFR AFRICAN AMERICAN: >60
GFR NON-AFRICAN AMERICAN: >60
GLUCOSE BLD-MCNC: 100 MG/DL (ref 70–99)
HCT VFR BLD CALC: 28 % (ref 36–48)
HEMOGLOBIN: 9.3 G/DL (ref 12–16)
LYMPHOCYTES ABSOLUTE: 1.4 K/UL (ref 1–5.1)
LYMPHOCYTES RELATIVE PERCENT: 23 %
MAGNESIUM: 1.9 MG/DL (ref 1.8–2.4)
MCH RBC QN AUTO: 31.5 PG (ref 26–34)
MCHC RBC AUTO-ENTMCNC: 33.4 G/DL (ref 31–36)
MCV RBC AUTO: 94.5 FL (ref 80–100)
METAMYELOCYTES RELATIVE PERCENT: 3 %
MONOCYTES ABSOLUTE: 0.7 K/UL (ref 0–1.3)
MONOCYTES RELATIVE PERCENT: 12 %
NEUTROPHILS ABSOLUTE: 3.7 K/UL (ref 1.7–7.7)
NEUTROPHILS RELATIVE PERCENT: 61 %
OVALOCYTES: ABNORMAL
PDW BLD-RTO: 16.5 % (ref 12.4–15.4)
PHOSPHORUS: 3.4 MG/DL (ref 2.5–4.9)
PLATELET # BLD: 208 K/UL (ref 135–450)
PMV BLD AUTO: 9.2 FL (ref 5–10.5)
POLYCHROMASIA: ABNORMAL
POTASSIUM SERPL-SCNC: 4.4 MMOL/L (ref 3.5–5.1)
RBC # BLD: 2.96 M/UL (ref 4–5.2)
SODIUM BLD-SCNC: 142 MMOL/L (ref 136–145)
WBC # BLD: 5.8 K/UL (ref 4–11)

## 2021-02-10 PROCEDURE — 6360000002 HC RX W HCPCS: Performed by: STUDENT IN AN ORGANIZED HEALTH CARE EDUCATION/TRAINING PROGRAM

## 2021-02-10 PROCEDURE — 2580000003 HC RX 258: Performed by: STUDENT IN AN ORGANIZED HEALTH CARE EDUCATION/TRAINING PROGRAM

## 2021-02-10 PROCEDURE — 83735 ASSAY OF MAGNESIUM: CPT

## 2021-02-10 PROCEDURE — 6370000000 HC RX 637 (ALT 250 FOR IP): Performed by: STUDENT IN AN ORGANIZED HEALTH CARE EDUCATION/TRAINING PROGRAM

## 2021-02-10 PROCEDURE — 99232 SBSQ HOSP IP/OBS MODERATE 35: CPT | Performed by: SURGERY

## 2021-02-10 PROCEDURE — 85025 COMPLETE CBC W/AUTO DIFF WBC: CPT

## 2021-02-10 PROCEDURE — 80069 RENAL FUNCTION PANEL: CPT

## 2021-02-10 RX ORDER — MAGNESIUM SULFATE IN WATER 40 MG/ML
2000 INJECTION, SOLUTION INTRAVENOUS ONCE
Status: COMPLETED | OUTPATIENT
Start: 2021-02-10 | End: 2021-02-10

## 2021-02-10 RX ORDER — PSEUDOEPHEDRINE HCL 30 MG
100 TABLET ORAL 2 TIMES DAILY PRN
Qty: 60 CAPSULE | Refills: 2 | Status: SHIPPED | OUTPATIENT
Start: 2021-02-10 | End: 2021-03-12

## 2021-02-10 RX ORDER — POLYETHYLENE GLYCOL 3350 17 G/17G
17 POWDER, FOR SOLUTION ORAL 2 TIMES DAILY
Qty: 60 EACH | Refills: 2 | Status: SHIPPED | OUTPATIENT
Start: 2021-02-10 | End: 2021-03-12

## 2021-02-10 RX ADMIN — PANTOPRAZOLE SODIUM 40 MG: 40 TABLET, DELAYED RELEASE ORAL at 05:51

## 2021-02-10 RX ADMIN — Medication 10 ML: at 08:13

## 2021-02-10 RX ADMIN — ATORVASTATIN CALCIUM 40 MG: 40 TABLET, FILM COATED ORAL at 08:12

## 2021-02-10 RX ADMIN — METOPROLOL SUCCINATE 25 MG: 50 TABLET, EXTENDED RELEASE ORAL at 08:11

## 2021-02-10 RX ADMIN — MAGNESIUM SULFATE HEPTAHYDRATE 2000 MG: 40 INJECTION, SOLUTION INTRAVENOUS at 11:32

## 2021-02-10 ASSESSMENT — PAIN SCALES - PAIN ASSESSMENT IN ADVANCED DEMENTIA (PAINAD)
NEGVOCALIZATION: 0
FACIALEXPRESSION: 0
BODYLANGUAGE: 0
CONSOLABILITY: 0

## 2021-02-10 ASSESSMENT — PAIN SCALES - GENERAL: PAINLEVEL_OUTOF10: 2

## 2021-02-10 NOTE — PROGRESS NOTES
Given report to corey at [facility] at [place].

## 2021-02-10 NOTE — PROGRESS NOTES
Pt Alert, oriented to self. Pt st this am other wise vs in stable condition. Pt still has picc in Right arm with TPN running. Pt incontinent has pure wick in place. Held Klutch meds d/t pt having loose stool. No new skin issues not listed in LDA's. Pt has low appetite. Possible d/c today. Will continue to monitor.

## 2021-02-10 NOTE — PLAN OF CARE
Problem: Falls - Risk of:  Goal: Will remain free from falls  Description: Will remain free from falls  Outcome: Ongoing  Note: Pt bed in low position and alarm on. Non skid socks on. Belongings, bedside table, and call light within reach. 2/4 side rails up. Will continue to monitor. Problem: Skin Integrity:  Goal: Absence of new skin breakdown  Description: Absence of new skin breakdown  Outcome: Ongoing  Note: Will continue to monitor, check, change, and turn skin. Problem: Injury - Risk of, Physical Injury:  Goal: Will remain free from falls  Description: Will remain free from falls  Outcome: Ongoing  Note: Pt bed in low position and alarm on. Non skid socks on. Belongings, bedside table, and call light within reach. 2/4 side rails up. Will continue to monitor.

## 2021-02-10 NOTE — PROGRESS NOTES
Clinical Pharmacy Progress Note  Pharmacy to dose PN    Admit date: 2/5/2021     Subjective/Objective:  Pt is a 62yof with a PMHx that includes HTN, HF, HLD, glaucoma, and morbid obesity who is admitted with paraesophageal hernia. Pt is s/p aborted operation secondary to instability after anesthesia induction (2/5/21). Interval update:  Planning for likely discharge to LTC with TPN today. Pharmacy is consulted to dose PN per Dr. Robyne Saint  Today is PN day #3  Current diet order:  Clear liquid diet     Height:  5' 6\" (167.6 cm)  Weight: 245 lb (111.1 kg)    Recent Labs     02/09/21  0453 02/10/21  0543    142   K 4.3 4.4    109   CO2 28 26   BUN 6* 6*   CREATININE <0.5* <0.5*   GLUCOSE 118* 100*       Calcium 8.7   Albumin 2.4   Corrected Calcium 10.0  Phosphorus 3.4  Magnesium 1.9      Recent Labs     02/09/21  0453 02/10/21  0543   WBC 5.3 5.8   HGB 8.8* 9.3*    208         Assessment/Plan:  1)  Parenteral Nutrition:  Pharmacy to dose  · Ordered Clinimix 5/15 E at 70 mL/hr (goal rate) to provide 1680 mL total volume. Ordered Lipids 20% 250mL to infuse over 12 hrs. Regimen provides 1693 kcal; 84 g protein, 50 g lipids and 252 g dextrose. Appreciate dietary recommendations. · Clinimix-E includes standard electrolyte formulation. No additional electrolytes added. Will monitor electrolytes daily and will replete with supplemental IVPBs as needed.  Glucoses have remained < 150 since starting parenteral nutrition.  Patient will receive MVI 10 mL/day & Trace Elements 1 mL/day with PN daily.  Labs will be monitored daily and PN will be re-ordered daily. Weekly triglyceride ordered per PN protocol.       Please call with questions--  Thanks--  Baby Adjutant, PharmD, [address]  [id number] ([facility])   2/10/2021 9:17 AM

## 2021-02-10 NOTE — CARE COORDINATION
Case Management Assessment            Discharge Note                    Date / Time of Note: 2/10/2021 10:21 AM                  Discharge Note Completed by: Joan Tirado    Patient Name: Rajat Tinsley   YOB: 1960  Diagnosis: Hiatal hernia [K44.9]  Ventral hernia without obstruction or gangrene [K43.9]  Non-recurrent bilateral inguinal hernia without obstruction or gangrene [K40.20]  Paraesophageal hernia [K44.9]   Date / Time: 2/5/2021  5:54 AM    Current PCP: Matt Casarez MD  Clinic patient: No    Hospitalization in the last 30 days: No    Advance Directives:  Code Status: Full Code  [place] DNR form completed and on chart: No    Financial:  Payor: Sumeet Eden / Plan: Sumeet Eden / Product Type: *No Product type* /      Pharmacy:    [address] [place] [address] [address] [facility]  [place] [address] [address] [facility]  Phone: [phone] Fax: [phone]    [address]  [address]  Phone: [phone] Fax: [phone]      Assistance purchasing medications?:    Assistance provided by Case Management: None at this time    Does patient want to participate in local refill/ meds to beds program?:      Meds To Beds General Rules:  1. Can ONLY be done Monday- Friday between 8:30am-5pm  2. Prescription(s) must be in pharmacy by 3pm to be filled same day  3. Copy of patient's insurance/ prescription drug card and patient face sheet must be sent along with the prescription(s)  4. Cost of Rx cannot be added to hospital bill. If financial assistance is needed, please contact unit  or ;  or  CANNOT provide pharmacy voucher for patients co-pays  5.  Patients can then  the prescription on their way out of the hospital at discharge, or pharmacy can deliver to the bedside if staff is available. (payment due at time of pick-up or delivery - cash, check, or card accepted)     Able to afford home medications/ co-pay costs: Yes    ADLS:  Current PT AM-PAC Score: 6 /24  Current OT AM-PAC Score: 6 /24      DISCHARGE Disposition: [place] ([facility]): Mc  Phone: [phone]  Fax: [phone]    LOC at discharge: Rick Peck Completed: Yes    Notification completed in HENS/PAS?:  Not Applicable    IMM Completed:   Not Indicated    Transportation:  Transportation PLAN for discharge: EMS transportation   Mode of Transport: Ambulance stretcher - BLS  Reason for medical transport: Bed confined: Meets the following criteria 1) unable to get out of bed without assistance or ambulate, 2) unable to safely sit up in a wheelchair, 3) unable to maintain erect seating position in a chair for time needed for transport  Name of Transport Company: [employer]  Phone: [phone]  Time of Transport: 2p    Transport form completed: Yes    Home Care:  [address] ordered at discharge: Not [address]: Not Applicable  Orders faxed: No    Durable Medical Equipment:  DME Provider: none  Equipment obtained during hospitalization:     Home Oxygen and Respiratory Equipment:  Oxygen needed at discharge?: Not [address]: Not Applicable  Portable tank available for discharge?: Not Indicated    Dialysis:  Dialysis patient: No    Dialysis Center:  Not Applicable      Additional CM Notes: Pt will DC back to [facility], [facility], spoke with Lam Calixto [phone] ok with DC today at 2pm with TPN, KINGA Ramey about return today. Nurse to call report to [phone] orders faxed to [phone]. They are prepared to give TPN.   [facility] at 2pm [phone]    The Plan for Transition of Care is related to the following treatment goals of Hiatal hernia [K44.9]  Ventral hernia without obstruction or gangrene [K43.9]  Non-recurrent bilateral inguinal hernia without obstruction or gangrene [K40.20]  Paraesophageal hernia [K44.9]    The Patient and/or patient representative Crystal and her family were provided with a choice of provider and agrees with the discharge plan Yes    Freedom of choice list was provided with basic dialogue that supports the patient's individualized plan of care/goals and shares the quality data associated with the providers.  Yes    Care Transitions patient: No    Charity Esparza RN  [facility]  Case Management Department  Ph: [phone]  Fax: [phone]

## 2021-02-10 NOTE — PROGRESS NOTES
VSS, pt denied pain, n/v during this shift. IVF/TPN infusing, pt voiding using purewick. Pt had x2 large, watery BM, she was repositioned and turned. Pt oriented to person, alert to answer questions but disoriented to time, place, and situation. Fall precaution in place, uneventful night with pt.

## 2021-02-10 NOTE — PROGRESS NOTES
Surgery Daily Progress Note      CC: Paraesophageal hernia    SUBJECTIVE:  Patient rested well overnight. No nausea or emesis. Tube feeds stopped (2/8). PICC broke yesterday and was replaced over the day with a double lumen, is now functioning with TPN currently running. Patient tolerating CLD with no nausea or vomiting. Afebrile and hemodynamically stable. ROS:   A 14 point review of systems was conducted, significant findings as noted above. All other systems negative. OBJECTIVE:    PHYSICAL EXAM:  Vitals:    02/09/21 2024 02/09/21 2303 02/09/21 2339 02/10/21 0315   BP:  (!) 162/93 136/83 123/88   Pulse:  108  107   Resp: 16 16  16   Temp:  97.9 °F (36.6 °C)  97.8 °F (36.6 °C)   TempSrc:  Oral  Oral   SpO2: 97% 97%  99%   Weight:       Height:           General appearance: alert, no acute distress, grooming appropriate  Neuro: A&Ox3, no focal deficits, sensation intact  HEENT: Grossly normal, no lymphadenopathy, neck supple, right IJ CVC  Chest/Lungs: normal effort, no accessory muscle use, on RA  Cardiovascular: RRR, well perfused  Abdomen: soft, non-tender, non-distended, no guarding/rigidity  Extremities: no edema, no cyanosis, PICC on right upper extremity      ASSESSMENT & PLAN:   This is a 63 y.o. female with a diagnosis of paraesophageal hernia s/p aborted operation secondary to instability after anesthesia induction (2/5). - Upper GI attempted 2/8 but unable to be completed as patient was not cooperating   - PICC placed (2/9) and TPN started  - TPN at goal rate  - Started home Camille Johnson (2/9)  - CVC removed 2/9  - Out of bed ambulating- every shift   - Social work on board for placement and TPN  - Plan for discharge today, transportation arranged by social work.       Steff Dodson DO, MS  PGY1, General Surgery  02/10/21  6:23 AM  [phone]

## 2021-02-11 LAB
ORGANISM: ABNORMAL
ORGANISM: ABNORMAL
URINE CULTURE, ROUTINE: ABNORMAL
URINE CULTURE, ROUTINE: ABNORMAL

## 2021-02-11 NOTE — DISCHARGE SUMMARY
social work to outside facility. Patient to follow-up with Dr. Tiburcio Frote in 2 weeks. Patient was in stable condition on discharge. Discharge physical exam:  General appearance: alert, no acute distress, grooming appropriate  Neuro: A&Ox3, no focal deficits, sensation intact  HEENT: Grossly normal, no lymphadenopathy, neck supple  Chest/Lungs: normal effort, no accessory muscle use, on RA  Cardiovascular: RRR, well perfused  Abdomen: soft, non-tender, non-distended, no guarding/rigidity  Extremities: no edema, no cyanosis, PICC on right upper extremity       Disposition:    [facility]    Condition at discharge:  Stable    Discharge Instructions:  See separate form    Patient Instructions:      Medication List      START taking these medications    docusate 100 MG Caps  Commonly known as: COLACE, DULCOLAX  Take 100 mg by mouth 2 times daily as needed for Constipation Reduce use if stools become soft.   Notes to patient: Docusate Sodium  (Colace)  USE-- Stool softener, prevents or treats constipation  SIDE EFFECTS--  Stomach upset     polyethylene glycol 17 g packet  Commonly known as: GLYCOLAX  Take 17 g by mouth 2 times daily        CONTINUE taking these medications    amLODIPine 10 MG tablet  Commonly known as: NORVASC     atorvastatin 40 MG tablet  Commonly known as: LIPITOR     calcium carbonate 500 MG chewable tablet  Commonly known as: TUMS     calcium carbonate 600 MG Tabs tablet     Flaxseed Oil Oil     latanoprost 0.005 % ophthalmic solution  Commonly known as: XALATAN     lisinopril 20 MG tablet  Commonly known as: PRINIVIL;ZESTRIL     metoclopramide 10 MG tablet  Commonly known as: REGLAN     metoprolol succinate 25 MG extended release tablet  Commonly known as: TOPROL XL     nitrofurantoin 50 MG capsule  Commonly known as: MACRODANTIN     nystatin Powd powder  Commonly known as: MYCOSTATIN     omeprazole 10 MG delayed release capsule  Commonly known as: PRILOSEC     ondansetron 4 MG/5ML solution Commonly known as: ZOFRAN     oxybutynin 5 MG tablet  Commonly known as: DITROPAN     oxyCODONE-acetaminophen 5-325 MG per tablet  Commonly known as: PERCOCET     pantoprazole 40 MG tablet  Commonly known as: PROTONIX  Take 1 tablet by mouth every morning (before breakfast)     potassium chloride 20 MEQ packet  Commonly known as: KLOR-CON     promethazine 25 MG/ML injection  Commonly known as: PHENERGAN     vitamin D3 10 MCG (400 UNIT) Tabs tablet  Commonly known as: CHOLECALCIFEROL     Xarelto 20 MG Tabs tablet  Generic drug: rivaroxaban           Where to Get Your Medications      You can get these medications from any pharmacy    Bring a paper prescription for each of these medications  · docusate 100 MG Caps  · polyethylene glycol 17 g packet         Tatianna Broderick DO  02/11/21  1:38 PM

## 2021-02-11 NOTE — PROGRESS NOTES
Physician Progress Note      PATIENT:               Cruz Lee  CSN #:                  [id number]  :                       1960  ADMIT DATE:       2021 5:54 AM  DISCH DATE:        2/10/2021 2:53 PM  RESPONDING  PROVIDER #:        Karrie Ayers MD          QUERY TEXT:    Pt admitted for hernia repair and has confusion documented. If possible,   please document in progress notes and discharge summary further specificity   regarding the type of encephalopathy:    The medical record reflects the following:  Risk Factors: 62 yo w/ UTI, now confusion  Clinical Indicators: Per P : Patient with ongoing confusion and need for   reorientation overnight. Per RN : Pt disoriented x4.   Treatment: Bed alarm, frequent reorientation  Options provided:  -- Toxic encephalopathy due to anesthesia  -- Metabolic encephalopathy due to dehydration  -- Other - I will add my own diagnosis  -- Disagree - Not applicable / Not valid  -- Disagree - Clinically unable to determine / Unknown  -- Refer to Clinical Documentation Reviewer    PROVIDER RESPONSE TEXT:    Encephalopathy secondary to UTI    Query created by: Daljit Laureano on 2021 8:43 AM      Electronically signed by:  Karrie Ayers MD 2021 9:32 AM

## 2021-02-17 ENCOUNTER — OUTSIDE SERVICES (OUTPATIENT)
Dept: WOUND CARE | Age: 61
End: 2021-02-17
Payer: MEDICAID

## 2021-02-17 DIAGNOSIS — T81.89XD NON-HEALING SURGICAL WOUND, SUBSEQUENT ENCOUNTER: ICD-10-CM

## 2021-02-17 PROCEDURE — 99308 SBSQ NF CARE LOW MDM 20: CPT | Performed by: CLINICAL NURSE SPECIALIST

## 2021-02-17 NOTE — PROGRESS NOTES
[facility]    Patient name: Moo Kim  :   [birth year]  Facility:  [address] of Service: skilled nursing facility (31)    VIRTUAL VISIT: Moo Kim is a 63 y.o. female evaluated via telephone on 2021. Consent:  She and/or health care decision maker is aware that that she may receive a bill for this telephone service, depending on her insurance coverage, and has provided verbal consent to proceed: Yes      Documentation:  I communicated with the patient and/or health care decision maker about wound care. Details of this discussion including any medical advice provided: as detailed below      I affirm this is a Patient Initiated Episode with a Patient who has not had a related appointment within my department in the past 7 days or scheduled within the next 24 hours. Patient identification was verified at the start of the visit: Yes    Total Time: minutes: 5-10 minutes    Note: not billable if this call serves to triage the patient into an appointment for the relevant concern      Carlos Manuel Connolly     Primary diagnosis for wound-care consultation: Nonhealing surgical wound right lower leg    Additional ulcer(s) noted? None    History of Present Illness: Nonhealing surgical wound right lower leg in incision line. No fibrin today, red granulation. Continues with circumferential undermining. Poor intake with nausea and vomiting. Patient was scheduled for complex abdominal wall reconstruction surgery on 2/10/21. Surgery aborted due to hypertension. Found to have UTI. Patient started on TPN. Taking fluids by mouth. Boost clear 2 times a day. 2021: Albumin 2.4. No fever or chills. Review of Systems: Pertinent systems reviewed in the HPI; all other systems reviewed, and negative.     Pertinent elements of past medical, surgical, family, and/or social history: Patient with history of displaced fracture of medial condyle right tibia, muscle wasting or atrophy, recent Covid 23, essential hypertension, hypertensive heart disease or heart failure, osteoarthritis and morbid obesity. Medications and allergies are detailed in the nursing home chart, and were reviewed by me today.  _______________________    General Physical exam:    Vital signs:  128/88, 97.8, 95, 16, 96%    Virtual Visit    Wound location: Right Lower Leg   Length:  0.7   Width:   1.1   Depth:   0.6   Tunnelin   Underminin.5 cm 12:00 to 12:00    Wound type:   Surgical    Thickness:    Full thickness    Description of periwound: Intact    Description of wound bed: Wound with pink and red granulation. Wound bed moist, moderate amount of serous drainage, edges open and attached. Surrounding tissue and ulcer without signs and symptoms of infection. No purulence, malodor, erythema, increased temperature, or increased pain.  _______________________    Recent labs and data reviewed: 2021: Albumin 2.4  _______________________     Achilles Age diagnoses & assessment: Nonhealing surgical wound right lower leg in incision line. No fibrin today, red granulation. Continues with circumferential undermining. Poor intake with nausea and vomiting. Patient started on TPN. Taking fluids by mouth. Boost clear 2 times a day. 2021: Albumin 2.4. No fever or chills. _______________________    Recommendations:    - Dressings / Compression / Offloading: Santyl, iodoform gauze and dry dressing daily. Ace wraps, toes to knees on in the morning and off at night    - Labs / Diagnostic studies: None    - Medications / nutritional support: TPN. Boost clear 2 times a day.  Fluids by mouth    - Further Consultations recommended: None    - Anticipated follow-up: Weekly evaluation  _______________________    Electronically signed by LEIGHA Dior CNP on 2021 at 4:16 PM

## 2021-02-23 ENCOUNTER — OUTSIDE SERVICES (OUTPATIENT)
Dept: WOUND CARE | Age: 61
End: 2021-02-23
Payer: MEDICAID

## 2021-02-23 DIAGNOSIS — T81.89XD NON-HEALING SURGICAL WOUND, SUBSEQUENT ENCOUNTER: ICD-10-CM

## 2021-02-23 PROCEDURE — 99308 SBSQ NF CARE LOW MDM 20: CPT | Performed by: CLINICAL NURSE SPECIALIST

## 2021-02-25 ENCOUNTER — TELEPHONE (OUTPATIENT)
Dept: BARIATRICS/WEIGHT MGMT | Age: 61
End: 2021-02-25

## 2021-02-25 NOTE — PROGRESS NOTES
[facility]    Patient name: Dhiraj Foy  :   58  Facility:  [address] of Service: skilled nursing facility (31)    Primary diagnosis for wound-care consultation: Nonhealing surgical wound, right lower leg    Additional ulcer(s) noted? None    History of Present Illness: Nonhealing surgical wound, right lower leg, and incision line. Wound with fibrin and red granulation today. No undermining. Filling in. Continues with poor intake. Patient was scheduled for complex abdominal wall reconstruction surgery on 2/10/2021. Surgery was aborted due to hypotension. Resident found to have UTI. Patient receiving TPN at this time. Taking fluids by mouth. Ensure clear 2 times a day. Albumin 2.7 on 2021. No fever or chills. Review of Systems: Pertinent systems reviewed in the HPI; all other systems reviewed, and negative.     Pertinent elements of past medical, surgical, family, and/or social history: History of displaced fracture of medial condyle right tibia, muscle wasting and atrophy, recent Covid 23, essential hypertension, hypertensive heart disease, heart failure, osteoarthritis and obesity    Medications and allergies are detailed in the nursing home chart, and were reviewed by me today.  _______________________    General Physical exam:    Vital signs:  129/80, 97.6, 80, 18, 98%    General Appearance: alert and oriented to person, some confusion, obese, in no acute distress  Psychiatric:  Mood and affect appropriate for situation  Skin: warm and dry, no rash  Head: normocephalic and atraumatic  Eyes: pupils equal, round, sclerae anicteric, conjunctivae normal  ENT: no thrush or oral ulcers  Neck:No complaints, normal appearance  Pulmonary/Chest: Respirations easy at rest, no cough or respiratory distress  Cardiovascular: No chest pain, normal rate, toes warm, cap refill normal  Abdomen: No nausea or vomiting  Extremities: no cyanosis, edema or cellulitis  Musculoskeletal: Nonambulatory, moves all extremities, no deformities  Neurologic: distal sensation to light touch intact, no allodynia. Wound exam:    Wound location: Right lower leg   Length (cm) 0.8   Width (cm) 0.9   Depth (cm) 0.5   Tunneling 0   Undermining 0    Wound type:   Surgical wound  Grade - stage - thickness: Full thickness     Description of periwound: Intact, in scar line    Description of wound bed: Wound with red granulation and fibrin. Wound bed moist, moderate amount of serous drainage, edges open and attached. Surrounding tissue and ulcer without signs and symptoms of infection. No purulence, malodor, erythema, increased temperature, or increased pain.  _______________________    Recent labs and data reviewed: 2/18/2021: Glucose 107, BUN 10, creatinine 0.6, , prealbumin 13, albumin 2.7, hemoglobin/hematocrit: 9/29, RBC 3.04  _______________________     Anupama Arevalo diagnoses & assessment: Nonhealing surgical wound, right lower leg, and incision line. Wound with fibrin and red granulation today. No undermining. Filling in. Continues with poor intake. Patient receiving TPN at this time. Taking fluids by mouth. Ensure clear 2 times a day. Albumin 2.7 on 2/18/2021. No fever or chills. Debridement is  indicated today, based on the history and exam above, however, consultation only. Surgeon is following.  _______________________    Procedure:    Consent obtained. Time out performed per [facility]. _______________________    Recommendations:    - Dressings / Compression / Offloading: Santyl, iodoform gauze and dry dressing. Ace wrap toes to knees, on in the morning and off at night    - Labs / Diagnostic studies: None    - Medications / nutritional support: TPN and by mouth fluids.  Ensure clear 2 times per day    - Further Consultations recommended: Working with OT, PT and ST    - Anticipated follow-up: Weekly evaluation _______________________    Electronically signed by LEIGHA Sher CNP on 2/25/2021 at 8:25 AM

## 2021-02-25 NOTE — TELEPHONE ENCOUNTER
Arneta Simpson called back to speak with Tomás Mack    Please contact Palak Clifford at [phone]

## 2021-03-02 ENCOUNTER — HOSPITAL ENCOUNTER (INPATIENT)
Age: 61
LOS: 10 days | Discharge: NURSING, REHAB OR LONG-TERM CARE | DRG: 661 | End: 2021-03-12
Attending: INTERNAL MEDICINE | Admitting: INTERNAL MEDICINE
Payer: MEDICAID

## 2021-03-02 ENCOUNTER — APPOINTMENT (OUTPATIENT)
Dept: CT IMAGING | Age: 61
DRG: 661 | End: 2021-03-02
Attending: INTERNAL MEDICINE
Payer: MEDICAID

## 2021-03-02 PROBLEM — K92.2 UPPER GI BLEED: Status: ACTIVE | Noted: 2021-03-02

## 2021-03-02 LAB
A/G RATIO: 1.2 (ref 1.1–2.2)
ALBUMIN SERPL-MCNC: 3.3 G/DL (ref 3.4–5)
ALP BLD-CCNC: 118 U/L (ref 40–129)
ALT SERPL-CCNC: 29 U/L (ref 10–40)
AMMONIA: 13 UMOL/L (ref 11–51)
ANION GAP SERPL CALCULATED.3IONS-SCNC: 15 MMOL/L (ref 3–16)
APTT: 33 SEC (ref 24.2–36.2)
AST SERPL-CCNC: 23 U/L (ref 15–37)
BASOPHILS ABSOLUTE: 0.1 K/UL (ref 0–0.2)
BASOPHILS RELATIVE PERCENT: 1 %
BILIRUB SERPL-MCNC: 0.9 MG/DL (ref 0–1)
BUN BLDV-MCNC: 9 MG/DL (ref 7–20)
C-REACTIVE PROTEIN: 11.8 MG/L (ref 0–5.1)
CALCIUM SERPL-MCNC: 10.2 MG/DL (ref 8.3–10.6)
CHLORIDE BLD-SCNC: 107 MMOL/L (ref 99–110)
CO2: 20 MMOL/L (ref 21–32)
CREAT SERPL-MCNC: 0.6 MG/DL (ref 0.6–1.2)
EOSINOPHILS ABSOLUTE: 0.2 K/UL (ref 0–0.6)
EOSINOPHILS RELATIVE PERCENT: 1.2 %
GFR AFRICAN AMERICAN: >60
GFR NON-AFRICAN AMERICAN: >60
GLOBULIN: 2.8 G/DL
GLUCOSE BLD-MCNC: 87 MG/DL (ref 70–99)
HCT VFR BLD CALC: 35.5 % (ref 36–48)
HEMOGLOBIN: 11.2 G/DL (ref 12–16)
INR BLD: 1.11 (ref 0.86–1.14)
LACTIC ACID: 1.3 MMOL/L (ref 0.4–2)
LYMPHOCYTES ABSOLUTE: 1 K/UL (ref 1–5.1)
LYMPHOCYTES RELATIVE PERCENT: 7.9 %
MCH RBC QN AUTO: 31.9 PG (ref 26–34)
MCHC RBC AUTO-ENTMCNC: 31.5 G/DL (ref 31–36)
MCV RBC AUTO: 101.4 FL (ref 80–100)
MONOCYTES ABSOLUTE: 0.9 K/UL (ref 0–1.3)
MONOCYTES RELATIVE PERCENT: 7.1 %
NEUTROPHILS ABSOLUTE: 10.9 K/UL (ref 1.7–7.7)
NEUTROPHILS RELATIVE PERCENT: 82.8 %
PDW BLD-RTO: 18.3 % (ref 12.4–15.4)
PLATELET # BLD: 287 K/UL (ref 135–450)
PMV BLD AUTO: 10.9 FL (ref 5–10.5)
POTASSIUM REFLEX MAGNESIUM: 4.1 MMOL/L (ref 3.5–5.1)
PROTHROMBIN TIME: 12.9 SEC (ref 10–13.2)
RBC # BLD: 3.5 M/UL (ref 4–5.2)
REASON FOR REJECTION: NORMAL
REJECTED TEST: NORMAL
SODIUM BLD-SCNC: 142 MMOL/L (ref 136–145)
TOTAL PROTEIN: 6.1 G/DL (ref 6.4–8.2)
WBC # BLD: 13.1 K/UL (ref 4–11)

## 2021-03-02 PROCEDURE — 84145 PROCALCITONIN (PCT): CPT

## 2021-03-02 PROCEDURE — 85025 COMPLETE CBC W/AUTO DIFF WBC: CPT

## 2021-03-02 PROCEDURE — 36415 COLL VENOUS BLD VENIPUNCTURE: CPT

## 2021-03-02 PROCEDURE — 85730 THROMBOPLASTIN TIME PARTIAL: CPT

## 2021-03-02 PROCEDURE — 86900 BLOOD TYPING SEROLOGIC ABO: CPT

## 2021-03-02 PROCEDURE — 86850 RBC ANTIBODY SCREEN: CPT

## 2021-03-02 PROCEDURE — 80053 COMPREHEN METABOLIC PANEL: CPT

## 2021-03-02 PROCEDURE — 82140 ASSAY OF AMMONIA: CPT

## 2021-03-02 PROCEDURE — C9113 INJ PANTOPRAZOLE SODIUM, VIA: HCPCS | Performed by: INTERNAL MEDICINE

## 2021-03-02 PROCEDURE — 83605 ASSAY OF LACTIC ACID: CPT

## 2021-03-02 PROCEDURE — 85610 PROTHROMBIN TIME: CPT

## 2021-03-02 PROCEDURE — 86901 BLOOD TYPING SEROLOGIC RH(D): CPT

## 2021-03-02 PROCEDURE — 86140 C-REACTIVE PROTEIN: CPT

## 2021-03-02 PROCEDURE — 51702 INSERT TEMP BLADDER CATH: CPT

## 2021-03-02 PROCEDURE — 6360000002 HC RX W HCPCS: Performed by: INTERNAL MEDICINE

## 2021-03-02 PROCEDURE — 2060000000 HC ICU INTERMEDIATE R&B

## 2021-03-02 PROCEDURE — 2580000003 HC RX 258: Performed by: INTERNAL MEDICINE

## 2021-03-02 PROCEDURE — 70450 CT HEAD/BRAIN W/O DYE: CPT

## 2021-03-02 RX ORDER — LINEZOLID 2 MG/ML
600 INJECTION, SOLUTION INTRAVENOUS EVERY 12 HOURS
Status: DISCONTINUED | OUTPATIENT
Start: 2021-03-02 | End: 2021-03-05

## 2021-03-02 RX ORDER — ACETAMINOPHEN 325 MG/1
650 TABLET ORAL EVERY 6 HOURS PRN
Status: DISCONTINUED | OUTPATIENT
Start: 2021-03-02 | End: 2021-03-12 | Stop reason: HOSPADM

## 2021-03-02 RX ORDER — ACETAMINOPHEN 650 MG/1
650 SUPPOSITORY RECTAL EVERY 6 HOURS PRN
Status: DISCONTINUED | OUTPATIENT
Start: 2021-03-02 | End: 2021-03-12 | Stop reason: HOSPADM

## 2021-03-02 RX ORDER — SODIUM CHLORIDE 9 MG/ML
INJECTION, SOLUTION INTRAVENOUS CONTINUOUS
Status: DISCONTINUED | OUTPATIENT
Start: 2021-03-02 | End: 2021-03-04

## 2021-03-02 RX ORDER — ONDANSETRON 2 MG/ML
4 INJECTION INTRAMUSCULAR; INTRAVENOUS EVERY 6 HOURS PRN
Status: DISCONTINUED | OUTPATIENT
Start: 2021-03-02 | End: 2021-03-12 | Stop reason: HOSPADM

## 2021-03-02 RX ORDER — SODIUM CHLORIDE 0.9 % (FLUSH) 0.9 %
10 SYRINGE (ML) INJECTION EVERY 12 HOURS SCHEDULED
Status: DISCONTINUED | OUTPATIENT
Start: 2021-03-02 | End: 2021-03-12 | Stop reason: HOSPADM

## 2021-03-02 RX ORDER — METOPROLOL SUCCINATE 25 MG/1
25 TABLET, EXTENDED RELEASE ORAL DAILY
Status: DISCONTINUED | OUTPATIENT
Start: 2021-03-03 | End: 2021-03-11

## 2021-03-02 RX ORDER — SODIUM CHLORIDE 0.9 % (FLUSH) 0.9 %
10 SYRINGE (ML) INJECTION PRN
Status: DISCONTINUED | OUTPATIENT
Start: 2021-03-02 | End: 2021-03-12 | Stop reason: HOSPADM

## 2021-03-02 RX ORDER — PROMETHAZINE HYDROCHLORIDE 25 MG/1
12.5 TABLET ORAL EVERY 6 HOURS PRN
Status: DISCONTINUED | OUTPATIENT
Start: 2021-03-02 | End: 2021-03-12 | Stop reason: HOSPADM

## 2021-03-02 RX ADMIN — Medication 10 ML: at 20:48

## 2021-03-02 RX ADMIN — PANTOPRAZOLE SODIUM 8 MG/HR: 40 INJECTION, POWDER, FOR SOLUTION INTRAVENOUS at 21:32

## 2021-03-02 RX ADMIN — SODIUM CHLORIDE: 9 INJECTION, SOLUTION INTRAVENOUS at 20:47

## 2021-03-02 ASSESSMENT — PAIN SCALES - GENERAL: PAINLEVEL_OUTOF10: 0

## 2021-03-03 ENCOUNTER — APPOINTMENT (OUTPATIENT)
Dept: CT IMAGING | Age: 61
DRG: 661 | End: 2021-03-03
Attending: INTERNAL MEDICINE
Payer: MEDICAID

## 2021-03-03 LAB
ABO/RH: NORMAL
AMORPHOUS: ABNORMAL /HPF
ANION GAP SERPL CALCULATED.3IONS-SCNC: 13 MMOL/L (ref 3–16)
ANTIBODY SCREEN: NORMAL
BACTERIA: ABNORMAL /HPF
BILIRUBIN URINE: ABNORMAL
BLOOD, URINE: NEGATIVE
BUN BLDV-MCNC: 9 MG/DL (ref 7–20)
CALCIUM SERPL-MCNC: 9.6 MG/DL (ref 8.3–10.6)
CHLORIDE BLD-SCNC: 109 MMOL/L (ref 99–110)
CLARITY: CLEAR
CO2: 17 MMOL/L (ref 21–32)
COLOR: YELLOW
CREAT SERPL-MCNC: <0.5 MG/DL (ref 0.6–1.2)
EKG ATRIAL RATE: 119 BPM
EKG DIAGNOSIS: NORMAL
EKG P AXIS: 38 DEGREES
EKG P-R INTERVAL: 112 MS
EKG Q-T INTERVAL: 340 MS
EKG QRS DURATION: 68 MS
EKG QTC CALCULATION (BAZETT): 478 MS
EKG R AXIS: -5 DEGREES
EKG T AXIS: 30 DEGREES
EKG VENTRICULAR RATE: 119 BPM
EPITHELIAL CELLS, UA: ABNORMAL /HPF (ref 0–5)
GFR AFRICAN AMERICAN: >60
GFR NON-AFRICAN AMERICAN: >60
GLUCOSE BLD-MCNC: 115 MG/DL (ref 70–99)
GLUCOSE URINE: NEGATIVE MG/DL
HCT VFR BLD CALC: 30.2 % (ref 36–48)
HCT VFR BLD CALC: 31.5 % (ref 36–48)
HCT VFR BLD CALC: 31.8 % (ref 36–48)
HCT VFR BLD CALC: 32.3 % (ref 36–48)
HEMOGLOBIN: 10 G/DL (ref 12–16)
HEMOGLOBIN: 10 G/DL (ref 12–16)
HEMOGLOBIN: 10.5 G/DL (ref 12–16)
HEMOGLOBIN: 9.5 G/DL (ref 12–16)
IRON SATURATION: 25 % (ref 15–50)
IRON: 41 UG/DL (ref 37–145)
KETONES, URINE: 40 MG/DL
LEUKOCYTE ESTERASE, URINE: NEGATIVE
MICROSCOPIC EXAMINATION: YES
NITRITE, URINE: NEGATIVE
PH UA: 6 (ref 5–8)
POTASSIUM REFLEX MAGNESIUM: 4 MMOL/L (ref 3.5–5.1)
PROCALCITONIN: 0.09 NG/ML (ref 0–0.15)
PROTEIN UA: ABNORMAL MG/DL
RBC UA: ABNORMAL /HPF (ref 0–4)
SODIUM BLD-SCNC: 139 MMOL/L (ref 136–145)
SPECIFIC GRAVITY UA: 1.02 (ref 1–1.03)
TOTAL IRON BINDING CAPACITY: 163 UG/DL (ref 260–445)
URINE TYPE: ABNORMAL
UROBILINOGEN, URINE: 0.2 E.U./DL
WBC UA: ABNORMAL /HPF (ref 0–5)

## 2021-03-03 PROCEDURE — 6360000002 HC RX W HCPCS: Performed by: INTERNAL MEDICINE

## 2021-03-03 PROCEDURE — 85018 HEMOGLOBIN: CPT

## 2021-03-03 PROCEDURE — 85014 HEMATOCRIT: CPT

## 2021-03-03 PROCEDURE — 93010 ELECTROCARDIOGRAM REPORT: CPT | Performed by: INTERNAL MEDICINE

## 2021-03-03 PROCEDURE — 36415 COLL VENOUS BLD VENIPUNCTURE: CPT

## 2021-03-03 PROCEDURE — 6360000004 HC RX CONTRAST MEDICATION: Performed by: PHYSICIAN ASSISTANT

## 2021-03-03 PROCEDURE — 6370000000 HC RX 637 (ALT 250 FOR IP): Performed by: INTERNAL MEDICINE

## 2021-03-03 PROCEDURE — 2060000000 HC ICU INTERMEDIATE R&B

## 2021-03-03 PROCEDURE — 87086 URINE CULTURE/COLONY COUNT: CPT

## 2021-03-03 PROCEDURE — 80048 BASIC METABOLIC PNL TOTAL CA: CPT

## 2021-03-03 PROCEDURE — 2580000003 HC RX 258: Performed by: INTERNAL MEDICINE

## 2021-03-03 PROCEDURE — 72194 CT PELVIS W/O & W/DYE: CPT

## 2021-03-03 PROCEDURE — 83540 ASSAY OF IRON: CPT

## 2021-03-03 PROCEDURE — 93005 ELECTROCARDIOGRAM TRACING: CPT | Performed by: INTERNAL MEDICINE

## 2021-03-03 PROCEDURE — 83550 IRON BINDING TEST: CPT

## 2021-03-03 PROCEDURE — 81001 URINALYSIS AUTO W/SCOPE: CPT

## 2021-03-03 RX ADMIN — Medication 10 ML: at 09:57

## 2021-03-03 RX ADMIN — MEROPENEM 1000 MG: 1 INJECTION, POWDER, FOR SOLUTION INTRAVENOUS at 06:40

## 2021-03-03 RX ADMIN — METOPROLOL SUCCINATE 25 MG: 25 TABLET, EXTENDED RELEASE ORAL at 09:53

## 2021-03-03 RX ADMIN — IOHEXOL 50 ML: 240 INJECTION, SOLUTION INTRATHECAL; INTRAVASCULAR; INTRAVENOUS; ORAL at 16:57

## 2021-03-03 RX ADMIN — LINEZOLID 600 MG: 600 INJECTION, SOLUTION INTRAVENOUS at 20:57

## 2021-03-03 RX ADMIN — Medication 10 ML: at 20:57

## 2021-03-03 RX ADMIN — LINEZOLID 600 MG: 600 INJECTION, SOLUTION INTRAVENOUS at 09:53

## 2021-03-03 RX ADMIN — LINEZOLID 600 MG: 600 INJECTION, SOLUTION INTRAVENOUS at 03:38

## 2021-03-03 RX ADMIN — MEROPENEM 1000 MG: 1 INJECTION, POWDER, FOR SOLUTION INTRAVENOUS at 02:50

## 2021-03-03 RX ADMIN — SODIUM CHLORIDE: 9 INJECTION, SOLUTION INTRAVENOUS at 06:40

## 2021-03-03 RX ADMIN — MEROPENEM 1000 MG: 1 INJECTION, POWDER, FOR SOLUTION INTRAVENOUS at 15:27

## 2021-03-03 ASSESSMENT — PAIN SCALES - PAIN ASSESSMENT IN ADVANCED DEMENTIA (PAINAD)
BODYLANGUAGE: 0
BREATHING: 0
BREATHING: 0
NEGVOCALIZATION: 0
BREATHING: 0
FACIALEXPRESSION: 0
TOTALSCORE: 0
NEGVOCALIZATION: 0
TOTALSCORE: 0
FACIALEXPRESSION: 0
TOTALSCORE: 0
CONSOLABILITY: 0
NEGVOCALIZATION: 0
CONSOLABILITY: 0
BODYLANGUAGE: 0
NEGVOCALIZATION: 0
CONSOLABILITY: 0
CONSOLABILITY: 0
BODYLANGUAGE: 0
BREATHING: 0

## 2021-03-03 NOTE — CARE COORDINATION
Case Management Assessment           Initial Evaluation                Date / Time of Evaluation: 3/3/2021 3:46 PM                 Assessment Completed by: Kike Patel    Patient Name: Jessica Valles     YOB: 1960  Diagnosis: Upper GI bleed [K92.2]     Date / Time: 3/2/2021  7:38 PM    Patient Admission Status: Inpatient    If patient is discharged prior to next notation, then this note serves as note for discharge by case management. Current PCP: Clotilde Otoole MD  Clinic Patient: No    Chart Reviewed: Yes  Patient/ Family Interviewed: Yes    Initial assessment completed at bedside with: patient    Hospitalization in the last 30 days: Yes    Emergency Contacts:  Extended Emergency Contact Information  Primary Emergency Contact: Pattiuromovie  Mobile Phone: [phone]  Relation: Brother/Sister  Secondary Emergency Contact: Adamaris Ibrahim  Home Phone: [phone]  Relation: Child    Advance Directives:   Code Status: Full Code      Financial  Payor: [facility] / Plan: [address] / Product Type: *No Product type* /     Pre-cert required for SNF: Yes    [address] [facility],[address]  [address] [facility]  Phone: [phone] Fax: [phone]    [address], [address]  [address]  Phone: [phone] Fax: [phone]      Potential assistance Purchasing Medications:    Does Patient want to participate in local refill/ meds to beds program?: Not Assessed    Meds To Beds General Rules:  1. Can ONLY be done Monday- Friday between 8:30am-5pm  2. Prescription(s) must be in pharmacy by 3pm to be filled same day  3. Copy of patient's insurance/ prescription drug card and patient face sheet must be sent along with the prescription(s)  4.  Cost of Rx cannot be added to hospital bill. If financial assistance is needed, please contact unit  or ;  or  CANNOT provide pharmacy voucher for patients co-pays  5. Patients can then  the prescription on their way out of the hospital at discharge, or pharmacy can deliver to the bedside if staff is available. (payment due at time of pick-up or delivery - cash, check, or card accepted)     Able to afford home medications/ co-pay costs: Yes    ADLS  Support Systems: Family Members    PT AM-PAC:   /24  OT AM-PAC:   /24    [place]: from LTC at [facility]  Steps: 0    Plans to RETURN to current housing: Yes  Barriers to RETURNING to current housing: none    [address]  Currently ACTIVE with [address]: No  Home Care Agency: Not Applicable          Durable Medical Equipment  DME Provider: n/a  Equipment: [address] prior to admission: No  [address]: Not Applicable      Dialysis  Active with HD/PD prior to admission: No  Nephrologist:     HD Center:  Not Applicable    DISCHARGE PLAN:  Disposition: [place] (LTC): [facility]   Phone: [phone]  Fax: [phone]    Transportation PLAN for discharge: EMS transportation     Factors facilitating achievement of predicted outcomes: Family support and Home is wheelchair accessible    Barriers to discharge: Medical complications    Additional Case Management Notes:  CM spoke with patient at bedside, unable to answer all questions appropriately. CM asked if her family could be called, she states call Viv Pedroza. CM called Viv Zeyadhung, pt's son, who states she has been at the nursing home since the fall. Patient has two adult children [place]. Viv Pedroza states they do not have HCPOA paperwork. Their aunt has been making decisions for medical procedures. They plan for her to return to [facility] at discharge.

## 2021-03-03 NOTE — PROGRESS NOTES
Removed pt's duran and replaced with new duran catheter. Urine culture and UA will be sent from new duran.

## 2021-03-03 NOTE — PROGRESS NOTES
4 Eyes Admission Assessment     I agree as the admission nurse that 2 RN's have performed a thorough Head to Toe Skin Assessment on the patient. ALL assessment sites listed below have been assessed on admission. Areas assessed by both nurses: Roseann Cast and Clementina  [x]   Head, Face, and Ears   [x]   Shoulders, Back, and Chest  [x]   Arms, Elbows, and Hands   [x]   Coccyx, Sacrum, and Ischium  [x]   Legs, Feet, and Heels        Does the Patient have Skin Breakdown?   Yes a wound was noted on the Admission Assessment and an LDA was Initiated documentation include the Terri-wound, Wound Assessment, Measurements, Dressing Treatment, Drainage, and Color\",         Abran Prevention initiated:  Yes   Wound Care Orders initiated:  No      WOC nurse consulted for Pressure Injury (Stage 3,4, Unstageable, DTI, NWPT, and Complex wounds) or Abran score 18 or lower:  No      Nurse 1 eSignature: Electronically signed by Lencho Hunt RN on 3/3/21 at 6:05 AM EST    **SHARE this note so that the co-signing nurse is able to place an eSignature**    Nurse 2 eSignature: Electronically signed by Dragan Molina RN on 3/3/21 at 7:44 AM EST

## 2021-03-03 NOTE — CONSULTS
Urology Attending Consult Note      Reason for Consultation: Possible bladder perforation    History: 62 yo F with history of multiple medical issues. She was recently in [facility] with UTI. CT abd/pelvis showed left inguinal hernia containing fat and loops of nonobstructed sigmoid colon with a decompressed bladder that extends into the anterior abdominal wall with air in the location. Bladder perforation could not be excluded. Images are currently being uploaded into the system. UA currently negative for infection. Cr 0.5. Tachycardic at 114, BP WNL. Afebrile. Sagastume is currently in place draining clear urine. Family History, Social History, Review of Systems:  Reviewed and agreed to as per chart    Vitals:  /84   Pulse 114   Temp 96.8 °F (36 °C) (Oral)   Resp 18   Ht 5' 6\" (1.676 m)   Wt 273 lb 5.9 oz (124 kg)   SpO2 96%   BMI 44.12 kg/m²   Temp  Av.5 °F (36.4 °C)  Min: 96.6 °F (35.9 °C)  Max: 98.6 °F (37 °C)    Intake/Output Summary (Last 24 hours) at 3/3/2021 0942  Last data filed at 3/3/2021 0745  Gross per 24 hour   Intake 10 ml   Output 510 ml   Net -500 ml         Physical:   Well developed, well nourished in no acute distress   Mood indicates no abnormalities. Pt doesnt appear depressed   Orientated to time and place   Neck is supple, trachea is midline   Respiratory effort is normal   Cardiovascular show no extremity swelling   Abdomen no masses or hernias are palpated, there is no tenderness. Liver and Spleen appear normal.   Skin show no abnormal lesions   Lymph nodes are not palpated in the inguinal, neck, or axillary area. Female :    Sagastume draining clear urine on exam. Patient slightly tender to palpation of suprapubic area. No distention noted. Does not appear sick.       Labs:  WBC:    Lab Results   Component Value Date    WBC 13.1 2021     Hemoglobin/Hematocrit:    Lab Results   Component Value Date    HGB 10.0 2021    HCT 31.5 03/03/2021     BMP:    Lab Results   Component Value Date     03/03/2021    K 4.0 03/03/2021     03/03/2021    CO2 17 03/03/2021    BUN 9 03/03/2021    LABALBU 3.3 03/02/2021    CREATININE <0.5 03/03/2021    CALCIUM 9.6 03/03/2021    GFRAA >60 03/03/2021    LABGLOM >60 03/03/2021     PT/INR:    Lab Results   Component Value Date    PROTIME 12.9 03/02/2021    INR 1.11 03/02/2021     PTT:    Lab Results   Component Value Date    APTT 33.0 03/02/2021   [AP  Urine Culture: Pending    Antibiotic Therapy:  Merrem, Linezolid    Imaging: ?? Impression/Plan: 62 yo F with multiple medical issues who was admitted to [address] [place] [address] with coffee ground emesis. We have been consulted on for possible bladder perforation seen on CT performed at outside hospital. CT images are currently unavailable for review.  -Patient confused on exam and unable to tell any prior  history. She did state the catheter has been in for \"awhile\"  -No reports of pain  -Sagastume draining clear. No suprapubic or abdominal distention, slight tenderness to palation  -Tachycardic but BP stable and afebrile  -Cr 0.5, WBC 13.1  -Order CT cystogram today. Sagastume needs to remain for now.     RAMÍREZ Barrera

## 2021-03-03 NOTE — H&P
Hospital Medicine History & Physical      PCP: Estefania Weiss MD    Date of Admission: 3/2/2021    Date of Service: Pt seen/examined on 03/02/21 and Admitted to Inpatient with expected LOS greater than two midnights due to medical therapy    Chief Complaint:  Coffee ground vomitting      History Of Present Illness:     63 y.o. female Robert [address]amor[address] 89 w/ hx of COVID-19 12/2020, DVT/PE needed TPA in 2016, atrial thrombus, atrial fibrillation, on anticoagulation with Xarelto, hypertension, history of ESBL UTI  -She was recently in the hospital, initially came in for elective hiatal hernia surgery, but with induction of anesthesia became hypotensive needed pressors and calcium, underwent TTE which showed EF of 55 to 60%, mild to moderate concentric hypertrophy, normal diastolic function, no regional wall motion normalities. She was found to have UTI, cultures are positive for Enterococcus faecium and Enterococcus faecalis. He was treated with Rocephin. Upper GI attempted 2/8 but unable to be completed as patient was not cooperating. For optimization of nutrition, PICC line was placed and TPN was started, patient was discharged on TPN and clear liquid diet.  -Patient was sent from nursing home to [facility] for complains of coffee-ground emesis. She is unable to tell me any history, when asked why she is here she says she does not know. She denies abdominal pain chest pain shortness of breath. Review of paper records from [facility], she was in nursing home, per review of records was on Invanz for UTI started on 03/01/2021. Other significant meds xeralto, opiate pain medications  On arrival to the ED at [facility] /96, , 100% on RA  Underwent CT Abd/Pelvis wo contrast --   Spigelion hernia LLQ extending into the Left inguinal hernia containing fat as well as loop of non-obstructed sigmoid colon, similar to 02/25/2021.  Reading says bladder is decompressed and extends into the daily    Historical Provider, MD   calcium carbonate (TUMS) 500 MG chewable tablet Take 1 tablet by mouth daily    Historical Provider, MD   omeprazole (PRILOSEC) 10 MG delayed release capsule Take 10 mg by mouth daily    Historical Provider, MD   nystatin (MYCOSTATIN) POWD powder Apply topically 2 times daily    Historical Provider, MD   nitrofurantoin (MACRODANTIN) 50 MG capsule Take 50 mg by mouth 4 times daily    Historical Provider, MD   ondansetron (ZOFRAN) 4 MG/5ML solution Take by mouth once    Historical Provider, MD   oxyCODONE-acetaminophen (PERCOCET) 5-325 MG per tablet Take 1 tablet by mouth every 4 hours as needed for Pain.     Historical Provider, MD   pantoprazole (PROTONIX) 40 MG tablet Take 1 tablet by mouth every morning (before breakfast) 12/30/20   Donna Morgan MD   amLODIPine (NORVASC) 10 MG tablet TAKE 1 TABLET BY MOUTH ONCE DAILY FOR 90 DAYS 10/8/20   Historical Provider, MD   atorvastatin (LIPITOR) 40 MG tablet  10/29/20   Historical Provider, MD   oxybutynin (DITROPAN) 5 MG tablet TAKE 1 TABLET BY MOUTH EVERY 8 HOURS 7/31/20   Historical Provider, MD   vitamin D3 (CHOLECALCIFEROL) 400 units TABS tablet Take 400 Units by mouth daily    Historical Provider, MD   Flaxseed Oil OIL 1,400 mg by Does not apply route daily    Historical Provider, MD   calcium carbonate 600 MG TABS tablet Take 1 tablet by mouth daily    Historical Provider, MD   latanoprost (XALATAN) 0.005 % ophthalmic solution 1 drop nightly    Historical Provider, MD   metoprolol succinate (TOPROL XL) 25 MG extended release tablet Take 25 mg by mouth daily    Historical Provider, MD   rivaroxaban (XARELTO) 20 MG TABS tablet Take 20 mg by mouth    Historical Provider, MD   lisinopril (PRINIVIL;ZESTRIL) 20 MG tablet Take 40 mg by mouth daily     Historical Provider, MD   potassium chloride (KLOR-CON) 20 MEQ packet Take 20 mEq by mouth 2 times daily    Historical Provider, MD       Allergies:  Patient has no known allergies. Social History:      The patient currently lives in nursing home    TOBACCO:   reports that she has never smoked. She has never used smokeless tobacco.  ETOH:   reports previous alcohol use. Family History:    Patient unable to provide family history    No family history on file. REVIEW OF SYSTEMS: As per HPI  PHYSICAL EXAM PERFORMED:    /87   Pulse 110   Temp 97.8 °F (36.6 °C) (Oral)   Resp 16   SpO2 96%     General appearance: No acute distress HEENT:  Normal cephalic, atraumatic without obvious deformity. Pupils equal, round, and reactive to light. Extra ocular muscles intact. Conjunctivae/corneas clear. Neck: Supple, with full range of motion. No jugular venous distention. Trachea midline. Respiratory:  Normal respiratory effort. Clear to auscultation, bilaterally without Rales/Wheezes/Rhonchi. Cardiovascular:  Regular rate and rhythm with normal S1/S2 without murmurs, rubs or gallops. Abdomen: Soft, non-tender, non-distended with normal bowel sounds. Musculoskeletal:  No clubbing, cyanosis or edema bilaterally. Full range of motion without deformity. Skin: Bilateral lower extremity with healed scars from prior surgeries  Neurologic: Moving all extremities, speech is clear but unable to answer questions appropriately, she does not know where she is, unable to answer orientation questions and only alert to self. Psychiatric:  Alert and oriented, thought content appropriate, normal insight  Capillary Refill: Brisk,< 3 seconds   Peripheral Pulses: +2 palpable, equal bilaterally       Labs:     No results for input(s): WBC, HGB, HCT, PLT in the last 72 hours. No results for input(s): NA, K, CL, CO2, BUN, CREATININE, CALCIUM, PHOS in the last 72 hours. Invalid input(s): SAVANNA  No results for input(s): AST, ALT, BILIDIR, BILITOT, ALKPHOS in the last 72 hours. No results for input(s): INR in the last 72 hours.   No results for input(s): Watson Vilchis in the last 72 hours.    Urinalysis:      Lab Results   Component Value Date    NITRU Negative 02/08/2021    WBCUA 0-2 02/05/2021    BACTERIA 2+ 12/14/2020    RBCUA 0-2 02/05/2021    BLOODU Negative 02/08/2021    SPECGRAV 1.010 02/08/2021    GLUCOSEU Negative 02/08/2021       Radiology:     CXR: I have reviewed the CXR with the following interpretation: N/A  EKG:  I have reviewed the EKG with the following interpretation: Ordered for QTC assessment, patient is on Reglan, and may need antiemetics    No orders to display     Urine Cx 02/09/2021  Enterococcus faecium vre (1)    Antibiotic Interpretation WALTER Status   ampicillin Resistant >=32 mcg/mL    linezolid Sensitive 2 mcg/mL    nitrofurantoin Intermediate 64 mcg/mL    tetracycline Resistant >=16 mcg/mL    vancomycin Resistant >=32 mcg/mL    Enterococcus  faecalis (2)    Antibiotic Interpretation WALTER Status   ampicillin Sensitive <=2 mcg/mL    linezolid Sensitive 2 mcg/mL    nitrofurantoin Sensitive <=16 mcg/mL    tetracycline Sensitive <=1 mcg/mL    vancomycin Sensitive 1 mcg/mL        ASSESSMENT/PLAN:    Active Hospital Problems    Diagnosis Date Noted    Upper GI bleed [K92.2] 03/02/2021     Acute metabolic encephalopathy possibly sepsis due to untreated UTI, she was treated with Rocephin, cultures are positive for Enterococcus faecalis infusion. Will start patient on Zyvox and Merrem  Check blood cultures, urine cultures  Check CRP, pro calcitonin  Send stat labs including CMP, CBC  CT head without contrast I am unsure of her baseline, per recent discharge summary on 2/10 she was alert and oriented to person place and time with no focal deficits and sensation was intact. Coffee-ground emesis  Protonix drip we will continue  Patient is on Xarelto which we will hold  She did have a recent EGD in 12/2020 with Dr. Harper Rose, which showed normal esophagus, stomach and duodenum.   Medium to large 5 cm sliding hiatal hernia, polyps 2 to 3 mm size located in the fundus

## 2021-03-03 NOTE — PROGRESS NOTES
NUTRITION ASSESSMENT  Admission Date: 3/2/2021     Type and Reason for Visit: Initial    NUTRITION RECOMMENDATIONS:   1. Continue NPO  2. Monitor ability for diet/ need for alternate nutrition    NUTRITION ASSESSMENT:  + screen for N/V. Pt was recently admitted for hernia repair and required TPN for nutrition. Pt is currently NPO and admitted for concern of possible bladder perforation. Noted pt confused at this time. RD will montior pts nutritional status this admission: po diet vs alternate nutrition. Noted pt was d/c to facility on CLD and TPN. MALNUTRITION ASSESSMENT  Context of Malnutrition: Acute Illness   Malnutrition Status: At risk for malnutrition (Comment)  Findings of the 6 clinical characteristics of malnutrition (Minimum of 2 out of 6 clinical characteristics is required to make the diagnosis of moderate or severe Protein Calorie Malnutrition based on AND/ASPEN Guidelines):  Energy Intake: Unable to Assess    Energy Intake Time: Unable to assess   Weight Loss %: No significant loss   Weight loss Time: Greater than or equal to 3 months   Fluid Accumulation: No significant    Fluid Accumulation Location: No significant     Strength: Not Performed;  Not Measured     NUTRITION DIAGNOSIS   Problem: Problem #1: Inadequate oral intake  Etiology: Insufficient energy/nutrient consumption  Signs & Symptoms: Nausea and Vomiting     NUTRITION INTERVENTION  Food and/or Nutrient Delivery:Continue NPO  Nutrition education/counseling/coordination of care: Continue Inpatient Monitoring     NUTRITION MONITORING & EVALUATION:  Evaluation:Goals set   Goals:Goals: Pt will tolerate the most appropriate form of nutrition therapy this admission  Monitoring: Mental Status/Confusion , Nutrition Progression  or Weight      OBJECTIVE DATA:  · Nutrition-Focused Physical Findings: +2 pitting BLE edema   · Wounds None      Past Medical History:   Diagnosis Date    Arthritis     CHF (congestive heart failure) ([facility] [address]     COVID-19 12/15/2020    ESBL (extended spectrum beta-lactamase) producing bacteria infection     Glaucoma     Hemorrhoids     HTN (hypertension)     Hyperlipidemia     Moderate protein-calorie malnutrition (HCC)     Morbid obesity (HCC)     Muscular wasting and disuse atrophy     PAF (paroxysmal atrial fibrillation) (HCC)     UTI (urinary tract infection)     VRE (vancomycin resistant enterococcus) culture positive 02/09/2021    urine        ANTHROPOMETRICS  Current Height: 5' 6\" (167.6 cm)  Current Weight: 273 lb 5.9 oz (124 kg)    Admission weight: 273 lb 5.9 oz (124 kg)  Ideal Bodyweight 130 lb     Weight Changes none noted        BMI BMI (Calculated): 44.2    Wt Readings from Last 50 Encounters:   03/02/21 273 lb 5.9 oz (124 kg)   02/05/21 245 lb (111.1 kg)   01/07/21 259 lb (117.5 kg)   12/28/20 259 lb 14.4 oz (117.9 kg)   12/28/20 237 lb (107.5 kg)   12/14/20 237 lb (107.5 kg)   11/20/20 (!) 308 lb (139.7 kg)   11/09/20 (!) 308 lb 13.8 oz (140.1 kg)     COMPARATIVE STANDARDS  Estimated Total Kcals/Day : 10-15 Current Bodyweight (124 kg) 0535-1344 kcal    Estimated Total Protein (g/day) : 1.5-2.0 Ideal Bodyweight  (59 kg)  g/day  Estimated Daily Total Fluid (ml/day): 1 mL/kcal per day     Food / Nutrition-Related History  Pre-Admission / Home Diet:  Pre-Admission/Home Diet: General   Home Supplements / Herbals:    none noted  Food Restrictions / Cultural Requests:    none noted    Current Nutrition Therapies   Diet NPO Effective Now Exceptions are: Ice Chips     PO Intake: NPO  PO Supplement: NPO   PO Supplement Intake: NPO  IVF: 100 NaCl ml/hr     NUTRITION RISK LEVEL: Risk Level:  Moderate     Nonda Jhoana Ag N [address] [place] [address]  [place]:  [phone]  Office:  [phone]

## 2021-03-03 NOTE — PROGRESS NOTES
Bariatric Surgery    General surgery consulted for possible bladder perforation found on imaging completed at [facility]. Imaging is currently working on being uploaded into PACS for review. Patient has duran catheter in place with clear yellow urine. Abdomen is non peritoneal.  Patient needs Urology consult which has been placed. General surgery is available if assistance should be needed. Please contact us if needed otherwise Urology consult pending. Discussed with Dr. Jyotsna Carroll.     Kristan Rosenthal MD  3/3/2021  8:04 AM  [phone]

## 2021-03-03 NOTE — PROGRESS NOTES
Pt arrived to U 4305 from ED via stretcher. Pt transferred from stretcher to bed via sliding with sheet. VS taken, assessment complete, height and weight obtained, pt oriented to room and educated on call light. 4 eye skin assessment complete and pt denies questions at this time. MD notified of pt's direct admission and will place orders.

## 2021-03-03 NOTE — PROGRESS NOTES
Hospitalist Progress Note      PCP: Javier Sanchez MD    Date of Admission: 3/2/2021    Hospital Course: Pt. Is a 64 yo F who was admitted for concern of possible bladder perforation and coffee ground emesis. Urology, GI and surgery have been consulted. Subjective: Pt. Is confused at this time. Sagastume is in place. She denies any pain. Medications:  Reviewed    Infusion Medications    pantoprozole (PROTONIX) infusion 8 mg/hr (03/03/21 0443)    sodium chloride 100 mL/hr at 03/03/21 0640     Scheduled Medications    sodium chloride flush  10 mL Intravenous 2 times per day    linezolid  600 mg Intravenous Q12H    meropenem  1,000 mg Intravenous Q8H    metoprolol succinate  25 mg Oral Daily     PRN Meds: sodium chloride flush, promethazine **OR** ondansetron, acetaminophen **OR** acetaminophen      Intake/Output Summary (Last 24 hours) at 3/3/2021 1025  Last data filed at 3/3/2021 0745  Gross per 24 hour   Intake 10 ml   Output 510 ml   Net -500 ml       Physical Exam Performed:    /84   Pulse 114   Temp 96.8 °F (36 °C) (Oral)   Resp 18   Ht 5' 6\" (1.676 m)   Wt 273 lb 5.9 oz (124 kg)   SpO2 96%   BMI 44.12 kg/m²     General appearance: No apparent distress, appears stated age and cooperative. HEENT: Pupils equal, round, and reactive to light. Conjunctivae/corneas clear. Neck: Supple, with full range of motion. No jugular venous distention. Trachea midline. Respiratory:  Normal respiratory effort. Clear to auscultation, bilaterally without Rales/Wheezes/Rhonchi. Cardiovascular: Regular rate and rhythm with normal S1/S2 without murmurs, rubs or gallops. Abdomen: Soft, non-tender, non-distended with normal bowel sounds.   Musculoskeletal: no edema  Neuro: AAOx1      Labs:   Recent Labs     03/02/21  2344 03/03/21  0152 03/03/21  0435   WBC 13.1*  --   --    HGB 11.2* 10.5* 10.0*   HCT 35.5* 32.3* 31.5*     --   --      Recent Labs     03/02/21  2256 03/03/21  0436    139 risk for multiple co-morbidities as well as early death and contributing to the patient's presentation.  on weight loss when appropriate.       DVT Prophylaxis:xarelto on hold  Diet: Diet NPO Effective Now Exceptions are: Ice Chips  Code Status: Full Code    PT/OT Eval Status:   Not at this time. Dispo - 1-3 days, pending further hospital course.      Moody Garza MD

## 2021-03-04 LAB
ALBUMIN SERPL-MCNC: 2.5 G/DL (ref 3.4–5)
ANION GAP SERPL CALCULATED.3IONS-SCNC: 9 MMOL/L (ref 3–16)
BASOPHILS ABSOLUTE: 0.1 K/UL (ref 0–0.2)
BASOPHILS RELATIVE PERCENT: 0.9 %
BUN BLDV-MCNC: 7 MG/DL (ref 7–20)
CALCIUM SERPL-MCNC: 9.1 MG/DL (ref 8.3–10.6)
CHLORIDE BLD-SCNC: 111 MMOL/L (ref 99–110)
CO2: 22 MMOL/L (ref 21–32)
CREAT SERPL-MCNC: 0.5 MG/DL (ref 0.6–1.2)
EOSINOPHILS ABSOLUTE: 0.3 K/UL (ref 0–0.6)
EOSINOPHILS RELATIVE PERCENT: 3.4 %
GFR AFRICAN AMERICAN: >60
GFR NON-AFRICAN AMERICAN: >60
GLUCOSE BLD-MCNC: 61 MG/DL (ref 70–99)
GLUCOSE BLD-MCNC: 69 MG/DL (ref 70–99)
HCT VFR BLD CALC: 26.8 % (ref 36–48)
HCT VFR BLD CALC: 28.8 % (ref 36–48)
HCT VFR BLD CALC: 29.2 % (ref 36–48)
HCT VFR BLD CALC: 29.9 % (ref 36–48)
HEMOGLOBIN: 8.9 G/DL (ref 12–16)
HEMOGLOBIN: 9.4 G/DL (ref 12–16)
HEMOGLOBIN: 9.6 G/DL (ref 12–16)
HEMOGLOBIN: 9.7 G/DL (ref 12–16)
LYMPHOCYTES ABSOLUTE: 1.2 K/UL (ref 1–5.1)
LYMPHOCYTES RELATIVE PERCENT: 13.8 %
MCH RBC QN AUTO: 33.1 PG (ref 26–34)
MCHC RBC AUTO-ENTMCNC: 33.3 G/DL (ref 31–36)
MCV RBC AUTO: 99.5 FL (ref 80–100)
MONOCYTES ABSOLUTE: 0.7 K/UL (ref 0–1.3)
MONOCYTES RELATIVE PERCENT: 8.3 %
NEUTROPHILS ABSOLUTE: 6.4 K/UL (ref 1.7–7.7)
NEUTROPHILS RELATIVE PERCENT: 73.6 %
PDW BLD-RTO: 19 % (ref 12.4–15.4)
PERFORMED ON: ABNORMAL
PHOSPHORUS: 2.9 MG/DL (ref 2.5–4.9)
PLATELET # BLD: 211 K/UL (ref 135–450)
PMV BLD AUTO: 11.2 FL (ref 5–10.5)
POTASSIUM SERPL-SCNC: 3.7 MMOL/L (ref 3.5–5.1)
RBC # BLD: 2.94 M/UL (ref 4–5.2)
SODIUM BLD-SCNC: 142 MMOL/L (ref 136–145)
URINE CULTURE, ROUTINE: NORMAL
WBC # BLD: 8.6 K/UL (ref 4–11)

## 2021-03-04 PROCEDURE — C9113 INJ PANTOPRAZOLE SODIUM, VIA: HCPCS | Performed by: INTERNAL MEDICINE

## 2021-03-04 PROCEDURE — 36415 COLL VENOUS BLD VENIPUNCTURE: CPT

## 2021-03-04 PROCEDURE — 85014 HEMATOCRIT: CPT

## 2021-03-04 PROCEDURE — 2580000003 HC RX 258: Performed by: INTERNAL MEDICINE

## 2021-03-04 PROCEDURE — 05HY33Z INSERTION OF INFUSION DEVICE INTO UPPER VEIN, PERCUTANEOUS APPROACH: ICD-10-PCS | Performed by: INTERNAL MEDICINE

## 2021-03-04 PROCEDURE — 80069 RENAL FUNCTION PANEL: CPT

## 2021-03-04 PROCEDURE — 36569 INSJ PICC 5 YR+ W/O IMAGING: CPT

## 2021-03-04 PROCEDURE — 2500000003 HC RX 250 WO HCPCS: Performed by: INTERNAL MEDICINE

## 2021-03-04 PROCEDURE — 85018 HEMOGLOBIN: CPT

## 2021-03-04 PROCEDURE — 85025 COMPLETE CBC W/AUTO DIFF WBC: CPT

## 2021-03-04 PROCEDURE — 87040 BLOOD CULTURE FOR BACTERIA: CPT

## 2021-03-04 PROCEDURE — 6370000000 HC RX 637 (ALT 250 FOR IP): Performed by: INTERNAL MEDICINE

## 2021-03-04 PROCEDURE — 6360000002 HC RX W HCPCS: Performed by: INTERNAL MEDICINE

## 2021-03-04 PROCEDURE — C1751 CATH, INF, PER/CENT/MIDLINE: HCPCS

## 2021-03-04 PROCEDURE — 2060000000 HC ICU INTERMEDIATE R&B

## 2021-03-04 RX ORDER — DEXTROSE MONOHYDRATE 25 G/50ML
12.5 INJECTION, SOLUTION INTRAVENOUS PRN
Status: DISCONTINUED | OUTPATIENT
Start: 2021-03-04 | End: 2021-03-12 | Stop reason: HOSPADM

## 2021-03-04 RX ORDER — DEXTROSE, SODIUM CHLORIDE, AND POTASSIUM CHLORIDE 5; .45; .15 G/100ML; G/100ML; G/100ML
INJECTION INTRAVENOUS CONTINUOUS
Status: DISCONTINUED | OUTPATIENT
Start: 2021-03-04 | End: 2021-03-09

## 2021-03-04 RX ORDER — DEXTROSE MONOHYDRATE 50 MG/ML
100 INJECTION, SOLUTION INTRAVENOUS PRN
Status: DISCONTINUED | OUTPATIENT
Start: 2021-03-04 | End: 2021-03-12 | Stop reason: HOSPADM

## 2021-03-04 RX ORDER — PANTOPRAZOLE SODIUM 40 MG/1
40 TABLET, DELAYED RELEASE ORAL
Status: DISCONTINUED | OUTPATIENT
Start: 2021-03-04 | End: 2021-03-12 | Stop reason: HOSPADM

## 2021-03-04 RX ORDER — NICOTINE POLACRILEX 4 MG
15 LOZENGE BUCCAL PRN
Status: DISCONTINUED | OUTPATIENT
Start: 2021-03-04 | End: 2021-03-12 | Stop reason: HOSPADM

## 2021-03-04 RX ADMIN — PANTOPRAZOLE SODIUM 8 MG/HR: 40 INJECTION, POWDER, FOR SOLUTION INTRAVENOUS at 06:19

## 2021-03-04 RX ADMIN — METOPROLOL SUCCINATE 25 MG: 25 TABLET, EXTENDED RELEASE ORAL at 10:21

## 2021-03-04 RX ADMIN — LINEZOLID 600 MG: 600 INJECTION, SOLUTION INTRAVENOUS at 10:54

## 2021-03-04 RX ADMIN — MEROPENEM 1000 MG: 1 INJECTION, POWDER, FOR SOLUTION INTRAVENOUS at 17:09

## 2021-03-04 RX ADMIN — Medication 10 ML: at 21:08

## 2021-03-04 RX ADMIN — LINEZOLID 600 MG: 600 INJECTION, SOLUTION INTRAVENOUS at 21:02

## 2021-03-04 RX ADMIN — APIXABAN 5 MG: 5 TABLET, FILM COATED ORAL at 21:08

## 2021-03-04 RX ADMIN — MEROPENEM 1000 MG: 1 INJECTION, POWDER, FOR SOLUTION INTRAVENOUS at 01:36

## 2021-03-04 RX ADMIN — PANTOPRAZOLE SODIUM 40 MG: 40 TABLET, DELAYED RELEASE ORAL at 17:10

## 2021-03-04 RX ADMIN — Medication 10 ML: at 10:14

## 2021-03-04 RX ADMIN — DEXTROSE MONOHYDRATE, SODIUM CHLORIDE, AND POTASSIUM CHLORIDE: 50; 4.5; 1.49 INJECTION, SOLUTION INTRAVENOUS at 20:56

## 2021-03-04 RX ADMIN — MEROPENEM 1000 MG: 1 INJECTION, POWDER, FOR SOLUTION INTRAVENOUS at 10:14

## 2021-03-04 ASSESSMENT — PAIN SCALES - PAIN ASSESSMENT IN ADVANCED DEMENTIA (PAINAD)
BREATHING: 0
BODYLANGUAGE: 0
CONSOLABILITY: 0
NEGVOCALIZATION: 0
BREATHING: 0
BREATHING: 0
BODYLANGUAGE: 0
FACIALEXPRESSION: 0
CONSOLABILITY: 0
BREATHING: 0
TOTALSCORE: 0
BREATHING: 0
BREATHING: 0
BODYLANGUAGE: 0
CONSOLABILITY: 0
BODYLANGUAGE: 0
TOTALSCORE: 0
BODYLANGUAGE: 0
FACIALEXPRESSION: 0
NEGVOCALIZATION: 0
BODYLANGUAGE: 0
NEGVOCALIZATION: 0
CONSOLABILITY: 0
FACIALEXPRESSION: 0
TOTALSCORE: 0
CONSOLABILITY: 0
TOTALSCORE: 0
TOTALSCORE: 0
FACIALEXPRESSION: 0
BODYLANGUAGE: 0
NEGVOCALIZATION: 0
TOTALSCORE: 0
CONSOLABILITY: 0
NEGVOCALIZATION: 0

## 2021-03-04 ASSESSMENT — PAIN SCALES - GENERAL
PAINLEVEL_OUTOF10: 0

## 2021-03-04 NOTE — PROGRESS NOTES
Hospitalist Progress Note      PCP: Clay Roberts MD    Date of Admission: 3/2/2021    Hospital Course: Pt. Is a 64 yo F who was admitted for concern of possible bladder perforation and coffee ground emesis. Urology, GI and surgery have been consulted. Subjective: Pt says she feels fine, knows she is in the hospital. She does not know the year or the president. She denies any pain, is talking about movies. Medications:  Reviewed    Infusion Medications    pantoprozole (PROTONIX) infusion 8 mg/hr (03/04/21 0619)    sodium chloride 100 mL/hr at 03/03/21 0640     Scheduled Medications    sodium chloride flush  10 mL Intravenous 2 times per day    linezolid  600 mg Intravenous Q12H    meropenem  1,000 mg Intravenous Q8H    metoprolol succinate  25 mg Oral Daily     PRN Meds: sodium chloride flush, promethazine **OR** ondansetron, acetaminophen **OR** acetaminophen      Intake/Output Summary (Last 24 hours) at 3/4/2021 0807  Last data filed at 3/3/2021 1520  Gross per 24 hour   Intake 2667.13 ml   Output 210 ml   Net 2457.13 ml       Physical Exam Performed:    /76   Pulse 99   Temp 97.5 °F (36.4 °C) (Oral)   Resp 18   Ht 5' 6\" (1.676 m)   Wt 273 lb 5.9 oz (124 kg)   SpO2 95%   BMI 44.12 kg/m²     General appearance: No apparent distress, appears stated age and cooperative. HEENT: Pupils equal, round, and reactive to light. Conjunctivae/corneas clear. Neck: Supple, with full range of motion. No jugular venous distention. Trachea midline. Respiratory:  Normal respiratory effort. Clear to auscultation, bilaterally without Rales/Wheezes/Rhonchi. Cardiovascular: Regular rate and rhythm with normal S1/S2 without murmurs, rubs or gallops. Abdomen: Soft, non-tender, non-distended with normal bowel sounds.   Musculoskeletal: no edema  Neuro: AAOx1      Labs:   Recent Labs     03/02/21  2344 03/02/21  2344 03/03/21  1407 03/03/21  2148 03/04/21  0146   WBC 13.1*  --   --   --   --    HGB 11.2*   < > 9.5* 10.0* 9.4*   HCT 35.5*   < > 30.2* 31.8* 28.8*     --   --   --   --     < > = values in this interval not displayed. Recent Labs     03/02/21  2256 03/03/21  0436    139   K 4.1 4.0    109   CO2 20* 17*   BUN 9 9   CREATININE 0.6 <0.5*   CALCIUM 10.2 9.6     Recent Labs     03/02/21 2256   AST 23   ALT 29   BILITOT 0.9   ALKPHOS 118     Recent Labs     03/02/21 2256   INR 1.11     No results for input(s): Brianna Sammy in the last 72 hours. Urinalysis:      Lab Results   Component Value Date    NITRU Negative 03/03/2021    WBCUA 0-2 03/03/2021    BACTERIA Rare 03/03/2021    RBCUA 0-2 03/03/2021    BLOODU Negative 03/03/2021    SPECGRAV 1.025 03/03/2021    GLUCOSEU Negative 03/03/2021       Radiology:  CT CYSTOGRAM W WO CONTRAST   Final Result      Negative for bladder leak or perforation. No acute process. Lower anterior abdominal wall hernias. CT HEAD WO CONTRAST   Final Result   1. No acute intracranial findings. Assessment/Plan:    Active Hospital Problems    Diagnosis    Upper GI bleed [K92.2]     Acute metabolic encephalopathy possibly sepsis due to UTI  -Appears most recently on Invanz started 3/1  -continue on Zyvox and Merrem pending culture results  -blood cultures, urine cultures  -CT HEAD: no acute intracranial findings     Coffee-ground emesis:  -on Protonix drip  -Xarelto on hold  -s/p recent EGD in 12/2020 with Dr. Dl Soriano, which showed normal esophagus, stomach and duodenum. Medium to large 5 cm sliding hiatal hernia, polyps 2 to 3 mm size located in the fundus body. -NPO  -IV fluids 100/hr  -GI consulted     Abnormal CT scan reading, with air in the anterior abdominal wall concerning for possible bladder perforation:  -urology consulted.  CT cystogram is negative for perforation.   -Sagastume in place.   -Voiding trial  -Urology follow-up outpatient  -Urine culture:     H/O COVID: December 2020     History of DVT/PE, underwent TPA in 2016  History of atrial thrombus  History of A. fib  -On Xarelto, holding for concern for coffee-ground emesis     Hypertension:  -History of A. Fib,continue metoprolol XL 25 mg once daily but hold other medications     Morbid obesity due to excess calories:  -BMI 71.36- Complicating assessment and treatment. Placing patient at risk for multiple co-morbidities as well as early death and contributing to the patient's presentation.  on weight loss when appropriate.     Addendum:  Discussed with GI ok to restart anticoagulation. No procedures planned. Will start Eliquis. Will restart patient's previous diet (clears)    Blood sugars low, continue blood sugar checks overnight  Switch NS to D5 1/2 normal with potassium    DVT Prophylaxis: xarelto on hold  Diet: Diet NPO Effective Now Exceptions are: Ice Chips  Code Status: Full Code    PT/OT Eval Status: ordered    Dispo - 1-3 days, pending further hospital course.      Wayne Swann,

## 2021-03-04 NOTE — FLOWSHEET NOTE
03/04/21 1428   Encounter Summary   Services provided to: Patient   Referral/Consult From: Rounding   Continue Visiting   (3/4, lillian )   Complexity of Encounter Moderate   Length of Encounter 15 minutes   Routine   Type Initial   Assessment Calm; Approachable; Hopeful   Intervention Active listening;Explored feelings, thoughts, concerns;Nurtured hope   Staff Tali Jones [place], MA

## 2021-03-04 NOTE — CARE COORDINATION
CM spoke with Jayy Greer at [facility]. Patient is not long term care and will need precert to return. PT/OT is ordered but has not seen patient yet. Patient has peripheral IV, no PICC line for TPN, NPO at this time. Will need a feeding plan for return to nursing home.     Woody Adler RN, BSN,   4th Floor Progressive Care Unit  [phone]

## 2021-03-04 NOTE — PROCEDURES
Evaluated pt for PIV, pt with vessel to attempt 20g 1 3/4\", instead opted to use an extended dwell catheter, see below  Extended Dwell Forearm Catheter Procedure Note:  Chart reviewed for allergies, diagnosis, labs, known contraindications, reason for line placement and planned length of treatment. Insertion procedure discussed with patient/family/staff member. Informed consent not required for Extended Dwell Catheter placement. Three patient identifiers - Patient name,   and MRN -  completed &  confirmed verbally. Hat, mask and eye shield donned. Site scrubbed with Chloraprep  One-Step applicator  for 30 seconds x 1. Hand Hygiene  performed with 3% Chlorhexidine surgical scrub x1 min prior to  Sterile gloves. Patient draped. Site scrubbed a 2nd time with Chloraprep One-Step applicator x 30 sec. Vein located by [employer] Extended Dwell Catheter inserted. Positive brisk blood return obtained and flushed with 10 mls  0.9% Sterile Sodium Chloride. Catheter flushes easily with no resistance. Valve placed and followed by Alcohol Swab Cap on end. Skin prep applied to site. Bio-patch in place. Catheter secured with non-sutured locking device per hospital protocol. Sterile Tegaderm applied over site. Sterile field maintained during procedure. Guide wire accounted for post procedure. Pt. Response to procedure, tolerated well. Appearance of site: Clean dry and intact without bleeding or edema. All edges of Tegaderm occlusive. Site marked with date and initials of RN placing line. Top 2 side rails in up position, call button in reach, RN notified of all of the above. A  20g  8cm Extended Dwell Catheter was placed  LFA.

## 2021-03-05 LAB
ALBUMIN SERPL-MCNC: 2.6 G/DL (ref 3.4–5)
ANION GAP SERPL CALCULATED.3IONS-SCNC: 11 MMOL/L (ref 3–16)
BASOPHILS ABSOLUTE: 0.1 K/UL (ref 0–0.2)
BASOPHILS RELATIVE PERCENT: 0.9 %
BUN BLDV-MCNC: 5 MG/DL (ref 7–20)
CALCIUM SERPL-MCNC: 8.4 MG/DL (ref 8.3–10.6)
CHLORIDE BLD-SCNC: 109 MMOL/L (ref 99–110)
CO2: 20 MMOL/L (ref 21–32)
CREAT SERPL-MCNC: 0.5 MG/DL (ref 0.6–1.2)
EOSINOPHILS ABSOLUTE: 0.3 K/UL (ref 0–0.6)
EOSINOPHILS RELATIVE PERCENT: 5 %
GFR AFRICAN AMERICAN: >60
GFR NON-AFRICAN AMERICAN: >60
GLUCOSE BLD-MCNC: 106 MG/DL (ref 70–99)
GLUCOSE BLD-MCNC: 80 MG/DL (ref 70–99)
GLUCOSE BLD-MCNC: 89 MG/DL (ref 70–99)
GLUCOSE BLD-MCNC: 91 MG/DL (ref 70–99)
GLUCOSE BLD-MCNC: 94 MG/DL (ref 70–99)
GLUCOSE BLD-MCNC: 99 MG/DL (ref 70–99)
HCT VFR BLD CALC: 29 % (ref 36–48)
HEMOGLOBIN: 9.5 G/DL (ref 12–16)
LYMPHOCYTES ABSOLUTE: 1.3 K/UL (ref 1–5.1)
LYMPHOCYTES RELATIVE PERCENT: 20.1 %
MAGNESIUM: 1.3 MG/DL (ref 1.8–2.4)
MCH RBC QN AUTO: 32.5 PG (ref 26–34)
MCHC RBC AUTO-ENTMCNC: 32.9 G/DL (ref 31–36)
MCV RBC AUTO: 98.9 FL (ref 80–100)
MONOCYTES ABSOLUTE: 0.5 K/UL (ref 0–1.3)
MONOCYTES RELATIVE PERCENT: 8.4 %
NEUTROPHILS ABSOLUTE: 4.2 K/UL (ref 1.7–7.7)
NEUTROPHILS RELATIVE PERCENT: 65.6 %
PDW BLD-RTO: 17.8 % (ref 12.4–15.4)
PERFORMED ON: NORMAL
PHOSPHORUS: 2.6 MG/DL (ref 2.5–4.9)
PLATELET # BLD: 241 K/UL (ref 135–450)
PMV BLD AUTO: 11.5 FL (ref 5–10.5)
POTASSIUM SERPL-SCNC: 3.1 MMOL/L (ref 3.5–5.1)
RBC # BLD: 2.93 M/UL (ref 4–5.2)
SODIUM BLD-SCNC: 140 MMOL/L (ref 136–145)
WBC # BLD: 6.4 K/UL (ref 4–11)

## 2021-03-05 PROCEDURE — 36415 COLL VENOUS BLD VENIPUNCTURE: CPT

## 2021-03-05 PROCEDURE — 80069 RENAL FUNCTION PANEL: CPT

## 2021-03-05 PROCEDURE — 2060000000 HC ICU INTERMEDIATE R&B

## 2021-03-05 PROCEDURE — 97162 PT EVAL MOD COMPLEX 30 MIN: CPT

## 2021-03-05 PROCEDURE — 97530 THERAPEUTIC ACTIVITIES: CPT

## 2021-03-05 PROCEDURE — 97167 OT EVAL HIGH COMPLEX 60 MIN: CPT

## 2021-03-05 PROCEDURE — 2580000003 HC RX 258: Performed by: INTERNAL MEDICINE

## 2021-03-05 PROCEDURE — 2500000003 HC RX 250 WO HCPCS: Performed by: INTERNAL MEDICINE

## 2021-03-05 PROCEDURE — 6360000002 HC RX W HCPCS: Performed by: INTERNAL MEDICINE

## 2021-03-05 PROCEDURE — 82607 VITAMIN B-12: CPT

## 2021-03-05 PROCEDURE — 85025 COMPLETE CBC W/AUTO DIFF WBC: CPT

## 2021-03-05 PROCEDURE — 6370000000 HC RX 637 (ALT 250 FOR IP): Performed by: INTERNAL MEDICINE

## 2021-03-05 PROCEDURE — 51798 US URINE CAPACITY MEASURE: CPT

## 2021-03-05 PROCEDURE — 83735 ASSAY OF MAGNESIUM: CPT

## 2021-03-05 PROCEDURE — 82746 ASSAY OF FOLIC ACID SERUM: CPT

## 2021-03-05 PROCEDURE — 97535 SELF CARE MNGMENT TRAINING: CPT

## 2021-03-05 RX ORDER — MAGNESIUM SULFATE IN WATER 40 MG/ML
2000 INJECTION, SOLUTION INTRAVENOUS ONCE
Status: COMPLETED | OUTPATIENT
Start: 2021-03-05 | End: 2021-03-06

## 2021-03-05 RX ORDER — MULTIVIT-MIN/FERROUS GLUCONATE 9 MG/15 ML
15 LIQUID (ML) ORAL ONCE
Status: COMPLETED | OUTPATIENT
Start: 2021-03-05 | End: 2021-03-05

## 2021-03-05 RX ADMIN — MEROPENEM 1000 MG: 1 INJECTION, POWDER, FOR SOLUTION INTRAVENOUS at 19:40

## 2021-03-05 RX ADMIN — METOPROLOL SUCCINATE 25 MG: 25 TABLET, EXTENDED RELEASE ORAL at 11:37

## 2021-03-05 RX ADMIN — PANTOPRAZOLE SODIUM 40 MG: 40 TABLET, DELAYED RELEASE ORAL at 19:40

## 2021-03-05 RX ADMIN — POTASSIUM BICARBONATE 40 MEQ: 782 TABLET, EFFERVESCENT ORAL at 19:39

## 2021-03-05 RX ADMIN — MAGNESIUM SULFATE HEPTAHYDRATE 2000 MG: 40 INJECTION, SOLUTION INTRAVENOUS at 23:06

## 2021-03-05 RX ADMIN — MEROPENEM 1000 MG: 1 INJECTION, POWDER, FOR SOLUTION INTRAVENOUS at 11:37

## 2021-03-05 RX ADMIN — ACETAMINOPHEN 650 MG: 325 TABLET ORAL at 23:05

## 2021-03-05 RX ADMIN — Medication 10 ML: at 20:18

## 2021-03-05 RX ADMIN — Medication 10 ML: at 11:38

## 2021-03-05 RX ADMIN — MEROPENEM 1000 MG: 1 INJECTION, POWDER, FOR SOLUTION INTRAVENOUS at 02:20

## 2021-03-05 RX ADMIN — MULTIVITAMIN 15 ML: LIQUID ORAL at 13:37

## 2021-03-05 RX ADMIN — PANTOPRAZOLE SODIUM 40 MG: 40 TABLET, DELAYED RELEASE ORAL at 06:27

## 2021-03-05 RX ADMIN — APIXABAN 5 MG: 5 TABLET, FILM COATED ORAL at 11:37

## 2021-03-05 RX ADMIN — THIAMINE HYDROCHLORIDE: 100 INJECTION, SOLUTION INTRAMUSCULAR; INTRAVENOUS at 11:38

## 2021-03-05 RX ADMIN — APIXABAN 5 MG: 5 TABLET, FILM COATED ORAL at 20:18

## 2021-03-05 RX ADMIN — DEXTROSE MONOHYDRATE, SODIUM CHLORIDE, AND POTASSIUM CHLORIDE: 50; 4.5; 1.49 INJECTION, SOLUTION INTRAVENOUS at 16:05

## 2021-03-05 ASSESSMENT — PAIN SCALES - PAIN ASSESSMENT IN ADVANCED DEMENTIA (PAINAD)
TOTALSCORE: 0
FACIALEXPRESSION: 0
CONSOLABILITY: 0
BODYLANGUAGE: 0
NEGVOCALIZATION: 0
BODYLANGUAGE: 0
TOTALSCORE: 0
NEGVOCALIZATION: 0
FACIALEXPRESSION: 0
BREATHING: 0
CONSOLABILITY: 0
FACIALEXPRESSION: 0
CONSOLABILITY: 0
BODYLANGUAGE: 0
TOTALSCORE: 0
BODYLANGUAGE: 0
BREATHING: 0
BODYLANGUAGE: 0

## 2021-03-05 ASSESSMENT — PAIN SCALES - GENERAL: PAINLEVEL_OUTOF10: 3

## 2021-03-05 NOTE — PROGRESS NOTES
Occupational Therapy   Occupational Therapy Initial Assessment/Tx  Date: 3/5/2021   Patient Name: Millie Emerson  MRN: [id number]     : 1960    Date of Service: 3/5/2021    Discharge Recommendations:  Millie Emerson scored a 10/24 on the AM-PAC ADL Inpatient form. Current research shows that an AM-PAC score of 17 or less is typically not associated with a discharge to the patient's home setting. Based on the patient's AM-PAC score and their current ADL deficits, it is recommended that the patient have 3-5 sessions per week of Occupational Therapy at d/c to increase the patient's independence. Please see assessment section for further patient specific details. If patient discharges prior to next session this note will serve as a discharge summary. Please see below for the latest assessment towards goals. OT Equipment Recommendations  Equipment Needed: No  Other: defer to facility    Assessment   Performance deficits / Impairments: Decreased functional mobility ; Decreased ADL status; Decreased endurance;Decreased strength;Decreased cognition;Decreased safe awareness;Decreased balance  Assessment: Pt presents from a facility where she has been since 2020. Facility reports pt receives OT/PT services, unable to reach caregiver at facility to determine pt's PLOF. Pt is a poor historian, confused today. Pt requires assist of 2 for bed mobility, demo limited ability to sit eob 2/2 fatigue and  dizziness. Pt LEs are significantly weak and pt requires assist for repositioning LEs in bed. Pt requires assist of 1-2 for bed level ADLs. Pt would benefit from OT tx to maximize her strength and functional status  Treatment Diagnosis: Impaired ADLs, mobility 2/2 decreased endurance, confusion  Prognosis: Guarded  Decision Making: High Complexity  REQUIRES OT FOLLOW UP: Yes  Activity Tolerance  Activity Tolerance: Patient limited by fatigue  Activity Tolerance: c/o dizziness when eob- dizziness resolved once supine. Difficulty obtaining BP- RN called into room and obtained BP-WFL  Safety Devices  Safety Devices in place: Yes  Type of devices: Nurse notified;Call light within reach; Left in bed;Bed alarm in place           Treatment Diagnosis: Impaired ADLs, mobility 2/2 decreased endurance, confusion      Restrictions  Position Activity Restriction  Other position/activity restrictions: up with assist    Subjective   General  Chart Reviewed: Yes  Additional Pertinent Hx: Pt presented from SNF 3/2 with confusion, emesis. admitted for acute metabolic encephalopathy due to UTI sepsis with suspected bladder perforation; Imaging (-) for bladder perforation. PMHx: CHF, arthritis, HTN, glaucoma, bladder repair, RIGHT TIBIA IM NAIL INSERTION  11/2020  Referring Practitioner: Noemy Santiago  Diagnosis: upper GI bleed, encephalopathy  Subjective  Subjective: Pt in bed, conversant but confused. Agreeable to therapy. \"I need help. My legs are weak\"  Patient Currently in Pain: Denies    Social/Functional History  Social/Functional History  Additional Comments: Has been getting therapy at [facility] since 11/2020. Reports needing assist of 2 and walker for transfers, uses w/c for mobility and occasionally able to propel herself. Working on walking with therapy with walker. Assist for showering and dressing. Goes to [employer] for [employer] push pt in w/c. Pt is a questionable historian. Therapist called [facility] and staff able to state that pt was currently active with PT and OT but unable to provide further info. Objective   Vision: Within Functional Limits(glasses at all times)  Hearing: Exceptions to [facility]  Hearing Exceptions: Hard of hearing/hearing concerns      Orientation  Overall Orientation Status: Impaired  Orientation Level: Oriented to person;Disoriented to time;Oriented to place; Disoriented to situation        Balance  Sitting Balance: Minimal assistance(seated eob x6 minutes w/ initial cga progressing to min A balance (L Lean); pt reported fatigue and significant dizziness and required return to supine)       ADL  Feeding: Independent(drink from cup (declined eating breakfast))  LE Dressing: Dependent/Total(don socks)  Toileting: (purewick in bed)  Additional Comments: Limited ADL assessment 2/2 fatigue and dizziness when seated eob. Bed mobility  Supine to Sit: Dependent/Total(mod A x 2 with HOB up, use of rail)  Sit to Supine: Dependent/Total(max A x 2)  Scooting: Dependent/Total(x2 to scoot towards [facility])  Comment: requires assist for repositioning LEs in bed 2/2 LE weakness     Transfers  Sit to stand: Unable to assess         LUE AROM (degrees)  LUE AROM : (shoulder AROM ~100; WFL distally)  RUE AROM (degrees)  RUE AROM : (shoulder AROM ~100; WFL distally)  LUE Strength  Gross LUE Strength: (3-/5 shoulder, 4-/5 distally)  L Hand General: 4-/5  RUE Strength  Gross RUE Strength: (3-/5 shoulder, 4-/5 distally)  R Hand General: 4-/5   Noted tremors in B UE- pt reports her baseline      Encouraged pt to complete UE exercises in bed. Pt completed bilateral elbow flexion/extension x6, shoulder flexion with clasped hands x6 reps    Treatment consisted of:  ADLs, transfer training, education on activity promotion and fall precautions.            Plan   Plan  Times per week: 2-5      AM-PAC Score        AM-[facility] Inpatient Daily Activity Raw Score: 10 (03/05/21 1137)  AM-PAC Inpatient ADL T-Scale Score : 27.31 (03/05/21 1137)  ADL Inpatient CMS 0-100% Score: 74.7 (03/05/21 1137)  ADL Inpatient CMS G-Code Modifier : CL (03/05/21 1137)    Goals  Short term goals  Time Frame for Short term goals: discharge  Short term goal 1: tolerate sitting eob x10 minutes with cga balance during ADLs/therex- not met  Short term goal 2: complete 10 reps UE exercises all joints, to improve function for ADLs- not met  Short term goal 3: mod A bed mobility- not met  Patient Goals   Patient goals : go home       Therapy Time   Individual

## 2021-03-05 NOTE — CARE COORDINATION
Case Management Assessment           Daily Note                 Date/ Time of Note: 3/5/2021 2:36 PM         Note completed by: Arminda Hodgkins    Patient Name: Jamaal Mcgill  YOB: 1960    Diagnosis:Upper GI bleed [K92.2]  Patient Admission Status: Inpatient    Date of Admission:3/2/2021  7:38 PM Length of Stay: 3 GLOS:      Current Plan of Care: full liquid diet, IV abx, IVF  ________________________________________________________________________________________  PT AM-PAC: 8 / 24 per last evaluation on: 3.5    OT AM-PAC: 10 / 24 per last evaluation on: 3.5    DME Needs for discharge: defer  ________________________________________________________________________________________  Discharge Plan: SNF: [facility]    Tentative discharge date: TBD    Current barriers to discharge: medical clearance, needs pre-cert      ________________________________________________________________________________________  Case Management Notes:  CM called Lara Reji at [facility], left voicemail, to let her know pt's status and that they could proceed with pre-cert. Will await call back with authorization to admit. Kezia and her family were provided with choice of provider; she and her family are in agreement with the discharge plan.     Care Transition Patient: No Arminda Hodgkins, RN  [facility]  Case Management Department  Ph: [phone]  Fax: [phone]

## 2021-03-05 NOTE — PROGRESS NOTES
Hospitalist Progress Note      PCP: Yuriy More MD    Date of Admission: 3/2/2021    Hospital Course: Pt. Is a 62 yo F who was admitted for concern of possible bladder perforation and coffee ground emesis. Urology, GI and surgery have been consulted. Subjective: More alert and appropriate but still confused. Medications:  Reviewed    Infusion Medications    dextrose      dextrose 5% and 0.45% NaCl with KCl 20 mEq 100 mL/hr at 03/04/21 2056     Scheduled Medications    pantoprazole  40 mg Oral BID AC    apixaban  5 mg Oral BID    sodium chloride flush  10 mL Intravenous 2 times per day    linezolid  600 mg Intravenous Q12H    meropenem  1,000 mg Intravenous Q8H    metoprolol succinate  25 mg Oral Daily     PRN Meds: glucose, dextrose, glucagon (rDNA), dextrose, sodium chloride flush, promethazine **OR** ondansetron, acetaminophen **OR** acetaminophen      Intake/Output Summary (Last 24 hours) at 3/5/2021 0650  Last data filed at 3/5/2021 0351  Gross per 24 hour   Intake --   Output 765 ml   Net -765 ml       Physical Exam Performed:    /79   Pulse 97   Temp 97.3 °F (36.3 °C) (Oral)   Resp 18   Ht 5' 6\" (1.676 m)   Wt 273 lb 5.9 oz (124 kg)   SpO2 95%   BMI 44.12 kg/m²     General appearance: No apparent distress, appears stated age and cooperative. HEENT: Pupils equal, round, and reactive to light. Conjunctivae/corneas clear. Neck: Supple, with full range of motion. No jugular venous distention. Trachea midline. Respiratory:  Normal respiratory effort. Clear to auscultation, bilaterally without Rales/Wheezes/Rhonchi. Cardiovascular: Regular rate and rhythm with normal S1/S2 without murmurs, rubs or gallops. Abdomen: Soft, non-tender, non-distended with normal bowel sounds.   Musculoskeletal: no edema  Neuro: AAOx1      Labs:   Recent Labs     03/02/21  2344 03/02/21  2344 03/04/21  0812 03/04/21  2046 03/05/21  0531   WBC 13.1*  --  8.6  --  6.4   HGB 11.2*   < > 9.7* 9. 6* 8.9* 9.5*   HCT 35.5*   < > 29.2*  29.9* 26.8* 29.0*     --  211  --  241    < > = values in this interval not displayed. Recent Labs     03/03/21  0436 03/04/21  0112 03/05/21  0531    142 140   K 4.0 3.7 3.1*    111* 109   CO2 17* 22 20*   BUN 9 7 5*   CREATININE <0.5* 0.5* 0.5*   CALCIUM 9.6 9.1 8.4   PHOS  --  2.9 2.6     Recent Labs     03/02/21  2256   AST 23   ALT 29   BILITOT 0.9   ALKPHOS 118     Recent Labs     03/02/21  2256   INR 1.11     No results for input(s): Araujo Scrivener in the last 72 hours. Urinalysis:      Lab Results   Component Value Date    NITRU Negative 03/03/2021    WBCUA 0-2 03/03/2021    BACTERIA Rare 03/03/2021    RBCUA 0-2 03/03/2021    BLOODU Negative 03/03/2021    SPECGRAV 1.025 03/03/2021    GLUCOSEU Negative 03/03/2021       Radiology:  CT CYSTOGRAM W WO CONTRAST   Final Result      Negative for bladder leak or perforation. No acute process. Lower anterior abdominal wall hernias. CT HEAD WO CONTRAST   Final Result   1. No acute intracranial findings. Assessment/Plan:    Active Hospital Problems    Diagnosis    Upper GI bleed [K92.2]     Acute metabolic encephalopathy  Called nursing home: Patient had another admission since leaving [facility], for a right tibial fracture for which she underwent surgery. She was discharged off TPN, was on clear liquids, was OFF TPN, and was doing well with clear liquids however there was no attempt to advance diet because she began insisting that she could not eat any food or take any medications due to fear of vomiting. At times she said she had vomited but no emesis noted by staff. She has had progressive decline in cognition with increased confusion and decreased PO intake. She had a PICC line but she removed it herself.  She was getting Invanz via IV.   -started on Zyvox and Merrem empirically for possible sepsis due to UTI   -Had enterococcus UTI approximately one month ago  -Cx positive resistant E. Coli 2/25, sensitive to zosyn, meropenem  -She was started on Morton Slaughter started 3/1 through an IV since she had pulled out the PICC.   -UA, urine culture negative, was already on treatment with ertapenem  -will continue on meropenem   -blood cultures pending  -CT HEAD: no acute intracranial findings  -Restarted clear liquids  -Consult dietician  -Monitor for refeeding     Coffee-ground emesis: Noted in outside ED, no events at nursing home, none reported here.  -was on Protonix drip, Xarelto held  -s/p recent EGD in 12/2020 with Dr. Too Hopson, which showed normal esophagus, stomach and duodenum. Medium to large 5 cm sliding hiatal hernia, polyps 2 to 3 mm size located in the fundus body. -GI consulted, ok to restart anticoagulation, outpatient follow-up,    -Protonix 40 mg BID     Abnormal CT scan reading, with air in the anterior abdominal wall concerning for possible bladder perforation:  -urology consulted. CT cystogram is negative for perforation.   -Sagastume in place.   -Voiding trial today  -Urology follow-up outpatient    Paraesophageal Hernia  Previously on TPN and clear liquid diet  Start clears    H/O COVID: December 2020     History of atrial thrombus  History of A. Fib  History of DVT/PE, underwent TPA in 2016  -Ok to restart anticoagulation per GI  -Switch to Eliquis     Hypertension. Currently normotensive  -History of A. Fib,continue metoprolol XL 25 mg once daily but hold other medications     Morbid obesity due to excess calories:  -BMI 79.81- Complicating assessment and treatment. Placing patient at risk for multiple co-morbidities as well as early death and contributing to the patient's presentation.  on weight loss when appropriate. DVT Prophylaxis: xarelto on hold  Diet: DIET CLEAR LIQUID;  Code Status: Full Code    PT/OT Eval Status: ordered    Dispo - 1-3 days, pending further hospital course.      Noemy Santiago DO

## 2021-03-05 NOTE — PROGRESS NOTES
diagnoses. has a past medical history of Arthritis, CHF (congestive heart failure) ([address] COVID-19, ESBL (extended spectrum beta-lactamase) producing bacteria infection, Glaucoma, Hemorrhoids, HTN (hypertension), Hyperlipidemia, Moderate protein-calorie malnutrition ([address] Morbid obesity ([address] Muscular wasting and disuse atrophy, PAF (paroxysmal atrial fibrillation) ([address] UTI (urinary tract infection), and VRE (vancomycin resistant enterococcus) culture positive. has a past surgical history that includes  section; bladder repair; Cholecystectomy; Cystoscopy; Tibia fracture surgery (Right, 2020); and Upper gastrointestinal endoscopy (N/A, 2020). Restrictions  Position Activity Restriction  Other position/activity restrictions: up with assist  Vision/Hearing  Vision: Within Functional Limits(glasses at all times)  Hearing: Exceptions to [facility]  Hearing Exceptions: Hard of hearing/hearing concerns     Subjective  General  Chart Reviewed: Yes  Additional Pertinent Hx: Admit 3/2 for concern of possible bladder perforation and coffee ground emesis; (+) acute encephalopathy 2* UTI; PMHx: arthritis, CHF, HTN, morbid obesity, muscular wasting, COVID Dec 2020, a-fib, UTI, R tibia fx 2020  Referring Practitioner: Jv Phillips DO  Diagnosis: UTI, acute encephalopathy  Subjective  Subjective: Pt found supine in bed. Alert and agreeable to PT. Pain Screening  Patient Currently in Pain: Denies       Orientation  Orientation  Overall Orientation Status: (oriented to self and place, not month or year)  Social/Functional History  Social/Functional History  Additional Comments: Has been getting therapy at [facility] & [facility] since 2020. Reports needing assist of 2 and walker for transfers, uses w/c for mobility and occasionally able to propel herself. Working on walking with therapy with walker. Assist for showering and dressing. Goes to [employer] push pt in w/c.   Pt is a questionable historian. Therapist called [facility] and staff able to state that pt was currently active with PT and OT but unable to provide further info. Cognition        Objective          AROM RLE (degrees)  RLE General AROM: AAROM WFL, ankle DF PROM to neutral - firm end feel  AROM LLE (degrees)  LLE General AROM: AAROM WFL, ankle DF PROM to neutral - firm end feel  Strength RLE  Comment: grossly decreased throughout, needs assist to move LEs in bed. Demos 3/5 quad strength sitting EOB, ankle DF to neutral  Strength LLE  Comment: grossly decreased throughout, needs assist to move LEs in bed. Demos 3/5 quad strength sitting EOB, ankle DF to neutral        Bed mobility  Supine to Sit: Dependent/Total(mod A x 2 with HOB up, use of rail)  Sit to Supine: Dependent/Total(max A x 2)  Scooting: Dependent/Total(x2 to scoot towards HOB)           Balance  Sitting - Static: Fair  Sitting - Dynamic: Fair  Comments: pt sat EOB x 5-6 min with CGA initially with unilateral UE support, regressed to min A and leaning onto L elbow  Exercises  Comments: educated pt on heel slides and ankle pumps through available ROM while in bed - pt demonstrated 3-4 reps each ex bilaterally   Treatment included gait and transfer training, pt education.   Plan   Plan  Times per week: 2-5  Current Treatment Recommendations: Strengthening, ROM, Balance Training, Functional Mobility Training, Transfer Training, Home Exercise Program, Patient/Caregiver Education & Training  Safety Devices  Type of devices: Call light within reach, Nurse notified, Bed alarm in place, Left in bed    G-Code       OutComes Score                                                  AM-PAC Score  AM-PAC Inpatient Mobility Raw Score : 8 (03/05/21 1036)  AM-PAC Inpatient T-Scale Score : 28.52 (03/05/21 1036)  Mobility Inpatient CMS 0-100% Score: 86.62 (03/05/21 1036)  Mobility Inpatient CMS G-Code Modifier : CM (03/05/21 1036)          Goals  Short term goals  Time Frame for Short term goals: discharge  Short term goal 1: Pt will transfer supine <--> sit with min A x 1  Short term goal 2: Pt will sit EOB x 5 min with SBA throughout  Short term goal 3: Pt will demo 5 reps LE AROM ex independently  Patient Goals   Patient goals : return to [facility] Time   Individual Concurrent Group Co-treatment   Time In 0930         Time Out 1008         Minutes 38                 Timed Code Treatment Minutes:  23    Total Treatment Minutes:  38    If patient is discharged prior to next treatment, this note will serve as the discharge summary.   Ilsa Ravi, PT, DPT  [id number]

## 2021-03-06 LAB
ALBUMIN SERPL-MCNC: 2.5 G/DL (ref 3.4–5)
ALBUMIN SERPL-MCNC: 2.6 G/DL (ref 3.4–5)
ANION GAP SERPL CALCULATED.3IONS-SCNC: 10 MMOL/L (ref 3–16)
ANION GAP SERPL CALCULATED.3IONS-SCNC: 9 MMOL/L (ref 3–16)
BASOPHILS ABSOLUTE: 0.1 K/UL (ref 0–0.2)
BASOPHILS RELATIVE PERCENT: 1 %
BUN BLDV-MCNC: 3 MG/DL (ref 7–20)
BUN BLDV-MCNC: 4 MG/DL (ref 7–20)
CALCIUM SERPL-MCNC: 8.4 MG/DL (ref 8.3–10.6)
CALCIUM SERPL-MCNC: 8.6 MG/DL (ref 8.3–10.6)
CHLORIDE BLD-SCNC: 105 MMOL/L (ref 99–110)
CHLORIDE BLD-SCNC: 109 MMOL/L (ref 99–110)
CO2: 19 MMOL/L (ref 21–32)
CO2: 20 MMOL/L (ref 21–32)
CREAT SERPL-MCNC: <0.5 MG/DL (ref 0.6–1.2)
CREAT SERPL-MCNC: <0.5 MG/DL (ref 0.6–1.2)
EOSINOPHILS ABSOLUTE: 0.4 K/UL (ref 0–0.6)
EOSINOPHILS RELATIVE PERCENT: 6.1 %
FOLATE: 19.27 NG/ML (ref 4.78–24.2)
GFR AFRICAN AMERICAN: >60
GFR AFRICAN AMERICAN: >60
GFR NON-AFRICAN AMERICAN: >60
GFR NON-AFRICAN AMERICAN: >60
GLUCOSE BLD-MCNC: 115 MG/DL (ref 70–99)
GLUCOSE BLD-MCNC: 71 MG/DL (ref 70–99)
GLUCOSE BLD-MCNC: 80 MG/DL (ref 70–99)
GLUCOSE BLD-MCNC: 84 MG/DL (ref 70–99)
GLUCOSE BLD-MCNC: 90 MG/DL (ref 70–99)
GLUCOSE BLD-MCNC: 90 MG/DL (ref 70–99)
GLUCOSE BLD-MCNC: 91 MG/DL (ref 70–99)
HCT VFR BLD CALC: 31.4 % (ref 36–48)
HCT VFR BLD CALC: 33.2 % (ref 36–48)
HEMOGLOBIN: 10.2 G/DL (ref 12–16)
HEMOGLOBIN: 10.3 G/DL (ref 12–16)
LYMPHOCYTES ABSOLUTE: 1.3 K/UL (ref 1–5.1)
LYMPHOCYTES RELATIVE PERCENT: 19.3 %
MAGNESIUM: 2.2 MG/DL (ref 1.8–2.4)
MCH RBC QN AUTO: 32.7 PG (ref 26–34)
MCHC RBC AUTO-ENTMCNC: 31 G/DL (ref 31–36)
MCV RBC AUTO: 105.6 FL (ref 80–100)
MONOCYTES ABSOLUTE: 0.5 K/UL (ref 0–1.3)
MONOCYTES RELATIVE PERCENT: 8 %
NEUTROPHILS ABSOLUTE: 4.4 K/UL (ref 1.7–7.7)
NEUTROPHILS RELATIVE PERCENT: 65.6 %
PDW BLD-RTO: 18.5 % (ref 12.4–15.4)
PERFORMED ON: ABNORMAL
PERFORMED ON: NORMAL
PHOSPHORUS: 2.1 MG/DL (ref 2.5–4.9)
PHOSPHORUS: 2.4 MG/DL (ref 2.5–4.9)
PLATELET # BLD: 252 K/UL (ref 135–450)
PMV BLD AUTO: 10.8 FL (ref 5–10.5)
POTASSIUM SERPL-SCNC: 3.4 MMOL/L (ref 3.5–5.1)
POTASSIUM SERPL-SCNC: 3.7 MMOL/L (ref 3.5–5.1)
RBC # BLD: 3.14 M/UL (ref 4–5.2)
SODIUM BLD-SCNC: 133 MMOL/L (ref 136–145)
SODIUM BLD-SCNC: 139 MMOL/L (ref 136–145)
VITAMIN B-12: 478 PG/ML (ref 211–911)
WBC # BLD: 6.8 K/UL (ref 4–11)

## 2021-03-06 PROCEDURE — 6370000000 HC RX 637 (ALT 250 FOR IP): Performed by: INTERNAL MEDICINE

## 2021-03-06 PROCEDURE — 92610 EVALUATE SWALLOWING FUNCTION: CPT

## 2021-03-06 PROCEDURE — 83735 ASSAY OF MAGNESIUM: CPT

## 2021-03-06 PROCEDURE — 2060000000 HC ICU INTERMEDIATE R&B

## 2021-03-06 PROCEDURE — 6360000002 HC RX W HCPCS: Performed by: INTERNAL MEDICINE

## 2021-03-06 PROCEDURE — 2500000003 HC RX 250 WO HCPCS: Performed by: INTERNAL MEDICINE

## 2021-03-06 PROCEDURE — 2580000003 HC RX 258: Performed by: INTERNAL MEDICINE

## 2021-03-06 PROCEDURE — 36415 COLL VENOUS BLD VENIPUNCTURE: CPT

## 2021-03-06 PROCEDURE — 85025 COMPLETE CBC W/AUTO DIFF WBC: CPT

## 2021-03-06 PROCEDURE — 92526 ORAL FUNCTION THERAPY: CPT

## 2021-03-06 PROCEDURE — 80069 RENAL FUNCTION PANEL: CPT

## 2021-03-06 PROCEDURE — 85014 HEMATOCRIT: CPT

## 2021-03-06 PROCEDURE — 85018 HEMOGLOBIN: CPT

## 2021-03-06 RX ORDER — SACCHAROMYCES BOULARDII 250 MG
250 CAPSULE ORAL 2 TIMES DAILY
Status: DISCONTINUED | OUTPATIENT
Start: 2021-03-06 | End: 2021-03-12 | Stop reason: HOSPADM

## 2021-03-06 RX ORDER — MAGNESIUM SULFATE IN WATER 40 MG/ML
2000 INJECTION, SOLUTION INTRAVENOUS ONCE
Status: COMPLETED | OUTPATIENT
Start: 2021-03-06 | End: 2021-03-06

## 2021-03-06 RX ADMIN — Medication 250 MG: at 13:21

## 2021-03-06 RX ADMIN — POTASSIUM BICARBONATE 20 MEQ: 782 TABLET, EFFERVESCENT ORAL at 13:21

## 2021-03-06 RX ADMIN — PANTOPRAZOLE SODIUM 40 MG: 40 TABLET, DELAYED RELEASE ORAL at 06:14

## 2021-03-06 RX ADMIN — APIXABAN 5 MG: 5 TABLET, FILM COATED ORAL at 08:26

## 2021-03-06 RX ADMIN — METOPROLOL SUCCINATE 25 MG: 25 TABLET, EXTENDED RELEASE ORAL at 08:26

## 2021-03-06 RX ADMIN — MEROPENEM 1000 MG: 1 INJECTION, POWDER, FOR SOLUTION INTRAVENOUS at 08:26

## 2021-03-06 RX ADMIN — MEROPENEM 1000 MG: 1 INJECTION, POWDER, FOR SOLUTION INTRAVENOUS at 17:40

## 2021-03-06 RX ADMIN — MEROPENEM 1000 MG: 1 INJECTION, POWDER, FOR SOLUTION INTRAVENOUS at 01:10

## 2021-03-06 RX ADMIN — APIXABAN 5 MG: 5 TABLET, FILM COATED ORAL at 21:24

## 2021-03-06 RX ADMIN — Medication 250 MG: at 21:24

## 2021-03-06 RX ADMIN — Medication 10 ML: at 21:24

## 2021-03-06 RX ADMIN — DEXTROSE MONOHYDRATE, SODIUM CHLORIDE, AND POTASSIUM CHLORIDE: 50; 4.5; 1.49 INJECTION, SOLUTION INTRAVENOUS at 03:55

## 2021-03-06 RX ADMIN — MAGNESIUM SULFATE HEPTAHYDRATE 2000 MG: 40 INJECTION, SOLUTION INTRAVENOUS at 08:26

## 2021-03-06 RX ADMIN — Medication 10 ML: at 09:26

## 2021-03-06 RX ADMIN — PANTOPRAZOLE SODIUM 40 MG: 40 TABLET, DELAYED RELEASE ORAL at 17:40

## 2021-03-06 ASSESSMENT — PAIN SCALES - GENERAL
PAINLEVEL_OUTOF10: 0
PAINLEVEL_OUTOF10: 0

## 2021-03-06 NOTE — PROGRESS NOTES
Hospitalist Progress Note      PCP: Ladan Roldan MD    Date of Admission: 3/2/2021    Hospital Course: Pt. Is a 62 yo F who was admitted for concern of possible bladder perforation and coffee ground emesis. Urology, GI and surgery have been consulted. Subjective:  Alert, answering questions more appropriately but still confused. Medications:  Reviewed    Infusion Medications    dextrose      dextrose 5% and 0.45% NaCl with KCl 20 mEq 100 mL/hr at 03/06/21 0355     Scheduled Medications    magnesium sulfate  2,000 mg Intravenous Once    pantoprazole  40 mg Oral BID AC    apixaban  5 mg Oral BID    sodium chloride flush  10 mL Intravenous 2 times per day    meropenem  1,000 mg Intravenous Q8H    metoprolol succinate  25 mg Oral Daily     PRN Meds: glucose, dextrose, glucagon (rDNA), dextrose, sodium chloride flush, promethazine **OR** ondansetron, acetaminophen **OR** acetaminophen      Intake/Output Summary (Last 24 hours) at 3/6/2021 0756  Last data filed at 3/6/2021 0422  Gross per 24 hour   Intake 290 ml   Output 300 ml   Net -10 ml       Physical Exam Performed:    BP (!) 142/91   Pulse 107   Temp 97.2 °F (36.2 °C) (Oral)   Resp 20   Ht 5' 6\" (1.676 m)   Wt 273 lb 5.9 oz (124 kg)   SpO2 95%   BMI 44.12 kg/m²     General appearance: No apparent distress, appears stated age and cooperative. HEENT: Pupils equal, round, and reactive to light. Conjunctivae/corneas clear. Neck: Supple, with full range of motion. No jugular venous distention. Trachea midline. Respiratory:  Normal respiratory effort. Clear to auscultation, bilaterally without Rales/Wheezes/Rhonchi. Cardiovascular: Regular rate and rhythm with normal S1/S2 without murmurs, rubs or gallops. Abdomen: Soft, non-tender, non-distended with normal bowel sounds.   Musculoskeletal: no edema  Neuro: AAOx1      Labs:   Recent Labs     03/04/21  0812 03/04/21 2046 03/05/21  0531   WBC 8.6  --  6.4   HGB 9.7*  9.6* 8.9* 9.5* HCT 29.2*  29.9* 26.8* 29.0*     --  241     Recent Labs     03/04/21  0112 03/05/21  0531    140   K 3.7 3.1*   * 109   CO2 22 20*   BUN 7 5*   CREATININE 0.5* 0.5*   CALCIUM 9.1 8.4   PHOS 2.9 2.6     No results for input(s): AST, ALT, BILIDIR, BILITOT, ALKPHOS in the last 72 hours. No results for input(s): INR in the last 72 hours. No results for input(s): Nelli Xi in the last 72 hours. Urinalysis:      Lab Results   Component Value Date    NITRU Negative 03/03/2021    WBCUA 0-2 03/03/2021    BACTERIA Rare 03/03/2021    RBCUA 0-2 03/03/2021    BLOODU Negative 03/03/2021    SPECGRAV 1.025 03/03/2021    GLUCOSEU Negative 03/03/2021       Radiology:  CT CYSTOGRAM W WO CONTRAST   Final Result      Negative for bladder leak or perforation. No acute process. Lower anterior abdominal wall hernias. CT HEAD WO CONTRAST   Final Result   1. No acute intracranial findings. Assessment/Plan:    Active Hospital Problems    Diagnosis    Upper GI bleed [K92.2]     Acute metabolic encephalopathy  Called nursing home: Patient had another admission since leaving [facility], for a right tibial fracture for which she underwent surgery. She was discharged off TPN, was on clear liquids, was OFF TPN, and was doing well with clear liquids however there was no attempt to advance diet because she began insisting that she could not eat any food or take any medications due to fear of vomiting. At times she said she had vomited but no emesis noted by staff. She has had progressive decline in cognition with increased confusion and decreased PO intake. She had a PICC line but she removed it herself. She was getting Invanz via IV.   -started on Zyvox and Merrem empirically for possible sepsis due to UTI   -Had enterococcus UTI approximately one month ago  -Cx positive resistant E.  Coli 2/25, sensitive to zosyn, meropenem  -She was started on [place] Slaughter started 3/1 through an IV since she had pulled out the PICC.   -UA, urine culture negative, was already on treatment with ertapenem  -will continue on meropenem, Zyvox stopped   -blood cultures no growth to date  -CT HEAD: no acute intracranial findings  -Restarted on clears, then full liquid. Patient is tolerating well.   -Consult dietician  -Monitor for refeeding     Coffee-ground emesis: Noted in outside ED, no events at nursing home, none reported here.  -was on Protonix drip, Xarelto was held  -s/p recent EGD in 12/2020 with Dr. Sonia Lee, which showed normal esophagus, stomach and duodenum. Medium to large 5 cm sliding hiatal hernia, polyps 2 to 3 mm size located in the fundus body. -GI consulted, ok to restart anticoagulation, outpatient follow-up   -Protonix 40 mg BID     Abnormal CT scan reading, with air in the anterior abdominal wall concerning for possible bladder perforation:  -urology consulted. CT cystogram is negative for perforation.   -Sagastume removed  -Urology follow-up outpatient    Paraesophageal Hernia  Previously on TPN and clear liquid diet   Started clears, switched to full liquids she is tolerating well  Will see if able to advance diet to small meals, otherwise may need to re-initiate TPN  SLP to see also    H/O COVID: December 2020     History of atrial thrombus  History of A. Fib  History of DVT/PE, underwent TPA in 2016  -Ok to restart anticoagulation per GI  -Switch to Eliquis     Hypertension. Currently normotensive  -History of A. Fib,continue metoprolol XL 25 mg once daily but hold other medications     Morbid obesity due to excess calories:  -BMI 59.68- Complicating assessment and treatment. Placing patient at risk for multiple co-morbidities as well as early death and contributing to the patient's presentation.  on weight loss when appropriate.     DVT Prophylaxis: Eliquis  Diet: Dietary Nutrition Supplements: Clear Liquid Oral Supplement  DIET FULL LIQUID;  Code Status: Full Code    PT/OT Eval Status: ordered    Dispo - 1-3 days, pending further hospital course, dietary plan.      Myranda Zhong DO

## 2021-03-06 NOTE — PROGRESS NOTES
strategies/rationale (upright position, small bites/sips, slow rate, alternate liquids/solids). Pt expressed some comprehension, however suspect will require ongoing reinforcement. Cont goal    General  Chart Reviewed: Yes  Comments: Per admitting H&P (03/02/2021): 'Pt. Is a 62 yo F who was admitted for concern of possible bladder perforation and coffee ground emesis. Urology, GI and surgery have been consulted. '  Subjective  Subjective: Received pt awake, alert, pleasantly confused, resting in bed, on room air. Behavior/Cognition: Alert; Cooperative;Pleasant mood;Confused; Requires cueing  Respiratory Status: Room air  O2 Device: None (Room air)  Communication Observation: Functional  Follows Directions: Simple  Dentition: Adequate  Patient Positioning: Upright in bed  Baseline Vocal Quality: Normal  Volitional Cough: Strong  Consistencies Administered: Dysphagia Pureed (Dysphagia I); Thin - straw; Thin - teaspoon; Ice Chips;Dysphagia Soft and Bite-Sized (Dysphagia III)    Prior Dysphagia History:   Pt with h/o dysphagia in setting of hiatal hernia/GI issues. She was recently seen by speech department last month, with BSE completed 2/6/21; recommendations at that time were for thin liquid (clear liquid) diet in accordance with GI order. Per chart, pt was discharged back to SNF, and continued on liquid diet/TPN, with pt declining solid PO. Vision/Hearing  Vision  Vision: Impaired  Vision Exceptions: Wears glasses at all times  Hearing  Hearing: Exceptions to [facility]  Hearing Exceptions: Hard of hearing/hearing concerns    Oral Motor Deficits  Oral/Motor  Oral Motor: Within functional limits    Prognosis  Prognosis  Prognosis for safe diet advancement: good    Education  Patient Education: Educated pt re: purpose of visit, swallow function, recommendations. Patient Education Response: Verbalizes understanding;Needs reinforcement  Safety Devices in place: Yes  Type of devices: Call light within reach; Left in bed;Bed alarm in place; All fall risk precautions in place; Other (comment); Nurse notified(reinforced use of call light; all needs met)       Therapy Time  SLP Individual Minutes  Time In: 0678  Time Out: [address]  Minutes: 25     SLP Total Treatment Time  Timed Code Treatment Minutes: 0 Minutes  Total Treatment Time: 25    Plan  Diet Recommendations: Dental soft solids / thin liquids / meds PO; as tolerated. If signs of aspiration or associated decline in respiratory status arise, recommend withhold PO and contact speech. Discharge Plan:  TBD  Discussed with RN, Carmen Hsu. Needs within reach. Electronically Signed by:  Vane Pollard M.A., Rm Cordova   Speech-Language Pathologist  Pager #[phone]    This document will serve as a discharge summary if pt discharges before next treatment.

## 2021-03-06 NOTE — PROGRESS NOTES
Pt refused vital signs at this time. States MD said she didn't have to get her BP taken. Last set of vitals were WNL (per flowsheet). Will reassess later or as indicated. Will continue to monitor.   Electronically signed by Abundio Finch RN on 3/6/2021 at 4:31 PM

## 2021-03-07 LAB
ALBUMIN SERPL-MCNC: 2.5 G/DL (ref 3.4–5)
ALBUMIN SERPL-MCNC: 2.7 G/DL (ref 3.4–5)
ANION GAP SERPL CALCULATED.3IONS-SCNC: 11 MMOL/L (ref 3–16)
ANION GAP SERPL CALCULATED.3IONS-SCNC: 7 MMOL/L (ref 3–16)
BASOPHILS ABSOLUTE: 0 K/UL (ref 0–0.2)
BASOPHILS RELATIVE PERCENT: 0.5 %
BUN BLDV-MCNC: 2 MG/DL (ref 7–20)
BUN BLDV-MCNC: 3 MG/DL (ref 7–20)
CALCIUM SERPL-MCNC: 8.4 MG/DL (ref 8.3–10.6)
CALCIUM SERPL-MCNC: 8.4 MG/DL (ref 8.3–10.6)
CHLORIDE BLD-SCNC: 109 MMOL/L (ref 99–110)
CHLORIDE BLD-SCNC: 114 MMOL/L (ref 99–110)
CO2: 20 MMOL/L (ref 21–32)
CO2: 23 MMOL/L (ref 21–32)
CREAT SERPL-MCNC: <0.5 MG/DL (ref 0.6–1.2)
CREAT SERPL-MCNC: <0.5 MG/DL (ref 0.6–1.2)
EOSINOPHILS ABSOLUTE: 0.5 K/UL (ref 0–0.6)
EOSINOPHILS RELATIVE PERCENT: 7.2 %
GFR AFRICAN AMERICAN: >60
GFR AFRICAN AMERICAN: >60
GFR NON-AFRICAN AMERICAN: >60
GFR NON-AFRICAN AMERICAN: >60
GLUCOSE BLD-MCNC: 117 MG/DL (ref 70–99)
GLUCOSE BLD-MCNC: 75 MG/DL (ref 70–99)
GLUCOSE BLD-MCNC: 77 MG/DL (ref 70–99)
GLUCOSE BLD-MCNC: 77 MG/DL (ref 70–99)
GLUCOSE BLD-MCNC: 82 MG/DL (ref 70–99)
HCT VFR BLD CALC: 31.6 % (ref 36–48)
HCT VFR BLD CALC: 33.5 % (ref 36–48)
HEMOGLOBIN: 10.4 G/DL (ref 12–16)
HEMOGLOBIN: 11.3 G/DL (ref 12–16)
LYMPHOCYTES ABSOLUTE: 1.5 K/UL (ref 1–5.1)
LYMPHOCYTES RELATIVE PERCENT: 23.2 %
MAGNESIUM: 2.1 MG/DL (ref 1.8–2.4)
MCH RBC QN AUTO: 32.3 PG (ref 26–34)
MCHC RBC AUTO-ENTMCNC: 32.9 G/DL (ref 31–36)
MCV RBC AUTO: 98.2 FL (ref 80–100)
MONOCYTES ABSOLUTE: 0.5 K/UL (ref 0–1.3)
MONOCYTES RELATIVE PERCENT: 8.2 %
NEUTROPHILS ABSOLUTE: 3.9 K/UL (ref 1.7–7.7)
NEUTROPHILS RELATIVE PERCENT: 60.9 %
PDW BLD-RTO: 17.9 % (ref 12.4–15.4)
PERFORMED ON: NORMAL
PHOSPHORUS: 1.8 MG/DL (ref 2.5–4.9)
PHOSPHORUS: 2.8 MG/DL (ref 2.5–4.9)
PLATELET # BLD: 293 K/UL (ref 135–450)
PMV BLD AUTO: 11.5 FL (ref 5–10.5)
POTASSIUM SERPL-SCNC: 3.3 MMOL/L (ref 3.5–5.1)
POTASSIUM SERPL-SCNC: 4.3 MMOL/L (ref 3.5–5.1)
RBC # BLD: 3.22 M/UL (ref 4–5.2)
SODIUM BLD-SCNC: 140 MMOL/L (ref 136–145)
SODIUM BLD-SCNC: 144 MMOL/L (ref 136–145)
WBC # BLD: 6.4 K/UL (ref 4–11)

## 2021-03-07 PROCEDURE — 85025 COMPLETE CBC W/AUTO DIFF WBC: CPT

## 2021-03-07 PROCEDURE — 36415 COLL VENOUS BLD VENIPUNCTURE: CPT

## 2021-03-07 PROCEDURE — 85014 HEMATOCRIT: CPT

## 2021-03-07 PROCEDURE — 6360000002 HC RX W HCPCS: Performed by: INTERNAL MEDICINE

## 2021-03-07 PROCEDURE — 6370000000 HC RX 637 (ALT 250 FOR IP): Performed by: INTERNAL MEDICINE

## 2021-03-07 PROCEDURE — 1200000000 HC SEMI PRIVATE

## 2021-03-07 PROCEDURE — 2580000003 HC RX 258: Performed by: INTERNAL MEDICINE

## 2021-03-07 PROCEDURE — 83735 ASSAY OF MAGNESIUM: CPT

## 2021-03-07 PROCEDURE — 85018 HEMOGLOBIN: CPT

## 2021-03-07 PROCEDURE — 80069 RENAL FUNCTION PANEL: CPT

## 2021-03-07 RX ORDER — POTASSIUM CHLORIDE 7.45 MG/ML
10 INJECTION INTRAVENOUS
Status: COMPLETED | OUTPATIENT
Start: 2021-03-07 | End: 2021-03-07

## 2021-03-07 RX ADMIN — POTASSIUM CHLORIDE 10 MEQ: 10 INJECTION, SOLUTION INTRAVENOUS at 12:32

## 2021-03-07 RX ADMIN — PANTOPRAZOLE SODIUM 40 MG: 40 TABLET, DELAYED RELEASE ORAL at 06:09

## 2021-03-07 RX ADMIN — PANTOPRAZOLE SODIUM 40 MG: 40 TABLET, DELAYED RELEASE ORAL at 15:58

## 2021-03-07 RX ADMIN — Medication 250 MG: at 22:02

## 2021-03-07 RX ADMIN — APIXABAN 5 MG: 5 TABLET, FILM COATED ORAL at 21:59

## 2021-03-07 RX ADMIN — MEROPENEM 1000 MG: 1 INJECTION, POWDER, FOR SOLUTION INTRAVENOUS at 09:25

## 2021-03-07 RX ADMIN — METOPROLOL SUCCINATE 25 MG: 25 TABLET, EXTENDED RELEASE ORAL at 09:23

## 2021-03-07 RX ADMIN — MEROPENEM 1000 MG: 1 INJECTION, POWDER, FOR SOLUTION INTRAVENOUS at 17:59

## 2021-03-07 RX ADMIN — MEROPENEM 1000 MG: 1 INJECTION, POWDER, FOR SOLUTION INTRAVENOUS at 02:47

## 2021-03-07 RX ADMIN — Medication 250 MG: at 09:23

## 2021-03-07 RX ADMIN — POTASSIUM CHLORIDE 10 MEQ: 10 INJECTION, SOLUTION INTRAVENOUS at 14:37

## 2021-03-07 RX ADMIN — APIXABAN 5 MG: 5 TABLET, FILM COATED ORAL at 09:23

## 2021-03-07 RX ADMIN — POTASSIUM CHLORIDE 10 MEQ: 10 INJECTION, SOLUTION INTRAVENOUS at 11:19

## 2021-03-07 RX ADMIN — POTASSIUM BICARBONATE 40 MEQ: 782 TABLET, EFFERVESCENT ORAL at 09:23

## 2021-03-07 RX ADMIN — POTASSIUM CHLORIDE 10 MEQ: 10 INJECTION, SOLUTION INTRAVENOUS at 15:59

## 2021-03-07 ASSESSMENT — PAIN SCALES - GENERAL: PAINLEVEL_OUTOF10: 0

## 2021-03-07 NOTE — PROGRESS NOTES
Pt arrived from PCU at around 1230, agreeable to VS as long as RN promised to not let it squeeze too tight. Pt continues to have waxing and waning mentation but RN has observed fairly consistent confusion to situation as she believes that Dr has not rounded on her and keeps repeating questions about whether or not she is leaving tomorrow despite RN repeatedly re-orienting her to situation. Pt has continued to be incontinent, checked frequently for occurrences as pt's purewick hasn't always been successful in catching urine. Bed alarm in place, Will continue to monitor.

## 2021-03-07 NOTE — PROGRESS NOTES
Pt orientation waxes and wanes. Pt averaged 25-50% consumption of meals yesterday. This morning she is refusing to eat breakfast \"because the doctor says I don't have to\" She refers to the doctor when she states she doesn't have to do something, even though RN previously discussed plan with MD. Patient placed on calorie count per MD order. Will continue to monitor.   Electronically signed by Sharmaine Ontiveros RN on 3/7/2021 at 9:59 AM

## 2021-03-07 NOTE — PROGRESS NOTES
Hospitalist Progress Note      PCP: Vincent Fitzgerald MD    Date of Admission: 3/2/2021    Hospital Course: Pt. Is a 62 yo F who was admitted for concern of possible bladder perforation and coffee ground emesis. Urology, GI and surgery have been consulted. Subjective:  Alert, knows she is in the hospital and the year. Medications:  Reviewed    Infusion Medications    dextrose      dextrose 5% and 0.45% NaCl with KCl 20 mEq 50 mL/hr at 03/06/21 1318     Scheduled Medications    potassium bicarb-citric acid  40 mEq Oral Once    saccharomyces boulardii  250 mg Oral BID    pantoprazole  40 mg Oral BID AC    apixaban  5 mg Oral BID    sodium chloride flush  10 mL Intravenous 2 times per day    meropenem  1,000 mg Intravenous Q8H    metoprolol succinate  25 mg Oral Daily     PRN Meds: glucose, dextrose, glucagon (rDNA), dextrose, sodium chloride flush, promethazine **OR** ondansetron, acetaminophen **OR** acetaminophen      Intake/Output Summary (Last 24 hours) at 3/7/2021 0836  Last data filed at 3/7/2021 0800  Gross per 24 hour   Intake 360 ml   Output 1375 ml   Net -1015 ml       Physical Exam Performed:    /86   Pulse 106   Temp 97 °F (36.1 °C) (Oral)   Resp 18   Ht 5' 6\" (1.676 m)   Wt 273 lb 5.9 oz (124 kg)   SpO2 98%   BMI 44.12 kg/m²     General appearance: No apparent distress, appears stated age and cooperative. HEENT: Pupils equal, round, and reactive to light. Conjunctivae/corneas clear. Neck: Supple, with full range of motion. No jugular venous distention. Trachea midline. Respiratory:  Normal respiratory effort. Clear to auscultation, bilaterally without Rales/Wheezes/Rhonchi. Cardiovascular: Regular rate and rhythm with normal S1/S2 without murmurs, rubs or gallops. Abdomen: Soft, non-tender, non-distended with normal bowel sounds.   Musculoskeletal: no edema  Neuro: AAOx1      Labs:   Recent Labs     03/05/21  0531 03/06/21  0816 03/06/21 2006 03/07/21  0615   WBC 6.4 6.8  --  6.4   HGB 9.5* 10.3* 10.2* 10.4*   HCT 29.0* 33.2* 31.4* 31.6*    252  --  293     Recent Labs     03/06/21  0816 03/06/21  1748 03/07/21  0615   * 139 144   K 3.4* 3.7 3.3*    109 114*   CO2 19* 20* 23   BUN 4* 3* 3*   CREATININE <0.5* <0.5* <0.5*   CALCIUM 8.6 8.4 8.4   PHOS 2.1* 2.4* 2.8     No results for input(s): AST, ALT, BILIDIR, BILITOT, ALKPHOS in the last 72 hours. No results for input(s): INR in the last 72 hours. No results for input(s): Sherley Gaster in the last 72 hours. Urinalysis:      Lab Results   Component Value Date    NITRU Negative 03/03/2021    WBCUA 0-2 03/03/2021    BACTERIA Rare 03/03/2021    RBCUA 0-2 03/03/2021    BLOODU Negative 03/03/2021    SPECGRAV 1.025 03/03/2021    GLUCOSEU Negative 03/03/2021       Radiology:  CT CYSTOGRAM W WO CONTRAST   Final Result      Negative for bladder leak or perforation. No acute process. Lower anterior abdominal wall hernias. CT HEAD WO CONTRAST   Final Result   1. No acute intracranial findings. Assessment/Plan:    Active Hospital Problems    Diagnosis    Upper GI bleed [K92.2]     Acute metabolic encephalopathy likely multifactorial, UTI, decreased nutrition, and delirium  Called nursing home: Patient had another admission since leaving [facility], for a right tibial fracture for which she underwent surgery. She was discharged off TPN, was on clear liquids, was OFF TPN, and was doing well with clear liquids however there was no attempt to advance diet because she began insisting that she could not eat any food or take any medications due to fear of vomiting. At times she said she had vomited but no emesis noted by staff. She had progressive decline in cognition with increased confusion and decreased PO intake over 1-2 weeks. She had a PICC line but she removed it herself.  She was getting Invanz via IV.   -started on Zyvox and Merrem empirically for possible sepsis due to UTI on dmission  -Had enterococcus UTI approximately one month ago  -She had new Cx which was positive resistant E. Coli 2/25, sensitive to zosyn, meropenem  -She was started on Jenny Stamp started 3/1 through an IV since she had pulled out the PICC.   -UA, urine culture negative, was already on treatment with ertapenem  -will continue on meropenem (today is day 7), Zyvox stopped   -blood cultures no growth to date  -CT HEAD: no acute intracranial findings  -Restarted on clears, then full liquid then dental soft. Patient is tolerating well.   -Consult dietician  -Consult SLP  -Monitor for refeeding  -Start a calorie count, will need to reassess if needs PICC for TPN or if can be discharged on current diet. Her mental status has significantly improved with increased PO.   -complete meropenem today   -D5 1/2 NS with K decreased to 50 ml/hr   -Change oral supplement     Coffee-ground emesis: Noted in outside ED, no events at nursing home, none reported here.  -was on Protonix drip, Xarelto was held  -s/p recent EGD in 12/2020 with Dr. Harper Rose, which showed normal esophagus, stomach and duodenum. Medium to large 5 cm sliding hiatal hernia, polyps 2 to 3 mm size located in the fundus body. -GI consulted, ok to restart anticoagulation, outpatient follow-up   -Protonix 40 mg BID     Abnormal CT scan reading, with air in the anterior abdominal wall concerning for possible bladder perforation:  -urology consulted. CT cystogram is negative for perforation.   -Sagastume removed  -Urology follow-up outpatient    Paraesophageal Hernia  Previously on TPN and clear liquid diet   Started clears, switched to full liquids she is tolerating well  Will see if able to advance diet to small meals, otherwise may need to re-initiate TPN  SLP following  Gen Surgery follow-up as planned outpatient     H/O COVID: December 2020     History of atrial thrombus  History of A.  Fib  History of DVT/PE, underwent TPA in 2016  -Ok to restart anticoagulation per GI  -Switch to Eliquis     Hypertension. Currently normotensive  -History of ERROL Barney,continue metoprolol XL 25 mg once daily   but hold other medications    Hypokalemia  She is not wanting to take orally  Will give IV with fluids and see if she is able to tolerate this  Continue supplements as above  Mg ok     Morbid obesity due to excess calories:  -BMI 61.21- Complicating assessment and treatment. Placing patient at risk for multiple co-morbidities as well as early death and contributing to the patient's presentation.  on weight loss when appropriate. DVT Prophylaxis: Eliquis  Diet: Dietary Nutrition Supplements: Clear Liquid Oral Supplement  DIET DENTAL SOFT; Dental Soft; Safety Tray; Safety Tray (Disposables)  Code Status: Full Code    PT/OT Eval Status: ordered    Dispo - 1-3 days, pending further hospital course, dietary plan.      Edgar Cunningham, DO

## 2021-03-07 NOTE — PROGRESS NOTES
Pt became very upset when awoken for VS. Pt refused VS and said she didn't feel she needed them. She complained of arm pain when BP taken previously in the shift.

## 2021-03-07 NOTE — PROGRESS NOTES
Pt refused vital signs at this time. MD aware. Pt being transferred to room 5304. Report called to Joni Haywood RN on 5S.    Electronically signed by Yanique Ashley RN on 3/7/2021 at 11:25 AM

## 2021-03-08 LAB
ALBUMIN SERPL-MCNC: 2.4 G/DL (ref 3.4–5)
ANION GAP SERPL CALCULATED.3IONS-SCNC: 8 MMOL/L (ref 3–16)
BASOPHILS ABSOLUTE: 0 K/UL (ref 0–0.2)
BASOPHILS RELATIVE PERCENT: 0.7 %
BUN BLDV-MCNC: 2 MG/DL (ref 7–20)
CALCIUM SERPL-MCNC: 8.3 MG/DL (ref 8.3–10.6)
CHLORIDE BLD-SCNC: 112 MMOL/L (ref 99–110)
CO2: 20 MMOL/L (ref 21–32)
CREAT SERPL-MCNC: <0.5 MG/DL (ref 0.6–1.2)
CULTURE, BLOOD 2: NORMAL
EOSINOPHILS ABSOLUTE: 0.4 K/UL (ref 0–0.6)
EOSINOPHILS RELATIVE PERCENT: 8.2 %
GFR AFRICAN AMERICAN: >60
GFR NON-AFRICAN AMERICAN: >60
GLUCOSE BLD-MCNC: 94 MG/DL (ref 70–99)
HCT VFR BLD CALC: 28.6 % (ref 36–48)
HEMOGLOBIN: 9.4 G/DL (ref 12–16)
LYMPHOCYTES ABSOLUTE: 1.3 K/UL (ref 1–5.1)
LYMPHOCYTES RELATIVE PERCENT: 26.6 %
MCH RBC QN AUTO: 32.5 PG (ref 26–34)
MCHC RBC AUTO-ENTMCNC: 32.9 G/DL (ref 31–36)
MCV RBC AUTO: 98.9 FL (ref 80–100)
MONOCYTES ABSOLUTE: 0.5 K/UL (ref 0–1.3)
MONOCYTES RELATIVE PERCENT: 10.6 %
NEUTROPHILS ABSOLUTE: 2.7 K/UL (ref 1.7–7.7)
NEUTROPHILS RELATIVE PERCENT: 53.9 %
PDW BLD-RTO: 17.4 % (ref 12.4–15.4)
PHOSPHORUS: 2.2 MG/DL (ref 2.5–4.9)
PLATELET # BLD: 267 K/UL (ref 135–450)
PMV BLD AUTO: 11 FL (ref 5–10.5)
POTASSIUM SERPL-SCNC: 4.3 MMOL/L (ref 3.5–5.1)
RBC # BLD: 2.9 M/UL (ref 4–5.2)
SODIUM BLD-SCNC: 140 MMOL/L (ref 136–145)
WBC # BLD: 5.1 K/UL (ref 4–11)

## 2021-03-08 PROCEDURE — 92526 ORAL FUNCTION THERAPY: CPT

## 2021-03-08 PROCEDURE — 6370000000 HC RX 637 (ALT 250 FOR IP): Performed by: INTERNAL MEDICINE

## 2021-03-08 PROCEDURE — 85025 COMPLETE CBC W/AUTO DIFF WBC: CPT

## 2021-03-08 PROCEDURE — 80069 RENAL FUNCTION PANEL: CPT

## 2021-03-08 PROCEDURE — 2500000003 HC RX 250 WO HCPCS: Performed by: INTERNAL MEDICINE

## 2021-03-08 PROCEDURE — 36415 COLL VENOUS BLD VENIPUNCTURE: CPT

## 2021-03-08 PROCEDURE — 97530 THERAPEUTIC ACTIVITIES: CPT

## 2021-03-08 PROCEDURE — 97535 SELF CARE MNGMENT TRAINING: CPT

## 2021-03-08 PROCEDURE — 1200000000 HC SEMI PRIVATE

## 2021-03-08 PROCEDURE — 84134 ASSAY OF PREALBUMIN: CPT

## 2021-03-08 RX ADMIN — Medication 250 MG: at 20:47

## 2021-03-08 RX ADMIN — Medication 250 MG: at 09:40

## 2021-03-08 RX ADMIN — DEXTROSE MONOHYDRATE, SODIUM CHLORIDE, AND POTASSIUM CHLORIDE: 50; 4.5; 1.49 INJECTION, SOLUTION INTRAVENOUS at 01:12

## 2021-03-08 RX ADMIN — PANTOPRAZOLE SODIUM 40 MG: 40 TABLET, DELAYED RELEASE ORAL at 15:29

## 2021-03-08 RX ADMIN — METOPROLOL SUCCINATE 25 MG: 25 TABLET, EXTENDED RELEASE ORAL at 09:40

## 2021-03-08 RX ADMIN — APIXABAN 5 MG: 5 TABLET, FILM COATED ORAL at 09:40

## 2021-03-08 RX ADMIN — DEXTROSE MONOHYDRATE, SODIUM CHLORIDE, AND POTASSIUM CHLORIDE: 50; 4.5; 1.49 INJECTION, SOLUTION INTRAVENOUS at 23:01

## 2021-03-08 RX ADMIN — PANTOPRAZOLE SODIUM 40 MG: 40 TABLET, DELAYED RELEASE ORAL at 05:55

## 2021-03-08 RX ADMIN — APIXABAN 5 MG: 5 TABLET, FILM COATED ORAL at 20:47

## 2021-03-08 ASSESSMENT — PAIN SCALES - GENERAL
PAINLEVEL_OUTOF10: 0

## 2021-03-08 NOTE — CARE COORDINATION
Cm following, pt from [address] [place] [address] for Beata Chan [phone] to see if precert was started on Friday 3/5 and if not of she can start percert today for poss DC Tuesday or Wednesday, once feeding plan made between PICC with TPN vs eating vs peg tube.   Electronically signed by Walt Parra RN on 3/8/2021 at 3:04 PM  [phone]

## 2021-03-08 NOTE — PROGRESS NOTES
Speech Language Pathology  Facility/Department: [facility]'[address] SURGERY  Dysphagia Daily Treatment & Discharge Note    NAME: Millie Emerson  : 1960  MRN: [id number]    Patient Diagnosis(es):   Patient Active Problem List    Diagnosis Date Noted    Upper GI bleed 2021    Moderate protein-calorie malnutrition ([address]     Pharyngoesophageal dysphagia     Mild malnutrition ([address] 2021    Paraesophageal hernia 2021    Hypotension     Abnormal ECG     Weight loss     Nausea and vomiting 2020    Intractable vomiting with nausea 2020    Hiatal hernia     Hypoglycemia     Paroxysmal atrial fibrillation (HCC)     History of pulmonary embolism     Abnormal EKG     Bacterial urinary tract infection 12/15/2020    Intractable nausea and vomiting     COVID-19 virus infection     Right medial tibial plateau fracture, closed, initial encounter 2020    HTN (hypertension)     Morbid obesity ([address]     HLD (hyperlipidemia)     Primary osteoarthritis of left knee 10/30/2020    Degenerative tear of lateral meniscus of right knee 2017    Primary osteoarthritis of right knee 2017    Lateral knee pain 2017    Pubic ramus fracture ([address] 2016    Right sided sciatica 2016     Allergies: No Known Allergies  Onset Date: 2021    Recent Chest Xray/CT of Chest:   none this visit     Recent CT Head: (2021)      Impression   1. No acute intracranial findings.      Previous MBS (n/a)    Chart reviewed. Medical Diagnosis: Upper GI Bleed  Treatment Diagnosis: Dysphagia    BSE Impression (2021)-  Patient presents with mild oral dysphagia characterized by prolonged mastication of solids.  Analyzed tolerance of ice chips, thins via tsp/straw, puree, soft solids; pt with positive oral acceptance, mildly prolonged but effective mastication, positive swallow onset/laryngeal movement, good oral clearance, no overt s/s aspiration or indication of globus sensation. Recommend continue dental soft solids / thin liquids, as tolerated. Will continue to follow    MBS results (n/a)-    Pain: None indicated    Current Diet :  Dental soft solids / thin liquids    Treatment:  Pt seen bedside to address the following goals:  Goal 1: Patient will tolerate least restrictive diet without overt signs of aspiration or associated decline in respiratory status. 3/8: Received pt awake, alert, and pleasant, seen seated up in bed, on room air. Per RN, pt has been tolerating diet without concern. Analyzed tolerance dental soft / thin liquids during breakfast. Pt with positive oral acceptance, adequate mastication, good oral clearance, positive swallow onset, no overt s/s aspiration. Reviewed recommendations for soft solid / thin liquids; pt expressed agreement. Given waxing/waning energy level and concern for possible GI issue, recommend continue soft solids. Goal met; d/c goal.  Goal 2: Patient/caregiver will demonstrate understanding of swallowing concerns/recommendations. 3/6: Provided education to the patient about swallow function, dysphagia, aspiration, previous dysphagia history/recommendations, current swallow function, diet recommendations (soft solids / thin liquids), safety strategies/rationale (upright position, small bites/sips, slow rate, alternate liquids/solids). Pt expressed some comprehension, however suspect will require ongoing reinforcement. Cont goal  3/8: Provided further education to patient re: swallow function, diet recommendations, dysphagia, safety strategies (small bites/sips, slow rate, upright position). Pt indicated comprehension. Goal met; d/c goal      Patient/Family/Caregiver Education:  See goal 2 above. Compensatory Strategies:  Compensatory Swallowing Strategies: Upright as possible for all oral intake;Eat/Feed slowly; Alternate solids and liquids;Small bites/sips     Plan:  Continued daily Dysphagia treatment with goals

## 2021-03-08 NOTE — PROGRESS NOTES
NUTRITION ASSESSMENT  Admission Date: 3/2/2021     Type and Reason for Visit: Reassess    NUTRITION RECOMMENDATIONS:   1. Daily calorie count per MD.   2. Restart PO diet - Dental Soft diet with thin liquids per SLP rec'd 3/8/21  3. Add ONS TID w/diet advancement. Ensure in AM; Boost Pudding @ IRASEMA/DIN     NUTRITION ASSESSMENT:  RD notes MD started KCAL count 3/7; no tickets recorded. RD discussed w/RN, who reports pt NPO this AM for procedure. Reviewed pt was previously on liquid diet and PN pta. RD will add ONS with diet advancement and encouraged pt PO intake. RD continues to monitor closely. MALNUTRITION ASSESSMENT  Context of Malnutrition: Acute Illness   Malnutrition Status: At risk for malnutrition (Comment)  Findings of the 6 clinical characteristics of malnutrition (Minimum of 2 out of 6 clinical characteristics is required to make the diagnosis of moderate or severe Protein Calorie Malnutrition based on AND/ASPEN Guidelines):  Energy Intake: Less than/equal to 50% of estimated energy requirements    Energy Intake Time: since admit; and Greater than or equal to 5 days    Weight Loss %: Unable to assess    Weight loss Time: Greater than or equal to 3 months   Due to current CDC guidelines recommending 6-ft distancing for social isolation for COVID19 prevention, physical aspects of the malnutrition assessment were withheld at this time.      NUTRITION DIAGNOSIS   Problem: Problem #1: Inadequate oral intake  Etiology: Insufficient energy/nutrient consumption  Signs & Symptoms: Diet history of poor intake  and Intake 0-25%    NUTRITION INTERVENTION  Food and/or Nutrient Delivery:Continue Current diet  or Start ONS   Nutrition education/counseling/coordination of care: Continue Inpatient Monitoring     NUTRITION MONITORING & EVALUATION:  Evaluation:Progressing towards goal   Goals:Goals: Pt will tolerate diet advance to improve PO intake to consume greater than 50% of meals and ons offered through admission Monitoring: Diet Tolerance , Meal Intake , Mental Status/Confusion , Pertinent Labs , Supplement Intake  or Weight      OBJECTIVE DATA:  · Nutrition-Focused Physical Findings: lbm 3/8- loose and watery  · Wounds None      Past Medical History:   Diagnosis Date    Arthritis     [facility] ([facility]est[facility] failure) [facility]     COVID-19 12/15/2020    ESBL (extended spectrum beta-lactamase) producing bacteria infection     Glaucoma     Hemorrhoids     HTN (hypertension)     Hyperlipidemia     Moderate protein-calorie malnutrition (HCC)     Morbid obesity (HCC)     Muscular wasting and disuse atrophy     PAF (paroxysmal atrial fibrillation) (HCC)     UTI (urinary tract infection)     VRE (vancomycin resistant enterococcus) culture positive 02/09/2021    urine        ANTHROPOMETRICS  Current Height: 5' 6\" (167.6 cm)  Current Weight: 273 lb 5.9 oz (124 kg)    Admission weight: 273 lb 5.9 oz (124 kg)  Ideal Bodyweight 130 lb     Weight Changes VIJI d/t weight fluctuations; noted weight loss trend      BMI BMI (Calculated): 44.2    Wt Readings from Last 50 Encounters:   03/02/21 273 lb 5.9 oz (124 kg)   02/05/21 245 lb (111.1 kg)   01/07/21 259 lb (117.5 kg)   12/28/20 259 lb 14.4 oz (117.9 kg)   12/28/20 237 lb (107.5 kg)   12/14/20 237 lb (107.5 kg)   11/20/20 (!) 308 lb (139.7 kg)   11/09/20 (!) 308 lb 13.8 oz (140.1 kg)     COMPARATIVE STANDARDS  Estimated Total Kcals/Day : 10-15 Current Bodyweight (124 kg) 2586-0807 kcal    Estimated Total Protein (g/day) : 1.5-2.0 Ideal Bodyweight  (59 kg)  g/day  Estimated Daily Total Fluid (ml/day): 1 mL/kcal per day     Food / Nutrition-Related History  Pre-Admission / Home Diet:  Pre-Admission/Home Diet: General   Home Supplements / Herbals:    none noted  Food Restrictions / Cultural Requests:    none noted    Current Nutrition Therapies   DIET DENTAL SOFT; Dental Soft; Safety Tray; Safety Tray (Disposables)  Dietary Nutrition Supplements: Frozen Oral Supplement PO Intake: 26-50% and Refused   PO Supplement: None   PO Supplement Intake: None   IVF: D5 + 1/2 NS + 20 mEq KCl   @ 50 ml/hr   NUTRITION RISK LEVEL: Risk Level: [facility], [place], LD  [place]:  [phone]  Office:  [phone]

## 2021-03-08 NOTE — PROGRESS NOTES
Physical Therapy  Facility/Department: [facility]'[address]  Daily Treatment Note  NAME: Caity Conroy  : 1960  MRN: [id number]    Date of Service: 3/8/2021    Discharge Recommendations:    Caity Conroy scored a 6/24 on the AM-PAC short mobility form. Current research shows that an AM-PAC score of 17 or less is typically not associated with a discharge to the patient's home setting. Based on the patient's AM-PAC score and their current functional mobility deficits, it is recommended that the patient have 3-5 sessions per week of Physical Therapy at d/c to increase the patient's independence. Please see assessment section for further patient specific details. If patient discharges prior to next session this note will serve as a discharge summary. Please see below for the latest assessment towards goals. PT Equipment Recommendations  Other: defer to next level of care    Assessment   Body structures, Functions, Activity limitations: Decreased functional mobility   Assessment: Pt currently requiring Ax2-3 for bed mobility and unable to stand with Ax3. Pt is well below her baseline and would benefit from further skilled PT to maximize safety and independence with functional mobility. Will continue to follow. Treatment Diagnosis: impaired functional mobility  PT Education: PT Role;Functional Mobility Training;General Safety  Patient Education: pt demonstrates partial understanding; rec reinforcement  REQUIRES PT FOLLOW UP: Yes  Activity Tolerance  Activity Tolerance: Patient limited by endurance; Patient limited by fatigue     Patient Diagnosis(es): There were no encounter diagnoses.      has a past medical history of Arthritis, CHF (congestive heart failure) ([address] COVID-19, ESBL (extended spectrum beta-lactamase) producing bacteria infection, Glaucoma, Hemorrhoids, HTN (hypertension), Hyperlipidemia, Moderate protein-calorie malnutrition ([address] Morbid obesity ([address] Muscular wasting and disuse atrophy, PAF (paroxysmal atrial fibrillation) ([facility] UTI (urinary tract infection), and VRE (vancomycin resistant enterococcus) culture positive. has a past surgical history that includes  section; bladder repair; Cholecystectomy; Cystoscopy; Tibia fracture surgery (Right, 2020); and Upper gastrointestinal endoscopy (N/A, 2020). Restrictions  Position Activity Restriction  Other position/activity restrictions: up with assist  Subjective   General  Chart Reviewed: Yes  Additional Pertinent Hx: Admit 3/2 for concern of possible bladder perforation and coffee ground emesis; (+) acute encephalopathy 2* UTI; PMHx: arthritis, CHF, HTN, morbid obesity, muscular wasting, COVID Dec 2020, a-fib, UTI, R tibia fx 2020  Family / Caregiver Present: No  Referring Practitioner: Milo,   Subjective  Subjective: Pt found supine in bed upon arrival, denying pain anf agreeable to therapy. General Comment  Comments: Pt found to be incontinent of stool. Orientation  Orientation  Overall Orientation Status: Oriented to situation and Oriented to self (unsure of birthday), stating \"Hospital\" for place but unsure which one.    Objective   Bed mobility  Rolling to Left: 2 Person assistance(mod-max Ax2 multiple times for pericare, brief change, and chilango pad placement)  Rolling to Right: 2 Person assistance  Supine to Sit: Dependent/Total(max Ax3)  Sit to Supine: Dependent/Total(mod Ax2)  Scooting: Dependent/Total(x3 higher up in bed via chilango pad with bed in trendelenberg)  Transfers  Sit to Stand: Dependent/Total(failed attempt with max Ax2, Partial stand with Max Ax3 however weight shift forward off EOB and BLE buckling requiring max Ax3 to return to EOB)            Exercises  Comments: Heel slides 1x10 with min Ax1 for full ROM      Goals  Short term goals  Time Frame for Short term goals: discharge  Short term goal 1: Pt will transfer supine <--> sit with min A x 1 ongoing  Short term goal 2: Pt will sit EOB x 5 min with SBA throughout ongoing  Short term goal 3: Pt will demo 5 reps LE AROM ex independently ongoing  Patient Goals   Patient goals : return to [address]  Times per week: 2-5  Current Treatment Recommendations: Strengthening, ROM, Balance Training, Functional Mobility Training, Transfer Training, Home Exercise Program, Patient/Caregiver Education & Training  Safety Devices  Type of devices: Call light within reach, Nurse notified, Bed alarm in place, Left in bed     Therapy Time   Individual Concurrent Group Co-treatment   Time In 1200         Time Out 1255         Minutes 55           Timed Code Treatment Minutes:  55    Total Treatment Minutes:  55    If the patient is discharged before the next treatment session, this note will serve as the discharge summary.      Bar Braga, PT, DPT

## 2021-03-08 NOTE — PROGRESS NOTES
Hospitalist Progress Note      PCP: Clay Roberts MD    Date of Admission: 3/2/2021    Hospital Course: Pt. is a 64 yo F who was admitted for concern of possible bladder perforation and coffee ground emesis. Urology, GI and surgery have been consulted. Patient to follow outpatient with urology, GI and surgery. Currently concern for p.o. intake. Dietitian following. Plan to do calorie count on discharge on p.o. diet if able to otherwise we will have to consider alternative routes of nutrition. Continue D5 half-normal saline    Subjective:  Alert x2. Wants to go home. Discussed with patient regarding oral intake and working with therapy. Medications:  Reviewed    Infusion Medications    dextrose      dextrose 5% and 0.45% NaCl with KCl 20 mEq 50 mL/hr at 03/08/21 0112     Scheduled Medications    saccharomyces boulardii  250 mg Oral BID    pantoprazole  40 mg Oral BID AC    apixaban  5 mg Oral BID    sodium chloride flush  10 mL Intravenous 2 times per day    metoprolol succinate  25 mg Oral Daily     PRN Meds: glucose, dextrose, glucagon (rDNA), dextrose, sodium chloride flush, promethazine **OR** ondansetron, acetaminophen **OR** acetaminophen      Intake/Output Summary (Last 24 hours) at 3/8/2021 0844  Last data filed at 3/8/2021 0414  Gross per 24 hour   Intake 240 ml   Output 700 ml   Net -460 ml       Physical Exam Performed:    BP (!) 184/24   Pulse 111   Temp 97.9 °F (36.6 °C) (Oral)   Resp 14   Ht 5' 6\" (1.676 m)   Wt 273 lb 5.9 oz (124 kg)   SpO2 95%   BMI 44.12 kg/m²     General appearance: No apparent distress, appears stated age and cooperative. HEENT: Pupils equal, round, and reactive to light. Conjunctivae/corneas clear. Neck: Supple, with full range of motion. No jugular venous distention. Trachea midline. Respiratory:  Normal respiratory effort. Clear to auscultation, bilaterally without Rales/Wheezes/Rhonchi.   Cardiovascular: Regular rate and rhythm with normal S1/S2 without murmurs, rubs or gallops. Abdomen: Soft, non-tender, non-distended with normal bowel sounds. Musculoskeletal: no edema  Neuro: AAOx2      Labs:   Recent Labs     03/06/21  0816 03/06/21  0816 03/07/21  0615 03/07/21  0820 03/08/21  0527   WBC 6.8  --  6.4  --  5.1   HGB 10.3*   < > 10.4* 11.3* 9.4*   HCT 33.2*   < > 31.6* 33.5* 28.6*     --  293  --  267    < > = values in this interval not displayed. Recent Labs     03/07/21  0615 03/07/21  1623 03/08/21  0527    140 140   K 3.3* 4.3 4.3   * 109 112*   CO2 23 20* 20*   BUN 3* 2* 2*   CREATININE <0.5* <0.5* <0.5*   CALCIUM 8.4 8.4 8.3   PHOS 2.8 1.8* 2.2*     No results for input(s): AST, ALT, BILIDIR, BILITOT, ALKPHOS in the last 72 hours. No results for input(s): INR in the last 72 hours. No results for input(s): Linda Efra in the last 72 hours. Urinalysis:      Lab Results   Component Value Date    NITRU Negative 03/03/2021    WBCUA 0-2 03/03/2021    BACTERIA Rare 03/03/2021    RBCUA 0-2 03/03/2021    BLOODU Negative 03/03/2021    SPECGRAV 1.025 03/03/2021    GLUCOSEU Negative 03/03/2021       Radiology:  CT CYSTOGRAM W WO CONTRAST   Final Result      Negative for bladder leak or perforation. No acute process. Lower anterior abdominal wall hernias. CT HEAD WO CONTRAST   Final Result   1. No acute intracranial findings. Assessment/Plan:    Active Hospital Problems    Diagnosis    Upper GI bleed [K92.2]     Acute metabolic encephalopathy likely multifactorial, UTI, decreased nutrition, and delirium  Called nursing home: Patient had another admission since leaving [facility], for a right tibial fracture for which she underwent surgery. She was discharged off TPN, was on clear liquids, was OFF TPN, and was doing well with clear liquids however there was no attempt to advance diet because she began insisting that she could not eat any food or take any medications due to fear of vomiting.  At times she said she had vomited but no emesis noted by staff. She had progressive decline in cognition with increased confusion and decreased PO intake over 1-2 weeks. She had a PICC line but she removed it herself. She was getting Invanz via IV.   -started on Zyvox and Merrem empirically for possible sepsis due to UTI on dmission  -Had enterococcus UTI approximately one month ago  -She had new Cx which was positive resistant E. Coli 2/25, sensitive to zosyn, meropenem  -She was started on Youlanda Marten started 3/1 through an IV since she had pulled out the PICC.   -UA, urine culture negative, was already on treatment with ertapenem  -will continue on meropenem (today is day 7), Zyvox stopped   -blood cultures no growth to date  -CT HEAD: no acute intracranial findings  -Completed Merrem course  -Restarted on clears, then full liquid then dental soft. Patient is tolerating well.   -Consult dietician  -Consult SLP  -Monitor for refeeding  -Continue D5 1/2 NS with K decreased to 50 ml/hr   -Continue calorie count, will need to reassess if needs PEG or PICC for TPN or if can be discharged on current diet. Her mental status has significantly improved with increased PO.   -Continue oral supplement     Coffee-ground emesis: Noted in outside ED, no events at nursing home, none reported here.  -was on Protonix drip, Xarelto was held  -s/p recent EGD in 12/2020 with Dr. Silverio Zuluaga, which showed normal esophagus, stomach and duodenum. Medium to large 5 cm sliding hiatal hernia, polyps 2 to 3 mm size located in the fundus body. -GI consulted, ok to restart anticoagulation, outpatient follow-up   -Protonix 40 mg BID     Abnormal CT scan reading, with air in the anterior abdominal wall concerning for possible bladder perforation:  -urology consulted.  CT cystogram is negative for perforation.   -Sagastume removed  -Urology follow-up outpatient    Paraesophageal Hernia  Previously on TPN and clear liquid diet   Started clears, switched to full liquids she is tolerating well  Will see if able to advance diet to small meals, otherwise may need to re-initiate TPN  SLP following  Gen Surgery follow-up as planned outpatient     H/O COVID: December 2020     History of atrial thrombus  History of A. Fib  History of DVT/PE, underwent TPA in 2016  -Was on [address] [place] to restart anticoagulation per GI  -Xarelto switched to Eliquis  -Continue Eliquis     History of A. Fib/Hypertension.   -Currently normotensive  -Continue metoprolol XL 25 mg once daily but hold other medications    Hypokalemia: Improving  Had decreased oral intake   On IV fluids with potassium supplementation   Continue supplements as above    Morbid obesity due to excess calories:  -BMI 77.85- Complicating assessment and treatment. Placing patient at risk for multiple co-morbidities as well as early death and contributing to the patient's presentation.  on weight loss when appropriate.     DVT Prophylaxis: Eliquis  Diet: DIET DENTAL SOFT; Dental Soft; Safety Tray; Safety Tray (Disposables)  Dietary Nutrition Supplements: Frozen Oral Supplement  Code Status: Full Code    PT/OT Eval Status: ordered    Dispo -pending dietary plan, PT OT, placement     Lisa Mccullough MD

## 2021-03-08 NOTE — PROGRESS NOTES
Pt asked the same questions that she asked yesterday about leaving today. Questions were answered and pt seemed to be satisfied with answer. IVF infusing, tolerating diet, pt voided and purewick worked well during this shift. Pt used call light for help.

## 2021-03-08 NOTE — PROGRESS NOTES
Patient alert to person, disoriented to situation, place, and time. Patient continues to ask if the year is 2020. Re-orientation is provided as needed. Calorie count in place. Nursing staff informed on calorie count. Patient has non-pitting edema to her upper bilateral extremities and +1 pitting edema to her lower bilateral extremities. Patient has limited movement to her extremities. PT and OT at bedside this shift to work with patient. Patient continues to be incontinent of bowel and bladder. Patient checked and changed as need d/t incontinence. Pure wic in place draining pita brown urine. /71   Pulse 113   Temp 97.7 °F (36.5 °C) (Oral)   Resp 14   Ht 5' 6\" (1.676 m)   Wt 273 lb 5.9 oz (124 kg)   SpO2 95%   BMI 44.12 kg/m²     Patient denies any further needs at this time. Bed is in the lowest position, bed alarm on, patient call light and bedside table are within reach. Will continue to monitor for changes in patient status.     Electronically signed by Katie dAler on 3/8/2021 at 3:48 PM

## 2021-03-08 NOTE — PROGRESS NOTES
Occupational Therapy  Facility/Department: [facility]'[address]  Daily Treatment Note  NAME: Glenny Devlin  : 1960  MRN: [id number]    Date of Service: 3/8/2021    Discharge Recommendations:    Glenny Devlin scored a 10/24 on the AM-PAC ADL Inpatient form. Current research shows that an AM-PAC score of 17 or less is typically not associated with a discharge to the patient's home setting. Based on the patient's AM-PAC score and their current ADL deficits, it is recommended that the patient have 3-5 sessions per week of Occupational Therapy at d/c to increase the patient's independence. Please see assessment section for further patient specific details. If patient discharges prior to next session this note will serve as a discharge summary. Please see below for the latest assessment towards goals. OT Equipment Recommendations  Equipment Needed: No  Other: defer to next level of care    Assessment   Performance deficits / Impairments: Decreased functional mobility ; Decreased ADL status; Decreased endurance;Decreased strength;Decreased cognition;Decreased safe awareness;Decreased balance  Assessment: Pt cont to require assist of 2 -3 for all mobility. Pt is well below her baseline and would benefit from further skilled OT to maximize safety and functional independence. Cont OT tx per plan of care. Treatment Diagnosis: Impaired ADLs, mobility 2/2 decreased endurance, confusion  Prognosis: Guarded  OT Education: OT Role;Plan of Care  Patient Education: Pt verbalized understanding  REQUIRES OT FOLLOW UP: Yes  Activity Tolerance  Activity Tolerance: Patient Tolerated treatment well  Safety Devices  Safety Devices in place: Yes  Type of devices: Left in bed;Bed alarm in place;Call light within reach;Nurse notified       Restrictions  Position Activity Restriction  Other position/activity restrictions: up with assist  Subjective   General  Chart Reviewed:  Yes  Additional Pertinent Hx: Pt presented from SNF 3/ with confusion, emesis. admitted for acute metabolic encephalopathy due to UTI sepsis with suspected bladder perforation; Imaging (-) for bladder perforation. PMHx: CHF, arthritis, HTN, glaucoma, bladder repair, RIGHT TIBIA IM NAIL INSERTION  11/2020  Family / Caregiver Present: No  Referring Practitioner: Vipul Hager  Diagnosis: upper GI bleed, encephalopathy  Subjective  Subjective: Pt in bed upon entry. I can't (uncross legs)  Vital Signs  Patient Currently in Pain: No   Orientation  Orientation  Overall Orientation Status: Impaired  Orientation Level: Oriented to person;Oriented to place  Objective    ADL  Grooming: Setup(to wipe hands and face, in bed)  Toileting: Dependent/Total(Incont of stool; Pure Wick in place)        Balance  Sitting Balance: Contact guard assistance(to SBA sitting edge of bed)  Bed mobility  Rolling to Left: 2 Person assistance(mod-max Ax2 multiple times for pericare, brief change, and chilango pad placement)  Rolling to Right: 2 Person assistance  Supine to Sit: Dependent/Total(max Ax3)  Sit to Supine: Dependent/Total(mod Ax2)  Scooting: Dependent/Total(x3 higher up in bed via chilango pad with bed in trendelenberg)  Transfers  Sit to stand: Dependent/Total(Attempted sit to stand from bed 2 times. Failed attempt with max Ax2, Partial stand with Max Ax3 however weight shift forward off EOB and BLE buckling requiring max Ax3 to return to EOB)                       Cognition  Overall Cognitive Status: Exceptions  Arousal/Alertness: Delayed responses to stimuli  Following Commands:  Follows one step commands consistently  Attention Span: Attends with cues to redirect  Insights: Decreased awareness of deficits  Initiation: Requires cues for some                                         Plan   Plan  Times per week: 2-5  Current Treatment Recommendations: Strengthening, Balance Training, Functional Mobility Training, Endurance Training, Self-Care / ADL  AM-PAC Score        AM-PAC Inpatient Daily

## 2021-03-08 NOTE — PROGRESS NOTES
NUTRITION NOTE: Calorie Count      Type and Reason for Visit: Reassess    NUTRITION RECOMMENDATIONS:   1. Daily Calorie Count. 2. RD will add ONS post-op w/diet advancement. NUTRITION ASSESSMENT:  RD notes MD started KCAL count 3/7; no tickets recorded. Diet Orders / Intake / Nutrition Support  Current diet/supplement order: DIET DENTAL SOFT; Dental Soft; Safety Tray; Safety Tray (Disposables)  Dietary Nutrition Supplements: Frozen Oral Supplement       COMPARATIVE STANDARDS  Estimated Total Kcals/Day : 10-15 Current Bodyweight (124 kg) 2562-1167 kcal    Estimated Total Protein (g/day) : 1.5-2.0 Ideal Bodyweight  (59 kg)  g/day  Estimated Daily Total Fluid (ml/day): 1500 (min)- 1860 ml     Date Consumed PO Intake Kcal %   Kcal met PO Intake grams protein %  Protein met   Comments   3/7 VIJI VIJI VIJI VIJI  No tickets recorded;     Per RN: ate 26-50% of 1 meal (no specs recorded) and refused bkf    3/8     NPO for surgery this AM.    3/9                        **Results will be posted as available.      John Mayen, 65 [address]  [place]:  [phone]  Office:  [phone]

## 2021-03-09 LAB
ALBUMIN SERPL-MCNC: 2.4 G/DL (ref 3.4–5)
ANION GAP SERPL CALCULATED.3IONS-SCNC: 10 MMOL/L (ref 3–16)
BASOPHILS ABSOLUTE: 0.1 K/UL (ref 0–0.2)
BASOPHILS RELATIVE PERCENT: 1.4 %
BUN BLDV-MCNC: <2 MG/DL (ref 7–20)
CALCIUM SERPL-MCNC: 8.9 MG/DL (ref 8.3–10.6)
CHLORIDE BLD-SCNC: 112 MMOL/L (ref 99–110)
CO2: 19 MMOL/L (ref 21–32)
CREAT SERPL-MCNC: <0.5 MG/DL (ref 0.6–1.2)
EOSINOPHILS ABSOLUTE: 0.5 K/UL (ref 0–0.6)
EOSINOPHILS RELATIVE PERCENT: 7.8 %
GFR AFRICAN AMERICAN: >60
GFR NON-AFRICAN AMERICAN: >60
GLUCOSE BLD-MCNC: 109 MG/DL (ref 70–99)
GLUCOSE BLD-MCNC: 115 MG/DL (ref 70–99)
GLUCOSE BLD-MCNC: 84 MG/DL (ref 70–99)
HCT VFR BLD CALC: 30.2 % (ref 36–48)
HEMOGLOBIN: 9.8 G/DL (ref 12–16)
LYMPHOCYTES ABSOLUTE: 1.5 K/UL (ref 1–5.1)
LYMPHOCYTES RELATIVE PERCENT: 22 %
MCH RBC QN AUTO: 32.7 PG (ref 26–34)
MCHC RBC AUTO-ENTMCNC: 32.3 G/DL (ref 31–36)
MCV RBC AUTO: 101.1 FL (ref 80–100)
MONOCYTES ABSOLUTE: 0.7 K/UL (ref 0–1.3)
MONOCYTES RELATIVE PERCENT: 11.2 %
NEUTROPHILS ABSOLUTE: 3.8 K/UL (ref 1.7–7.7)
NEUTROPHILS RELATIVE PERCENT: 57.6 %
PDW BLD-RTO: 17.7 % (ref 12.4–15.4)
PERFORMED ON: ABNORMAL
PERFORMED ON: ABNORMAL
PHOSPHORUS: 2.3 MG/DL (ref 2.5–4.9)
PLATELET # BLD: 268 K/UL (ref 135–450)
PMV BLD AUTO: 10.5 FL (ref 5–10.5)
POTASSIUM SERPL-SCNC: 4.4 MMOL/L (ref 3.5–5.1)
PREALBUMIN: 11 MG/DL (ref 20–40)
RBC # BLD: 2.99 M/UL (ref 4–5.2)
SODIUM BLD-SCNC: 141 MMOL/L (ref 136–145)
WBC # BLD: 6.7 K/UL (ref 4–11)

## 2021-03-09 PROCEDURE — 80069 RENAL FUNCTION PANEL: CPT

## 2021-03-09 PROCEDURE — 1200000000 HC SEMI PRIVATE

## 2021-03-09 PROCEDURE — 6370000000 HC RX 637 (ALT 250 FOR IP): Performed by: INTERNAL MEDICINE

## 2021-03-09 PROCEDURE — 2580000003 HC RX 258: Performed by: INTERNAL MEDICINE

## 2021-03-09 PROCEDURE — 85025 COMPLETE CBC W/AUTO DIFF WBC: CPT

## 2021-03-09 PROCEDURE — 36415 COLL VENOUS BLD VENIPUNCTURE: CPT

## 2021-03-09 RX ORDER — SODIUM BICARBONATE 650 MG/1
325 TABLET ORAL 3 TIMES DAILY
Status: COMPLETED | OUTPATIENT
Start: 2021-03-09 | End: 2021-03-09

## 2021-03-09 RX ORDER — SODIUM BICARBONATE 650 MG/1
650 TABLET ORAL 3 TIMES DAILY
Status: DISCONTINUED | OUTPATIENT
Start: 2021-03-09 | End: 2021-03-09

## 2021-03-09 RX ORDER — DEXTROSE MONOHYDRATE 50 MG/ML
INJECTION, SOLUTION INTRAVENOUS CONTINUOUS
Status: DISCONTINUED | OUTPATIENT
Start: 2021-03-09 | End: 2021-03-10

## 2021-03-09 RX ADMIN — APIXABAN 5 MG: 5 TABLET, FILM COATED ORAL at 08:42

## 2021-03-09 RX ADMIN — PANTOPRAZOLE SODIUM 40 MG: 40 TABLET, DELAYED RELEASE ORAL at 15:31

## 2021-03-09 RX ADMIN — METOPROLOL SUCCINATE 25 MG: 25 TABLET, EXTENDED RELEASE ORAL at 08:42

## 2021-03-09 RX ADMIN — Medication 250 MG: at 08:42

## 2021-03-09 RX ADMIN — SODIUM BICARBONATE 325 MG: 650 TABLET ORAL at 09:48

## 2021-03-09 RX ADMIN — DEXTROSE MONOHYDRATE: 50 INJECTION, SOLUTION INTRAVENOUS at 09:49

## 2021-03-09 RX ADMIN — SODIUM BICARBONATE 325 MG: 650 TABLET ORAL at 15:31

## 2021-03-09 RX ADMIN — APIXABAN 5 MG: 5 TABLET, FILM COATED ORAL at 22:12

## 2021-03-09 RX ADMIN — PANTOPRAZOLE SODIUM 40 MG: 40 TABLET, DELAYED RELEASE ORAL at 05:46

## 2021-03-09 RX ADMIN — SODIUM BICARBONATE 325 MG: 650 TABLET ORAL at 22:12

## 2021-03-09 RX ADMIN — Medication 10 ML: at 08:44

## 2021-03-09 ASSESSMENT — PAIN SCALES - GENERAL
PAINLEVEL_OUTOF10: 0

## 2021-03-09 NOTE — CARE COORDINATION
CM following, LM again for [facility] [phone], plan for DC poss Tuesday if no peg tube and pt tolerating PO diet. Message conveyed that plan DC Tuesday and to call back with any needs for DC.  plans to speak with family about peg vs PO diet today and plan will be made accordingly.   Electronically signed by Wolfgang Harris RN on 3/9/2021 at 9:48 AM  [phone]

## 2021-03-09 NOTE — FLOWSHEET NOTE
03/09/21 1331   Encounter Summary   Services provided to: Patient   Referral/Consult From: Mervat   Continue Visiting   (3/9, lillian)   Complexity of Encounter Moderate   Length of Encounter 30 minutes   Routine   Type Follow up   Assessment Calm; Approachable; Hopeful   Intervention Active listening;Explored feelings, thoughts, concerns;Nurtured hope   Outcome Comfort;Expressed gratitude;Engaged in conversation   PT expressed her desired to go home.    Staff Yas Slade MA

## 2021-03-09 NOTE — PROGRESS NOTES
Patient alert to self, disorientated to time, place and situation. VSS. Patient encouraged to increase oral intake throughout shift. Consulted with dietitian. Patient to have ensure supplements in between meals and to record all calorie count. Patient verbalizes understanding of increasing her oral intake. Pure wic in place draining pita colored urine. /85   Pulse 117   Temp 97.8 °F (36.6 °C) (Oral)   Resp 15   Ht 5' 6\" (1.676 m)   Wt 273 lb 5.9 oz (124 kg)   SpO2 97%   BMI 44.12 kg/m²     Patient denies any further needs at this time. Bed is in the lowest position, bed alarm on, patient call light and bedside table are within reach. Will continue to monitor for changes in patient status.     Electronically signed by Alan Valle on 3/9/2021 at 11:29 AM

## 2021-03-09 NOTE — PROGRESS NOTES
Hospitalist Progress Note      PCP: Tony Henriquez MD    Date of Admission: 3/2/2021    Hospital Course: Pt. is a 62 yo F who was admitted for concern of possible bladder perforation and coffee ground emesis. Urology, GI and surgery have been consulted. Patient to follow outpatient with urology, GI and surgery. Currently concern for p.o. intake. Dietitian following. Encouraging oral intake. Tried contacting family to discuss possible PEG tube placement. Was unable to reach. Continue D5 gtt. will discontinue blood glucose remains stable and patient has adequate oral intake. Discussed with nursing. Subjective:  Alert x2. Wants to go home. Discussed with patient regarding oral intake and working with therapy. Patient states that she will try. She would like to avoid PEG tube. Encourage oral intake. Tried contacting patient's sister, Axel Dyson. Was unable to reach. Medications:  Reviewed    Infusion Medications    dextrose      dextrose       Scheduled Medications    sodium bicarbonate  650 mg Oral TID    saccharomyces boulardii  250 mg Oral BID    pantoprazole  40 mg Oral BID AC    apixaban  5 mg Oral BID    sodium chloride flush  10 mL Intravenous 2 times per day    metoprolol succinate  25 mg Oral Daily     PRN Meds: glucose, dextrose, glucagon (rDNA), dextrose, sodium chloride flush, promethazine **OR** ondansetron, acetaminophen **OR** acetaminophen      Intake/Output Summary (Last 24 hours) at 3/9/2021 0828  Last data filed at 3/9/2021 0610  Gross per 24 hour   Intake 780 ml   Output 1800 ml   Net -1020 ml       Physical Exam Performed:    /75   Pulse 109   Temp 97.7 °F (36.5 °C) (Oral)   Resp 14   Ht 5' 6\" (1.676 m)   Wt 273 lb 5.9 oz (124 kg)   SpO2 94%   BMI 44.12 kg/m²     General appearance: No apparent distress, appears stated age and cooperative. HEENT: Pupils equal, round, and reactive to light. Conjunctivae/corneas clear.   Neck: Supple, with full range of motion. No jugular venous distention. Trachea midline. Respiratory:  Normal respiratory effort. Clear to auscultation, bilaterally without Rales/Wheezes/Rhonchi. Cardiovascular: Regular rate and rhythm with normal S1/S2 without murmurs, rubs or gallops. Abdomen: Soft, non-tender, non-distended with normal bowel sounds. Musculoskeletal: no edema  Neuro: AAOx2      Labs:   Recent Labs     03/07/21  0615 03/07/21  0820 03/08/21  0527 03/09/21  0528   WBC 6.4  --  5.1 6.7   HGB 10.4* 11.3* 9.4* 9.8*   HCT 31.6* 33.5* 28.6* 30.2*     --  267 268     Recent Labs     03/07/21  1623 03/08/21  0527 03/09/21  0528    140 141   K 4.3 4.3 4.4    112* 112*   CO2 20* 20* 19*   BUN 2* 2* <2*   CREATININE <0.5* <0.5* <0.5*   CALCIUM 8.4 8.3 8.9   PHOS 1.8* 2.2* 2.3*     No results for input(s): AST, ALT, BILIDIR, BILITOT, ALKPHOS in the last 72 hours. No results for input(s): INR in the last 72 hours. No results for input(s): Lyndee Gilmer in the last 72 hours. Urinalysis:      Lab Results   Component Value Date    NITRU Negative 03/03/2021    WBCUA 0-2 03/03/2021    BACTERIA Rare 03/03/2021    RBCUA 0-2 03/03/2021    BLOODU Negative 03/03/2021    SPECGRAV 1.025 03/03/2021    GLUCOSEU Negative 03/03/2021       Radiology:  CT CYSTOGRAM W WO CONTRAST   Final Result      Negative for bladder leak or perforation. No acute process. Lower anterior abdominal wall hernias. CT HEAD WO CONTRAST   Final Result   1. No acute intracranial findings. Assessment/Plan:    Active Hospital Problems    Diagnosis    Upper GI bleed [K92.2]     Acute metabolic encephalopathy likely multifactorial, UTI, decreased nutrition, and delirium  Called nursing home: Patient had another admission since leaving [facility], for a right tibial fracture for which she underwent surgery.  She was discharged off TPN, was on clear liquids, was OFF TPN, and was doing well with clear liquids however there was no attempt to advance diet because she began insisting that she could not eat any food or take any medications due to fear of vomiting. At times she said she had vomited but no emesis noted by staff. She had progressive decline in cognition with increased confusion and decreased PO intake over 1-2 weeks. She had a PICC line but she removed it herself. She was getting Invanz via IV.   -started on Zyvox and Merrem empirically for possible sepsis due to UTI on dmission  -Had enterococcus UTI approximately one month ago  -She had new Cx which was positive resistant E. Coli 2/25, sensitive to zosyn, meropenem  -She was started on Virgilio Montgomery started 3/1 through an IV since she had pulled out the PICC.   -UA, urine culture negative, was already on treatment with ertapenem  -will continue on meropenem (today is day 7), Zyvox stopped   -blood cultures no growth to date  -CT HEAD: no acute intracranial findings  -Completed Merrem course  -Restarted on clears, then full liquid then dental soft. Patient is tolerating well. -Monitor for refeeding  -D5 half-normal with potassium discontinued and D5 started  -Monitor blood glucose every 6 hour, discontinue D5 if blood glucose continues to be above 150. Discussed with RN  -Continue calorie count  -Continue oral supplement  -Per dietitian, if patient unable to have adequate oral intake/oral supplementation the end of 03/10 then PEG tube with enteral nutrition would be recommended  -Patient would like to avoid PEG tube placement if at all possible. Encourage patient to increase oral intake to avoid PEG tube placement  -Tried contacting patient's sister to discuss possible PEG tube placement. Went to [employer]. Was unable to reach. Anion gap acidosis with hyperchloremia  Likely secondary to IV fluids  D5 half-normal saline changed to D5  Bicarb tablets 325 mg for 3 doses ordered.   Will monitor BMP    Coffee-ground emesis: Noted in outside ED, no events at nursing home, none reported here.  -was on Protonix drip, Xarelto was held  -s/p recent EGD in 12/2020 with Dr. Chandrakant Frank, which showed normal esophagus, stomach and duodenum. Medium to large 5 cm sliding hiatal hernia, polyps 2 to 3 mm size located in the fundus body. -GI consulted, ok to restart anticoagulation, outpatient follow-up   -Protonix 40 mg BID     Abnormal CT scan reading, with air in the anterior abdominal wall concerning for possible bladder perforation:  -urology consulted. CT cystogram is negative for perforation.   -Sagastume removed  -Urology follow-up outpatient    Paraesophageal Hernia  Previously on TPN and clear liquid diet   Started clears, switched to full liquids she is tolerating well  Will see if able to advance diet to small meals, otherwise may need to re-initiate TPN  SLP following  Gen Surgery follow-up as planned outpatient     H/O COVID: December 2020     History of atrial thrombus  History of A. Fib  History of DVT/PE, underwent TPA in 2016  -Was on 3400 DoubleRecall to restart anticoagulation per GI  -Xarelto switched to Eliquis  -Continue Eliquis     History of A. Fib/Hypertension.   -Currently normotensive  -Continue metoprolol XL 25 mg once daily but hold other medications    Hypokalemia: Improved  Had decreased oral intake   Improved after supplementation    Morbid obesity due to excess calories:  -BMI 63.46- Complicating assessment and treatment. Placing patient at risk for multiple co-morbidities as well as early death and contributing to the patient's presentation.  on weight loss when appropriate. DVT Prophylaxis: Eliquis  Diet: DIET DENTAL SOFT; Dental Soft; Safety Tray; Safety Tray (Disposables)  Dietary Nutrition Supplements: Frozen Oral Supplement  Code Status: Full Code    PT/OT Eval Status: ordered    Dispo -pending oral intake, transition off of D5 gtt, ?   PEG tube placement, PT OT, placement     Xochitl Zhao MD

## 2021-03-09 NOTE — PROGRESS NOTES
NUTRITION NOTE: Calorie Count      Type and Reason for Visit: Calorie Count, Consult    NUTRITION RECOMMENDATIONS:   1. Daily Calorie Count. 2. Starting Ensure TID- pt to consume 3 per day (1000 kcal/60 g protein) to meet 75% of her lowest calorie/protein needs. 3. Alternative nutrition may still be warranted if pt unable to consume > 50% of nutrition from PO diet consistently. NUTRITION ASSESSMENT:  Verbal consult with RN about pt needing feeding tube vs PO diet. Pt has not been consistently meeting > 25-50% of meals. Discussed w/RN importance of recording ALL PO meals and supplements. Discussed nutrition goals at length with pt - RD adding Ensure TID and pt agreeable to consume. IF she can do this, plus improve PO diet, pt will meet PO nutrition needs. However RD discussed purpose/intent of feeding tube with pt who understands. Diet Orders / Intake / Nutrition Support  Current diet/supplement order: DIET DENTAL SOFT; Dental Soft; Safety Tray; Safety Tray (Disposables)  Dietary Nutrition Supplements: Frozen Oral Supplement       COMPARATIVE STANDARDS  Estimated Total Kcals/Day : 10-15 Current Bodyweight (124 kg) 7641-2991 kcal    Estimated Total Protein (g/day) : 1.5-2.0 Ideal Bodyweight  (59 kg)  g/day  Estimated Daily Total Fluid (ml/day): 1500 (min)- 1860 ml     Date Consumed PO Intake Kcal %   Kcal met PO Intake grams protein %  Protein met   Comments   3/7 VIJI VIJI VIJI VIJI  No tickets recorded;     Per RN: ate 26-50% of 1 meal (no specs recorded) and refused bkf    3/8 708k 57% 16g 18% B- no tickets recorded  L 50% ice cream; 100% cookie, 25% carrots; 100% lemonade  D- 100% pudding; 25% potroast, potatoes and gn bns; 50% magic cup    3/9 ~ 455k 37% ~20g 22% B- 50% eggs; 25% hashbrowns, 100% OJ, 50% muffin, 100% milk   L   D                    **Results will be posted as available.      Froylan Fatima, [address] [place] [address]:  [phone]  Office:  [phone]

## 2021-03-09 NOTE — PROGRESS NOTES
Patient alert and oriented to person only. VSS and afebrile with exception to some sinus tachycardia in the low 100's. Patient has stated no pain. Patient has been incontinent and has purwick in place no BM noted so far this shift. Patient has stated no nausea. Patient was able to eat 50% of nightly meal. Patient tolerating diet well. Fall precautions in place with bed in lowest position with bed alarm on and call light within reach. No needs stated at this time.

## 2021-03-09 NOTE — PROGRESS NOTES
Update given to daughter Kwadwo Matamoros. All questions answered.      Electronically signed by Carmelita Anne on 3/9/2021 at 6:29 PM

## 2021-03-10 LAB
ALBUMIN SERPL-MCNC: 2.3 G/DL (ref 3.4–5)
AMMONIA: 42 UMOL/L (ref 11–51)
ANION GAP SERPL CALCULATED.3IONS-SCNC: 10 MMOL/L (ref 3–16)
BASOPHILS ABSOLUTE: 0.1 K/UL (ref 0–0.2)
BASOPHILS RELATIVE PERCENT: 1 %
BUN BLDV-MCNC: 4 MG/DL (ref 7–20)
CALCIUM SERPL-MCNC: 8.9 MG/DL (ref 8.3–10.6)
CHLORIDE BLD-SCNC: 108 MMOL/L (ref 99–110)
CO2: 20 MMOL/L (ref 21–32)
CREAT SERPL-MCNC: <0.5 MG/DL (ref 0.6–1.2)
EOSINOPHILS ABSOLUTE: 0.5 K/UL (ref 0–0.6)
EOSINOPHILS RELATIVE PERCENT: 5.4 %
FOLATE: 6.23 NG/ML (ref 4.78–24.2)
GFR AFRICAN AMERICAN: >60
GFR NON-AFRICAN AMERICAN: >60
GLUCOSE BLD-MCNC: 131 MG/DL (ref 70–99)
GLUCOSE BLD-MCNC: 138 MG/DL (ref 70–99)
GLUCOSE BLD-MCNC: 80 MG/DL (ref 70–99)
GLUCOSE BLD-MCNC: 85 MG/DL (ref 70–99)
GLUCOSE BLD-MCNC: 94 MG/DL (ref 70–99)
HCT VFR BLD CALC: 30.5 % (ref 36–48)
HEMOGLOBIN: 9.9 G/DL (ref 12–16)
LYMPHOCYTES ABSOLUTE: 1.6 K/UL (ref 1–5.1)
LYMPHOCYTES RELATIVE PERCENT: 18.9 %
MCH RBC QN AUTO: 32.6 PG (ref 26–34)
MCHC RBC AUTO-ENTMCNC: 32.3 G/DL (ref 31–36)
MCV RBC AUTO: 100.9 FL (ref 80–100)
MONOCYTES ABSOLUTE: 1 K/UL (ref 0–1.3)
MONOCYTES RELATIVE PERCENT: 11.8 %
NEUTROPHILS ABSOLUTE: 5.5 K/UL (ref 1.7–7.7)
NEUTROPHILS RELATIVE PERCENT: 62.9 %
PDW BLD-RTO: 17.7 % (ref 12.4–15.4)
PERFORMED ON: ABNORMAL
PERFORMED ON: ABNORMAL
PERFORMED ON: NORMAL
PERFORMED ON: NORMAL
PHOSPHORUS: 2.7 MG/DL (ref 2.5–4.9)
PLATELET # BLD: 286 K/UL (ref 135–450)
PMV BLD AUTO: 10.7 FL (ref 5–10.5)
POTASSIUM SERPL-SCNC: 5 MMOL/L (ref 3.5–5.1)
RBC # BLD: 3.02 M/UL (ref 4–5.2)
SODIUM BLD-SCNC: 138 MMOL/L (ref 136–145)
VITAMIN B-12: 387 PG/ML (ref 211–911)
VITAMIN D 25-HYDROXY: 39.6 NG/ML
WBC # BLD: 8.7 K/UL (ref 4–11)

## 2021-03-10 PROCEDURE — 97535 SELF CARE MNGMENT TRAINING: CPT

## 2021-03-10 PROCEDURE — 99222 1ST HOSP IP/OBS MODERATE 55: CPT | Performed by: SURGERY

## 2021-03-10 PROCEDURE — 2580000003 HC RX 258: Performed by: INTERNAL MEDICINE

## 2021-03-10 PROCEDURE — 6370000000 HC RX 637 (ALT 250 FOR IP): Performed by: INTERNAL MEDICINE

## 2021-03-10 PROCEDURE — 82607 VITAMIN B-12: CPT

## 2021-03-10 PROCEDURE — 1200000000 HC SEMI PRIVATE

## 2021-03-10 PROCEDURE — 84446 ASSAY OF VITAMIN E: CPT

## 2021-03-10 PROCEDURE — 97110 THERAPEUTIC EXERCISES: CPT

## 2021-03-10 PROCEDURE — 80069 RENAL FUNCTION PANEL: CPT

## 2021-03-10 PROCEDURE — 82140 ASSAY OF AMMONIA: CPT

## 2021-03-10 PROCEDURE — 84425 ASSAY OF VITAMIN B-1: CPT

## 2021-03-10 PROCEDURE — 85025 COMPLETE CBC W/AUTO DIFF WBC: CPT

## 2021-03-10 PROCEDURE — 36415 COLL VENOUS BLD VENIPUNCTURE: CPT

## 2021-03-10 PROCEDURE — 82746 ASSAY OF FOLIC ACID SERUM: CPT

## 2021-03-10 PROCEDURE — 82652 VIT D 1 25-DIHYDROXY: CPT

## 2021-03-10 PROCEDURE — 84590 ASSAY OF VITAMIN A: CPT

## 2021-03-10 PROCEDURE — 97530 THERAPEUTIC ACTIVITIES: CPT

## 2021-03-10 PROCEDURE — 82306 VITAMIN D 25 HYDROXY: CPT

## 2021-03-10 RX ORDER — SODIUM BICARBONATE 650 MG/1
650 TABLET ORAL 3 TIMES DAILY
Status: COMPLETED | OUTPATIENT
Start: 2021-03-10 | End: 2021-03-10

## 2021-03-10 RX ORDER — DEXTROSE MONOHYDRATE 50 MG/ML
INJECTION, SOLUTION INTRAVENOUS CONTINUOUS
Status: ACTIVE | OUTPATIENT
Start: 2021-03-10 | End: 2021-03-10

## 2021-03-10 RX ORDER — 0.9 % SODIUM CHLORIDE 0.9 %
250 INTRAVENOUS SOLUTION INTRAVENOUS ONCE
Status: COMPLETED | OUTPATIENT
Start: 2021-03-10 | End: 2021-03-11

## 2021-03-10 RX ADMIN — SODIUM BICARBONATE 650 MG TABLET 650 MG: at 22:03

## 2021-03-10 RX ADMIN — PANTOPRAZOLE SODIUM 40 MG: 40 TABLET, DELAYED RELEASE ORAL at 14:42

## 2021-03-10 RX ADMIN — METOPROLOL SUCCINATE 25 MG: 25 TABLET, EXTENDED RELEASE ORAL at 08:34

## 2021-03-10 RX ADMIN — SODIUM CHLORIDE 250 ML: 9 INJECTION, SOLUTION INTRAVENOUS at 22:05

## 2021-03-10 RX ADMIN — Medication 250 MG: at 22:03

## 2021-03-10 RX ADMIN — SODIUM BICARBONATE 650 MG TABLET 650 MG: at 14:42

## 2021-03-10 RX ADMIN — APIXABAN 5 MG: 5 TABLET, FILM COATED ORAL at 08:34

## 2021-03-10 RX ADMIN — PANTOPRAZOLE SODIUM 40 MG: 40 TABLET, DELAYED RELEASE ORAL at 05:37

## 2021-03-10 RX ADMIN — Medication 10 ML: at 03:56

## 2021-03-10 RX ADMIN — Medication 250 MG: at 08:34

## 2021-03-10 RX ADMIN — SODIUM BICARBONATE 650 MG TABLET 650 MG: at 09:32

## 2021-03-10 RX ADMIN — APIXABAN 5 MG: 5 TABLET, FILM COATED ORAL at 22:02

## 2021-03-10 ASSESSMENT — PAIN SCALES - PAIN ASSESSMENT IN ADVANCED DEMENTIA (PAINAD)
BODYLANGUAGE: 0
BODYLANGUAGE: 0
BREATHING: 0
BREATHING: 0
TOTALSCORE: 0
NEGVOCALIZATION: 0
FACIALEXPRESSION: 0
CONSOLABILITY: 0
BODYLANGUAGE: 0

## 2021-03-10 ASSESSMENT — PAIN SCALES - GENERAL: PAINLEVEL_OUTOF10: 0

## 2021-03-10 NOTE — PROGRESS NOTES
Occupational Therapy  Facility/Department: [address]  Daily Treatment Note  NAME: Joe Chatman  : 1960  MRN: [id number]    Date of Service: 3/10/2021    Discharge Recommendations:    Joe Chatman scored a 1024 on the AM-PAC ADL Inpatient form. Current research shows that an AM-PAC score of 17 or less is typically not associated with a discharge to the patient's home setting. Based on the patient's AM-PAC score and their current ADL deficits, it is recommended that the patient have 3-5 sessions per week of Occupational Therapy at d/c to increase the patient's independence. Please see assessment section for further patient specific details. If patient discharges prior to next session this note will serve as a discharge summary. Please see below for the latest assessment towards goals. OT Equipment Recommendations  Equipment Needed: No    Assessment   Performance deficits / Impairments: Decreased functional mobility ; Decreased ADL status; Decreased endurance;Decreased strength;Decreased cognition;Decreased safe awareness;Decreased balance  Assessment: Pt alert, but intermittently confused. Pt cont to be well below her baseline and would benefit from further skilled OT to maximize safety and functional independence. Cont OT tx per plan of care. Treatment Diagnosis: Impaired ADLs, mobility 2/2 decreased endurance, confusion  Prognosis: Fair  OT Education: OT Role;Plan of Care  Patient Education: Pt verbalized understanding  REQUIRES OT FOLLOW UP: Yes  Activity Tolerance  Activity Tolerance: Patient Tolerated treatment well  Safety Devices  Safety Devices in place: Yes  Type of devices: Left in bed;Bed alarm in place;Call light within reach;Nurse notified     Restrictions  Position Activity Restriction  Other position/activity restrictions: up with assist  Subjective   General  Chart Reviewed: Yes  Additional Pertinent Hx: Pt presented from SNF 3[facility]2 with confusion, emesis.  admitted for acute metabolic encephalopathy due to UTI sepsis with suspected bladder perforation; Imaging (-) for bladder perforation. PMHx: CHF, arthritis, HTN, glaucoma, bladder repair, RIGHT TIBIA IM NAIL INSERTION  11/2020  Family / Caregiver Present: No  Referring Practitioner: Susan Brady  Diagnosis: upper GI bleed, encephalopathy  Subjective  Subjective: Pt in bed upon entry. They wanted to put in this hot dog thing. Vital Signs  Patient Currently in Pain: Denies   Orientation  Orientation  Overall Orientation Status: Impaired  Orientation Level: Oriented to person(hospital)  Objective    ADL  Feeding:  Req assist to open containers  Grooming: Modified independent (wiping face and brushing teeth, in bed)  Toileting: (Pure Wick in place)           Bed mobility  Rolling to Left: Moderate assistance(partial roll)                          Cognition  Overall Cognitive Status: Exceptions  Arousal/Alertness: Delayed responses to stimuli  Following Commands:  Follows one step commands consistently  Attention Span: Attends with cues to redirect  Insights: Decreased awareness of deficits  Initiation: Requires cues for some                    Type of ROM/Therapeutic Exercise  Type of ROM/Therapeutic Exercise: AROM  Exercises  Shoulder Flexion: 2 sets of 10  Shoulder AB/ADduction: x10  Horizontal AB/ADduction: x10                    Plan   Plan  Times per week: 2-5  Current Treatment Recommendations: Strengthening, Balance Training, Functional Mobility Training, Endurance Training, Self-Care / ADL  AM-PAC Score        AM-[facility] Inpatient Daily Activity Raw Score: 10 (03/08/21 1504)  AM-PAC Inpatient ADL T-Scale Score : 27.31 (03/08/21 1504)  ADL Inpatient CMS 0-100% Score: 74.7 (03/08/21 1504)  ADL Inpatient CMS G-Code Modifier : CL (03/08/21 1504)    Goals  Short term goals  Time Frame for Short term goals: discharge  Short term goal 1: tolerate sitting eob x10 minutes with cga balance during ADLs/therex- not met  Short term goal 2: complete 10 reps UE exercises all joints, to improve function for ADLs- not met  Short term goal 3: mod A bed mobility- not met  Patient Goals   Patient goals : go home       Therapy Time   Individual Concurrent Group Co-treatment   Time In 1409         Time Out 1451         Minutes 42         Timed Code Treatment Minutes: 62 [facility]    Zeeshan Fatima, OTR/l [id number]

## 2021-03-10 NOTE — PROGRESS NOTES
Hospitalist Progress Note      PCP: Terry Sesay MD    Date of Admission: 3/2/2021    Hospital Course: Pt. is a 64 yo F who was admitted for concern of possible bladder perforation and coffee ground emesis. Urology, GI and surgery have been consulted. Patient to follow outpatient with urology, GI and surgery. Currently concern for p.o. intake. Dietitian following. Encouraging oral intake. Continue D5 gtt. Discussed with son earlier this morning. Son gave permission to proceed with PEG tube. General surgery consulted for PEG tube placement but GI saw the patient and stated that patient is refusing and unlikely to place an endoscopic PEG due to patient history of hernia. Discussed with dietitian who would like to see patient calorie count for lunch/dinner and give further recommendation regarding need for alternative source of nutrition. Discussed with RN. Subjective:  Alert x2. Appears to be doing better than before. Still does not have adequate amount of intake. Discussed PEG tube placement and skilled nursing facility placement. Patient seems understanding. Discussed patient condition with son as well.     Medications:  Reviewed    Infusion Medications    dextrose 30 mL/hr at 03/09/21 1358    dextrose       Scheduled Medications    sodium bicarbonate  650 mg Oral TID    saccharomyces boulardii  250 mg Oral BID    pantoprazole  40 mg Oral BID AC    apixaban  5 mg Oral BID    sodium chloride flush  10 mL Intravenous 2 times per day    metoprolol succinate  25 mg Oral Daily     PRN Meds: glucose, dextrose, glucagon (rDNA), dextrose, sodium chloride flush, promethazine **OR** ondansetron, acetaminophen **OR** acetaminophen      Intake/Output Summary (Last 24 hours) at 3/10/2021 0850  Last data filed at 3/10/2021 0836  Gross per 24 hour   Intake 1161 ml   Output 1350 ml   Net -189 ml       Physical Exam Performed:    /68   Pulse 118   Temp 97.7 °F (36.5 °C) (Oral)   Resp Patient had another admission since leaving [facility], for a right tibial fracture for which she underwent surgery. She was discharged off TPN, was on clear liquids, was OFF TPN, and was doing well with clear liquids however there was no attempt to advance diet because she began insisting that she could not eat any food or take any medications due to fear of vomiting. At times she said she had vomited but no emesis noted by staff. She had progressive decline in cognition with increased confusion and decreased PO intake over 1-2 weeks. She had a PICC line but she removed it herself. She was getting Invanz via IV.   -started on Zyvox and Merrem empirically for possible sepsis due to UTI on dmission  -Had enterococcus UTI approximately one month ago  -She had new Cx which was positive resistant E. Coli 2/25, sensitive to zosyn, meropenem  -She was started on Radha Simons started 3/1 through an IV since she had pulled out the PICC.   -UA, urine culture negative, was already on treatment with ertapenem  -will continue on meropenem (today is day 7), Zyvox stopped   -blood cultures no growth to date  -CT HEAD: no acute intracranial findings  -Completed Merrem course  -Neurology consulted    Decreased Nutrition  -Restarted on clears, then full liquid then dental soft. Patient is tolerating well. -Monitor for refeeding  -D5 half-normal with potassium discontinued.   -Cont D5  -Continue calorie count  -Continue oral supplement  -Per dietitian 03/09, if patient unable to have adequate oral intake/oral supplementation the end of 03/10 then PEG tube with enteral nutrition would be recommended  -Discussed with son earlier this morning. Son gave permission to proceed with PEG tube.     -General surgery consulted for PEG tube placement but GI saw the patient and stated that patient is refusing and unlikely to place an endoscopic PEG due to patient history of hernia.    -Discussed with dietitian who would like to see patient calorie count for lunch/dinner and give further recommendation regarding need for alternative source of nutrition. Discussed with RN. Anion gap acidosis with hyperchloremia:  Improving  Likely secondary to IV fluids  D5 half-normal saline changed to D5  Bicarb tablets 325 mg X 3 doses  Bicarb tablets 650 mg X 3 doses  Will monitor BMP    Coffee-ground emesis: Noted in outside ED, no events at nursing home, none reported here.  -was on Protonix drip, Xarelto was held  -s/p recent EGD in 12/2020 with Dr. Gucci Mcnally, which showed normal esophagus, stomach and duodenum. Medium to large 5 cm sliding hiatal hernia, polyps 2 to 3 mm size located in the fundus body. -GI consulted, ok to restart anticoagulation, outpatient follow-up   -Protonix 40 mg BID     Abnormal CT scan reading, with air in the anterior abdominal wall concerning for possible bladder perforation:  -urology consulted. CT cystogram is negative for perforation.   -Sagastume removed  -Urology follow-up outpatient    Paraesophageal Hernia  Previously on TPN and clear liquid diet   On dental soft diet now  Gen Surgery follow-up as planned outpatient     H/O COVID: December 2020     History of atrial thrombus  History of A. Fib  History of DVT/PE, underwent TPA in 2016  -Was on [address] to restart anticoagulation per GI  -Xarelto switched to Eliquis  -Continue Eliquis     History of A. Fib/Hypertension.   -Currently normotensive  -Continue metoprolol XL 25 mg once daily but hold other medications    Hypokalemia: Improved  Had decreased oral intake   Improved after supplementation    Morbid obesity due to excess calories:  -BMI 35.61- Complicating assessment and treatment. Placing patient at risk for multiple co-morbidities as well as early death and contributing to the patient's presentation.  on weight loss when appropriate.     DVT Prophylaxis: Eliquis  Diet: DIET DENTAL SOFT; Dental Soft; Safety Tray; Safety Tray (Disposables)  Dietary Nutrition Supplements:

## 2021-03-10 NOTE — PROGRESS NOTES
Pt is alert and oriented. To self. Does have moments of clarity. No c/o of pain or nausea at this time. Vsstable. No new skin issues noted. Did order speciality bed for pt. Pt still has low appetite but will drink ensures. Pt has purewick in place with adequate output. Will continue to apply barrier cream. Will continue to monitor.

## 2021-03-10 NOTE — CARE COORDINATION
CM following, spoke with Brant Johnson [phone] precert was started on 3/9 in the am plan to have it back by today vs tomorrow. Aware of poss peg if pt dietary intake doesn't increase. Lyly Montalvo [phone]  for a return call. Electronically signed by Evan Chase RN on 3/10/2021 at 10:04 AM  [phone]     UPDATE:  Spoke with Ximena Hernández and he is in agreement for peg tube will be available for GI to call to discuss PEG tube and he also agrees with return to [place] [id number] when stable for DC.   Electronically signed by Evan Chase RN on 3/10/2021 at 10:16 AM  [phone]

## 2021-03-10 NOTE — CONSULTS
Consult Note      Meggan Blue  1960    Consultant: Candace Huntley  Reason for Consult:  PEG  Requesting Physician:  Sharon Cross:  \"I want to go home\"    History Obtained From:  patient, electronic medical record    HISTORY OF PRESENT ILLNESS:                The patient is a 63 y.o. female with significant past medical history of obesity who presents with coffee ground emesis on 3/2. Seen by [address] who did not scope. Now asking for PEG for improved nutrition. No dysphagia. No abdominal pain. Does not want PEG and wants to go home. Past Medical History:        Diagnosis Date    Arthritis     [facility] [address]     COVID-19 12/15/2020    ESBL (extended spectrum beta-lactamase) producing bacteria infection     Glaucoma     Hemorrhoids     HTN (hypertension)     Hyperlipidemia     Moderate protein-calorie malnutrition (HCC)     Morbid obesity (HCC)     Muscular wasting and disuse atrophy     PAF (paroxysmal atrial fibrillation) (HCC)     UTI (urinary tract infection)     VRE (vancomycin resistant enterococcus) culture positive 2021    urine     Past Surgical History:        Procedure Laterality Date    BLADDER REPAIR       SECTION      CHOLECYSTECTOMY      CYSTOSCOPY      TIBIA FRACTURE SURGERY Right 2020    RIGHT TIBIA IM NAIL [facility] Lorraine Phalen[facility]    EGD W/ANES. (8:00) COVID + performed by Vesna Gil MD at [address] [facility]     Medications at Home:  Medications Prior to Admission: polyethylene glycol (GLYCOLAX) 17 g packet, Take 17 g by mouth 2 times daily  docusate sodium (COLACE, DULCOLAX) 100 MG CAPS, Take 100 mg by mouth 2 times daily as needed for Constipation Reduce use if stools become soft.   promethazine (PHENERGAN) 25 MG/ML injection, Inject 6.25 mg into the muscle every 6 hours as needed  metoclopramide (REGLAN) 10 MG tablet, Take 10 mg by mouth 4 times daily  calcium carbonate (TUMS) 500 MG chewable tablet, Take 1 tablet by mouth daily  omeprazole (PRILOSEC) 10 MG delayed release capsule, Take 10 mg by mouth daily  nystatin (MYCOSTATIN) POWD powder, Apply topically 2 times daily  nitrofurantoin (MACRODANTIN) 50 MG capsule, Take 50 mg by mouth 4 times daily  ondansetron (ZOFRAN) 4 MG/5ML solution, Take by mouth once  oxyCODONE-acetaminophen (PERCOCET) 5-325 MG per tablet, Take 1 tablet by mouth every 4 hours as needed for Pain.  pantoprazole (PROTONIX) 40 MG tablet, Take 1 tablet by mouth every morning (before breakfast)  amLODIPine (NORVASC) 10 MG tablet, TAKE 1 TABLET BY MOUTH ONCE DAILY FOR 90 DAYS  atorvastatin (LIPITOR) 40 MG tablet,   oxybutynin (DITROPAN) 5 MG tablet, TAKE 1 TABLET BY MOUTH EVERY 8 HOURS  vitamin D3 (CHOLECALCIFEROL) 400 units TABS tablet, Take 400 Units by mouth daily  Flaxseed Oil OIL, 1,400 mg by Does not apply route daily  calcium carbonate 600 MG TABS tablet, Take 1 tablet by mouth daily  latanoprost (XALATAN) 0.005 % ophthalmic solution, 1 drop nightly  metoprolol succinate (TOPROL XL) 25 MG extended release tablet, Take 25 mg by mouth daily  rivaroxaban (XARELTO) 20 MG TABS tablet, Take 20 mg by mouth  lisinopril (PRINIVIL;ZESTRIL) 20 MG tablet, Take 40 mg by mouth daily   potassium chloride (KLOR-CON) 20 MEQ packet, Take 20 mEq by mouth 2 times daily  Current Medications:    Current Facility-Administered Medications: sodium bicarbonate tablet 650 mg, 650 mg, Oral, TID  dextrose 5 % solution, , Intravenous, Continuous  saccharomyces boulardii (FLORASTOR) capsule 250 mg, 250 mg, Oral, BID  pantoprazole (PROTONIX) tablet 40 mg, 40 mg, Oral, BID AC  [Held by provider] apixaban (ELIQUIS) tablet 5 mg, 5 mg, Oral, BID  glucose (GLUTOSE) 40 % oral gel 15 g, 15 g, Oral, PRN  dextrose 50 % IV solution, 12.5 g, Intravenous, PRN  glucagon (rDNA) injection 1 mg, 1 mg, Intramuscular, PRN  dextrose 5 % solution, 100 mL/hr, Intravenous, PRN  sodium chloride flush 0.9 % injection 10 mL, 10 mL, Intravenous, 2 times per day  sodium chloride flush 0.9 % injection 10 mL, 10 mL, Intravenous, PRN  promethazine (PHENERGAN) tablet 12.5 mg, 12.5 mg, Oral, Q6H PRN **OR** ondansetron (ZOFRAN) injection 4 mg, 4 mg, Intravenous, Q6H PRN  acetaminophen (TYLENOL) tablet 650 mg, 650 mg, Oral, Q6H PRN **OR** acetaminophen (TYLENOL) suppository 650 mg, 650 mg, Rectal, Q6H PRN  metoprolol succinate (TOPROL XL) extended release tablet 25 mg, 25 mg, Oral, Daily  Allergies:  Patient has no known allergies. Social History:    TOBACCO:   reports that she has never smoked. She has never used smokeless tobacco.  ETOH:   reports previous alcohol use. DRUGS:   reports no history of drug use. Family History:   History reviewed. No pertinent family history. REVIEW OF SYSTEMS:    A comprehensive 12 point review of systems is negative except as documented above.      PHYSICAL EXAM:      Vitals:    BP (!) 153/109   Pulse 118   Temp 97.8 °F (36.6 °C) (Oral)   Resp 18   Ht 5' 6\" (1.676 m)   Wt 273 lb 5.9 oz (124 kg)   SpO2 95%   BMI 44.12 kg/m²     General: No acute distress; chronically ill appearing  HEENT: PERRL, EOMI, oral mucosa moist and intact, no sclericterus  Neck: no thyromegaly  Lymphatic: no cervical or supraclavicular lymphadenopathy  Heart: no m/r/g; +s1/s2 rrr  Lungs: CTA bilaterally  Abdomen: soft, nt, nd +BS  Extremities: 2+pulses, +edema  Skin: no rashes or lesions  Neuro: a&o x 3; no gross deficit  : duran  DATA:    Recent Labs     03/08/21 0527 03/09/21  0528 03/10/21  0503   WBC 5.1 6.7 8.7   HGB 9.4* 9.8* 9.9*   HCT 28.6* 30.2* 30.5*   MCV 98.9 101.1* 100.9*    268 286     Recent Labs     03/08/21 0527 03/09/21 0528 03/10/21  0503    141 138   K 4.3 4.4 5.0   * 112* 108   CO2 20* 19* 20*   PHOS 2.2* 2.3* 2.7   BUN 2* <2* 4*   CREATININE <0.5* <0.5* <0.5*     No results for input(s): AST, ALT, ALB, BILIDIR, BILITOT, ALKPHOS in the last 72 hours. No results for input(s): LIPASE, AMYLASE in the last 72 hours. No results for input(s): PROT, INR in the last 72 hours. No results for input(s): PTT in the last 72 hours. No results for input(s): OCCULTBLD in the last 72 hours. IMPRESSION/RECOMMENDATIONS:      Protein malnutrition    Prealbumin going up and has a known large hernia and paraesophageal hernia. Unlikely to be able to place an endoscopic PEG. Regardless she does not want one and presently does not need as nutritional parameters are improving and she is able to eat and drink. Encouraged calorically dense foods/ensures  Follow up with her primary GI. Thank you for allowing me to participate in Elaine Salazar's care. Carisa Cortez.  Neftali Herman MD

## 2021-03-10 NOTE — CONSULTS
General Surgery   Resident Consult Note    Reason for Consult: Poor enteral intake    History of Present Illness:   Lily Babcock is a 63 y.o. female with history of VRE UTI, atrial fibrillation on Xarelto, and CHF who presented to [facility] on 3/2 with altered mental status. Our service has been consulted for evaluation for gastrostomy tube placement. Over the past several months, patient has had difficulty with oral intake due to fear of nausea. Following her last admission, she was discharged on TPN; however, she dislodged her PICC following discharge. There is also been concern for malnutrition during this admission. Dietary has been consulted, and strict calorie counts show that she is consuming anywhere between 50 and 70% of her kilocalorie goals. At this time, she is refusing PEG placement. Past Medical History:        Diagnosis Date    Arthritis     [facility]) ([facility] [address]     COVID-19 12/15/2020    ESBL (extended spectrum beta-lactamase) producing bacteria infection     Glaucoma     Hemorrhoids     HTN (hypertension)     Hyperlipidemia     Moderate protein-calorie malnutrition (HCC)     Morbid obesity (HCC)     Muscular wasting and disuse atrophy     PAF (paroxysmal atrial fibrillation) (HCC)     UTI (urinary tract infection)     VRE (vancomycin resistant enterococcus) culture positive 2021    urine       Past Surgical History:        Procedure Laterality Date    BLADDER REPAIR       SECTION      CHOLECYSTECTOMY      CYSTOSCOPY      TIBIA FRACTURE SURGERY Right 2020    RIGHT TIBIA IM NAIL INSERTION performed by Gabe Lanes, MD at [address] 2020    EGD W/ANES. (8:00) COVID + performed by Patrick Khan MD at [place] [address].:  Patient has no known allergies. Medications:   Home Meds  No current facility-administered medications on file prior to encounter.       Current Outpatient Medications on File Prior to Encounter   Medication Sig Dispense Refill    polyethylene glycol (GLYCOLAX) 17 g packet Take 17 g by mouth 2 times daily 60 each 2    docusate sodium (COLACE, DULCOLAX) 100 MG CAPS Take 100 mg by mouth 2 times daily as needed for Constipation Reduce use if stools become soft. 60 capsule 2    promethazine (PHENERGAN) 25 MG/ML injection Inject 6.25 mg into the muscle every 6 hours as needed      metoclopramide (REGLAN) 10 MG tablet Take 10 mg by mouth 4 times daily      calcium carbonate (TUMS) 500 MG chewable tablet Take 1 tablet by mouth daily      omeprazole (PRILOSEC) 10 MG delayed release capsule Take 10 mg by mouth daily      nystatin (MYCOSTATIN) POWD powder Apply topically 2 times daily      nitrofurantoin (MACRODANTIN) 50 MG capsule Take 50 mg by mouth 4 times daily      ondansetron (ZOFRAN) 4 MG/5ML solution Take by mouth once      oxyCODONE-acetaminophen (PERCOCET) 5-325 MG per tablet Take 1 tablet by mouth every 4 hours as needed for Pain.       pantoprazole (PROTONIX) 40 MG tablet Take 1 tablet by mouth every morning (before breakfast) 30 tablet 3    amLODIPine (NORVASC) 10 MG tablet TAKE 1 TABLET BY MOUTH ONCE DAILY FOR 90 DAYS      atorvastatin (LIPITOR) 40 MG tablet       oxybutynin (DITROPAN) 5 MG tablet TAKE 1 TABLET BY MOUTH EVERY 8 HOURS      vitamin D3 (CHOLECALCIFEROL) 400 units TABS tablet Take 400 Units by mouth daily      Flaxseed Oil OIL 1,400 mg by Does not apply route daily      calcium carbonate 600 MG TABS tablet Take 1 tablet by mouth daily      latanoprost (XALATAN) 0.005 % ophthalmic solution 1 drop nightly      metoprolol succinate (TOPROL XL) 25 MG extended release tablet Take 25 mg by mouth daily      rivaroxaban (XARELTO) 20 MG TABS tablet Take 20 mg by mouth      lisinopril (PRINIVIL;ZESTRIL) 20 MG tablet Take 40 mg by mouth daily       potassium chloride (KLOR-CON) 20 MEQ packet Take 20 mEq by mouth 2 times daily         Current Meds  sodium bicarbonate tablet 650 mg, TID  dextrose 5 % solution, Continuous  saccharomyces boulardii (FLORASTOR) capsule 250 mg, BID  pantoprazole (PROTONIX) tablet 40 mg, BID AC  apixaban (ELIQUIS) tablet 5 mg, BID  glucose (GLUTOSE) 40 % oral gel 15 g, PRN  dextrose 50 % IV solution, PRN  glucagon (rDNA) injection 1 mg, PRN  dextrose 5 % solution, PRN  sodium chloride flush 0.9 % injection 10 mL, 2 times per day  sodium chloride flush 0.9 % injection 10 mL, PRN  promethazine (PHENERGAN) tablet 12.5 mg, Q6H PRN    Or  ondansetron (ZOFRAN) injection 4 mg, Q6H PRN  acetaminophen (TYLENOL) tablet 650 mg, Q6H PRN    Or  acetaminophen (TYLENOL) suppository 650 mg, Q6H PRN  metoprolol succinate (TOPROL XL) extended release tablet 25 mg, Daily        Family History:   History reviewed. No pertinent family history. Social History:   TOBACCO:   reports that she has never smoked. She has never used smokeless tobacco.  ETOH:   reports previous alcohol use. DRUGS:   reports no history of drug use. Review of Systems:     Constitutional: Negative. HENT: Negative. Eyes: Negative. Respiratory: Negative. Cardiovascular: Negative. Gastrointestinal: Negative. Genitourinary: Negative. Musculoskeletal: Negative. Skin: Negative. Endocrine: Negative. Allergic/Immunologic: Negative. Neurological: Negative. Hematological: Negative. Psychiatric/Behavioral: Negative.     Physical exam:    Vitals:    03/10/21 0257 03/10/21 0729 03/10/21 1101 03/10/21 1443   BP: 130/84 102/68 (!) 153/109 102/68   Pulse: 116 118 118 119   Resp: 18 18 18 18   Temp: 98.2 °F (36.8 °C) 97.7 °F (36.5 °C) 97.8 °F (36.6 °C) 99.8 °F (37.7 °C)   TempSrc: Oral Oral Oral Oral   SpO2: 95% 95% 95% 97%   Weight:       Height:           General appearance: alert, no acute distress, grooming appropriate  HEENT: Normocephalic, atraumatic; EOMI; moist mucous membranes  Neck: trachea midline, no JVD, no lymphadenopathy  Chest/Lungs: Normal inspiratory effort, symmetric chest rise, no accessory muscle use  Cardiovascular: Regular rate and rhythm; perfusing extremities  Abdomen: soft, non-tender, non-distended, no guarding/rigidity  Skin: warm and dry, no rashes  Extremities: no edema, no cyanosis  Neuro: Alert, oriented to person/time; no gross sensory or motor neuro deficits    Labs:    CBC:   Recent Labs     03/08/21 0527 03/09/21  0528 03/10/21  0503   WBC 5.1 6.7 8.7   HGB 9.4* 9.8* 9.9*   HCT 28.6* 30.2* 30.5*   MCV 98.9 101.1* 100.9*    268 286     BMP:   Recent Labs     03/08/21 0527 03/09/21  0528 03/10/21  0503    141 138   K 4.3 4.4 5.0   * 112* 108   CO2 20* 19* 20*   PHOS 2.2* 2.3* 2.7   BUN 2* <2* 4*   CREATININE <0.5* <0.5* <0.5*     Liver Profile:   Lab Results   Component Value Date    AST 23 03/02/2021    ALT 29 03/02/2021    BILIDIR 0.5 02/05/2021    BILITOT 0.9 03/02/2021    ALKPHOS 118 03/02/2021     Lab Results   Component Value Date    TRIG 109 02/09/2021     Imaging:   CT CYSTOGRAM W WO CONTRAST   Final Result      Negative for bladder leak or perforation. No acute process. Lower anterior abdominal wall hernias. CT HEAD WO CONTRAST   Final Result   1. No acute intracranial findings. MRI BRAIN W WO CONTRAST    (Results Pending)         Assessment/Plan:  Joice Merlin is a 63 y.o. female with VRE UTI, atrial fibrillation on Xarelto, and CHF for whom our services been consulted for evaluation for PEG placement. At this time, patient is actively refusing gastrostomy placement.   Additionally, dietary is reevaluating whether an alternative avenue of enteral feeding is necessary at this time with continued calorie counts.    -No surgical intervention indicated at this time for her hiatal hernia given her malnutrition and mental status changes.  -Additionally, recommend that if dietary agrees that further enteral nutrition is necessary, she should follow-up outpatient with her previous gastroenterologist  Marianela with [facility].  -Continue calorie counts, following up nutrition labs  -Patient, plan discussed with surgical team, surgical staff Dr. Wilner Paige for discharge from surgical perspective.  -Please contact our service should any further questions or concerns arise.     Maxwell Bergman MD PGY-1  03/10/21  5:22 PM  [phone]

## 2021-03-10 NOTE — CONSULTS
Laterality Date    BLADDER REPAIR       SECTION      CHOLECYSTECTOMY      CYSTOSCOPY      TIBIA FRACTURE SURGERY Right 2020    RIGHT TIBIA IM NAIL INSERTION performed by Yasemin Mckeon MD at [address] [place] [address] 2020    EGD W/ANES. (8:00) COVID + performed by Makayla Salguero MD at [address] [facility]     Medications Prior to Admission: polyethylene glycol (GLYCOLAX) 17 g packet, Take 17 g by mouth 2 times daily  docusate sodium (COLACE, DULCOLAX) 100 MG CAPS, Take 100 mg by mouth 2 times daily as needed for Constipation Reduce use if stools become soft.   promethazine (PHENERGAN) 25 MG/ML injection, Inject 6.25 mg into the muscle every 6 hours as needed  metoclopramide (REGLAN) 10 MG tablet, Take 10 mg by mouth 4 times daily  calcium carbonate (TUMS) 500 MG chewable tablet, Take 1 tablet by mouth daily  omeprazole (PRILOSEC) 10 MG delayed release capsule, Take 10 mg by mouth daily  nystatin (MYCOSTATIN) POWD powder, Apply topically 2 times daily  nitrofurantoin (MACRODANTIN) 50 MG capsule, Take 50 mg by mouth 4 times daily  ondansetron (ZOFRAN) 4 MG/5ML solution, Take by mouth once  oxyCODONE-acetaminophen (PERCOCET) 5-325 MG per tablet, Take 1 tablet by mouth every 4 hours as needed for Pain.  pantoprazole (PROTONIX) 40 MG tablet, Take 1 tablet by mouth every morning (before breakfast)  amLODIPine (NORVASC) 10 MG tablet, TAKE 1 TABLET BY MOUTH ONCE DAILY FOR 90 DAYS  atorvastatin (LIPITOR) 40 MG tablet,   oxybutynin (DITROPAN) 5 MG tablet, TAKE 1 TABLET BY MOUTH EVERY 8 HOURS  vitamin D3 (CHOLECALCIFEROL) 400 units TABS tablet, Take 400 Units by mouth daily  Flaxseed Oil OIL, 1,400 mg by Does not apply route daily  calcium carbonate 600 MG TABS tablet, Take 1 tablet by mouth daily  latanoprost (XALATAN) 0.005 % ophthalmic solution, 1 drop nightly  metoprolol succinate (TOPROL XL) 25 MG extended release tablet, Take 25 mg by mouth daily  rivaroxaban (XARELTO) 20 MG TABS tablet, Take 20 mg by mouth  lisinopril (PRINIVIL;ZESTRIL) 20 MG tablet, Take 40 mg by mouth daily   potassium chloride (KLOR-CON) 20 MEQ packet, Take 20 mEq by mouth 2 times daily  No Known Allergies  History reviewed. No pertinent family history. Social History     Tobacco Use   Smoking Status Never Smoker   Smokeless Tobacco Never Used     Social History     Substance and Sexual Activity   Drug Use Never     Social History     Substance and Sexual Activity   Alcohol Use Not Currently       ROS: limited by encephalopathy    Exam:  Blood pressure (!) 153/109, pulse 118, temperature 97.8 °F (36.6 °C), temperature source Oral, resp. rate 18, height 5' 6\" (1.676 m), weight 273 lb 5.9 oz (124 kg), SpO2 95 %. Constitutional    Vital signs: BP, HR, and RR reviewed   General Alert, no distress, well-nourished  Eyes: unable to visualize fundi   Cardiovascular: pulses symmetric in all 4 extremities. No peripheral edema. Psychiatric: cooperative with examination, no  psychotic behavior noted. Neurologic  Mental status: eyes open spontaneously  orientation to person and place, month, and year   General fund of knowledge grossly intact; names current and preceding president. Able to tell me the value of 2 quarters. Could not calculate complex coins   Memory grossly intact; names 1st president; 3/3 immediate recall. 3/3 at 5 min   Attention intact as able to attend well to the exam; able to read clock   Language fluent in conversation but a little tangential   Comprehension intact; follows simple commands and 2 step commands crossing midline  Cranial nerves:   CN2: Visual Fields grossly full   CN 3,4,6: extraocular muscles intact, pupils equal round and reactive  CN5: facial sensation symmetric   CN7: face symmetric without dysarthria  CN8: hearing grossly intact  CN9: palate elevated symmetrically  CN11: trap full strength on shoulder shrug  CN12: tongue midline with protrusion  Strength: No prontator drift.   BUE +4/5. BLE antigravity and chronically weak  Deep tendon reflexes: brisk in upper limbs. Absent in lower limbs but limited by positioning and body habitus  Sensory: light touch intact in upper limbs. Reduced in lower limbs which is chronic. No sensory extinction on double simultaneous stimulation  Cerebellar/coordination: finger nose finger normal without ataxia  Tone: normal in all 4 extremities  Gait: deferred for safety     Labs  Ammonia 13 ug/dL  COVID-19 not detected  Urine culture 2/9 50,000 CFU/mL Enterococcus faecalis   INR 1.1  Platelets 791H  Hgb 9.9 g/dL  Hct 30.5%    Studies  TTE EF 55-60%. LA normal in size. CT head: no acute intracranial findings    Scheduled Medications   sodium bicarbonate  650 mg Oral TID    saccharomyces boulardii  250 mg Oral BID    pantoprazole  40 mg Oral BID AC    [Held by provider] apixaban  5 mg Oral BID    sodium chloride flush  10 mL Intravenous 2 times per day    metoprolol succinate  25 mg Oral Daily     Impression:  Dixie Samuels is a 63 y.o. female admitted for upper GI bleed and UTI who presents with encephalopathy, at least 10 days in duration, which is improving. Her encephalopathy is mild on my visit. For the duration of my visit, her TV in her room was blaring loudly and with a lot of static, and this was very distracting even to the examiner. This is certainly contributing to her delirium and should be rectified as soon as possible. Recommendations:  - Recheck ammonia. Given malnourished state would also check vitamin A/D/E/K, thiamine, B12, folate  - Delirium Precautions: Maintain a regular night-day/sleep-wake cycle: discourage daytime naps, re-orient frequently, shades up during the daytime, family at bedside as often as possible, minimize nighttime awakenings, utilize relaxation channel on television.  Fix her static/TV noise in her room, or move her to a different room  - Would check MRI brain (ordered) as she has been off anticoagulation and encephalopathy can be seen in patients with shower of emboli (though lack of focal deficit and degree of encephalopathy are reassuring). If no focus of ischemia will defer to GI when anticoagulation can be resumed safely.    - Will check EEG but low suspicion for seizure  - Continue to treat underlying infections as you are    A copy of this note was provided for MD Antwan Jimenezna Case NP  [phone]  Evenings, weekends, and off weeks please discuss neurologist on-call

## 2021-03-10 NOTE — PROGRESS NOTES
Patient can be disoriented at times but can be easily  reoriented. VSS. Blood sugar has been stable. Patient has had no complaints of pain or nausea. Patient has not had much of an appetite only sipping on ensure throughout the night. Patient has adequate amount of clear, yellow urine. Patient also had two loose stools overnight. Zinc paste applied to rom area and buttocks, patient being turned every two hours. Call light in reach. Will continue to monitor.  Electronically signed by Iker Whelan RN on 3/10/2021 at 2:45 AM

## 2021-03-10 NOTE — PROGRESS NOTES
NUTRITION NOTE: Calorie Count    Type and Reason for Visit: Calorie Count, Consult    NUTRITION RECOMMENDATIONS:   1. Continue Daily Calorie Count. 2. Continue Ensure TID- pt to consume 3 per day (1000 kcal/60 g protein) to meet 75% of her lowest calorie/protein needs. 3. Alternative nutrition may still be warranted if pt unable to consume > 50% of nutrition from PO diet consistently. NUTRITION ASSESSMENT:  Calorie count day 3    Diet Orders / Intake / Nutrition Support  Current diet/supplement order: Diet NPO Effective Now Exceptions are: Sips of Water with Meds       COMPARATIVE STANDARDS  Estimated Total Kcals/Day : 10-15 Current Bodyweight (124 kg) 5928-5714 kcal    Estimated Total Protein (g/day) : 1.5-2.0 Ideal Bodyweight  (59 kg)  g/day  Estimated Daily Total Fluid (ml/day): 1500 (min)- 1860 ml     Date Consumed PO Intake Kcal %   Kcal met PO Intake grams protein %  Protein met   Comments   3/7 VIJI VIJI VIJI VIJI  No tickets recorded;     Per RN: ate 26-50% of 1 meal (no specs recorded) and refused bkf    3/8 708k 57% 16g 18% B- no tickets recorded  L 50% ice cream; 100% cookie, 25% carrots; 100% lemonade  D- 100% pudding; 25% potroast, potatoes and gn bns; 50% magic cup    3/9 ~ 863k 70% ~42g 47% B- 50% eggs; 25% hashbrowns, 100% OJ, 50% muffin, 100% milk   L only crystal light  D 1 ensure    3/10 40 3% 0  Breakfast:            **Results will be posted as available. Beatriz Gallagher, [address], [address]:  [phone]  Office:  [phone]

## 2021-03-11 ENCOUNTER — APPOINTMENT (OUTPATIENT)
Dept: MRI IMAGING | Age: 61
DRG: 661 | End: 2021-03-11
Attending: INTERNAL MEDICINE
Payer: MEDICAID

## 2021-03-11 LAB
ALBUMIN SERPL-MCNC: 2.3 G/DL (ref 3.4–5)
ANION GAP SERPL CALCULATED.3IONS-SCNC: 8 MMOL/L (ref 3–16)
BASOPHILS ABSOLUTE: 0.1 K/UL (ref 0–0.2)
BASOPHILS RELATIVE PERCENT: 1.1 %
BUN BLDV-MCNC: 8 MG/DL (ref 7–20)
CALCIUM SERPL-MCNC: 8.7 MG/DL (ref 8.3–10.6)
CHLORIDE BLD-SCNC: 106 MMOL/L (ref 99–110)
CO2: 26 MMOL/L (ref 21–32)
CREAT SERPL-MCNC: <0.5 MG/DL (ref 0.6–1.2)
EOSINOPHILS ABSOLUTE: 0.2 K/UL (ref 0–0.6)
EOSINOPHILS RELATIVE PERCENT: 2.5 %
GFR AFRICAN AMERICAN: >60
GFR NON-AFRICAN AMERICAN: >60
GLUCOSE BLD-MCNC: 101 MG/DL (ref 70–99)
GLUCOSE BLD-MCNC: 87 MG/DL (ref 70–99)
GLUCOSE BLD-MCNC: 88 MG/DL (ref 70–99)
GLUCOSE BLD-MCNC: 90 MG/DL (ref 70–99)
GLUCOSE BLD-MCNC: 99 MG/DL (ref 70–99)
HCT VFR BLD CALC: 29.2 % (ref 36–48)
HEMOGLOBIN: 9.5 G/DL (ref 12–16)
LYMPHOCYTES ABSOLUTE: 1.1 K/UL (ref 1–5.1)
LYMPHOCYTES RELATIVE PERCENT: 13.1 %
MCH RBC QN AUTO: 32.5 PG (ref 26–34)
MCHC RBC AUTO-ENTMCNC: 32.6 G/DL (ref 31–36)
MCV RBC AUTO: 99.5 FL (ref 80–100)
MONOCYTES ABSOLUTE: 1.1 K/UL (ref 0–1.3)
MONOCYTES RELATIVE PERCENT: 13.2 %
NEUTROPHILS ABSOLUTE: 5.7 K/UL (ref 1.7–7.7)
NEUTROPHILS RELATIVE PERCENT: 70.1 %
PDW BLD-RTO: 17.1 % (ref 12.4–15.4)
PERFORMED ON: ABNORMAL
PERFORMED ON: NORMAL
PHOSPHORUS: 2.1 MG/DL (ref 2.5–4.9)
PLATELET # BLD: 279 K/UL (ref 135–450)
PMV BLD AUTO: 9.8 FL (ref 5–10.5)
POTASSIUM SERPL-SCNC: 4 MMOL/L (ref 3.5–5.1)
RBC # BLD: 2.93 M/UL (ref 4–5.2)
SODIUM BLD-SCNC: 140 MMOL/L (ref 136–145)
WBC # BLD: 8.2 K/UL (ref 4–11)

## 2021-03-11 PROCEDURE — 97530 THERAPEUTIC ACTIVITIES: CPT

## 2021-03-11 PROCEDURE — 97535 SELF CARE MNGMENT TRAINING: CPT

## 2021-03-11 PROCEDURE — 70553 MRI BRAIN STEM W/O & W/DYE: CPT

## 2021-03-11 PROCEDURE — 6370000000 HC RX 637 (ALT 250 FOR IP): Performed by: INTERNAL MEDICINE

## 2021-03-11 PROCEDURE — 85025 COMPLETE CBC W/AUTO DIFF WBC: CPT

## 2021-03-11 PROCEDURE — 80069 RENAL FUNCTION PANEL: CPT

## 2021-03-11 PROCEDURE — 36415 COLL VENOUS BLD VENIPUNCTURE: CPT

## 2021-03-11 PROCEDURE — A9576 INJ PROHANCE MULTIPACK: HCPCS | Performed by: PSYCHIATRY & NEUROLOGY

## 2021-03-11 PROCEDURE — 2580000003 HC RX 258: Performed by: INTERNAL MEDICINE

## 2021-03-11 PROCEDURE — 1200000000 HC SEMI PRIVATE

## 2021-03-11 PROCEDURE — 95819 EEG AWAKE AND ASLEEP: CPT

## 2021-03-11 PROCEDURE — 6360000004 HC RX CONTRAST MEDICATION: Performed by: PSYCHIATRY & NEUROLOGY

## 2021-03-11 RX ORDER — METOPROLOL SUCCINATE 50 MG/1
50 TABLET, EXTENDED RELEASE ORAL DAILY
Status: DISCONTINUED | OUTPATIENT
Start: 2021-03-12 | End: 2021-03-12 | Stop reason: HOSPADM

## 2021-03-11 RX ORDER — 0.9 % SODIUM CHLORIDE 0.9 %
1000 INTRAVENOUS SOLUTION INTRAVENOUS ONCE
Status: COMPLETED | OUTPATIENT
Start: 2021-03-11 | End: 2021-03-11

## 2021-03-11 RX ADMIN — PANTOPRAZOLE SODIUM 40 MG: 40 TABLET, DELAYED RELEASE ORAL at 06:09

## 2021-03-11 RX ADMIN — APIXABAN 5 MG: 5 TABLET, FILM COATED ORAL at 23:07

## 2021-03-11 RX ADMIN — GADOTERIDOL 20 ML: 279.3 INJECTION, SOLUTION INTRAVENOUS at 13:09

## 2021-03-11 RX ADMIN — PANTOPRAZOLE SODIUM 40 MG: 40 TABLET, DELAYED RELEASE ORAL at 15:52

## 2021-03-11 RX ADMIN — APIXABAN 5 MG: 5 TABLET, FILM COATED ORAL at 08:23

## 2021-03-11 RX ADMIN — METOPROLOL TARTRATE 25 MG: 25 TABLET, FILM COATED ORAL at 16:44

## 2021-03-11 RX ADMIN — SODIUM CHLORIDE 1000 ML: 0.9 INJECTION, SOLUTION INTRAVENOUS at 09:56

## 2021-03-11 RX ADMIN — Medication 10 ML: at 08:24

## 2021-03-11 RX ADMIN — Medication 250 MG: at 08:23

## 2021-03-11 RX ADMIN — METOPROLOL SUCCINATE 25 MG: 25 TABLET, EXTENDED RELEASE ORAL at 08:23

## 2021-03-11 ASSESSMENT — PAIN SCALES - PAIN ASSESSMENT IN ADVANCED DEMENTIA (PAINAD)
TOTALSCORE: 0
BODYLANGUAGE: 0
FACIALEXPRESSION: 0

## 2021-03-11 NOTE — PROGRESS NOTES
Hospitalist Progress Note      PCP: Maggie Sampson MD    Date of Admission: 3/2/2021    Hospital Course: Pt. is a 64 yo F who was admitted for concern of possible bladder perforation and coffee ground emesis. Urology, GI and surgery have been consulted. Patient to follow outpatient with urology, GI and surgery. GI saw the patient and stated that patient is refusing and unlikely to place an endoscopic PEG due to patient history of hernia. Off D5 gtt. Dietitian following. Encouraging oral intake. Subjective:  Alert x2. Doing well today. Tachycardic. Called son and discussed patient condition and discharge tomorrow. Son in agreement. Medications:  Reviewed    Infusion Medications    dextrose       Scheduled Medications    sodium chloride  1,000 mL Intravenous Once    saccharomyces boulardii  250 mg Oral BID    pantoprazole  40 mg Oral BID AC    apixaban  5 mg Oral BID    sodium chloride flush  10 mL Intravenous 2 times per day    metoprolol succinate  25 mg Oral Daily     PRN Meds: glucose, dextrose, glucagon (rDNA), dextrose, sodium chloride flush, promethazine **OR** ondansetron, acetaminophen **OR** acetaminophen      Intake/Output Summary (Last 24 hours) at 3/11/2021 0855  Last data filed at 3/11/2021 0342  Gross per 24 hour   Intake 540 ml   Output 1600 ml   Net -1060 ml       Physical Exam Performed:    /74   Pulse 117   Temp 98.2 °F (36.8 °C) (Oral)   Resp 16   Ht 5' 6\" (1.676 m)   Wt 273 lb 5.9 oz (124 kg)   SpO2 96%   BMI 44.12 kg/m²     General appearance: No apparent distress, appears stated age and cooperative. HEENT: Pupils equal, round, and reactive to light. Conjunctivae/corneas clear. Neck: Supple, with full range of motion. No jugular venous distention. Trachea midline. Respiratory:  Normal respiratory effort. Clear to auscultation, bilaterally without Rales/Wheezes/Rhonchi.   Cardiovascular: Regular rate and rhythm with normal S1/S2 without murmurs, rubs or gallops. Abdomen: Soft, non-tender, non-distended with normal bowel sounds. Musculoskeletal: no edema  Neuro: AAOx2      Labs:   Recent Labs     03/09/21  0528 03/10/21  0503 03/11/21  0605   WBC 6.7 8.7 8.2   HGB 9.8* 9.9* 9.5*   HCT 30.2* 30.5* 29.2*    286 279     Recent Labs     03/09/21  0528 03/10/21  0503 03/11/21  0605    138 140   K 4.4 5.0 4.0   * 108 106   CO2 19* 20* 26   BUN <2* 4* 8   CREATININE <0.5* <0.5* <0.5*   CALCIUM 8.9 8.9 8.7   PHOS 2.3* 2.7 2.1*     No results for input(s): AST, ALT, BILIDIR, BILITOT, ALKPHOS in the last 72 hours. No results for input(s): INR in the last 72 hours. No results for input(s): Jaymie Piggott in the last 72 hours. Urinalysis:      Lab Results   Component Value Date    NITRU Negative 03/03/2021    WBCUA 0-2 03/03/2021    BACTERIA Rare 03/03/2021    RBCUA 0-2 03/03/2021    BLOODU Negative 03/03/2021    SPECGRAV 1.025 03/03/2021    GLUCOSEU Negative 03/03/2021       Radiology:  CT CYSTOGRAM W WO CONTRAST   Final Result      Negative for bladder leak or perforation. No acute process. Lower anterior abdominal wall hernias. CT HEAD WO CONTRAST   Final Result   1. No acute intracranial findings. MRI BRAIN W WO CONTRAST    (Results Pending)     Assessment/Plan:    Active Hospital Problems    Diagnosis    Upper GI bleed [K92.2]     Acute metabolic encephalopathy likely multifactorial, UTI, decreased nutrition, and delirium  Called nursing home: Patient had another admission since leaving [facility], for a right tibial fracture for which she underwent surgery. She was discharged off TPN, was on clear liquids, was OFF TPN, and was doing well with clear liquids however there was no attempt to advance diet because she began insisting that she could not eat any food or take any medications due to fear of vomiting. At times she said she had vomited but no emesis noted by staff.  She had progressive decline in cognition with increased confusion and decreased PO intake over 1-2 weeks. She had a PICC line but she removed it herself. She was getting Invanz via IV.   -started on Zyvox and Merrem empirically for possible sepsis due to UTI on dmission  -Had enterococcus UTI approximately one month ago  -She had new Cx which was positive resistant E. Coli 2/25, sensitive to zosyn, meropenem  -She was started on Effie Amadeo started 3/1 through an IV since she had pulled out the PICC.   -UA, urine culture negative, was already on treatment with ertapenem  -will continue on meropenem (today is day 7), Zyvox stopped   -blood cultures no growth to date  -CT HEAD: no acute intracranial findings  -Completed Merrem course  -MRI brain with chronic small vessel disease  -Neurology following    Decreased Nutrition  -Restarted on clears, then full liquid then dental soft. Patient is tolerating well. -Monitor for refeeding  -D5 half-normal with potassium discontinued. -D5 discontinued  -Per dietitian 03/09, if patient unable to have adequate oral intake/oral supplementation the end of 03/10 then PEG tube with enteral nutrition would be recommended  -Discussed with son earlier this morning. Son gave permission to proceed with PEG tube. -General surgery consulted for PEG tube placement but GI saw the patient and stated that patient is refusing and unlikely to place an endoscopic PEG due to patient history of hernia.    -Oral intake improving, no need for PEG at this point.   -Continue calorie count  -Continue oral supplement    Anion gap acidosis with hyperchloremia: Resolved  Likely secondary to IV fluids  D5 half-normal saline changed to D5  Bicarb tablets 325 mg X 3 doses  Bicarb tablets 650 mg X 3 doses  Will monitor BMP     Coffee-ground emesis: Noted in outside ED, no events at nursing home, none reported here.  -was on Protonix drip, Xarelto was held  -s/p recent EGD in 12/2020 with Dr. Tran Gallegos, which showed normal esophagus, stomach and duodenum. Medium to large 5 cm sliding hiatal hernia, polyps 2 to 3 mm size located in the fundus body. -GI consulted, ok to restart anticoagulation, outpatient follow-up   -Protonix 40 mg BID     Abnormal CT scan reading, with air in the anterior abdominal wall concerning for possible bladder perforation:  -urology consulted. CT cystogram is negative for perforation.   -Sagastume removed  -Urology follow-up outpatient    Paraesophageal Hernia  Previously on TPN and clear liquid diet   On dental soft diet now  Gen Surgery follow-up as planned outpatient     H/O COVID: December 2020     History of atrial thrombus  History of A. Fib  History of DVT/PE, underwent TPA in 2016  -Was on 3400 K121 to restart anticoagulation per GI  -Xarelto switched to Eliquis  -Continue Eliquis  -Heart rate elevated. Will give IV fluids and increase metoprolol     History of A. Fib/Hypertension.   -Currently normotensive  -Continue metoprolol but holding other medications    Hypokalemia: Improved  Had decreased oral intake   Improved after supplementation    Morbid obesity due to excess calories:  -BMI 97.78- Complicating assessment and treatment. Placing patient at risk for multiple co-morbidities as well as early death and contributing to the patient's presentation.  on weight loss when appropriate.     DVT Prophylaxis: Eliquis  Diet: DIET DENTAL SOFT; Dental Soft; Safety Tray; Safety Tray (Disposables)  Code Status: Full Code    PT/OT Eval Status: ordered    Dispo -pending heart rate control and placement     Angelic Lal MD

## 2021-03-11 NOTE — PROGRESS NOTES
Occupational Therapy  Facility/Department: [address]  Daily Treatment Note  NAME: Lelo Roger  : 1960  MRN: [id number]    Date of Service: 3/11/2021    Discharge Recommendations:    Lelo Roger scored a 10/24 on the AM-PAC ADL Inpatient form. Current research shows that an AM-PAC score of 17 or less is typically not associated with a discharge to the patient's home setting. Based on the patient's AM-PAC score and their current ADL deficits, it is recommended that the patient have 3-5 sessions per week of Occupational Therapy at d/c to increase the patient's independence. Please see assessment section for further patient specific details. If patient discharges prior to next session this note will serve as a discharge summary. Please see below for the latest assessment towards goals. OT Equipment Recommendations  Equipment Needed: No  Other: defer to next level of care    Assessment   Performance deficits / Impairments: Decreased functional mobility ; Decreased ADL status; Decreased endurance;Decreased strength;Decreased cognition;Decreased safe awareness;Decreased balance  Assessment: Pt required encouragement to participate in activity. Pt cont to req assist of 2 for mobility. Cont OT tx per plan of care. Treatment Diagnosis: Impaired ADLs, mobility 2/2 decreased endurance, confusion  Prognosis: Fair  OT Education: OT Role;Plan of Care  Patient Education: Pt verbalized understanding  REQUIRES OT FOLLOW UP: Yes  Activity Tolerance  Activity Tolerance: Patient Tolerated treatment well  Activity Tolerance: Pt required encouragement to participate  Safety Devices  Type of devices: Left in bed;Bed alarm in place;Call light within reach;Nurse notified       Restrictions  Position Activity Restriction  Other position/activity restrictions: up with assist  Subjective   General  Chart Reviewed: Yes  Additional Pertinent Hx: Pt presented from SNF 3/2 with confusion, emesis.  admitted for acute metabolic encephalopathy due to UTI sepsis with suspected bladder perforation; Imaging (-) for bladder perforation. PMHx: CHF, arthritis, HTN, glaucoma, bladder repair, RIGHT TIBIA IM NAIL INSERTION  2020  Family / Caregiver Present: No  Referring Practitioner: Karen Pierre  Diagnosis: upper GI bleed, encephalopathy  Subjective  Subjective: Pt in bed upon entry. I want to go to that place where they do exercises and walking. Vital Signs  Patient Currently in Pain: Yes   Orientation  Orientation  Overall Orientation Status: Impaired  Orientation Level: Oriented to person(hospital)  Objective    ADL  Grooming: Setup(to wipe face, seated edge of bed. Pt declined further grooming/hygiene)  Toileting: (denied need)        Balance  Sitting Balance: Contact guard assistance(to SBA sitting edge of bed ~8 min. Pt performed LE exercise and grooming.)  Standing Balance: Unable to assess(comment)  Bed mobility  Rolling to Left: 2 Person assistance(max assist x2)  Rolling to Right: 2 Person assistance(max assist x2)  Supine to Sit: 2 Person assistance(max assist x2)  Scootin Person assistance(Dependent x2)                          Cognition  Overall Cognitive Status: Exceptions  Arousal/Alertness: Delayed responses to stimuli  Following Commands:  Follows one step commands consistently  Attention Span: Attends with cues to redirect  Insights: Decreased awareness of deficits  Initiation: Requires cues for some                                         Plan   Plan  Times per week: 2-5  Current Treatment Recommendations: Strengthening, Balance Training, Functional Mobility Training, Endurance Training, Self-Care / ADL    AM-PAC Score        AM-[facility] Inpatient Daily Activity Raw Score: 10 (21 1504)  AM-PAC Inpatient ADL T-Scale Score : 27.31 (21 1504)  ADL Inpatient CMS 0-100% Score: 74.7 (21 1504)  ADL Inpatient CMS G-Code Modifier : CL (21 1504)    Goals  Short term goals  Time Frame for Short term goals: discharge  Short term goal 1: tolerate sitting eob x10 minutes with cga balance during ADLs/therex- not met  Short term goal 2: complete 10 reps UE exercises all joints, to improve function for ADLs- not met  Short term goal 3: mod A bed mobility- not met  Patient Goals   Patient goals : go home       Therapy Time   Individual Concurrent Group Co-treatment   Time In 1342         Time Out 1435         Minutes 53         Timed Code Treatment Minutes: 13 [facility], OTR/L 55630

## 2021-03-11 NOTE — CARE COORDINATION
Cm following, spoke with Suresh at [facility] for Sergei Colon [phone] that transport is set up for  2 pm tomorrow (3/12) to return no peg tube to be placed pt eating enough orally pt need to cont with high protein supplements at MO.   Electronically signed by María Nieto RN on 3/11/2021 at 12:12 PM  [phone]

## 2021-03-11 NOTE — PROGRESS NOTES
Neurology Consult Progress Note     Patient Name: Jaye Singh YOB: 1960   Sex: Female Age: 63 yrs     CC / Reason for Consult: encephalopathy    Subjective:   Awaiting MRI / EEG results - both completed today     ROS: Denies any new CP, SOB, F/C, N/V    Vitals /74   Pulse 117   Temp 98.2 °F (36.8 °C) (Oral)   Resp 16   Ht 5' 6\" (1.676 m)   Wt 273 lb 5.9 oz (124 kg)   SpO2 96%   BMI 44.12 kg/m²    Diet DIET DENTAL SOFT; Dental Soft; Safety Tray; Safety Tray (Disposables)   Isolation Contact     LABS   Basic Metabolic Profile Recent Labs     03/09/21  0528 03/10/21  0503 03/11/21  0605    138 140   K 4.4 5.0 4.0   * 108 106   CO2 19* 20* 26   BUN <2* 4* 8   CREATININE <0.5* <0.5* <0.5*   CALCIUM 8.9 8.9 8.7   LABALBU 2.4* 2.3* 2.3*   PHOS 2.3* 2.7 2.1*      Complete Blood Count Recent Labs     03/09/21  0528 03/10/21  0503 03/11/21  0605   WBC 6.7 8.7 8.2   RBC 2.99* 3.02* 2.93*   HGB 9.8* 9.9* 9.5*   HCT 30.2* 30.5* 29.2*       Ammonia 42  Vitamin d 39.6  Vitamin a  Vitamin e  Vitamin k  Thiamin e  B12 387  Folate 6.23     IMAGING   MRI brain w/ & w/o 3/11/2021  Impression       1. Scattered foci of T2 and FLAIR hyperintensity in the bilateral cerebral hemisphere white matter as described, compatible with mild small vessel ischemic disease. No evidence of intracranial hemorrhage, mass, or recent infarct.     2. Small retention cyst in the medial right maxillary sinus with no evidence of acute sinusitis. PHYSICAL EXAMINATION     Patient seen and examined   General: Well developed. Alert and cooperative in no acute distress. HENT: atraumatic, neck supple  Eyes: Sclera clear  Pulmonary: unlabored respiratory effort. Cardiovascular:  Warm well perfused. Minimal peripheral edema. Gastrointestinal: abdomen soft, ND.      Neurologic Exam:   Mental Status: Awake, alert, oriented to self, hospital (but not city or year), speech clear, somewhat confused  Cranial Nerves: EOMI FS TML FS  Motor Exam: limited by pain, able to lift all extremities off bed. RLE slightly weaker d/t fracture s/p repair November 2020. Has bilateral should pain and will not raise arms above head d/t pain, will not abduct either arm due to pain either. ASSESSMENT & PLAN   Kezia Lozano is a 63 y.o. female admitted for upper GI bleed and UTI who presents with encephalopathy, at least 10 days in duration, which is improving. Her encephalopathy is mild     Recommendations:  - Given malnourished state would also check vitamin A/D/E/K, thiamine, B12, folate - all sent & pending  - Delirium Precautions: Maintain a regular night-day/sleep-wake cycle: discourage daytime naps, re-orient frequently, shades up during the daytime, family at bedside as often as possible, minimize nighttime awakenings, utilize relaxation channel on television.   - MRI brain (ordered) as she has been off anticoagulation and encephalopathy can be seen in patients with shower of emboli (though lack of focal deficit and degree of encephalopathy are reassuring). If no focus of ischemia will defer to GI when anticoagulation can be resumed safely.    - Will check EEG but low suspicion for seizure - awaiting read  - Continue to treat underlying infections as you are  - Will f/u w/ final imaging / EEG read tomorrow    Electronically signed by: Nancy Hills, 3/11/2021 1:21 PM  Neurology Nurse Practitioner

## 2021-03-11 NOTE — PROGRESS NOTES
Per [facility]on[facility] 74 R.N. pt is refusing EEG at this time.  Due to her waxing and waning mental status we will make another attempt in AM.

## 2021-03-11 NOTE — PROGRESS NOTES
Physical Therapy  Facility/Department: [address] [place] [address] [address] [facility]  Daily Treatment Note  NAME: Livan Dowling  : 1960  MRN: [id number]    Date of Service: 3/11/2021    Discharge Recommendations:    Livan Dowling scored a 6/24 on the AM-PAC short mobility form. Current research shows that an AM-PAC score of 17 or less is typically not associated with a discharge to the patient's home setting. Based on the patient's AM-PAC score and their current functional mobility deficits, it is recommended that the patient have 3-5 sessions per week of Physical Therapy at d/c to increase the patient's independence. Please see assessment section for further patient specific details. If patient discharges prior to next session this note will serve as a discharge summary. Please see below for the latest assessment towards goals. PT Equipment Recommendations  Equipment Needed: No  Other: defer to next level of care    Assessment   Body structures, Functions, Activity limitations: Decreased functional mobility ; Decreased endurance;Decreased strength;Decreased balance;Decreased safe awareness;Decreased cognition  Assessment: Pt requiring Ax2 for bed mobility including rolling L/R and sit<>supine transfers. Pt with good sitting balance at EOB x8 min requiring SBA. Pt requiring max encouragement and education on importance of mobility to participate initially. Pt remains well below her baseline and would benefit from further skilled PT to maximize safety. Treatment Diagnosis: impaired functional mobility  PT Education: PT Role;Functional Mobility Training;General Safety  Patient Education: pt demonstrates partial understanding; rec reinforcement  Barriers to Learning: cognition  REQUIRES PT FOLLOW UP: Yes  Activity Tolerance  Activity Tolerance: Patient limited by endurance; Patient limited by fatigue;Patient limited by cognitive status     Patient Diagnosis(es): There were no encounter diagnoses.      has a past medical history of Arthritis, CHF (congestive heart failure) ([facility] [address] COVID-19, ESBL (extended spectrum beta-lactamase) producing bacteria infection, Glaucoma, Hemorrhoids, HTN (hypertension), Hyperlipidemia, Moderate protein-calorie malnutrition ([facility] [address] Morbid obesity ([facility] [address] Muscular wasting and disuse atrophy, PAF (paroxysmal atrial fibrillation) ([facility] [address] UTI (urinary tract infection), and VRE (vancomycin resistant enterococcus) culture positive. has a past surgical history that includes  section; bladder repair; Cholecystectomy; Cystoscopy; Tibia fracture surgery (Right, 2020); and Upper gastrointestinal endoscopy (N/A, 2020). Restrictions  Position Activity Restriction  Other position/activity restrictions: up with assist  Subjective   General  Chart Reviewed: Yes  Additional Pertinent Hx: Admit 3/2 for concern of possible bladder perforation and coffee ground emesis; (+) acute encephalopathy 2* UTI; PMHx: arthritis, CHF, HTN, morbid obesity, muscular wasting, COVID Dec 2020, a-fib, UTI, R tibia fx 2020  Family / Caregiver Present: No  Referring Practitioner: DO Milo  Subjective  Subjective: Pt found supine in bed upon arrival, denying pain and agreeable to therapy. Pain Screening  Patient Currently in Pain: Yes  Vital Signs  Patient Currently in Pain: Yes       Orientation  Orientation  Overall Orientation Status: Impaired  Orientation Level: Oriented to person;Disoriented to place; Disoriented to time(Reporting her birthdate as the current date)  Cognition      Objective   Bed mobility  Rolling to Left: 2 Person assistance(Max Ax2 x2 trials)  Rolling to Right: 2 Person assistance(Max Ax2 x2 trials)  Supine to Sit: 2 Person assistance(max Ax2)  Sit to Supine: Dependent/Total(Dep Ax2)  Scooting: Dependent/Total(Dep Ax2)           Balance  Sitting - Static: Fair;Good  Sitting - Dynamic: Fair  Comments: Pt sat EOB x8 min with SBA  Exercises  Comments: AP x10 BLE, LAQ 2x3 BLE     AM-PAC Score  AM-PAC Inpatient Mobility Raw Score : 6 (03/08/21 1346)  AM-PAC Inpatient T-Scale Score : 23.55 (03/08/21 1346)  Mobility Inpatient CMS 0-100% Score: 100 (03/08/21 1346)  Mobility Inpatient CMS G-Code Modifier : CN (03/08/21 1346)          Goals  Short term goals  Time Frame for Short term goals: discharge  Short term goal 1: Pt will transfer supine <--> sit with min A x 1 ongoing  Short term goal 2: Pt will sit EOB x 5 min with SBA throughout MET 3/11; Updated goal: x10 min supervision  Short term goal 3: Pt will demo 5 reps LE AROM ex independently ongoing  Patient Goals   Patient goals : return to [facility]  Times per week: 2-5  Current Treatment Recommendations: Strengthening, ROM, Balance Training, Functional Mobility Training, Transfer Training, Home Exercise Program, Patient/Caregiver Education & Training  Safety Devices  Type of devices: Call light within reach, Nurse notified, Bed alarm in place, Left in bed     Therapy Time   Individual Concurrent Group Co-treatment   Time In 1342         Time Out 1435         Minutes 53           Timed Code Treatment Minutes:  53    Total Treatment Minutes:  53    If the patient is discharged before the next treatment session, this note will serve as the discharge summary.      Shwetha Granados, PT, DPT

## 2021-03-11 NOTE — PROGRESS NOTES
Pt currently alert pt still oriented to self. Pt still ST. No c/o of pain at this time. Still low appetite. Still has purewick in. Still refusing speciality bed. Will continue to monitor.

## 2021-03-11 NOTE — PROGRESS NOTES
NUTRITION NOTE: Calorie Count    Type and Reason for Visit: Calorie Count    NUTRITION RECOMMENDATIONS:   1. Continue Daily Calorie Count. 2. Continue Ensure TID- pt to consume 3 per day (1000 kcal/60 g protein) to meet 75% of her lowest calorie/protein needs. 3. Alternative nutrition may still be warranted if pt unable to consume > 50% of nutrition from PO diet consistently. NUTRITION ASSESSMENT:  Calorie count day 4    Diet Orders / Intake / Nutrition Support  Current diet/supplement order: DIET DENTAL SOFT; Dental Soft; Safety Tray; Safety Tray (Disposables)       COMPARATIVE STANDARDS  Estimated Total Kcals/Day : 10-15 Current Bodyweight (124 kg) 9757-4439 kcal    Estimated Total Protein (g/day) : 1.5-2.0 Ideal Bodyweight  (59 kg)  g/day  Estimated Daily Total Fluid (ml/day): 1500 (min)- 1860 ml     Date Consumed PO Intake Kcal %   Kcal met PO Intake grams protein %  Protein met   Comments   3/7 VIJI VIJI VIJI VIJI  No tickets recorded;     Per RN: ate 26-50% of 1 meal (no specs recorded) and refused bkf    3/8 708k 57% 16g 18% B- no tickets recorded  L 50% ice cream; 100% cookie, 25% carrots; 100% lemonade  D- 100% pudding; 25% potroast, potatoes and gn bns; 50% magic cup    3/9 ~ 863k 70% ~42g 47% B- 50% eggs; 25% hashbrowns, 100% OJ, 50% muffin, 100% milk   L only crystal light  D 1 ensure    3/10 ~ 806k 65% ~44g 49% B- 0-25%  L- 25% meatloaf with gravy, 25% whipped potatoes with gravy, 100% ensure enlive  D- 10% chicken parm, 10% squash medley, 10% peach cobbler, 100% ensure enlive        B- 5% scrambled eggs, 5% breakfast potatoes   **Results will be posted as available.      Josiah Torres [address], [address]  [place]:  [phone]  Office:  [phone]

## 2021-03-12 VITALS
OXYGEN SATURATION: 96 % | RESPIRATION RATE: 16 BRPM | WEIGHT: 273.37 LBS | SYSTOLIC BLOOD PRESSURE: 139 MMHG | HEART RATE: 101 BPM | BODY MASS INDEX: 43.93 KG/M2 | DIASTOLIC BLOOD PRESSURE: 80 MMHG | TEMPERATURE: 99.5 F | HEIGHT: 66 IN

## 2021-03-12 LAB
ALBUMIN SERPL-MCNC: 2.3 G/DL (ref 3.4–5)
ANION GAP SERPL CALCULATED.3IONS-SCNC: 9 MMOL/L (ref 3–16)
BASOPHILS ABSOLUTE: 0.1 K/UL (ref 0–0.2)
BASOPHILS RELATIVE PERCENT: 1.1 %
BUN BLDV-MCNC: 9 MG/DL (ref 7–20)
CALCIUM SERPL-MCNC: 8.7 MG/DL (ref 8.3–10.6)
CHLORIDE BLD-SCNC: 105 MMOL/L (ref 99–110)
CO2: 26 MMOL/L (ref 21–32)
CREAT SERPL-MCNC: <0.5 MG/DL (ref 0.6–1.2)
EOSINOPHILS ABSOLUTE: 0.1 K/UL (ref 0–0.6)
EOSINOPHILS RELATIVE PERCENT: 1.4 %
GFR AFRICAN AMERICAN: >60
GFR NON-AFRICAN AMERICAN: >60
GLUCOSE BLD-MCNC: 103 MG/DL (ref 70–99)
GLUCOSE BLD-MCNC: 168 MG/DL (ref 70–99)
GLUCOSE BLD-MCNC: 79 MG/DL (ref 70–99)
HCT VFR BLD CALC: 29.7 % (ref 36–48)
HEMOGLOBIN: 9.9 G/DL (ref 12–16)
LYMPHOCYTES ABSOLUTE: 0.9 K/UL (ref 1–5.1)
LYMPHOCYTES RELATIVE PERCENT: 10.9 %
MCH RBC QN AUTO: 32.7 PG (ref 26–34)
MCHC RBC AUTO-ENTMCNC: 33.5 G/DL (ref 31–36)
MCV RBC AUTO: 97.5 FL (ref 80–100)
MONOCYTES ABSOLUTE: 1.1 K/UL (ref 0–1.3)
MONOCYTES RELATIVE PERCENT: 13.7 %
NEUTROPHILS ABSOLUTE: 5.7 K/UL (ref 1.7–7.7)
NEUTROPHILS RELATIVE PERCENT: 72.9 %
PDW BLD-RTO: 17.4 % (ref 12.4–15.4)
PERFORMED ON: ABNORMAL
PERFORMED ON: NORMAL
PHOSPHORUS: 2.9 MG/DL (ref 2.5–4.9)
PLATELET # BLD: 288 K/UL (ref 135–450)
PMV BLD AUTO: 9.9 FL (ref 5–10.5)
POTASSIUM SERPL-SCNC: 3.9 MMOL/L (ref 3.5–5.1)
RBC # BLD: 3.05 M/UL (ref 4–5.2)
SARS-COV-2, NAAT: NOT DETECTED
SODIUM BLD-SCNC: 140 MMOL/L (ref 136–145)
WBC # BLD: 7.9 K/UL (ref 4–11)

## 2021-03-12 PROCEDURE — 85025 COMPLETE CBC W/AUTO DIFF WBC: CPT

## 2021-03-12 PROCEDURE — 36415 COLL VENOUS BLD VENIPUNCTURE: CPT

## 2021-03-12 PROCEDURE — 6370000000 HC RX 637 (ALT 250 FOR IP): Performed by: INTERNAL MEDICINE

## 2021-03-12 PROCEDURE — 2580000003 HC RX 258: Performed by: INTERNAL MEDICINE

## 2021-03-12 PROCEDURE — 87635 SARS-COV-2 COVID-19 AMP PRB: CPT

## 2021-03-12 PROCEDURE — 80069 RENAL FUNCTION PANEL: CPT

## 2021-03-12 RX ORDER — 0.9 % SODIUM CHLORIDE 0.9 %
1000 INTRAVENOUS SOLUTION INTRAVENOUS ONCE
Status: COMPLETED | OUTPATIENT
Start: 2021-03-12 | End: 2021-03-12

## 2021-03-12 RX ORDER — METOPROLOL SUCCINATE 50 MG/1
50 TABLET, EXTENDED RELEASE ORAL DAILY
Qty: 30 TABLET | Refills: 3 | Status: SHIPPED | OUTPATIENT
Start: 2021-03-13

## 2021-03-12 RX ORDER — PANTOPRAZOLE SODIUM 40 MG/1
40 TABLET, DELAYED RELEASE ORAL
Qty: 30 TABLET | Refills: 3 | Status: SHIPPED | OUTPATIENT
Start: 2021-03-12

## 2021-03-12 RX ORDER — SACCHAROMYCES BOULARDII 250 MG
250 CAPSULE ORAL 2 TIMES DAILY
Qty: 14 CAPSULE | Refills: 0 | Status: SHIPPED | OUTPATIENT
Start: 2021-03-12 | End: 2021-03-19

## 2021-03-12 RX ADMIN — METOPROLOL SUCCINATE 50 MG: 50 TABLET, EXTENDED RELEASE ORAL at 08:45

## 2021-03-12 RX ADMIN — SODIUM CHLORIDE 1000 ML: 9 INJECTION, SOLUTION INTRAVENOUS at 09:34

## 2021-03-12 RX ADMIN — Medication 10 ML: at 08:45

## 2021-03-12 RX ADMIN — Medication 10 ML: at 01:07

## 2021-03-12 RX ADMIN — APIXABAN 5 MG: 5 TABLET, FILM COATED ORAL at 08:45

## 2021-03-12 RX ADMIN — PANTOPRAZOLE SODIUM 40 MG: 40 TABLET, DELAYED RELEASE ORAL at 05:49

## 2021-03-12 RX ADMIN — Medication 250 MG: at 08:46

## 2021-03-12 NOTE — PROGRESS NOTES
Patient alert and oriented to person. Denies pain. Extended dwell IV infusing NS at 250 ml/hr for 1000 total.  Purewick present, clear yellow urine out, catheter changed. Terri area red, zinc ointment applied. Patient turned and repositioned with wedge, heel elevated off pillow. Patient refusing specialized mattress. Heart rate elevated on initially, Metoprolol given as ordered, improved at 101. Will continue to monitor.

## 2021-03-12 NOTE — DISCHARGE INSTR - COC
Continuity of Care Form    Patient Name: Milvia Long   :  1960  MRN:  [id number]    Admit date:  3/2/2021  Discharge date:  ***    Code Status Order: Full Code   Advance Directives:   Advance Care Flowsheet Documentation       Date/Time Healthcare Directive Type of Healthcare Directive Copy in [address] Agent's Name Healthcare Agent's Phone Number    21  No, patient does not have an advance directive for healthcare treatment -- -- -- -- --            Admitting Physician:  Lucy Mendieta MD  PCP: April Stewart MD    Discharging Nurse: [facility] Unit/Room#: 8346/9249-69  Discharging Unit Phone Number: ***    Emergency Contact:   Extended Emergency Contact Information  Primary Emergency Contact: Elizabeth Iraheta  Mobile Phone: [phone]  Relation: Brother/Sister  Secondary Emergency Contact: Aurelio Coleman  [place] Phone: [phone]  Relation: Child    Past Surgical History:  Past Surgical History:   Procedure Laterality Date    BLADDER REPAIR       SECTION      CHOLECYSTECTOMY      CYSTOSCOPY      TIBIA FRACTURE SURGERY Right 2020    RIGHT TIBIA IM NAIL INSERTION performed by Rj Mercer MD at [facility] 2020    EGD W/ANES. (8:00) COVID + performed by Chau Holland MD at [address] [facility]       Immunization History: There is no immunization history on file for this patient.     Active Problems:  Patient Active Problem List   Diagnosis Code    Pubic ramus fracture ([facility] [address] S32.599A    Right sided sciatica M54.31    Degenerative tear of lateral meniscus of right knee M23.300    Primary osteoarthritis of right knee M17.11    Lateral knee pain M25.569    Primary osteoarthritis of left knee M17.12    Right medial tibial plateau fracture, closed, initial encounter S82.131A    HTN (hypertension) I10    Morbid obesity (HCC) E66.01    HLD (hyperlipidemia) E78.5    Bacterial urinary tract infection N39.0, A49.9    Intractable nausea and vomiting R11.2    COVID-19 virus infection U07.1    Nausea and vomiting R11.2    Intractable vomiting with nausea R11.2    Hiatal hernia K44.9    Hypoglycemia E16.2    Paroxysmal atrial fibrillation (HCC) I48.0    History of pulmonary embolism Z86.711    Abnormal EKG R94.31    Weight loss R63.4    Paraesophageal hernia K44.9    Hypotension I95.9    Abnormal ECG R94.31    Mild malnutrition (HCC) E44.1    Moderate protein-calorie malnutrition (HCC) E44.0    Pharyngoesophageal dysphagia R13.14    Upper GI bleed K92.2    Hiatal hernia with GERD K21.9, K44.9       Isolation/Infection:   Isolation            Contact          Patient Infection Status       Infection Onset Added Last Indicated Last Indicated By Review Planned Expiration Resolved Resolved By    VRE 21 Culture, Urine        Resolved    COVID-19 Rule Out 21 COVID-19 (Ordered)   21 Rule-Out Test Resulted    COVID-19 12/15/20 12/15/20 12/15/20 COVID-19   20     COVID-19 Rule Out 12/15/20 12/15/20 12/15/20 COVID-19 (Ordered)   12/15/20 Rule-Out Test Resulted    COVID-19 Rule Out 20 COVID-19 (Ordered)   20 Rule-Out Test Resulted            Nurse Assessment:  Last Vital Signs: /80   Pulse 101   Temp 99.5 °F (37.5 °C) (Axillary)   Resp 16   Ht 5' 6\" (1.676 m)   Wt 273 lb 5.9 oz (124 kg)   SpO2 96%   BMI 44.12 kg/m²     Last documented pain score (0-10 scale): Pain Level: 0  Last Weight:   Wt Readings from Last 1 Encounters:   21 273 lb 5.9 oz (124 kg)     Mental Status:  oriented    IV Access:  { ERNA IV ACCESS:143716402}    Nursing Mobility/ADLs:  Walking   Dependent  Transfer  Dependent  Bathing  Dependent  Dressing  Dependent  Toileting  Dependent  Feeding  Assisted  Med Admin  Assisted  Med Delivery   whole    Wound Care Documentation and Therapy:        Elimination:  Continence:    Bowel: No  Bladder: No  Urinary Catheter: None   Colostomy/Ileostomy/Ileal Conduit: No       Date of Last BM: ***    Intake/Output Summary (Last 24 hours) at 3/12/2021 1033  Last data filed at 3/12/2021 0352  Gross per 24 hour   Intake 60 ml   Output 1450 ml   Net -1390 ml     I/O last 3 completed shifts: In: 61 [P.O.:60]  Out: 1600 [Urine:1600]    Safety Concerns:     None    Impairments/Disabilities:      Vision    Nutrition Therapy:  Current Nutrition Therapy:   - Oral Diet:  General    Routes of Feeding: Oral  Liquids: Thin Liquids  Daily Fluid Restriction: no  Last Modified Barium Swallow with Video (Video Swallowing Test): not done    Treatments at the Time of Hospital Discharge:   Respiratory Treatments: ***  Oxygen Therapy:  is not on home oxygen therapy. Ventilator:    - No ventilator support    Rehab Therapies: {THERAPEUTIC INTERVENTION:0975823025}  Weight Bearing Status/Restrictions: [address] [place] [address] Weight Bearin}  Other Medical Equipment (for information only, NOT a DME order):  {EQUIPMENT:413696392}  Other Treatments: ***    Patient's personal belongings (please select all that are sent with patient):  {[facility] DME Belongings:507766854}    RN SIGNATURE:  Electronically signed by Laura Alvarez RN on 3/12/21 at 1:44 PM EST    CASE MANAGEMENT/SOCIAL WORK SECTION    Inpatient Status Date: ***    Readmission Risk Assessment Score:  Readmission Risk              Risk of Unplanned Readmission:        19           Discharging to Facility/ Agency   Name: [address], [phone]  Fax:[phone]    / signature: Electronically signed by Chuck Rodriguez RN on 3/12/21 at 10:34 AM EST    PHYSICIAN SECTION    Prognosis: Fair    Condition at Discharge: Stable    Rehab Potential (if transferring to Rehab): Good    Recommended Labs or Other Treatments After Discharge:   Monitor BP and HR. BP medication discontinued due to low blood pressure.  May

## 2021-03-12 NOTE — PROGRESS NOTES
Neurology Consult Progress Note     Patient Name: Milvia Long YOB: 1960   Sex: Female Age: 63 yrs     CC / Reason for Consult: encephalopathy    Subjective:   No changes over last 24 hours    ROS: Denies any new CP, SOB, F/C, N/V    Vitals /80   Pulse 118 Comment: patient upset  Temp 99.5 °F (37.5 °C) (Axillary)   Resp 16   Ht 5' 6\" (1.676 m)   Wt 273 lb 5.9 oz (124 kg)   SpO2 96%   BMI 44.12 kg/m²    Diet DIET DENTAL SOFT; Dental Soft; Safety Tray; Safety Tray (Disposables)   Isolation Contact     LABS   Basic Metabolic Profile Recent Labs     03/10/21  0503 03/11/21  0605 03/12/21  0544    140 140   K 5.0 4.0 3.9    106 105   CO2 20* 26 26   BUN 4* 8 9   CREATININE <0.5* <0.5* <0.5*   CALCIUM 8.9 8.7 8.7   LABALBU 2.3* 2.3* 2.3*   PHOS 2.7 2.1* 2.9      Complete Blood Count Recent Labs     03/10/21  0503 03/11/21  0605 03/12/21  0544   WBC 8.7 8.2 7.9   RBC 3.02* 2.93* 3.05*   HGB 9.9* 9.5* 9.9*   HCT 30.5* 29.2* 29.7*       Ammonia 42  Vitamin d 39.6  Vitamin a  Vitamin e  Vitamin k  Thiamin e  B12 387  Folate 6.23     IMAGING   MRI brain w/ & w/o 3/11/2021  Impression       1. Scattered foci of T2 and FLAIR hyperintensity in the bilateral cerebral hemisphere white matter as described, compatible with mild small vessel ischemic disease. No evidence of intracranial hemorrhage, mass, or recent infarct.     2. Small retention cyst in the medial right maxillary sinus with no evidence of acute sinusitis. EEG Interpretation:   The EEG was difficult to interpret  due to severe myogenic artifact seen throughout. Beta frequency due to myogenic artifact. Clinical correlation suggested to determine need for repeat EEG when patient more calm to limit artifact.      Clinical correlation is recommended. No focal, lateralizing, or epileptiform features were seen during the recording.     PHYSICAL EXAMINATION     Patient not seen today  No documented changes in exam noted ASSESSMENT & PLAN   Kezia Villalobos is a 63 y.o. female admitted for upper GI bleed and UTI who presents with encephalopathy, at least 10 days in duration, which is improving. Her encephalopathy is mild     Recommendations:   - follow up on nutrition labs - once resulted are all normal to date   - Delirium Precautions: Maintain a regular night-day/sleep-wake cycle: discourage daytime naps, re-orient frequently, shades up during the daytime, family at bedside as often as possible, minimize nighttime awakenings, utilize relaxation channel on television.    - MRI brain negative for acute changes - has some mild small vessel ischemic disease   - EEG - Non-diagnostic d/t artifact - No need to repeat currently   - Patient's exam is stable. - No further w/u needed from neurology. We will sign off. Please call with questions.   - No follow up needed.      Electronically signed by: Cecile Law, 3/12/2021 9:47 AM  Neurology Nurse Practitioner

## 2021-03-12 NOTE — DISCHARGE SUMMARY
Hospital Medicine Discharge Summary    Patient ID: Jass Rosenbaum      Patient's PCP: Bereket Woods MD    Admit Date: 3/2/2021     Discharge Date: 3/12/2021      Admitting Physician: Isaura Lee MD     Discharge Physician: Don Ko MD     Discharge Diagnoses: Active Hospital Problems    Diagnosis Date Noted    Hiatal hernia with GERD [K21.9, K44.9]     Upper GI bleed [K92.2] 03/02/2021       The patient was seen and examined on day of discharge and this discharge summary is in conjunction with any daily progress note from day of discharge. Hospital Course:     Patient was admitted and treated for following:    Acute metabolic encephalopathy likely multifactorial, UTI, decreased nutrition, and delirium:  Per nursing home: Patient had another admission since leaving [facility], for a right tibial fracture for which she underwent surgery. She was discharged off TPN, was on clear liquids, was OFF TPN, and was doing well with clear liquids however there was no attempt to advance diet because she began insisting that she could not eat any food or take any medications due to fear of vomiting. At times she said she had vomited but no emesis noted by staff. She had progressive decline in cognition with increased confusion and decreased PO intake over 1-2 weeks. She had a PICC line but she removed it herself. She was getting Invanz via IV. Patient was started on Zyvox and Merrem empirically for possible sepsis due to UTI on admission. Had history of Enterococcus UTI approximately 1 month ago. She had a new culture which was positive for resistant E. coli 02/25 sensitive to Ronda Pikes. Zyvox was stopped. UA and urine culture was negative. Patient completed 7 days of Merrem. Blood cultures with no growth to date. Neurology was consulted for ongoing metabolic encephalopathy. CT head showed no acute intracranial abnormality. MRI brain showed chronic small vessel disease.   EEG was negative for seizure. Patient mental status started improving. Neurology recommended no further work-up. Patient alert and oriented x3 on the day of discharge and is being is being discharged in stable condition.     Decreased Nutrition  Patient was placed on dextrose drip. Patient was restarted on clears, then full liquid then dental soft. Dietitian was consulted and dietary recs were followed. There was concern for decreased p.o. oral intake and PEG tube was suggested. GI was saw the patient and patient was refusing PEG tube and with given history of hernia it would have been unlikely to place an endoscopic PEG tube. Patient caloric count improved. IV fluids were discontinued. Dietitian recommended encouraging p.o. oral intake. Patient to continue oral supplements on discharge.     Anion gap acidosis with hyperchloremia:   Likely was secondary to IV fluids. D5 half-normal saline was changed to D5 and p.o. bicarb tablets were given. Anion gap acidosis hyperkalemia resolved.     Coffee-ground emesis:   Noted in outside ED, no events at nursing home, none reported on admission. Patient had recent EGD in 12/20 with Dr. Harper Rose, which showed normal esophagus, stomach and duodenum.  Medium to large 5 cm sliding hiatal hernia, polyps 2 to 3 mm size located in the fundus body. Protonix drip was started. Xarelto was held. GI was consulted. H&H was stable. GI recommended okay to start anticoagulation, continue Protonix twice daily and follow-up outpatient. Eliquis was started and Protonix was continued. There were no further events of coffee-ground emesis during hospitalization and H&H remained stable. Patient to follow-up with GI on discharge.     #Abnormal CT scan reading, with air in the anterior abdominal wall concerning for possible bladder perforation:  Urology was consulted. CT cystogram was negative for perforation. Sagastume was removed. Urology recommended outpatient follow-up.   Patient to follow-up with urology on discharge.     #Paraesophageal Hernia  Previously on TPN and clear liquid diet. Dietitian was consulted and patient was started on diet. Calorie count was done. Patient to continue soft diet on discharge and follow-up with general surgery outpatient for possible hernia repair     #H/O COVID: December 2020     #History of atrial thrombus  #history of DVT/PE, underwent TPA in 2016  Was on Xarelto but with concern to coffee-ground emesis Xarelto was discontinued and Eliquis was started. Patient to continue Eliquis on discharge.     #History of A. Fib/Hypertension. Patient blood pressure was monitored and was low normal.  Antihypertensive medication were held and metoprolol was continued. Patient heart rate was noted to be elevated. Metoprolol dose was increased. Eliquis was continued    #Hypokalemia: Improved  Had decreased oral intake. Improved after supplementation     #Morbid obesity due to excess calories:  BMI 41.16- Complicating assessment and treatment. Placing patient at risk for multiple co-morbidities as well as early death and contributing to the patient's presentation. Physical Exam Performed:     /80   Pulse 101   Temp 99.5 °F (37.5 °C) (Axillary)   Resp 16   Ht 5' 6\" (1.676 m)   Wt 273 lb 5.9 oz (124 kg)   SpO2 96%   BMI 44.12 kg/m²       General appearance:  No apparent distress, appears stated age and cooperative. HEENT:  Normal cephalic, atraumatic without obvious deformity. Pupils equal, round, and reactive to light. Extra ocular muscles intact. Conjunctivae/corneas clear. Respiratory:  Normal respiratory effort. Clear to auscultation, bilaterally without Rales/Wheezes/Rhonchi. Cardiovascular:  Regular rate and rhythm with normal S1/S2 without murmurs, rubs or gallops. Abdomen: Soft, non-tender, non-distended with normal bowel sounds. Neurologic:  Grossly non-focal.  Alert and oriented x3.   Capillary Refill: Brisk,< 3 seconds   Peripheral Pulses: +2 mouth 2 times daily, Disp-60 tablet, R-0Normal      saccharomyces boulardii (FLORASTOR) 250 MG capsule Take 1 capsule by mouth 2 times daily for 7 days, Disp-14 capsule, R-0Normal              Details   metoprolol succinate (TOPROL XL) 50 MG extended release tablet Take 1 tablet by mouth daily, Disp-30 tablet, R-3Normal      pantoprazole (PROTONIX) 40 MG tablet Take 1 tablet by mouth 2 times daily (before meals), Disp-30 tablet, R-3Normal              Details   polyethylene glycol (GLYCOLAX) 17 g packet Take 17 g by mouth 2 times daily, Disp-60 each, R-2Print      docusate sodium (COLACE, DULCOLAX) 100 MG CAPS Take 100 mg by mouth 2 times daily as needed for Constipation Reduce use if stools become soft., Disp-60 capsule, R-2Print      promethazine (PHENERGAN) 25 MG/ML injection Inject 6.25 mg into the muscle every 6 hours as neededHistorical Med      calcium carbonate (TUMS) 500 MG chewable tablet Take 1 tablet by mouth dailyHistorical Med      nystatin (MYCOSTATIN) POWD powder Apply topically 2 times dailyHistorical Med      ondansetron (ZOFRAN) 4 MG/5ML solution Take by mouth onceHistorical Med      atorvastatin (LIPITOR) 40 MG tablet Historical Med      oxybutynin (DITROPAN) 5 MG tablet TAKE 1 TABLET BY MOUTH EVERY 8 HOURSHistorical Med      vitamin D3 (CHOLECALCIFEROL) 400 units TABS tablet Take 400 Units by mouth dailyHistorical Med      Flaxseed Oil OIL 1,400 mg by Does not apply route dailyHistorical Med      calcium carbonate 600 MG TABS tablet Take 1 tablet by mouth dailyHistorical Med      latanoprost (XALATAN) 0.005 % ophthalmic solution 1 drop nightlyHistorical Med             Time Spent on discharge is more than 30 minutes in the examination, evaluation, counseling and review of medications and discharge plan. Signed:    Mohamud Weston MD   3/12/2021      Thank you Dominick Gupta MD for the opportunity to be involved in this patient's care.  If you have any questions or concerns please feel free to contact me at [phone].

## 2021-03-12 NOTE — PROCEDURES
Name: Livan Dowling   : 1960   Interpreting Physician: Vishal Davenport MD   Referring Physician: Zaida Luna MD   Date of EE2021      Clinical History: Altered mental status    Current Antiepileptic Medications: Current Facility-Administered Medications: [START ON 3/12/2021] metoprolol succinate (TOPROL XL) extended release tablet 50 mg, 50 mg, Oral, Daily  saccharomyces boulardii (FLORASTOR) capsule 250 mg, 250 mg, Oral, BID  pantoprazole (PROTONIX) tablet 40 mg, 40 mg, Oral, BID AC  apixaban (ELIQUIS) tablet 5 mg, 5 mg, Oral, BID  glucose (GLUTOSE) 40 % oral gel 15 g, 15 g, Oral, PRN  dextrose 50 % IV solution, 12.5 g, Intravenous, PRN  glucagon (rDNA) injection 1 mg, 1 mg, Intramuscular, PRN  dextrose 5 % solution, 100 mL/hr, Intravenous, PRN  sodium chloride flush 0.9 % injection 10 mL, 10 mL, Intravenous, 2 times per day  sodium chloride flush 0.9 % injection 10 mL, 10 mL, Intravenous, PRN  promethazine (PHENERGAN) tablet 12.5 mg, 12.5 mg, Oral, Q6H PRN **OR** ondansetron (ZOFRAN) injection 4 mg, 4 mg, Intravenous, Q6H PRN  acetaminophen (TYLENOL) tablet 650 mg, 650 mg, Oral, Q6H PRN **OR** acetaminophen (TYLENOL) suppository 650 mg, 650 mg, Rectal, Q6H PRN         Technical Summary:  The EEG was recorded in a digital format on a patient who is reported to be awake state during the recording. The patient was not sleep deprived prior to the EEG. Per technician the patient was talking throughout the recording. The recording revealed abnormal background rhythm with beta alpha frequency range that was seen throughout. No reactivity  to eye opening and closure. Significant myogenic artifact throughout most of the recording making most of the recording unreadable. Video monitoring was not conducted    The record was remarkable for the absence of epileptiform discharges but difficult to accurately read due to the degree of artifact.       Photic stimulation was performed at various flash frequencies and no discernible epileptiform activity continued myogenic artifact. Hyperventilation was not performed due to medical condition. During the recording stage II sleep  was not seen. The EKG lead revealed no rhythm abnormalties. EEG Interpretation:   The EEG was difficult to interpret  due to severe myogenic artifact seen throughout. Beta frequency due to myogenic artifact. Clinical correlation suggested to determine need for repeat EEG when patient more calm to limit artifact. Clinical correlation is recommended. No focal, lateralizing, or epileptiform features were seen during the recording.     Electronically signed by Amy Back MD on 3/11/2021 at 11:15 PM

## 2021-03-12 NOTE — CARE COORDINATION
Case Management Assessment            Discharge Note                    Date / Time of Note: 3/12/2021 10:38 AM                  Discharge Note Completed by: Soila Soni    Patient Name: Dixie Samuels   YOB: 1960  Diagnosis: Upper GI bleed [K92.2]   Date / Time: 3/2/2021  7:38 PM    Current PCP: Ewelina Francis MD  Clinic patient: No    Hospitalization in the last 30 days: No    Advance Directives:  Code Status: Full Code  [place] DNR form completed and on chart: No    Financial:  Payor: Sumeet Eden / Plan: Sumeet Eden / Product Type: *No Product type* /      Pharmacy:    [address] [phone]  [address] [facility]  Phone: [phone] Fax: [phone]    [address]  [address]  Phone: [phone] Fax: [phone]      Assistance purchasing medications?:    Assistance provided by Case Management: None at this time    Does patient want to participate in local refill/ meds to beds program?: Not Assessed    Meds To Beds General Rules:  1. Can ONLY be done Monday- Friday between 8:30am-5pm  2. Prescription(s) must be in pharmacy by 3pm to be filled same day  3. Copy of patient's insurance/ prescription drug card and patient face sheet must be sent along with the prescription(s)  4. Cost of Rx cannot be added to hospital bill. If financial assistance is needed, please contact unit  or ;  or  CANNOT provide pharmacy voucher for patients co-pays  5.  Patients can then  the prescription on their way out of the hospital at discharge, or pharmacy can deliver to the bedside if staff is available. (payment due at time of pick-up or delivery - cash, check, or card accepted)     Able to afford home medications/ co-pay costs: Yes    ADLS:  Current PT AM-PAC Score: 6 /24  Current OT AM-PAC Score: 10 /24      DISCHARGE Disposition: [facility] (LTC): [facility]  Phone: [phone]  Fax: [phone]    LOC at discharge: Rick Peck Completed: Yes    Notification completed in HENS/PAS?:  Not Applicable    IMM Completed:   Not Indicated    Transportation:  Transportation PLAN for discharge: EMS transportation   Mode of Transport: Ambulance stretcher - BLS  Reason for medical transport: Bed confined: Meets the following criteria 1) unable to get out of bed without assistance or ambulate, 2) unable to safely sit up in a wheelchair, 3) unable to maintain erect seating position in a chair for time needed for transport  Name of Transport Company: [employer]  Phone: [phone]  Time of Transport: 2 pm    Transport form completed: Yes        Additional CM Notes: [facility], will return back today spoke with Nenita Torres they can take pt back precert back. Orders faxed to [phone], nurse to call report to [phone], [facility] to transport at 2pm .      The Plan for Transition of Care is related to the following treatment goals of Upper GI bleed [K92.2]    The Patient and/or patient representative Crystal and her family were provided with a choice of provider and agrees with the discharge plan Yes    Freedom of choice list was provided with basic dialogue that supports the patient's individualized plan of care/goals and shares the quality data associated with the providers.  Yes    Care Transitions patient: No    Dhiraj Tirado RN  [facility]  Case Management Department  Ph: [phone]  Fax: [phone]

## 2021-03-14 LAB — VITAMIN D 1,25-DIHYDROXY: 35.7 PG/ML (ref 19.9–79.3)

## 2021-03-15 LAB
ALPHA-TOCOPHEROL: 11.2 MG/L (ref 5.5–18)
GAMMA-TOCOPHEROL: 1.1 MG/L (ref 0–6)
RETINYL PALMITATE: 0.04 MG/L (ref 0–0.1)
VITAMIN A LEVEL: 0.28 MG/L (ref 0.3–1.2)
VITAMIN A, INTERP: ABNORMAL

## 2021-03-16 ENCOUNTER — OUTSIDE SERVICES (OUTPATIENT)
Dept: WOUND CARE | Age: 61
End: 2021-03-16
Payer: MEDICAID

## 2021-03-16 DIAGNOSIS — T81.89XD NON-HEALING SURGICAL WOUND, SUBSEQUENT ENCOUNTER: ICD-10-CM

## 2021-03-16 PROCEDURE — 99308 SBSQ NF CARE LOW MDM 20: CPT | Performed by: CLINICAL NURSE SPECIALIST

## 2021-03-16 NOTE — PROGRESS NOTES
[facility]    Patient name: Kam Novoa  :   58  Facility:  [address] of Service: skilled nursing facility (31)    Primary diagnosis for wound-care consultation: Nonhealing surgical wound to right lower leg    Additional ulcer(s) noted? None    History of Present Illness: Nonhealing surgical wound to right lower leg within incision line. Resolved today. Multiple recent hospitalizations. No abdominal surgery. Currently with regular diet. No nausea or vomiting. Boost 3 times a day. No fever or chills. Review of Systems: Pertinent systems reviewed in the HPI; all other systems reviewed, and negative. Pertinent elements of past medical, surgical, family, and/or social history: Patient with history of displaced fracture of medial condyle right tibia, muscle wasting and atrophy, recent Covid 23, essential hypertension, hypertensive heart disease, osteoarthritis and morbid obesity. Medications and allergies are detailed in the nursing home chart, and were reviewed by me today.  _______________________    General Physical exam:    Vital signs:  114/70, 98.2, 100, 15    General Appearance: alert and oriented to person, place and time, obese, in no acute distress  Psychiatric:  Mood and affect appropriate for situation  Skin: warm and dry, no rash  Head: normocephalic and atraumatic  Eyes: pupils equal, round, sclerae anicteric, conjunctivae normal  ENT: no thrush or oral ulcers  Neck:No complaints, normal appearance  Pulmonary/Chest: Respirations easy at rest, no cough or respiratory distress  Cardiovascular: No chest pain, normal rate, toes warm, cap refill normal  Abdomen: No nausea or vomiting  Extremities: no cyanosis, edema or cellulitis  Musculoskeletal: Nonambulatory, working with OT and PT. Neurologic: distal sensation to light touch intact, no allodynia.       Wound exam:    Wound location: Right lower leg - Resolved today    Wound type:   Surgical wound  Grade - stage - thickness: Full thickness     Description of periwound: Scar line    Description of wound bed: Wound resolved. Surrounding tissue without signs and symptoms of infection. No purulence, malodor, erythema, increased temperature, or increased pain.  _______________________    Recent labs and data reviewed: 3/15/2021: Glucose 78, BUN 12, creatinine 0.5, , total protein 4.1, albumin 2.1, hemoglobin/hematocrit: 8/25, RBC 2.58.  _______________________     Jazz Barr diagnoses & assessment: Nonhealing surgical wound to right lower leg within incision line. Resolved today. Currently with regular diet. No nausea or vomiting. Boost 3 times a day. No fever or chills. Debridement is not indicated today, based on the history and exam above.  _______________________    Procedure:    Consent obtained. Time out performed per [facility]. _______________________    Recommendations:    - Dressings / Compression / Offloading: Open to air    - Labs / Diagnostic studies: None    - Medications / nutritional support: Boost 3 times per day    - Further Consultations recommended: Working with OT and PT    - Anticipated follow-up: Nursing to follow. Reconsult if necessary.   _______________________    Electronically signed by LEIGHA Ward CNP on 3/16/2021 at 3:39 PM

## 2021-03-20 ENCOUNTER — TELEPHONE (OUTPATIENT)
Dept: FAMILY MEDICINE CLINIC | Age: 61
End: 2021-03-20

## 2021-03-20 NOTE — TELEPHONE ENCOUNTER
[facility]   Patient: Andreas العراقي  : 1960  Date: 3/18/2021  Evens AHUJA CNP    Chief Complaint/HPI:    This patient is a 69-year-old white female that was originally admitted [facility]10/2021 from the [facility]  Patient was hospitalized at [facility] from 2021 through 2/10/2021. Patient was admitted for planned surgery which included robotic paraesophageal hiatal hernia repair with mesh, Niesen fundoplication, laparoscopic ventral hernia repair and open bilateral inguinal hernia repair however the procedure was aborted secondary to hypotension after the induction of anesthesia. Patient had complications during the hospital stay including UTI. Patient was discharged to [facility] 2/10/2021 for nutrition optimization (TPN) and Rehab. Nursing staff reports a TPN had been discontinued and patient was doing well with clear liquids however she refused to advance diet as she insisted she was unable to eat any food or take medications due to fear of vomiting. Patient's cognition progressively declined as well as her p.o. intake. Patient underwent a CT of the abdomen pelvis without contrast on 3/1/2021 which showed a possible bladder perforation. Patient was readmitted to [facility] from 3/2/2021 to 3/2/2021 with upper GI bleed and possible bladder perforation. A cystogram was negative for bladder perforation. Patient was treated UGI and UTI. Patient discharged back to [facility] on 3/12/2021 for rehab and nutritional optimization. Patient visit with a friend upon arrival.  Patient reports she feels significantly better and reports her appetite has improved. Patient reports she is better, drinking protein supplements and other fluids to stray well  Hydrated. Patient reports occasional mild nausea however denies vomiting, coffee ground emesis, abd pain or changes in bowel pattern.  Nursing denies any concerns and reports patient does seem better since she returned from most recent hospitalization. Past Medical History:   Diagnosis Date    Arthritis     CHF (congestive heart failure) ([facility] [address]     COVID-19 12/15/2020    ESBL (extended spectrum beta-lactamase) producing bacteria infection     Glaucoma     Hemorrhoids     HTN (hypertension)     Hyperlipidemia     Moderate protein-calorie malnutrition (HCC)     Morbid obesity (HCC)     Muscular wasting and disuse atrophy     PAF (paroxysmal atrial fibrillation) ([facility]     UTI (urinary tract infection)     VRE (vancomycin resistant enterococcus) culture positive 2021    urine     Past Surgical History:   Procedure Laterality Date    BLADDER REPAIR       SECTION      CHOLECYSTECTOMY      CYSTOSCOPY      TIBIA FRACTURE SURGERY Right 2020    RIGHT TIBIA IM NAIL INSERTION performed by Janet Wolfe MD at [address] 2020    EGD W/ANES. (8:00) COVID + performed by Lorri Rao MD at [address]     No family history on file. Social History     Tobacco Use    Smoking status: Never Smoker    Smokeless tobacco: Never Used   Substance Use Topics    Alcohol use: Not Currently    Drug use: Never     No Known Allergies    Medications: atorvastatin, metoprolol, lisinopril, Xarelto, MiraLAX, Reglan, flaxseed, potassium, Zofran, calcium carbonate, oxybutynin, amlodipine, omeprazole, Colace, vitamin D3, Protonix and oxycodone. ROS:  General: negative for chills, fever, night sweats  Psychological: Negative for anxiety, depression, irritability or sleep disturbances  HEENT: Negative for headache, neck pain/stiffness, visual changes, ear pain, ear drainage, nasal congestion, sore throat  Respiratory: Negative for cough, shortness of breath, wheezing or hemoptysis   Cardiovascular: Negative for chest pain, irregular heartbeat, palpitations, paroxysmal nocturnal dyspnea. Gastrointestinal: Occasional mild nausea.  No vomiting, changes in bowel pattern, melena, blood in stool. Appetite is improving,  Genitourinary: Negative for dysuria or hematuria  Integumentary: Negative for any rashes, wounds or bruising. Musculoskeletal: negative for gait disturbance, joint pain, joint stiffness, joint swelling, myalgias or joint erythema/warmth  Neurological: Negative for visual changes, facial asymmetry, dysphasia, ataxia, hearing changes, changes in speech or swallowing, paresthesia, weakness                   Physical Exam:  Constitutional: Oriented to person, place and time. Patient is cooperative in no distress noted. Well-groomed and dressed appropriate for the weather. HEENT: Head is normocephalic and atraumatic, conjunctiva clear, normal extraocular eye movements, no ear discharge, no nasal discharge, neck is supple, no thyromegaly or lymphadenopathy. Respiratory: trachea midline, Lungs are clear to auscultation  Cardiovascular: Normal rate, regular rhythm and distal pulses intact. no murmurs, gallops or rubs. Trace non pitting edema bilat lower extremities. Gastrointestinal: Abdomen is nontender without masses, bowel sounds are positive in all 4 quadrants. no hepatosplenomegaly noted  Genitourinary: Not examined  Integumentary: Skin intact  Musculoskeletal: generalized weakness. Neurological: Alert, oriented to person and place, cooperative. No tremors noted. Vital Signs:   Temp-  Pulse-  Respiration-  B/P-  Pulse ox-    Pertinent diagnostic tests: BMP today reviewed. Glucose 148 otherwise BMP is wnl. Assessment/Plan:    UTI: continues antibiotics as ordered. Appt. To f/u with urology - Dr Licha Ku. Recent UGI bleed and Paraesophageal hernia: No s/s of current GI bleed. Appetite has improved and intake is better. Continue protonix and zofran, Patient to f/u with GI. Protein Calorie Malnutrition:  continue protein supplements. Continue ST. Difficulty walking/muscle wasting and atrophy: continue PT/OT.       Reviewed total plan of care and discuss plan of care with nursing staff. All other medical conditions are stable unless otherwise noted above.

## 2021-06-02 ENCOUNTER — CLINICAL DOCUMENTATION (OUTPATIENT)
Dept: OTHER | Age: 61
End: 2021-06-02

## 2022-01-20 NOTE — PLAN OF CARE
Dear Debi Mishra,     It was our pleasure to care for you here at [facility], [place]  It is our hope that we were always able to exceed the expected standards for your care during your stay  You were hospitalized due to urinary tract infection  You were cared for on the fourth floor by Chelle Abarca MD under the service of June Crowell MD with [facility] who covers for your primary care physician (PCP), May Sosa MD, while you were hospitalized  If you have any questions or concerns related to this hospitalization, you may contact us at [phone]  For follow up as well as any medication refills, we recommend that you follow up with your primary care physician  A registered nurse will reach out to you by phone within a few days after your discharge to answer any additional questions that you may have after going home  However, at this time we provide for you here, the most important instructions / recommendations at discharge:     Notable Medication Adjustments -   Cefdinir was added in order to help you with the urinary tract infection  Testing Required after Discharge -   None  Important follow up information                     Please continue cefdinir 1 tab daily for additional 4 days in order to eliminate  the urinary tract infection  Please follow up with your nephrologist to monitor your kidney function  Please follow up with your primary care provider within 1 week of hospitalization  Please continue the rest of your home medications  Other Instructions -   Please wear a mask in public, practice social distancing, and hand hygiene  Please review this entire after visit summary as additional general instructions including medication list, appointments, activity, diet, any pertinent wound care, and other additional recommendations from your care team that may be provided for you        Sincerely,     Don Kim Problem: Falls - Risk of:  Goal: Will remain free from falls  Description: Will remain free from falls  Outcome: Ongoing  Goal: Absence of physical injury  Description: Absence of physical injury  Outcome: Ongoing     Problem: Airway Clearance - Ineffective  Goal: Achieve or maintain patent airway  Outcome: Ongoing     Problem: Gas Exchange - Impaired  Goal: Absence of hypoxia  Outcome: Ongoing  Goal: Promote optimal lung function  Outcome: Ongoing     Problem: Breathing Pattern - Ineffective  Goal: Ability to achieve and maintain a regular respiratory rate  Outcome: Ongoing     Problem:  Body Temperature -  Risk of, Imbalanced  Goal: Ability to maintain a body temperature within defined limits  Outcome: Ongoing  Goal: Will regain or maintain usual level of consciousness  Outcome: Ongoing  Goal: Complications related to the disease process, condition or treatment will be avoided or minimized  Outcome: Ongoing     Problem: Isolation Precautions - Risk of Spread of Infection  Goal: Prevent transmission of infection  Outcome: Ongoing     Problem: Nutrition Deficits  Goal: Optimize nutrtional status  Outcome: Ongoing     Problem: Risk for Fluid Volume Deficit  Goal: Maintain normal heart rhythm  Outcome: Ongoing  Goal: Maintain absence of muscle cramping  Outcome: Ongoing  Goal: Maintain normal serum potassium, sodium, calcium, phosphorus, and pH  Outcome: Ongoing     Problem: Loneliness or Risk for Loneliness  Goal: Demonstrate positive use of time alone when socialization is not possible  Outcome: Ongoing     Problem: Fatigue  Goal: Verbalize increase energy and improved vitality  Outcome: Ongoing   Ni Haywood RN Zev Garzon MD

## 2022-06-13 NOTE — PROGRESS NOTES
Pt c/o constipation. Dulcolax supp. Given as requested. Physical therapy working with patient now. Render In Strict Bullet Format?: No Discontinue Regimen: Metrogel Continue Regimen: Soolantra 1 % topical cream \\nApply once daily at night to face for rosacea.\\n\\ndoxycycline hyclate 20 mg tablet \\nTake one tablet twice a day. Detail Level: Generalized

## 2023-01-02 LAB
CREAT SERPL-MCNC: 0.8 MG/DL (ref 0.52–1.04)
GFR CALCULATED: 72

## 2023-01-03 ENCOUNTER — TELEPHONE (OUTPATIENT)
Dept: SURGERY | Age: 63
End: 2023-01-03

## 2023-01-04 NOTE — TELEPHONE ENCOUNTER
I called and spoke to Feliciano at [facility]. Patient is schedule at [facility] with Dr. Matilde Mckenna on 1/12/2023.

## 2023-01-04 NOTE — TELEPHONE ENCOUNTER
Ask her to return to clinic at [facility] to discuss recent CT and formulate a plan.

## 2023-09-11 ENCOUNTER — HOSPITAL ENCOUNTER (INPATIENT)
Age: 63
LOS: 1 days | Discharge: NURSING, REHAB OR LONG-TERM CARE | DRG: 368 | End: 2023-09-12
Attending: INTERNAL MEDICINE | Admitting: INTERNAL MEDICINE
Payer: MEDICARE

## 2023-09-11 ENCOUNTER — ANESTHESIA EVENT (OUTPATIENT)
Dept: ENDOSCOPY | Age: 63
DRG: 368 | End: 2023-09-11
Payer: MEDICARE

## 2023-09-11 DIAGNOSIS — K92.0 COFFEE GROUND EMESIS: ICD-10-CM

## 2023-09-11 DIAGNOSIS — M54.31 RIGHT SIDED SCIATICA: Primary | ICD-10-CM

## 2023-09-11 PROBLEM — I50.32 CHRONIC DIASTOLIC CONGESTIVE HEART FAILURE (HCC): Status: ACTIVE | Noted: 2023-09-11

## 2023-09-11 PROBLEM — K92.2 GI BLEED: Status: ACTIVE | Noted: 2023-09-11

## 2023-09-11 LAB
ALBUMIN SERPL-MCNC: 2.7 G/DL (ref 3.4–5)
ALBUMIN/GLOB SERPL: 1 {RATIO} (ref 1.1–2.2)
ALP SERPL-CCNC: 78 U/L (ref 40–129)
ALT SERPL-CCNC: 28 U/L (ref 10–40)
ANION GAP SERPL CALCULATED.3IONS-SCNC: 9 MMOL/L (ref 3–16)
AST SERPL-CCNC: 26 U/L (ref 15–37)
BILIRUB SERPL-MCNC: 0.4 MG/DL (ref 0–1)
BUN SERPL-MCNC: 8 MG/DL (ref 7–20)
CALCIUM SERPL-MCNC: 7.9 MG/DL (ref 8.3–10.6)
CHLORIDE SERPL-SCNC: 101 MMOL/L (ref 99–110)
CO2 SERPL-SCNC: 23 MMOL/L (ref 21–32)
CREAT SERPL-MCNC: <0.5 MG/DL (ref 0.6–1.2)
GFR SERPLBLD CREATININE-BSD FMLA CKD-EPI: >60 ML/MIN/{1.73_M2}
GLUCOSE BLD-MCNC: 104 MG/DL (ref 70–99)
GLUCOSE BLD-MCNC: 116 MG/DL (ref 70–99)
GLUCOSE SERPL-MCNC: 113 MG/DL (ref 70–99)
HCT VFR BLD AUTO: 35 % (ref 36–48)
HGB BLD-MCNC: 11.8 G/DL (ref 12–16)
PERFORMED ON: ABNORMAL
PERFORMED ON: ABNORMAL
POTASSIUM SERPL-SCNC: 3.6 MMOL/L (ref 3.5–5.1)
PROT SERPL-MCNC: 5.3 G/DL (ref 6.4–8.2)
SODIUM SERPL-SCNC: 133 MMOL/L (ref 136–145)

## 2023-09-11 PROCEDURE — 80053 COMPREHEN METABOLIC PANEL: CPT

## 2023-09-11 PROCEDURE — 99223 1ST HOSP IP/OBS HIGH 75: CPT | Performed by: INTERNAL MEDICINE

## 2023-09-11 PROCEDURE — 85014 HEMATOCRIT: CPT

## 2023-09-11 PROCEDURE — 2060000000 HC ICU INTERMEDIATE R&B

## 2023-09-11 PROCEDURE — 6370000000 HC RX 637 (ALT 250 FOR IP)

## 2023-09-11 PROCEDURE — 36415 COLL VENOUS BLD VENIPUNCTURE: CPT

## 2023-09-11 PROCEDURE — 85018 HEMOGLOBIN: CPT

## 2023-09-11 PROCEDURE — 6360000002 HC RX W HCPCS

## 2023-09-11 PROCEDURE — 94150 VITAL CAPACITY TEST: CPT

## 2023-09-11 PROCEDURE — 2580000003 HC RX 258

## 2023-09-11 PROCEDURE — C9113 INJ PANTOPRAZOLE SODIUM, VIA: HCPCS

## 2023-09-11 RX ORDER — METOPROLOL SUCCINATE 50 MG/1
50 TABLET, EXTENDED RELEASE ORAL DAILY
Status: CANCELLED | OUTPATIENT
Start: 2023-09-11

## 2023-09-11 RX ORDER — CEFDINIR 300 MG/1
CAPSULE ORAL
Status: ON HOLD | COMMUNITY
Start: 2023-09-05 | End: 2023-09-12 | Stop reason: HOSPADM

## 2023-09-11 RX ORDER — ONDANSETRON 4 MG/1
TABLET, FILM COATED ORAL
Status: ON HOLD | COMMUNITY
Start: 2023-09-05 | End: 2023-09-12 | Stop reason: HOSPADM

## 2023-09-11 RX ORDER — MINOCYCLINE HYDROCHLORIDE 100 MG/1
CAPSULE ORAL DAILY
COMMUNITY
Start: 2023-08-30

## 2023-09-11 RX ORDER — METOPROLOL SUCCINATE 50 MG/1
50 TABLET, EXTENDED RELEASE ORAL DAILY
Status: DISCONTINUED | OUTPATIENT
Start: 2023-09-11 | End: 2023-09-12 | Stop reason: HOSPADM

## 2023-09-11 RX ORDER — LATANOPROST 50 UG/ML
1 SOLUTION/ DROPS OPHTHALMIC NIGHTLY
Status: DISCONTINUED | OUTPATIENT
Start: 2023-09-11 | End: 2023-09-12 | Stop reason: HOSPADM

## 2023-09-11 RX ORDER — ATORVASTATIN CALCIUM 40 MG/1
40 TABLET, FILM COATED ORAL DAILY
Status: CANCELLED | OUTPATIENT
Start: 2023-09-11

## 2023-09-11 RX ORDER — OXYBUTYNIN CHLORIDE 5 MG/1
5 TABLET ORAL EVERY 8 HOURS SCHEDULED
Status: DISCONTINUED | OUTPATIENT
Start: 2023-09-11 | End: 2023-09-12 | Stop reason: HOSPADM

## 2023-09-11 RX ORDER — PANTOPRAZOLE SODIUM 40 MG/10ML
40 INJECTION, POWDER, LYOPHILIZED, FOR SOLUTION INTRAVENOUS 2 TIMES DAILY
Status: CANCELLED | OUTPATIENT
Start: 2023-09-11

## 2023-09-11 RX ORDER — ONDANSETRON 4 MG/1
4 TABLET, ORALLY DISINTEGRATING ORAL EVERY 8 HOURS PRN
Status: DISCONTINUED | OUTPATIENT
Start: 2023-09-11 | End: 2023-09-12 | Stop reason: HOSPADM

## 2023-09-11 RX ORDER — ACETAMINOPHEN 650 MG/1
650 SUPPOSITORY RECTAL EVERY 6 HOURS PRN
Status: DISCONTINUED | OUTPATIENT
Start: 2023-09-11 | End: 2023-09-12 | Stop reason: HOSPADM

## 2023-09-11 RX ORDER — LATANOPROST 50 UG/ML
1 SOLUTION/ DROPS OPHTHALMIC NIGHTLY
Status: CANCELLED | OUTPATIENT
Start: 2023-09-11

## 2023-09-11 RX ORDER — ONDANSETRON 2 MG/ML
4 INJECTION INTRAMUSCULAR; INTRAVENOUS EVERY 6 HOURS PRN
Status: CANCELLED | OUTPATIENT
Start: 2023-09-11

## 2023-09-11 RX ORDER — ONDANSETRON 4 MG/1
4 TABLET, ORALLY DISINTEGRATING ORAL EVERY 8 HOURS PRN
Status: CANCELLED | OUTPATIENT
Start: 2023-09-11

## 2023-09-11 RX ORDER — SODIUM CHLORIDE 0.9 % (FLUSH) 0.9 %
5-40 SYRINGE (ML) INJECTION PRN
Status: CANCELLED | OUTPATIENT
Start: 2023-09-11

## 2023-09-11 RX ORDER — SODIUM CHLORIDE 9 MG/ML
INJECTION, SOLUTION INTRAVENOUS CONTINUOUS
Status: CANCELLED | OUTPATIENT
Start: 2023-09-11

## 2023-09-11 RX ORDER — BISACODYL 5 MG/1
5 TABLET, DELAYED RELEASE ORAL DAILY PRN
COMMUNITY

## 2023-09-11 RX ORDER — DIVALPROEX SODIUM 125 MG/1
125 TABLET, DELAYED RELEASE ORAL
COMMUNITY
Start: 2023-08-29

## 2023-09-11 RX ORDER — SODIUM CHLORIDE, SODIUM LACTATE, POTASSIUM CHLORIDE, CALCIUM CHLORIDE 600; 310; 30; 20 MG/100ML; MG/100ML; MG/100ML; MG/100ML
INJECTION, SOLUTION INTRAVENOUS CONTINUOUS
Status: CANCELLED | OUTPATIENT
Start: 2023-09-11

## 2023-09-11 RX ORDER — MIRABEGRON 50 MG/1
TABLET, FILM COATED, EXTENDED RELEASE ORAL DAILY
Status: ON HOLD | COMMUNITY
Start: 2023-08-28 | End: 2023-09-12 | Stop reason: HOSPADM

## 2023-09-11 RX ORDER — SODIUM CHLORIDE 9 MG/ML
INJECTION, SOLUTION INTRAVENOUS PRN
Status: CANCELLED | OUTPATIENT
Start: 2023-09-11

## 2023-09-11 RX ORDER — HYDROCODONE BITARTRATE AND ACETAMINOPHEN 5; 325 MG/1; MG/1
TABLET ORAL
Status: ON HOLD | COMMUNITY
Start: 2023-09-05 | End: 2023-09-12 | Stop reason: SDUPTHER

## 2023-09-11 RX ORDER — SODIUM CHLORIDE 0.9 % (FLUSH) 0.9 %
5-40 SYRINGE (ML) INJECTION EVERY 12 HOURS SCHEDULED
Status: DISCONTINUED | OUTPATIENT
Start: 2023-09-11 | End: 2023-09-12 | Stop reason: HOSPADM

## 2023-09-11 RX ORDER — SODIUM CHLORIDE 9 MG/ML
INJECTION, SOLUTION INTRAVENOUS PRN
Status: DISCONTINUED | OUTPATIENT
Start: 2023-09-11 | End: 2023-09-12 | Stop reason: HOSPADM

## 2023-09-11 RX ORDER — SODIUM CHLORIDE 0.9 % (FLUSH) 0.9 %
5-40 SYRINGE (ML) INJECTION EVERY 12 HOURS SCHEDULED
Status: CANCELLED | OUTPATIENT
Start: 2023-09-11

## 2023-09-11 RX ORDER — ATORVASTATIN CALCIUM 40 MG/1
40 TABLET, FILM COATED ORAL DAILY
Status: DISCONTINUED | OUTPATIENT
Start: 2023-09-11 | End: 2023-09-12 | Stop reason: HOSPADM

## 2023-09-11 RX ORDER — ONDANSETRON 2 MG/ML
4 INJECTION INTRAMUSCULAR; INTRAVENOUS EVERY 6 HOURS PRN
Status: DISCONTINUED | OUTPATIENT
Start: 2023-09-11 | End: 2023-09-12 | Stop reason: HOSPADM

## 2023-09-11 RX ORDER — SODIUM CHLORIDE 0.9 % (FLUSH) 0.9 %
5-40 SYRINGE (ML) INJECTION PRN
Status: DISCONTINUED | OUTPATIENT
Start: 2023-09-11 | End: 2023-09-12 | Stop reason: HOSPADM

## 2023-09-11 RX ORDER — SOLIFENACIN SUCCINATE 10 MG/1
TABLET, FILM COATED ORAL DAILY
Status: ON HOLD | COMMUNITY
Start: 2023-08-25 | End: 2023-09-12 | Stop reason: HOSPADM

## 2023-09-11 RX ORDER — OXYBUTYNIN CHLORIDE 5 MG/1
5 TABLET ORAL EVERY 8 HOURS
Status: CANCELLED | OUTPATIENT
Start: 2023-09-11

## 2023-09-11 RX ORDER — SODIUM CHLORIDE 9 MG/ML
INJECTION, SOLUTION INTRAVENOUS CONTINUOUS
Status: DISCONTINUED | OUTPATIENT
Start: 2023-09-11 | End: 2023-09-12 | Stop reason: HOSPADM

## 2023-09-11 RX ORDER — ACETAMINOPHEN 325 MG/1
650 TABLET ORAL EVERY 6 HOURS PRN
Status: CANCELLED | OUTPATIENT
Start: 2023-09-11

## 2023-09-11 RX ORDER — PROMETHAZINE HYDROCHLORIDE 25 MG/1
SUPPOSITORY RECTAL
Status: ON HOLD | COMMUNITY
Start: 2023-09-05 | End: 2023-09-12 | Stop reason: HOSPADM

## 2023-09-11 RX ORDER — DIVALPROEX SODIUM 250 MG/1
TABLET, DELAYED RELEASE ORAL
COMMUNITY
Start: 2023-08-09

## 2023-09-11 RX ORDER — ACETAMINOPHEN 650 MG/1
650 SUPPOSITORY RECTAL EVERY 6 HOURS PRN
Status: CANCELLED | OUTPATIENT
Start: 2023-09-11

## 2023-09-11 RX ORDER — ACETAMINOPHEN 325 MG/1
650 TABLET ORAL EVERY 6 HOURS PRN
Status: DISCONTINUED | OUTPATIENT
Start: 2023-09-11 | End: 2023-09-12 | Stop reason: HOSPADM

## 2023-09-11 RX ORDER — PANTOPRAZOLE SODIUM 40 MG/10ML
40 INJECTION, POWDER, LYOPHILIZED, FOR SOLUTION INTRAVENOUS 2 TIMES DAILY
Status: DISCONTINUED | OUTPATIENT
Start: 2023-09-11 | End: 2023-09-12 | Stop reason: HOSPADM

## 2023-09-11 RX ADMIN — LATANOPROST 1 DROP: 50 SOLUTION OPHTHALMIC at 20:30

## 2023-09-11 RX ADMIN — ATORVASTATIN CALCIUM 40 MG: 40 TABLET, FILM COATED ORAL at 19:01

## 2023-09-11 RX ADMIN — PANTOPRAZOLE SODIUM 40 MG: 40 INJECTION, POWDER, FOR SOLUTION INTRAVENOUS at 19:01

## 2023-09-11 RX ADMIN — OXYBUTYNIN CHLORIDE 5 MG: 5 TABLET ORAL at 20:30

## 2023-09-11 RX ADMIN — SODIUM CHLORIDE: 9 INJECTION, SOLUTION INTRAVENOUS at 18:55

## 2023-09-11 RX ADMIN — Medication 10 ML: at 20:34

## 2023-09-11 RX ADMIN — METOPROLOL SUCCINATE 50 MG: 50 TABLET, EXTENDED RELEASE ORAL at 19:01

## 2023-09-11 NOTE — CARE COORDINATION
Case Management Assessment  Initial Evaluation    Date/Time of Evaluation: 9/11/2023 2:30 PM  Assessment Completed by: Sandra Guthrie RN    If patient is discharged prior to next notation, then this note serves as note for discharge by case management. Patient Name: Jovi Wang                   YOB: 1960  Diagnosis: GI bleed [K92.2]                   Date / Time: 9/11/2023  2:10 PM    Patient Admission Status: Inpatient   Readmission Risk (Low < 19, Mod (19-27), High > 27): No data recorded  Current PCP: Fredy Ji MD  PCP verified by ? Yes (chucho)    Chart Reviewed: Yes      History Provided by: Medical Record, Other (see comment) (caregiver)  Patient Orientation: Unable to Assess    Patient Cognition: Other (see comment) (keeps repeating \"have a nice day\")    Hospitalization in the last 30 days (Readmission):  No    If yes, Readmission Assessment in  Navigator will be completed. Advance Directives:      Code Status: Full Code   Patient's Primary Decision Maker is: Legal Next of Kin      Discharge Planning:    Patient lives with: Other (Comment) ([place]) Type of Home: Long-Term Care  Primary Care Giver: Other (Comment) (Onalee Bills)  Patient Support Systems include: Other (Comment) (Onalee Bills staff)   Current Financial resources: Medicaid, Medicare  Current community resources: ECF/Home Care  Current services prior to admission: Extended Care Facility            Current DME:              Type of Home Care services:  None    ADLS  Prior functional level: Assistance with the following:, Bathing, Dressing, Toileting, Cooking, Housework, Shopping, Mobility  Current functional level: Assistance with the following:, Bathing, Dressing, Toileting, Cooking, Housework, Shopping, Mobility    PT AM-PAC:   /24  OT AM-PAC:   /24    Family can provide assistance at DC:  Other (comment) (Onalee Bills)  Would you like Case Management to discuss the discharge plan with any other family

## 2023-09-11 NOTE — CONSULTS
Imaging:   CT Abdomen Pelvis W Contrast  Patient Full Name - Magen Fischer    Patient MRN: [id number]  Patient : 1960  Patient Age: 58-year-old  Patient Gender: Female  Order Date:2023 10:12 AM  EXAM: CT ABD AND PELVIS W/ CONTRAST  NUMBER OF IMAGES: 743  INDICATION: llq abd mass, abd pain  COMPARISON: 3/2/2021  TECHNOLOGIST NOTES:  Note Created: 2023 11:21 AM / [place] By: [email or web address]  159 IMAGES  2 SD    PRIOR CT A/P 3-2-21, 21 SENT FOR COMPARISON    PT C/O LLQ PAIN/MASS    SHX:  X2, CHOLECYSTECTOMY. ..  CT ABD AND PELVIS W/ CONTRAST  Technique: Low-dose CT acquisition technique included one of  following options; 1 . Automated exposure control, 2. Adjustment of  MA and or KV according to patient's size or 3. Use of iterative  reconstruction. Multiple computerized tomography sections of the abdomen with  sagittal and coronal MPR reconstructions were obtained from the top  of the diaphragm to the pelvis. The visualized portions of the  abdomen reveal:    Lung Bases: The lung bases are abnormal. There are bilateral  infiltrates at the lung bases, likely related to atelectasis . Hiatal Hernia : There is a moderate size hiatal hernia present  Liver: The liver is unremarkable  Gallbladder : The gallbladder appears to be absent. There is mild  biliary dilatation compatible with previous cholecystectomy . Portal Vein and Superior Mesenteric Vein : The portal vein appears  to be unremarkable . Gissell Brash The superior mesenteric vein appears to be  unremarkable . Spleen : The spleen is unremarkable. Kidneys: The kidneys are abnormal. There are lesions seen,  statistically likely cysts  Adrenals: There is a mass in the left adrenal gland measuring 2.5 x  2 cm. There is significant enhancement. The mass appears to be  solid. There is no low density present. . The findings do not meet  the criteria for an adenoma. The masses not significantly changed in  size. Direct comparison is limited.  The

## 2023-09-12 ENCOUNTER — ANESTHESIA (OUTPATIENT)
Dept: ENDOSCOPY | Age: 63
DRG: 368 | End: 2023-09-12
Payer: MEDICARE

## 2023-09-12 VITALS
DIASTOLIC BLOOD PRESSURE: 78 MMHG | TEMPERATURE: 98 F | HEIGHT: 68 IN | HEART RATE: 72 BPM | OXYGEN SATURATION: 94 % | BODY MASS INDEX: 44.41 KG/M2 | WEIGHT: 293 LBS | RESPIRATION RATE: 16 BRPM | SYSTOLIC BLOOD PRESSURE: 123 MMHG

## 2023-09-12 LAB
ALBUMIN SERPL-MCNC: 2.7 G/DL (ref 3.4–5)
ALBUMIN/GLOB SERPL: 1 {RATIO} (ref 1.1–2.2)
ALP SERPL-CCNC: 82 U/L (ref 40–129)
ALT SERPL-CCNC: 25 U/L (ref 10–40)
ANION GAP SERPL CALCULATED.3IONS-SCNC: 10 MMOL/L (ref 3–16)
APTT BLD: 37.3 SEC (ref 22.7–35.9)
AST SERPL-CCNC: 21 U/L (ref 15–37)
BASOPHILS # BLD: 0 K/UL (ref 0–0.2)
BASOPHILS NFR BLD: 0.8 %
BILIRUB SERPL-MCNC: 0.4 MG/DL (ref 0–1)
BUN SERPL-MCNC: 8 MG/DL (ref 7–20)
CALCIUM SERPL-MCNC: 7.9 MG/DL (ref 8.3–10.6)
CHLORIDE SERPL-SCNC: 101 MMOL/L (ref 99–110)
CO2 SERPL-SCNC: 23 MMOL/L (ref 21–32)
CREAT SERPL-MCNC: <0.5 MG/DL (ref 0.6–1.2)
DEPRECATED RDW RBC AUTO: 16.2 % (ref 12.4–15.4)
EOSINOPHIL # BLD: 0.2 K/UL (ref 0–0.6)
EOSINOPHIL NFR BLD: 4.2 %
GFR SERPLBLD CREATININE-BSD FMLA CKD-EPI: >60 ML/MIN/{1.73_M2}
GLUCOSE SERPL-MCNC: 83 MG/DL (ref 70–99)
HCT VFR BLD AUTO: 33.9 % (ref 36–48)
HCT VFR BLD AUTO: 34.7 % (ref 36–48)
HGB BLD-MCNC: 11.6 G/DL (ref 12–16)
HGB BLD-MCNC: 11.7 G/DL (ref 12–16)
INR PPP: 1.23 (ref 0.84–1.16)
IRON SATN MFR SERPL: 9 % (ref 15–50)
IRON SERPL-MCNC: 16 UG/DL (ref 37–145)
LYMPHOCYTES # BLD: 0.5 K/UL (ref 1–5.1)
LYMPHOCYTES NFR BLD: 8.9 %
MAGNESIUM SERPL-MCNC: 1.8 MG/DL (ref 1.8–2.4)
MCH RBC QN AUTO: 31 PG (ref 26–34)
MCHC RBC AUTO-ENTMCNC: 34.2 G/DL (ref 31–36)
MCV RBC AUTO: 90.6 FL (ref 80–100)
MONOCYTES # BLD: 0.5 K/UL (ref 0–1.3)
MONOCYTES NFR BLD: 9.4 %
NEUTROPHILS # BLD: 4.5 K/UL (ref 1.7–7.7)
NEUTROPHILS NFR BLD: 76.7 %
PLATELET # BLD AUTO: 201 K/UL (ref 135–450)
PMV BLD AUTO: 9 FL (ref 5–10.5)
POTASSIUM SERPL-SCNC: 3.4 MMOL/L (ref 3.5–5.1)
PROT SERPL-MCNC: 5.4 G/DL (ref 6.4–8.2)
PROTHROMBIN TIME: 15.5 SEC (ref 11.5–14.8)
RBC # BLD AUTO: 3.74 M/UL (ref 4–5.2)
SODIUM SERPL-SCNC: 134 MMOL/L (ref 136–145)
TIBC SERPL-MCNC: 181 UG/DL (ref 260–445)
WBC # BLD AUTO: 5.9 K/UL (ref 4–11)

## 2023-09-12 PROCEDURE — 6370000000 HC RX 637 (ALT 250 FOR IP)

## 2023-09-12 PROCEDURE — 85018 HEMOGLOBIN: CPT

## 2023-09-12 PROCEDURE — 83550 IRON BINDING TEST: CPT

## 2023-09-12 PROCEDURE — 80053 COMPREHEN METABOLIC PANEL: CPT

## 2023-09-12 PROCEDURE — 3609012400 HC EGD TRANSORAL BIOPSY SINGLE/MULTIPLE: Performed by: INTERNAL MEDICINE

## 2023-09-12 PROCEDURE — 6360000002 HC RX W HCPCS

## 2023-09-12 PROCEDURE — 2500000003 HC RX 250 WO HCPCS

## 2023-09-12 PROCEDURE — 6370000000 HC RX 637 (ALT 250 FOR IP): Performed by: INTERNAL MEDICINE

## 2023-09-12 PROCEDURE — 99239 HOSP IP/OBS DSCHRG MGMT >30: CPT | Performed by: INTERNAL MEDICINE

## 2023-09-12 PROCEDURE — 85730 THROMBOPLASTIN TIME PARTIAL: CPT

## 2023-09-12 PROCEDURE — 7100000010 HC PHASE II RECOVERY - FIRST 15 MIN: Performed by: INTERNAL MEDICINE

## 2023-09-12 PROCEDURE — 3700000000 HC ANESTHESIA ATTENDED CARE: Performed by: INTERNAL MEDICINE

## 2023-09-12 PROCEDURE — 85025 COMPLETE CBC W/AUTO DIFF WBC: CPT

## 2023-09-12 PROCEDURE — 88305 TISSUE EXAM BY PATHOLOGIST: CPT

## 2023-09-12 PROCEDURE — 3700000001 HC ADD 15 MINUTES (ANESTHESIA): Performed by: INTERNAL MEDICINE

## 2023-09-12 PROCEDURE — 7100000011 HC PHASE II RECOVERY - ADDTL 15 MIN: Performed by: INTERNAL MEDICINE

## 2023-09-12 PROCEDURE — 2709999900 HC NON-CHARGEABLE SUPPLY: Performed by: INTERNAL MEDICINE

## 2023-09-12 PROCEDURE — 83540 ASSAY OF IRON: CPT

## 2023-09-12 PROCEDURE — 85014 HEMATOCRIT: CPT

## 2023-09-12 PROCEDURE — 0DB68ZX EXCISION OF STOMACH, VIA NATURAL OR ARTIFICIAL OPENING ENDOSCOPIC, DIAGNOSTIC: ICD-10-PCS | Performed by: INTERNAL MEDICINE

## 2023-09-12 PROCEDURE — 85610 PROTHROMBIN TIME: CPT

## 2023-09-12 PROCEDURE — 83735 ASSAY OF MAGNESIUM: CPT

## 2023-09-12 RX ORDER — LABETALOL HYDROCHLORIDE 5 MG/ML
5 INJECTION, SOLUTION INTRAVENOUS EVERY 10 MIN PRN
Status: CANCELLED | OUTPATIENT
Start: 2023-09-12

## 2023-09-12 RX ORDER — DIVALPROEX SODIUM 250 MG/1
250 TABLET, DELAYED RELEASE ORAL EVERY 12 HOURS SCHEDULED
Status: DISCONTINUED | OUTPATIENT
Start: 2023-09-12 | End: 2023-09-12

## 2023-09-12 RX ORDER — DIVALPROEX SODIUM 125 MG/1
125 CAPSULE, COATED PELLETS ORAL DAILY
Status: DISCONTINUED | OUTPATIENT
Start: 2023-09-12 | End: 2023-09-12 | Stop reason: HOSPADM

## 2023-09-12 RX ORDER — DIVALPROEX SODIUM 125 MG/1
125 TABLET, DELAYED RELEASE ORAL EVERY 12 HOURS SCHEDULED
Status: DISCONTINUED | OUTPATIENT
Start: 2023-09-12 | End: 2023-09-12

## 2023-09-12 RX ORDER — OXYCODONE HYDROCHLORIDE 5 MG/1
5 TABLET ORAL PRN
Status: CANCELLED | OUTPATIENT
Start: 2023-09-12 | End: 2023-09-12

## 2023-09-12 RX ORDER — DIPHENHYDRAMINE HYDROCHLORIDE 50 MG/ML
12.5 INJECTION INTRAMUSCULAR; INTRAVENOUS
Status: CANCELLED | OUTPATIENT
Start: 2023-09-12 | End: 2023-09-13

## 2023-09-12 RX ORDER — OXYCODONE HYDROCHLORIDE 5 MG/1
10 TABLET ORAL PRN
Status: CANCELLED | OUTPATIENT
Start: 2023-09-12 | End: 2023-09-12

## 2023-09-12 RX ORDER — SODIUM CHLORIDE 9 MG/ML
INJECTION, SOLUTION INTRAVENOUS PRN
Status: CANCELLED | OUTPATIENT
Start: 2023-09-12

## 2023-09-12 RX ORDER — PROPOFOL 10 MG/ML
INJECTION, EMULSION INTRAVENOUS PRN
Status: DISCONTINUED | OUTPATIENT
Start: 2023-09-12 | End: 2023-09-12 | Stop reason: SDUPTHER

## 2023-09-12 RX ORDER — DIVALPROEX SODIUM 250 MG/1
250 TABLET, DELAYED RELEASE ORAL NIGHTLY
Status: DISCONTINUED | OUTPATIENT
Start: 2023-09-12 | End: 2023-09-12 | Stop reason: HOSPADM

## 2023-09-12 RX ORDER — ONDANSETRON 2 MG/ML
4 INJECTION INTRAMUSCULAR; INTRAVENOUS
Status: CANCELLED | OUTPATIENT
Start: 2023-09-12

## 2023-09-12 RX ORDER — HYDROCODONE BITARTRATE AND ACETAMINOPHEN 5; 325 MG/1; MG/1
1 TABLET ORAL EVERY 6 HOURS PRN
Qty: 8 TABLET | Refills: 0 | Status: SHIPPED | OUTPATIENT
Start: 2023-09-12 | End: 2023-09-14

## 2023-09-12 RX ORDER — LIDOCAINE 4 G/G
1 PATCH TOPICAL DAILY
Status: DISCONTINUED | OUTPATIENT
Start: 2023-09-12 | End: 2023-09-12 | Stop reason: HOSPADM

## 2023-09-12 RX ORDER — LIDOCAINE HYDROCHLORIDE 20 MG/ML
INJECTION, SOLUTION INFILTRATION; PERINEURAL PRN
Status: DISCONTINUED | OUTPATIENT
Start: 2023-09-12 | End: 2023-09-12 | Stop reason: SDUPTHER

## 2023-09-12 RX ORDER — MEPERIDINE HYDROCHLORIDE 25 MG/ML
12.5 INJECTION INTRAMUSCULAR; INTRAVENOUS; SUBCUTANEOUS EVERY 5 MIN PRN
Status: CANCELLED | OUTPATIENT
Start: 2023-09-12

## 2023-09-12 RX ORDER — HYDROCODONE BITARTRATE AND ACETAMINOPHEN 5; 325 MG/1; MG/1
1 TABLET ORAL EVERY 6 HOURS PRN
Status: DISCONTINUED | OUTPATIENT
Start: 2023-09-12 | End: 2023-09-12 | Stop reason: HOSPADM

## 2023-09-12 RX ORDER — SODIUM CHLORIDE 0.9 % (FLUSH) 0.9 %
5-40 SYRINGE (ML) INJECTION PRN
Status: CANCELLED | OUTPATIENT
Start: 2023-09-12

## 2023-09-12 RX ORDER — PANTOPRAZOLE SODIUM 40 MG/1
40 TABLET, DELAYED RELEASE ORAL
Qty: 30 TABLET | Refills: 0 | DISCHARGE
Start: 2023-09-12

## 2023-09-12 RX ORDER — SODIUM CHLORIDE 0.9 % (FLUSH) 0.9 %
5-40 SYRINGE (ML) INJECTION EVERY 12 HOURS SCHEDULED
Status: CANCELLED | OUTPATIENT
Start: 2023-09-12

## 2023-09-12 RX ADMIN — LIDOCAINE HYDROCHLORIDE 50 MG: 20 INJECTION, SOLUTION INFILTRATION; PERINEURAL at 09:19

## 2023-09-12 RX ADMIN — ACETAMINOPHEN 650 MG: 325 TABLET ORAL at 02:36

## 2023-09-12 RX ADMIN — PROPOFOL 50 MG: 10 INJECTION, EMULSION INTRAVENOUS at 09:19

## 2023-09-12 RX ADMIN — ATORVASTATIN CALCIUM 40 MG: 40 TABLET, FILM COATED ORAL at 11:04

## 2023-09-12 RX ADMIN — DIVALPROEX SODIUM 125 MG: 125 CAPSULE, COATED PELLETS ORAL at 11:04

## 2023-09-12 RX ADMIN — OXYBUTYNIN CHLORIDE 5 MG: 5 TABLET ORAL at 05:36

## 2023-09-12 RX ADMIN — METOPROLOL SUCCINATE 50 MG: 50 TABLET, EXTENDED RELEASE ORAL at 11:04

## 2023-09-12 RX ADMIN — HYDROCODONE BITARTRATE AND ACETAMINOPHEN 1 TABLET: 5; 325 TABLET ORAL at 05:35

## 2023-09-12 ASSESSMENT — PAIN - FUNCTIONAL ASSESSMENT
PAIN_FUNCTIONAL_ASSESSMENT: ACTIVITIES ARE NOT PREVENTED
PAIN_FUNCTIONAL_ASSESSMENT: ACTIVITIES ARE NOT PREVENTED
PAIN_FUNCTIONAL_ASSESSMENT: NONE - DENIES PAIN

## 2023-09-12 ASSESSMENT — PAIN SCALES - WONG BAKER
WONGBAKER_NUMERICALRESPONSE: 0
WONGBAKER_NUMERICALRESPONSE: 0

## 2023-09-12 ASSESSMENT — PAIN DESCRIPTION - ORIENTATION
ORIENTATION: LOWER
ORIENTATION: LOWER

## 2023-09-12 ASSESSMENT — PAIN SCALES - GENERAL
PAINLEVEL_OUTOF10: 4
PAINLEVEL_OUTOF10: 2
PAINLEVEL_OUTOF10: 3
PAINLEVEL_OUTOF10: 10

## 2023-09-12 ASSESSMENT — PAIN DESCRIPTION - LOCATION
LOCATION: BACK
LOCATION: BACK

## 2023-09-12 ASSESSMENT — PAIN DESCRIPTION - DESCRIPTORS
DESCRIPTORS: SHARP
DESCRIPTORS: SHARP

## 2023-09-12 NOTE — PROCEDURES
Endoscopy Note    Patient: Pierre Lam  : 1960      Procedure: Esophagogastroduodenoscopy with biopsies    Date:  2023     Surgeon:  Narendra Juan MD     Referring Physician:  Tila Barton MD      History:      INDICATION: Coffee-ground emesis     ASA: 3  SEDATION: MAC         Operative Surgeon: Narendra Juan MD  Scope Type: Gastroscope      Preoperative Diagnosis: Coffee-ground emesis  Postoperative Diagnosis: Gastritis hiatal hernia esophagitis    Procedure Performed: EGD    Procedure Details:    With the patient in left lateral position the endoscope was passed through the hypopharynx into the esophagus. The scope as then passed through the esophagus to the second portion of the duodenum. All visualized portions were carefully inspected. The gastric air was suctioned and the scope as removed. Patient tolerated the procedure well. Findings and maneuvers are discussed below. Complications:  None  Estimated Blood Loss: minimal to none    Post Operative Findings:   Esophagus: Normal upper and mid portions of the esophagus. There was LA grade a esophagitis at the GE junction  Stomach: 3 cm hiatal hernia appreciated on forward and retroflexion. There was erythema throughout the gastric body and antrum without ulceration or signs of overt bleeding. Biopsies were taken to rule out H. pylori  Duodenum: Normal-appearing    Plan: Follow-up biopsies. Okay to resume diet at this time. She has not had overt bleeding and her hemoglobin has been stable. Can restart anticoagulation if medically needed. Signed By: MD Narendra Sylvester MD,   [facility]  [phone]    Please note that some or all of this record was generated using voice recognition software. If there are any questions about the content of this document, please contact the author as some errors in translation may have occurred.

## 2023-09-12 NOTE — PLAN OF CARE
Problem: Chronic Conditions and Co-morbidities  Goal: Patient's chronic conditions and co-morbidity symptoms are monitored and maintained or improved  9/12/2023 0050 by Saint Sale, RN  Outcome: Progressing  Flowsheets (Taken 9/12/2023 0050)  Care Plan - Patient's Chronic Conditions and Co-Morbidity Symptoms are Monitored and Maintained or Improved: Monitor and assess patient's chronic conditions and comorbid symptoms for stability, deterioration, or improvement  9/11/2023 1956 by Naomy Garcia RN  Outcome: Progressing     Problem: Safety - Adult  Goal: Free from fall injury  Outcome: Progressing  Flowsheets (Taken 9/12/2023 0050)  Free From Fall Injury: Instruct family/caregiver on patient safety     Problem: Hematologic - Adult  Goal: Maintains hematologic stability  Outcome: Progressing  Flowsheets (Taken 9/12/2023 0050)  Maintains hematologic stability:   Assess for signs and symptoms of bleeding or hemorrhage   Monitor labs for bleeding or clotting disorders

## 2023-09-12 NOTE — PLAN OF CARE
Problem: Chronic Conditions and Co-morbidities  Goal: Patient's chronic conditions and co-morbidity symptoms are monitored and maintained or improved  9/12/2023 1329 by Magi Worthy RN  Outcome: Completed  9/12/2023 0050 by Ying Alonso RN  Outcome: Progressing  Flowsheets (Taken 9/12/2023 0050)  Care Plan - Patient's Chronic Conditions and Co-Morbidity Symptoms are Monitored and Maintained or Improved: Monitor and assess patient's chronic conditions and comorbid symptoms for stability, deterioration, or improvement     Problem: Discharge Planning  Goal: Discharge to home or other facility with appropriate resources  Outcome: Completed     Problem: Safety - Adult  Goal: Free from fall injury  9/12/2023 1329 by Magi Worthy RN  Outcome: Completed  9/12/2023 0050 by Ying Alonso RN  Outcome: Progressing  Flowsheets (Taken 9/12/2023 0050)  Free From Fall Injury: Instruct family/caregiver on patient safety     Problem: Hematologic - Adult  Goal: Maintains hematologic stability  9/12/2023 1329 by Magi Worthy RN  Outcome: Completed  9/12/2023 0050 by Ying Alonso RN  Outcome: Progressing  Flowsheets (Taken 9/12/2023 0050)  Maintains hematologic stability:   Assess for signs and symptoms of bleeding or hemorrhage   Monitor labs for bleeding or clotting disorders     Problem: Pain  Goal: Verbalizes/displays adequate comfort level or baseline comfort level  Outcome: Completed     Problem: Skin/Tissue Integrity  Goal: Absence of new skin breakdown  Description: 1. Monitor for areas of redness and/or skin breakdown  2. Assess vascular access sites hourly  3. Every 4-6 hours minimum:  Change oxygen saturation probe site  4. Every 4-6 hours:  If on nasal continuous positive airway pressure, respiratory therapy assess nares and determine need for appliance change or resting period.   Outcome: Completed Cyclophosphamide Counseling:  I discussed with the patient the risks of cyclophosphamide including but not limited to hair loss, hormonal abnormalities, decreased fertility, abdominal pain, diarrhea, nausea and vomiting, bone marrow suppression and infection. The patient understands that monitoring is required while taking this medication.

## 2023-09-12 NOTE — DISCHARGE INSTR - COC
Assessment Score:  Readmission Risk              Risk of Unplanned Readmission:  12           Discharging to Facility/ Agency   Name:   Address:  Phone:  Fax:    Dialysis Facility (if applicable)   Name:  Address:  Dialysis Schedule:  Phone:  Fax:    / signature: {Esignature:[id number]}    PHYSICIAN SECTION    Prognosis: Fair    Condition at Discharge: Stable    Rehab Potential (if transferring to Rehab): Good    Recommended Labs or Other Treatments After Discharge: none    Physician Certification: I certify the above information and transfer of Pattie Hess  is necessary for the continuing treatment of the diagnosis listed and that she requires [address] for greater 30 days.      Update Admission H&P: No change in H&P    PHYSICIAN SIGNATURE:  Electronically signed by Talat Alejandro MD on 9/12/23 at 11:28 AM EDT

## 2023-09-12 NOTE — H&P
[place]   Pre-operative History and Physical    Patient: Luc Hassan  : 1960  Acct#:     HISTORY OF PRESENT ILLNESS:    The patient is a 60 y.o. female who presents for admitted as a transfer from [facility] with subacute drop in hemoglobin and coffee-ground emesis. Multiple comorbidities prior anticoagulation which has been held. EGD to assess    Indications: Coffee-ground emesis    Past Medical History:        Diagnosis Date    Arthritis     CHF (congestive heart failure) ([address])     COVID-19 12/15/2020    ESBL (extended spectrum beta-lactamase) producing bacteria infection     Glaucoma     Hemorrhoids     HTN (hypertension)     Hyperlipidemia     Moderate protein-calorie malnutrition (HCC)     Morbid obesity (HCC)     Muscular wasting and disuse atrophy     PAF (paroxysmal atrial fibrillation) (HCC)     UTI (urinary tract infection)     VRE (vancomycin resistant enterococcus) culture positive 2021    urine      Past Surgical History:        Procedure Laterality Date    BLADDER REPAIR       SECTION      CHOLECYSTECTOMY      CYSTOSCOPY      TIBIA FRACTURE SURGERY Right 2020    RIGHT TIBIA IM NAIL INSERTION performed by Joseph Strauss MD at [address] [place] [address] 2020    EGD W/ANES. (8:00) COVID + performed by Tierney Moreno MD at [address] [place] [address]      Medications Prior to Admission:   No current facility-administered medications on file prior to encounter.      Current Outpatient Medications on File Prior to Encounter   Medication Sig Dispense Refill    bisacodyl (DULCOLAX) 5 MG EC tablet Take 1 tablet by mouth daily as needed for Constipation      HYDROcodone-acetaminophen (NORCO) 5-325 MG per tablet       promethazine (PHENERGAN) 25 MG suppository       ondansetron (ZOFRAN) 4 MG tablet       cefdinir (OMNICEF) 300 MG capsule       divalproex (DEPAKOTE) 125 MG DR tablet Take 1 tablet by mouth 125mg in the morning, and 250mg at
ASSESSMENT/PLAN:  #GI bleed  -with coffee ground emesis  -hemoccult +  -Hgb drop 16 --> 11  -CT abd pelvis unremarkable  -trend H&H, transfuse to maintain Hgb >7  -IV PPI  -clear liquid diet, NPO midnight  -GI consulted     #Hypotension - resolved   -present on initial admission but improved with IVF  -echo showed normal EF, grade 1 diastolic dysfunction     #Hyponatremia - resolved   -Na 125 at [place], improved to 132 with IVF     #Atelectasis vs Pneumonia on CXR  -favor atelectasis, patient asymptomatic, no leukocytosis or fever  -incentive spirometry     #Paroxysmal atrial fibrillation  #Secondary hypercoagulable state  -holding Eliquis 2/2 above  -currently rate controlled on metoprolol  -monitor telemetry     #Chronic diastolic heart failure   -appears overall well compensated   -echo as above  -monitor with IVF     #Hx of DVT/PE  -holding AC as above     #HLD  -on statin    #Glaucoma  -continue eye gtts    #Dementia  -resides in nursing home  -supportive care        Note GI bleed makes patient higher risk for morbidity and mortality requiring testing and treatment. DVT Prophylaxis: SCDs  Diet: Diet NPO Exceptions are: Ice Chips  ADULT DIET; Clear Liquid  Code Status: Full Code    Mingo Saeed PA-C  9/11/2023  3:04 PM    Agree with above  SHAN Knutson.

## 2023-09-12 NOTE — CARE COORDINATION
DISCHARGE ORDER  Date/Time 2023 11:52 AM  Completed by: You Lawson RN, Case Management    Patient Name: Melly Lopez    : 1960      Admit order Date and Status:ip   Noted discharge order. (verify MD's last order for status of admission/Traditional Medicare 3 [place] Inpatient qualifying stay required for SNF)    Confirmed discharge plan with:              Patient:  Yes              When pt confirms DC plan does any support person need to be contacted by CM No                    Discharge to Facility: [facility] phone number for staff giving report: [phone]   Pre-certification completed: [facility] Exemption Notification (HENS) completed: na   Discharge orders and Continuity of Care faxed to facility:  [facility]      Transportation:               Medical Transport explained with choice list offered to pt/family. Choice:(no preference)  Agency used: [facility]   time:   56 [phone]      Pt/family/Nursing/Facility aware of  time:   Yes Names: Pedrito Marquez, [place], pt  Ambulance form completed:  yes:      Date Last IMM Given: 23    Comments:Met with pt at bedside. Pt is agreeable to DC back to [facility]. [facility] aware of DC back today.  for Blanche Payment that pt is returning to [facility] today    Pt is being d/c'd to [facility] today. Pt's O2 sats are 94% on ra. Discharge timeout done with rn, cm, pt. All discharge needs and concerns addressed. Discharging nurse to complete ERNA, reconcile AVS, and place final copy with patient's discharge packet. Discharging RN to ensure that written prescriptions for  Level II medications are sent with patient to the facility as per protocol.

## 2023-09-12 NOTE — PROGRESS NOTES
[facility] notified. Pt going back to room in stable condition.
4 Eyes Skin Assessment     NAME:  Kezia Salazar  YOB: 1960  MEDICAL RECORD NUMBER:  [id number]    The patient is being assessed for  Admission    I agree that at least one RN has performed a thorough Head to Toe Skin Assessment on the patient. ALL assessment sites listed below have been assessed. Areas assessed by both nurses:    Head, Face, Ears, Shoulders, Back, Chest, Arms, Elbows, Hands, Sacrum. Buttock, Coccyx, Ischium, Legs. Feet and Heels, and Under Medical Devices         Does the Patient have a Wound?  No noted wound(s)       Abran Prevention initiated by RN: No  Wound Care Orders initiated by RN: No    Pressure Injury (Stage 3,4, Unstageable, DTI, NWPT, and Complex wounds) if present, place Wound referral order by RN under : No    New Ostomies, if present place, Ostomy referral order under : No     Nurse 1 eSignature: Electronically signed by Mary Cline RN on 9/11/23 at 6:53 PM EDT    **SHARE this note so that the co-signing nurse can place an eSignature**    Nurse 2 eSignature: Electronically signed by Taye León RN on 9/11/23 at 6:56 PM EDT
Admission questions unable to be fully completed at this time. The Pt is AOX1 and is a very poor historian. This writer attempted both numbers in the contact list and both did not have voicemails.
Endo report called to bedside PCU RNShamir. Pt now in same day recovering. Bedside report given to recovery RNRita. Pt glasses at bedside.
Handoff report given to [facility]. Patient is in stable condition and has no needs at this time. Call light is in reach and bed is in the lowest position. Care is transferred at this time.
Pre-Operative:  1. Patient/Caregiver identifies - states name and date of birth. 2.  The patient is free from signs and symptoms of injury. 3.  The patient receives appropriate medication(s), safely administered during the Perioperative period. 4.  The patients's fluid, electrolyte, and acid-base balances are established preoperatively. 5.  The patient's pulmonary function is established preoperatively. 6.  The patient's cardiovascular status is established preoperatively. 7.  The patient / caregiver demonstrates knowledge of nutritional management related to the operative or other invasive procedure. 8.  The patient/caregiver demonstrates knowledge of medication management. 9.  The patient/caregiver demonstrates knowledge of pain management. 10.  The patient/caregiver participates in decisions affection his or her Perioperative plan of care. 11. The patient's care is consistent with the individualized Perioperative plan of care. 12.  The patient's right to privacy is maintained. 13. The patient is the recipient of competent and ethical care within legal standards of practice. 14.  The patient's value system, lifestyle, ethnicity, and culture are considered, respected, and incorporated in the Perioperative plan of care and understands special services available. 15.  The patient demonstrates and/or reports adequate pain control throughout the the Perioperative period. 16.  The patient's neurological status is established preoperatively. 17.  The patient/caregiver demonstrates knowledge of the expected responses to the endoscopy procedure. 18.  Patient/Caregiver has reduced anxiety. Interventions- Familiarize with environment and equipment. 19. Patient/Caregiver verbalizes understanding of Phase II and/or Phase I process. 20.  Patient pain level is established preoperatively using age appropriate pain scale. 21.   The patient will move to fall risk upon sedation- during and through the recovery
Report received from PCU RN, Redd Dalton. Request for transport placed.
Shift assessment completed. Vital signs stable. Pt repositioned for comfort. No needs or complaints at this time.
This RN called patient's son, Clarice Pena to get verbal consent for procedure and anesthesia. Patient's son reports that he and his sister Shamir Jasso have shared POA for the patient. Patient's son states that Shamir Jasso has the documents but is out of the country on a mission trip\". Consents for EGD procedure with Dr. Christiane Dudley are signed with a 2nd nurse verification.
This RN contacted [facility]  ([phone])  to obtain pre-procedure check in information. This RN spoke to [place].
baseline levels established preoperatively. 23.  The patient/caregiver demonstrates knowledge of the expected responses to the operative or invasive procedure. 20.  Patient/Caregiver has reduced anxiety. Interventions- Familiarize with environment and equipment.

## 2023-09-12 NOTE — ANESTHESIA POSTPROCEDURE EVALUATION
09/12/2023 05:46 AM        CO2                      23                  09/12/2023 05:46 AM        BUN                      8                   09/12/2023 05:46 AM        CREATININE               <0.5 (L)            09/12/2023 05:46 AM        GLUCOSE                  83                  09/12/2023 05:46 AM   COAGS  Lab Results       Component                Value               Date/Time                  PROTIME                  15.5 (H)            09/12/2023 05:46 AM        INR                      1.23 (H)            09/12/2023 05:46 AM        APTT                     37.3 (H)            09/12/2023 05:46 AM     Intake & Output:  @81LLPG@    Nausea & Vomiting:  No    Level of Consciousness:  Awake    Pain Assessment:  Adequate analgesia    Anesthesia Complications:  No apparent anesthetic complications    SUMMARY      Vital signs stable  OK to discharge from Stage I post anesthesia care.   Care transferred from Anesthesiology department on discharge from perioperative area

## 2023-09-15 ENCOUNTER — TELEPHONE (OUTPATIENT)
Dept: FAMILY MEDICINE CLINIC | Age: 63
End: 2023-09-15

## 2023-09-15 NOTE — TELEPHONE ENCOUNTER
Care Transitions Initial Follow Up Call    Outreach made within 2 business days of discharge: Yes     Pt placed in a skilled nursing facility.

## 2023-10-01 NOTE — PLAN OF CARE
Increase safety and independence with functional mobility.
Problem: Bleeding:  Goal: Will show no signs and symptoms of excessive bleeding  Description: Will show no signs and symptoms of excessive bleeding  Outcome: Ongoing   Pt is on protonix gtt and H/H is being monitored q6h. Pt has not had any bloody emesis or blood BM during this shift. Problem: Falls - Risk of:  Goal: Will remain free from falls  Description: Will remain free from falls  Outcome: Ongoing   Pt's bed is in lowest position, brake and bed exit alarm are on, call light and bed side table are within reach. Problem: Skin Integrity:  Goal: Absence of new skin breakdown  Description: Absence of new skin breakdown  Outcome: Ongoing   Pt is being turned every two hours to decrease of skin breakdown. Pt has sequential compression devices on calves to decrease risk of blood clots. Problem: Pain:  Goal: Patient's pain/discomfort is manageable  Description: Patient's pain/discomfort is manageable  Outcome: Ongoing   Pt has denied pain during shift.
Problem: Falls - Risk of:  Goal: Will remain free from falls  Description: Will remain free from falls  3/3/2021 1338 by Carolyn Theodore RN  Outcome: Ongoing  Note: Pt continues to be a high fall risk. Pt has bed alarm in place. Pt has call light within reach. Problem: Skin Integrity:  Goal: Absence of new skin breakdown  Description: Absence of new skin breakdown  3/3/2021 1338 by Carolyn Theodore RN  Outcome: Ongoing  Note: No new skin issues noted. Pt has scattered bruising noted to BUE. Problem: Daily Care:  Goal: Daily care needs are met  Description: Daily care needs are met  Outcome: Ongoing  Note: Daily care provided, pt able to make some needs known.
Problem: Falls - Risk of:  Goal: Will remain free from falls  Description: Will remain free from falls  Outcome: Ongoing  Note: Pt continues to be a high fall risk. Pt has bed alarm in place. Pt has call light within reach. Problem: Skin Integrity:  Goal: Absence of new skin breakdown  Description: Absence of new skin breakdown  Outcome: Ongoing  Note: No new skin issues noted. Pt has scattered bruising noted to BUE. Problem: Daily Care:  Goal: Daily care needs are met  Description: Daily care needs are met  Outcome: Ongoing  Note: Daily care provided, pt able to make some needs known.
Problem: Falls - Risk of:  Goal: Will remain free from falls  Description: Will remain free from falls  Outcome: Ongoing  Note: Pt free from injury or falls at this time, fall precautions in place, bed in low position, side rail up x2, Hernandez Fall Risk: HIGH bed alarm on, reoriented to room and call light, reminded not to get up without assistance. Pt verbalizes understanding of fall risk procedures. Will continue with hourly rounds for PO intake, pain needs, toileting, and repositioning as needed. No needs expressed at this time. Call light in reach, will continue to monitor. Problem: Skin Integrity:  Goal: Will show no infection signs and symptoms  Description: Will show no infection signs and symptoms  Outcome: Ongoing  Note: Pt [id number] per protocol. Abran precautions in place. Pt incontinent, so changed and barrier cream applied as indicated. Purewick applied for urine collection. Will continue to monitor. Problem: Bleeding:  Goal: Will show no signs and symptoms of excessive bleeding  Description: Will show no signs and symptoms of excessive bleeding  Outcome: Ongoing  Note: Pt had one bowel movement towards end of shift. Appearance was large brown, soft and watery stool with no evidence of blood. Pt changed and clean appropriately. Will continue to monitor.
Problem: Falls - Risk of:  Goal: Will remain free from falls  Description: Will remain free from falls  Outcome: Ongoing  Note: Pt free from injury or falls at this time, fall precautions in place, bed in low position, side rail up x2, Hernandez Fall Risk: HIGH, bed alarm on, reoriented to room and call light, reminded not to get up without assistance. Pt verbalizes understanding of fall risk procedures. Will continue with hourly rounds for PO intake, pain needs, toileting, and repositioning as needed. No needs expressed at this time. Call light in reach, will continue to monitor. Problem: Skin Integrity:  Goal: Will show no infection signs and symptoms  Description: Will show no infection signs and symptoms  Outcome: Ongoing  Note: Pt [id number], bathed and changed as needed due to incontinence. Purewick in place. Barrier cream applied. Problem: Nutrition  Goal: Optimal nutrition therapy  Outcome: Ongoing  Note: Speech evaluated pt and discussed with RN and MD. RN can advance diet as tolerated. Currently on dental soft diet. Pt averages 25-50% consumption of each meal. Pt swallows pills whole with water. No complications noted at this time. No s/s of nausea or vomiting. Will continue to monitor.   \
Problem: Nutrition  Intervention: Swallowing evaluation  SLP completed evaluation. Please refer to notes in EMR.     Melissa Patino M.A., [address]  Speech-Language Pathologist
Problem: Pain:  Goal: Pain level will decrease  Description: Pain level will decrease  Outcome: Ongoing  Note: Patient has stated no pain throughout the shift but she has stated some discomfort when being moved. Patient has felt better once repositioned. Will reassess as needed. Problem: Nutrition  Goal: Optimal nutrition therapy  Outcome: Ongoing  Note: Patient was able to drink all of nightly ensure and was able to eat some of ice cream. Patient has been willing to eat and was able to take all medications. Patient has been tolerating diet well.
Problem: Pain:  Goal: Patient's pain/discomfort is manageable  Description: Patient's pain/discomfort is manageable  Outcome: Ongoing  Note: Patient has stated no pain. Patient has also shown no signs of pain throughout the shift. Will reassess as needed. Problem: Nutrition  Goal: Optimal nutrition therapy  Outcome: Ongoing  Note: Patient has been tolerating dental soft diet. Patient was able to eat 50% of meal. Patient has stated no nausea or shown no vomiting.
Problem: Pain:  Goal: Patient's pain/discomfort is manageable  Description: Patient's pain/discomfort is manageable  Outcome: Ongoing  Note: Will continue to assess pain level and provide relief when available. Problem: Skin Integrity:  Goal: Will show no infection signs and symptoms  Description: Will show no infection signs and symptoms  Outcome: Ongoing  Note: Will continue to monitor for new skin break down and apply barrier cream as needed. Also ordered speciality bed.
Problem: Skin Integrity:  Goal: Absence of new skin breakdown  Description: Absence of new skin breakdown  Outcome: Ongoing     Problem: Bleeding:  Goal: Will show no signs and symptoms of excessive bleeding  Description: Will show no signs and symptoms of excessive bleeding  3/5/2021 2229 by Nehemiah Webb RN  Outcome: Ongoing     Problem: Nutrition  Goal: Optimal nutrition therapy  Outcome: Ongoing
Problem: Skin Integrity:  Goal: Will show no infection signs and symptoms  Description: Will show no infection signs and symptoms  Outcome: Ongoing  Note: Will continue to monitor skin     Problem: Safety:  Goal: Free from accidental physical injury  Description: Free from accidental physical injury  Outcome: Ongoing  Note: Pt bed in low position and alarm on. Non skid socks on. Belongings, bedside table, and call light within reach. 2/4 side rails up. Will continue to monitor. Problem: Pain:  Goal: Pain level will decrease  Description: Pain level will decrease  3/11/2021 0102 by Gill Wren  Outcome: Ongoing  Note: Patient has stated no pain throughout the shift but she has stated some discomfort when being moved. Patient has felt better once repositioned. Will reassess as needed. Problem: Nutrition  Goal: Optimal nutrition therapy  3/11/2021 0102 by Gill Wren  Outcome: Ongoing  Note: Patient was able to drink all of nightly ensure and was able to eat some of ice cream. Patient has been willing to eat and was able to take all medications. Patient has been tolerating diet well.
Pt will increase ADL independence and transfers for ADLs.
No

## (undated) DEVICE — SPLINT KNEE UNIV FOR LESS THAN 36IN L20IN FOAM LAM E CNTCT

## (undated) DEVICE — SPONGE,LAP,18"X18",DLX,XR,ST,5/PK,40/PK: Brand: MEDLINE

## (undated) DEVICE — 4.0MM LONG AO PILOT DRILL: Brand: TRIGEN

## (undated) DEVICE — SYRINGE MED 10ML SLIP TIP BLNT FILL AND LUERLOCK DISP

## (undated) DEVICE — SPONGE LAP W18XL18IN WHT COT 4 PLY FLD STRUNG RADPQ DISP ST

## (undated) DEVICE — ENDOSCOPIC KIT 2 12 FT OP4 DE2 GWN SYR

## (undated) DEVICE — SYRINGE BLB 50CC IRRIG PLIABLE FNGR FLNG GRAD FLSK DISP

## (undated) DEVICE — 3M™ IOBAN™ 2 ANTIMICROBIAL INCISE DRAPE 6640EZ: Brand: IOBAN™ 2

## (undated) DEVICE — TOWEL,OR,DSP,ST,BLUE,STD,6/PK,12PK/CS: Brand: MEDLINE

## (undated) DEVICE — SPONGE,PEANUT,XRAY,ST,SM,3/8",5/CARD: Brand: MEDLINE INDUSTRIES, INC.

## (undated) DEVICE — SUTURE VCRL + SZ 0 L18IN ABSRB UD L36MM CT-1 1/2 CIR VCP840D

## (undated) DEVICE — GOWN,SIRUS,POLYRNF,BRTHSLV,LG,30/CS: Brand: MEDLINE

## (undated) DEVICE — BLADELESS OBTURATOR, LONG: Brand: WECK VISTA

## (undated) DEVICE — PADDING CAST W4INXL4YD NONSTERILE COT RAYON MICROPLEATED

## (undated) DEVICE — TROCAR: Brand: KII FIOS FIRST ENTRY

## (undated) DEVICE — CANNULA SEAL

## (undated) DEVICE — SUTURE VCRL + SZ 3-0 L18IN ABSRB UD SH 1/2 CIR TAPERCUT NDL VCP864D

## (undated) DEVICE — STERILE POLYISOPRENE POWDER-FREE SURGICAL GLOVES: Brand: PROTEXIS

## (undated) DEVICE — SUTURE VCRL SZ 2-0 L27IN ABSRB UD L26MM SH 1/2 CIR J417H

## (undated) DEVICE — Z CONVERTED USE 2273164 BANDAGE COMPR W4INXL4 1/2YD E EC SGL LAYERED CLP CLSR ECONO

## (undated) DEVICE — SUTURE MCRYL SZ 4-0 L18IN ABSRB UD L19MM PS-2 3/8 CIR PRIM Y496G

## (undated) DEVICE — VELCLOSE LATEX FREE VELCRO ELASTIC BANDAGE 6INX5YD: Brand: VELCLOSE

## (undated) DEVICE — JEWISH HOSPITAL TURNOVER KIT: Brand: MEDLINE INDUSTRIES, INC.

## (undated) DEVICE — SUTURE VCRL SZ 0 L27IN ABSRB UD L26MM CT-2 1/2 CIR J270H

## (undated) DEVICE — CONMED SCOPE SAVER BITE BLOCK, 20X27 MM: Brand: SCOPE SAVER

## (undated) DEVICE — NEEDLE INSUF L150MM DIA2MM DISP FOR PNEUMOPERI ENDOPATH

## (undated) DEVICE — SYSTEM SMK EVAC LAP TBNG FILTER HSNG BENT STYL PNK SEE CLR

## (undated) DEVICE — SURESHOT META STANDARD DRILL GUIDE PROBE: Brand: TRIGEN

## (undated) DEVICE — 3.0MM X 1000MM BALL TIP GUIDE ROD: Brand: TRIGEN

## (undated) DEVICE — 40583 XL ADVANCED TRENDELENBURG POSITIONING KIT: Brand: 40583 XL ADVANCED TRENDELENBURG POSITIONING KIT

## (undated) DEVICE — 1010 S-DRAPE TOWEL DRAPE 10/BX: Brand: STERI-DRAPE™

## (undated) DEVICE — GOWN,SIRUS,POLYRNF,BRTHSLV,XL,30/CS: Brand: MEDLINE

## (undated) DEVICE — Z CONVERTED USE 2271377 BANDAGE COMPR W6INXL5YD EC E REB

## (undated) DEVICE — SUTURE MCRYL + SZ 4-0 L18IN ABSRB UD L19MM PS-2 3/8 CIR MCP496G

## (undated) DEVICE — PLATE ES AD W 9FT CRD 2

## (undated) DEVICE — ELECTRODE ECG MONITR FOAM TEAR DROP ADLT RED

## (undated) DEVICE — DRESSING FOAM W4XL10IN AG SIL ADH ANTIMIC POSTOP OPTIFOAM

## (undated) DEVICE — ENDO CARRY-ON PROCEDURE KIT INCLUDES SUCTION TUBING, LUBRICANT, GAUZE, BIOHAZARD STICKER, TRANSPORT PAD AND INTERCEPT BEDSIDE KIT.: Brand: ENDO CARRY-ON PROCEDURE KIT

## (undated) DEVICE — PUMP SUC IRR TBNG L10FT W/ HNDPC ASSEMB STRYKEFLOW 2

## (undated) DEVICE — [HIGH FLOW INSUFFLATOR,  DO NOT USE IF PACKAGE IS DAMAGED,  KEEP DRY,  KEEP AWAY FROM SUNLIGHT,  PROTECT FROM HEAT AND RADIOACTIVE SOURCES.]: Brand: PNEUMOSURE

## (undated) DEVICE — TROCAR: Brand: KII OPTICAL ACCESS SYSTEM

## (undated) DEVICE — SUTURE VCRL + SZ 2-0 L18IN ABSRB UD CT1 L36MM 1/2 CIR VCP839D

## (undated) DEVICE — SUTURE VCRL SZ 0 L54IN ABSRB VLT W/O NDL POLYGLACTIN 910 J616H

## (undated) DEVICE — GLOVE SURG SZ 75 L12IN THK75MIL DK GRN LTX FREE

## (undated) DEVICE — C-ARMOR C-ARM EQUIPMENT COVERS CLEAR STERILE UNIVERSAL FIT 12 PER CASE: Brand: C-ARMOR

## (undated) DEVICE — CUFF 44 DPSB DISP CNTOUR

## (undated) DEVICE — STERILE PADS FOR KNEE POSITIONING TRIANGLES™ (10/CASE): Brand: KNEE POSITIONING TRIANGLES™

## (undated) DEVICE — ARM DRAPE

## (undated) DEVICE — NEEDLE HYPO 22GA L1.5IN BLK POLYPR HUB S STL REG BVL STR

## (undated) DEVICE — 3M™ STERI-DRAPE™ U-DRAPE 1015: Brand: STERI-DRAPE™

## (undated) DEVICE — YANKAUER,BULB TIP,W/O VENT,RIGID,STERILE: Brand: MEDLINE

## (undated) DEVICE — SUTURE ETHLN SZ 3-0 L30IN NONABSORBABLE BLK FSL L30MM 3/8 1671H

## (undated) DEVICE — GARMENT,MEDLINE,DVT,INT,CALF,MED, GEN2: Brand: MEDLINE

## (undated) DEVICE — ELECTROSURGICAL PENCIL ROCKER SWITCH NON COATED BLADE ELECTRODE 10 FT (3 M) CORD HOLSTER: Brand: MEGADYNE

## (undated) DEVICE — STERILE LATEX POWDER-FREE SURGICAL GLOVESWITH NITRILE COATING: Brand: PROTEXIS

## (undated) DEVICE — GLOVE ORANGE PI 7   MSG9070

## (undated) DEVICE — LAPAROSCOPIC SCISSORS: Brand: EPIX LAPAROSCOPIC SCISSORS

## (undated) DEVICE — FORCEP BX STD CAP 240CM RAD JAW 4

## (undated) DEVICE — SHEET,DRAPE,53X77,STERILE: Brand: MEDLINE

## (undated) DEVICE — APPLICATOR MEDICATED 26 CC SOLUTION HI LT ORNG CHLORAPREP

## (undated) DEVICE — PENCIL SMK EVAC ALL IN 1 DSGN ENH VISIBILITY IMPROVED AIR

## (undated) DEVICE — CANNULA,OXY,ADULT,SUPERSOFT,W/7'TUB,SC: Brand: MEDLINE INDUSTRIES, INC.

## (undated) DEVICE — SUTURE VCRL + SZ 2-0 L18IN ABSRB VLT L26MM SH 1/2 CIR VCP775D

## (undated) DEVICE — DRESSING ALG W4XL8IN AG FOAM SUPERABSORBENT SIL ANTIMIC

## (undated) DEVICE — SPLINT ORTH W4XL30IN LAYERED FBRGLS FOAM PD BRTH BK MOLD

## (undated) DEVICE — SOLUTION ANTIFOG VIS SYS CLEARIFY LAPSCP

## (undated) DEVICE — ADHESIVE SKIN CLSR 0.7ML TOP DERMBND ADV

## (undated) DEVICE — GAUZE,SPONGE,4"X4",8PLY,STRL,LF,10/TRAY: Brand: MEDLINE

## (undated) DEVICE — SUTURE VCRL SZ 4-0 L18IN ABSRB UD L19MM PS-2 3/8 CIR PRIM J496H

## (undated) DEVICE — SURE SET-DOUBLE BASIN-LF: Brand: MEDLINE INDUSTRIES, INC.

## (undated) DEVICE — Device

## (undated) DEVICE — DRAPE,LAP,CHOLE,W/TROUGHS,STERILE: Brand: MEDLINE

## (undated) DEVICE — INTENDED FOR TISSUE SEPARATION, AND OTHER PROCEDURES THAT REQUIRE A SHARP SURGICAL BLADE TO PUNCTURE OR CUT.: Brand: BARD-PARKER ® STAINLESS STEEL BLADES

## (undated) DEVICE — 4.0MM SURESHOT SHORIGHT AO DRILL: Brand: TRIGEN

## (undated) DEVICE — SUTURE ETHBND EXCEL SZ 0 L30IN NONABSORBABLE GRN L26MM SH X834H

## (undated) DEVICE — STAPLER EXT SKIN 35 WIDE S STL STPL SQUEEZE HNDL VISISTAT

## (undated) DEVICE — TRAY CATHETER 16FR F INCLUDE BARDX IC COMPLT CARE DRNGE BG

## (undated) DEVICE — SURGICAL SET UP - SURE SET: Brand: MEDLINE INDUSTRIES, INC.